# Patient Record
Sex: FEMALE | Race: WHITE | NOT HISPANIC OR LATINO | Employment: FULL TIME | URBAN - METROPOLITAN AREA
[De-identification: names, ages, dates, MRNs, and addresses within clinical notes are randomized per-mention and may not be internally consistent; named-entity substitution may affect disease eponyms.]

---

## 2018-07-04 ENCOUNTER — HOSPITAL ENCOUNTER (EMERGENCY)
Facility: HOSPITAL | Age: 38
Discharge: HOME | End: 2018-07-05
Attending: EMERGENCY MEDICINE | Admitting: EMERGENCY MEDICINE
Payer: COMMERCIAL

## 2018-07-04 DIAGNOSIS — F10.929 ALCOHOL INTOXICATION (HCC): Primary | ICD-10-CM

## 2018-07-04 LAB
AMPHETAMINES SERPL QL SCN: NEGATIVE
BARBITURATES UR QL: NEGATIVE
BENZODIAZ UR QL: NEGATIVE
COCAINE UR QL: NEGATIVE
ETHANOL EXG-MCNC: 0.26 MG/DL
EXT PREG TEST URINE: NEGATIVE
METHADONE UR QL: NEGATIVE
OPIATES UR QL SCN: NEGATIVE
PCP UR QL: NEGATIVE
THC UR QL: NEGATIVE

## 2018-07-04 PROCEDURE — 81025 URINE PREGNANCY TEST: CPT | Performed by: EMERGENCY MEDICINE

## 2018-07-04 PROCEDURE — 82075 ASSAY OF BREATH ETHANOL: CPT | Performed by: EMERGENCY MEDICINE

## 2018-07-04 PROCEDURE — 80307 DRUG TEST PRSMV CHEM ANLYZR: CPT | Performed by: EMERGENCY MEDICINE

## 2018-07-04 RX ORDER — NICOTINE 21 MG/24HR
14 PATCH, TRANSDERMAL 24 HOURS TRANSDERMAL ONCE
Status: DISCONTINUED | OUTPATIENT
Start: 2018-07-04 | End: 2018-07-04

## 2018-07-04 RX ORDER — NICOTINE 21 MG/24HR
14 PATCH, TRANSDERMAL 24 HOURS TRANSDERMAL DAILY PRN
Status: DISCONTINUED | OUTPATIENT
Start: 2018-07-04 | End: 2018-07-05 | Stop reason: HOSPADM

## 2018-07-04 RX ORDER — LORAZEPAM 1 MG/1
0.5 TABLET ORAL EVERY 6 HOURS PRN
COMMUNITY
End: 2018-09-19 | Stop reason: ALTCHOICE

## 2018-07-04 RX ADMIN — NICOTINE 14 MG: 14 PATCH, EXTENDED RELEASE TRANSDERMAL at 22:58

## 2018-07-04 NOTE — ED NOTES
On arrival, patient is intoxicated, uncooperative and intermittently physically combative  Patient refused to change into scrubs or give her purse to staff  Patient stated "I am due for my ativan", explained to patient that her belongings will be locked up and that if she is due for medication we will provide it for her, patient began to open her bottle of ativan, security called to bedside at this time however patient did take one 1mg tablet of ativan before staff could get the pills out of her hand  Security assisted patient to change and patient's money and credit cards locked up with security, all other belongings to be inventoried and placed in zone 5 med room  Patients medications placed in bag and sent to pharmacy  Patient ambulatory to bathroom with ED tech and is providing a urine sample at this time        Tiburcio Hazel RN  07/04/18 4201

## 2018-07-05 VITALS
HEART RATE: 82 BPM | SYSTOLIC BLOOD PRESSURE: 130 MMHG | TEMPERATURE: 98.7 F | WEIGHT: 130 LBS | RESPIRATION RATE: 18 BRPM | BODY MASS INDEX: 24.56 KG/M2 | OXYGEN SATURATION: 99 % | DIASTOLIC BLOOD PRESSURE: 84 MMHG

## 2018-07-05 LAB — ETHANOL EXG-MCNC: 0.15 MG/DL

## 2018-07-05 PROCEDURE — 99285 EMERGENCY DEPT VISIT HI MDM: CPT

## 2018-07-05 PROCEDURE — 82075 ASSAY OF BREATH ETHANOL: CPT | Performed by: EMERGENCY MEDICINE

## 2018-07-05 RX ORDER — LORAZEPAM 0.5 MG/1
1 TABLET ORAL ONCE
Status: COMPLETED | OUTPATIENT
Start: 2018-07-05 | End: 2018-07-05

## 2018-07-05 RX ADMIN — LORAZEPAM 1 MG: 0.5 TABLET ORAL at 00:42

## 2018-07-05 NOTE — ED PROVIDER NOTES
History  Chief Complaint   Patient presents with   [address] Problem     Patient brought in by EMS and Jose FELICIANO, per EMS patient was on the phone and was drunk and "sounded depressed", that person called the police, patient made suicidal statements to police and is being brought in as a 302 at this time     Patient is a 49-year-old female with a history of depression, PTSD, fibromyalgia, alcohol abuse who presents via police for reported suicidal statements  Per triage report, patient made suicidal statements to police so they filled out 302 forms and brought the patient for evaluation  She reports that she is "very, very, very, very depressed" but denies suicidal thoughts  She does report thoughts of hurting herself "but that's normal and I would never do it " She has a remote history of psych admission for anorexia/bulemia but denies admission for depression/suicidality  She denies homicidal thoughts, hallucinations, drug use  She does report having several beers today  Prior to Admission Medications   Prescriptions Last Dose Informant Patient Reported? Taking? LORazepam (ATIVAN) 1 mg tablet 7/4/2018 at Unknown time  Yes Yes   Sig: Take 0 5 mg by mouth every 6 (six) hours as needed for anxiety      Facility-Administered Medications: None       Past Medical History:   Diagnosis Date    Asthma     Chronic pain     Seizures ([facility] [address] )        History reviewed  No pertinent surgical history  History reviewed  No pertinent family history  I have reviewed and agree with the history as documented  Social History   Substance Use Topics    Smoking status: Current Every Day Smoker     Packs/day: 0 50     Types: Cigarettes    Smokeless tobacco: Never Used    Alcohol use Yes      Comment: patient refuses to answer        Review of Systems   Constitutional: Negative for chills, fatigue and fever  HENT: Negative for congestion and sore throat  Eyes: Negative for visual disturbance  Respiratory: Negative for cough and shortness of breath  Cardiovascular: Negative for chest pain  Gastrointestinal: Negative for abdominal pain, diarrhea, nausea and vomiting  Endocrine: Negative for polyuria  Genitourinary: Negative for difficulty urinating and dysuria  Musculoskeletal: Negative for arthralgias  Skin: Negative for rash  Neurological: Negative for dizziness, light-headedness and headaches  Psychiatric/Behavioral: Positive for dysphoric mood  Negative for suicidal ideas  The patient is not nervous/anxious  All other systems reviewed and are negative  Physical Exam  ED Triage Vitals [07/04/18 1909]   Temperature Pulse Respirations Blood Pressure SpO2   98 7 °F (37 1 °C) 79 20 125/77 99 %      Temp Source Heart Rate Source Patient Position - Orthostatic VS BP Location FiO2 (%)   Oral Monitor Sitting Right arm --      Pain Score       No Pain           Orthostatic Vital Signs  Vitals:    07/04/18 1909 07/05/18 0414   BP: 125/77 130/84   Pulse: 79 82   Patient Position - Orthostatic VS: Sitting Lying       Physical Exam   Constitutional: She is oriented to person, place, and time  She appears well-developed and well-nourished  No distress  HENT:   Head: Normocephalic and atraumatic  Eyes: EOM are normal  Pupils are equal, round, and reactive to light  Neck: Normal range of motion  Neck supple  Cardiovascular: Normal rate, regular rhythm and normal heart sounds  Pulmonary/Chest: Effort normal and breath sounds normal  No respiratory distress  Abdominal: Soft  Bowel sounds are normal  There is no tenderness  Musculoskeletal: Normal range of motion  She exhibits no edema  Neurological: She is alert and oriented to person, place, and time  Skin: Skin is warm and dry  Psychiatric: Her speech is slurred  She is agitated  She is not actively hallucinating  She exhibits a depressed mood  She expresses no homicidal and no suicidal ideation   She expresses no suicidal plans and no homicidal plans  Nursing note and vitals reviewed  ED Medications  Medications   LORazepam (ATIVAN) tablet 1 mg (1 mg Oral Given 7/5/18 0042)       Diagnostic Studies  Results Reviewed     Procedure Component Value Units Date/Time    POCT alcohol breath test [[id number]]  (Normal) Resulted:  07/05/18 0027    Lab Status:  Final result Updated:  07/05/18 0027     EXTBreath Alcohol 0 151    Rapid drug screen, urine [[id number]]  (Normal) Collected:  07/04/18 1949    Lab Status:  Final result Specimen:  Urine from Urine, Clean Catch Updated:  07/04/18 2007     Amph/Meth UR Negative     Barbiturate Ur Negative     Benzodiazepine Urine Negative     Cocaine Urine Negative     Methadone Urine Negative     Opiate Urine Negative     PCP Ur Negative     THC Urine Negative    Narrative:         FOR MEDICAL PURPOSES ONLY  IF CONFIRMATION NEEDED PLEASE CONTACT THE LAB WITHIN 5 DAYS  Drug Screen Cutoff Levels:  AMPHETAMINE/METHAMPHETAMINES  1000 ng/mL  BARBITURATES     200 ng/mL  BENZODIAZEPINES     200 ng/mL  COCAINE      300 ng/mL  METHADONE      300 ng/mL  OPIATES      300 ng/mL  PHENCYCLIDINE     25 ng/mL  THC       50 ng/mL    POCT alcohol breath test [[id number]]  (Normal) Resulted:  07/04/18 2000    Lab Status:  Final result Updated:  07/04/18 2000     EXTBreath Alcohol 0 261    POCT pregnancy, urine [[id number]]  (Normal) Resulted:  07/04/18 2000    Lab Status:  Final result Updated:  07/04/18 2000     EXT PREG TEST UR (Ref: Negative) NEGATIVE                 No orders to display         Procedures  Procedures      Phone Consults  ED Phone Contact    ED Course                               MDM  Number of Diagnoses or Management Options  Alcohol intoxication [facility]):   Diagnosis management comments: Patient is a 42-year-old female with a history of depression, PTSD, fibromyalgia, alcohol abuse who presents via police for reported suicidal statements   On exam, she is depressed and intoxicated but denies suicidality or homicidality    Will allow to sober up then reevaluate  When sober, patient continues to deny SI/HI  Discharged with return precautions  CritCare Time    Disposition  Final diagnoses:   Alcohol intoxication ([address] )     Time reflects when diagnosis was documented in both MDM as applicable and the Disposition within this note     Time User Action Codes Description Comment    7/5/2018  3:48 AM Juliane Grullon Add [F10 999] Alcohol intoxication [facility])       ED Disposition     ED Disposition Condition Comment    Discharge  Cedrick Zuñiga discharge to home/self care  Condition at discharge: Good        MD Documentation      Most Recent Value   Sending MD Jules Jovel      Follow-up Information     Follow up With Specialties Details Why Contact Info Additional Information    Guillermo Velarde DO General Practice Schedule an appointment as soon as possible for a visit in 3 days for re-evaluation [address] [address]  1100 [facility] Emergency Department Emergency Medicine Go to for re-evaluation, As needed, If symptoms worsen [address] [place] [address]  [phone]  ED, [address], [facility], 1717 [facility], [address]          Discharge Medication List as of 7/5/2018  3:49 AM      CONTINUE these medications which have NOT CHANGED    Details   LORazepam (ATIVAN) 1 mg tablet Take 0 5 mg by mouth every 6 (six) hours as needed for anxiety, Historical Med           No discharge procedures on file  ED Provider  Attending physically available and evaluated Beartiztorin Zuñiga I managed the patient along with the ED Attending      Electronically Signed by         Jeffrey Grover MD  07/06/18 4475

## 2018-07-05 NOTE — ED ATTENDING ATTESTATION
Zachary Sagastume MD, saw and evaluated the patient  All available labs and X-rays were ordered by me or the resident and have been reviewed by myself  I discussed the patient with the resident / non-physician and agree with the resident's / non-physician practitioner's findings and plan as documented in the resident's / non-physician practicitioner's note, except where noted  At this point, I agree with the current assessment done in the ED  Chief Complaint   Patient presents with   [address] Problem     Patient brought in by EMS and Jose FELICIANO, per EMS patient was on the phone and was drunk and "sounded depressed", that person called the police, patient made suicidal statements to police and is being brought in as a 302 at this time     This is a 63-year-old female presenting for evaluation of depression, alcohol intake  The patient was at home, she has been feeling very sad and depressed but will not tell me exactly what the trigger is  She mentioned this to a family member, I suspect the mother given the conversation and how angry the patient became  The police were called by this person  Police arrived and the patient had made worrisome statements so they had initiated a 302 on the patient, though she was intoxicated  She in the room and angry that this happened  She denies anything on ROS but is clinically intoxicated  Denies SI/HI  She also mentions, "does everyone who say they will kill themselves or are depressed, will they be brought in?" I said yes  PMH:  - anorexia/bulemia  - Asthma  - seizures  PSH:  - none  Smokes daily  +alcohol use  No drugs  PE:  Vitals:    07/04/18 1909 07/05/18 0414 07/05/18 0415   BP: 125/77 130/84    BP Location: Right arm Right arm    Pulse: 79 82    Resp: 20 18    Temp: 98 7 °F (37 1 °C)     TempSrc: Oral     SpO2: 99% 99%    Weight:   59 kg (130 lb)   General: VSS, NAD, awake, alert  Well-nourished, well-developed  Appears stated age     Speaking normally in full sentences  Head: Normocephalic, atraumatic, nontender  Eyes: PERRL, EOM-I  No diplopia  No hyphema  No subconjunctival hemorrhages  Symmetrical lids  ENT: Atraumatic external nose and ears  MMM  No malocclusion  No stridor  Normal phonation  No drooling  Normal swallowing  Neck: Symmetric, trachea midline  No JVD  CV: RRR  +S1/S2  No murmurs or gallops  Peripheral pulses +2 throughout  No chest wall tenderness  Lungs:   Unlabored No retractions  CTAB, lungs sounds equal bilateral    No tachypnea  Abd: +BS, soft, NT/ND    MSK:   FROM   Back:   No rashes  Skin: Dry, intact  Neuro: AAOx3, GCS 15, CN II-XII grossly intact  Motor grossly intact  Psychiatric/Behavioral: Appropriate mood and affect   Exam: deferred  A:  - Depression  - ETOH  P:  - obs  - CRISIS  - re-eval when sober   - 13 point ROS was performed and all are normal unless stated in the history above  - Nursing note reviewed  Vitals reviewed  - Orders placed by myself and/or advanced practitioner / resident     - Previous chart was not reviewed  - No language barrier    - History obtained from patient  - There are no limitations to the history obtained  - Critical care time: Not applicable for this patient  Final Diagnosis:  1   Alcohol intoxication ([address] )           Medications   LORazepam (ATIVAN) tablet 1 mg (1 mg Oral Given 7/5/18 0042)     No orders to display     Orders Placed This Encounter   Procedures    Rapid drug screen, urine    POCT alcohol breath test    POCT pregnancy, urine    POCT alcohol breath test     Labs Reviewed   RAPID DRUG SCREEN, URINE - Normal       Result Value Ref Range Status    Amph/Meth UR Negative  Negative Final    Barbiturate Ur Negative  Negative Final    Benzodiazepine Urine Negative  Negative Final    Cocaine Urine Negative  Negative Final    Methadone Urine Negative  Negative Final    Opiate Urine Negative  Negative Final    PCP Ur Negative  Negative Final    THC Urine Negative  Negative Final    Narrative:     FOR MEDICAL PURPOSES ONLY  IF CONFIRMATION NEEDED PLEASE CONTACT THE LAB WITHIN 5 DAYS  Drug Screen Cutoff Levels:  AMPHETAMINE/METHAMPHETAMINES  1000 ng/mL  BARBITURATES     200 ng/mL  BENZODIAZEPINES     200 ng/mL  COCAINE      300 ng/mL  METHADONE      300 ng/mL  OPIATES      300 ng/mL  PHENCYCLIDINE     25 ng/mL  THC       50 ng/mL   POCT ALCOHOL BREATH TEST - Normal    EXTBreath Alcohol 0 261   Final   POCT PREGNANCY, URINE - Normal    EXT PREG TEST UR (Ref: Negative) NEGATIVE   Final   POCT ALCOHOL BREATH TEST - Normal    EXTBreath Alcohol 0 151   Final     Time reflects when diagnosis was documented in both MDM as applicable and the Disposition within this note     Time User Action Codes Description Comment    7/5/2018  3:48 AM Nesha Ramires Add [F10 929] Alcohol intoxication [facility])       ED Disposition     ED Disposition Condition Comment    Discharge  Edward Amber discharge to home/self care  Condition at discharge: Good        MD Documentation      Most Recent Value   Sending MD Marc Iverson      Follow-up Information     Follow up With Specialties Details Why Contact Info Additional Information    Dangelo Murcia DO General Practice Schedule an appointment as soon as possible for a visit in 3 days for re-evaluation [address] [address]  1100 [facility] Emergency Department Emergency Medicine Go to for re-evaluation, As needed, If symptoms worsen [address] [place] [address]  [phone]  ED, [address] [place], [place], [address]        Discharge Medication List as of 7/5/2018  3:49 AM      CONTINUE these medications which have NOT CHANGED    Details   LORazepam (ATIVAN) 1 mg tablet Take 0 5 mg by mouth every 6 (six) hours as needed for anxiety, Historical Med           No discharge procedures on file    Prior to Admission Medications Prescriptions Last Dose Informant Patient Reported? Taking? LORazepam (ATIVAN) 1 mg tablet 7/4/2018 at Unknown time  Yes Yes   Sig: Take 0 5 mg by mouth every 6 (six) hours as needed for anxiety      Facility-Administered Medications: None       Portions of the record may have been created with voice recognition software  Occasional wrong word or "sound a like" substitutions may have occurred due to the inherent limitations of voice recognition software  Read the chart carefully and recognize, using context, where substitutions have occurred      Electronically signed by:  Chino Booth

## 2018-07-05 NOTE — ED CARE HANDOFF
Emergency Department Sign Out Note        Sign out and transfer of care from Dr Lloyd Carney  See Separate Emergency Department note  The patient, Benjamin Chatterjee, was evaluated by the previous provider for EtOH intoxication and depression/ suicidal ideation  Workup Completed:  Patient observed in the emergency department until ETOH is below 100    ED Course / Workup Pending (followup):   patient care signed out to me while patient is alcohol was still above 100  Currently, patient has a level that is below 100 and is clinically sober  Patient reports that she went off the wagon and started drinking 2 days ago and secondary to that she became extremely upset  Patient is denying suicidal ideations, homicidal ideations, auditory or visual hallucinations  She does admit that she is very upset that she started drinking again, but does not want to harm herself  Crisis also evaluated the patient and offered outpatient resources which the patient declined because she already  has outpatient resources  Will discharge the patient  Discussed strict return precautions including returning immediately if depression worsens or if patient develops suicidal ideations  Patient verbalized understanding  ED Course as of Jul 05 0349   Thu Jul 05, 2018   0200 39 yo F w/ pmhx EtOH abuse, depression p/w SI  Patient is still intoxicated  Had vague SI  Needs reassessment when sober  Procedures  MDM  CritCare Time      Disposition  Final diagnoses:   Alcohol intoxication ([address] )     Time reflects when diagnosis was documented in both MDM as applicable and the Disposition within this note     Time User Action Codes Description Comment    7/5/2018  3:48 AM Hermelinda Mcmahon Add [H30 768] Alcohol intoxication [facility])       ED Disposition     ED Disposition Condition Comment    Discharge  Benjamin Chatterjee discharge to home/self care      Condition at discharge: Good        MD Documentation      Most Recent Value   Sending MD Tarun Hicks      Follow-up Information     Follow up With Specialties Details Why Contact Info Additional Information    Veronica Rubio DO General Practice Schedule an appointment as soon as possible for a visit in 3 days for re-evaluation [address] [address]  1100 [facility] Emergency Department Emergency Medicine Go to for re-evaluation, As needed, If symptoms worsen 4178 [facility] 809 [facility] ED, [address] [address], [place], [place], [facility]        Patient's Medications   Discharge Prescriptions    No medications on file     No discharge procedures on file         ED Provider  Electronically Signed by     Vel Brown DO  07/05/18 4205

## 2018-07-05 NOTE — ED NOTES
Pt cleared by CW to be discharged  Medications retrieved from pharmacy along with valuables placed in safe       Олег Barney RN  07/05/18 5692

## 2018-07-05 NOTE — DISCHARGE INSTRUCTIONS
Alcohol Use Disorder   WHAT YOU NEED TO KNOW:   Alcohol use disorder (AUD) is problem drinking  AUD includes alcohol abuse and alcohol dependency  DISCHARGE INSTRUCTIONS:   Seek care immediately if:   · Your heart is beating faster than usual     · You have hallucinations  · You cannot remember what happens while you are drinking  · You have seizures  Contact your healthcare provider if:   · You are anxious and have nausea  · Your hands are shaky and you are sweating heavily  · You have questions or concerns about your condition or care  Follow up with your healthcare provider as directed:  Do not try to stop drinking on your own  Your healthcare provider may need to help you withdraw from alcohol safely  He may need to admit you to the hospital  You may also need any of the following treatments:  · Medicines to decrease your craving for alcohol    · Support groups such as Alcoholics Anonymous     · Therapy from a psychiatrist or psychologist     · Admission to an inpatient facility for treatment for severe AUD  For support and more information:   · Substance Abuse and [address] , [address] [place]  Web Address: https://[employer]/  · Alcoholics Anonymous  Web Address: http://[employer]/  © 2017 [address] Information is for End User's use only and may not be sold, redistributed or otherwise used for commercial purposes  All illustrations and images included in CareNotes® are the copyrighted property of A D A M , Inc  or Phillip Trejo  The above information is an  only  It is not intended as medical advice for individual conditions or treatments  Talk to your doctor, nurse or pharmacist before following any medical regimen to see if it is safe and effective for you  Depression   WHAT YOU NEED TO KNOW:   Depression is a medical condition that causes feelings of sadness or hopelessness that do not go away  Depression may cause you to lose interest in things you used to enjoy  These feelings may interfere with your daily life  DISCHARGE INSTRUCTIONS:   Call 911 for any of the following:   · You think about harming yourself or someone else  Contact your healthcare provider if:   · Your symptoms do not improve  · You cannot make it to your next appointment  · You have new symptoms  · You have questions or concerns about your condition or care  Medicines:   · Antidepressants  may be given to improve or balance your mood  You may need to take this medicine for several weeks before you begin to feel better  · Take your medicine as directed  Contact your healthcare provider if you think your medicine is not helping or if you have side effects  Tell him of her if you are allergic to any medicine  Keep a list of the medicines, vitamins, and herbs you take  Include the amounts, and when and why you take them  Bring the list or the pill bottles to follow-up visits  Carry your medicine list with you in case of an emergency  Therapy  may be used to treat your depression  A therapist will help you learn to cope with your thoughts and feelings  This can be done alone or in a group  It may also be done with family members or a significant other  Self-care:   · Get regular physical activity  Try to exercise for 30 minutes, 3 to 5 days a week  Work with your healthcare provider to develop an exercise plan that you enjoy  Physical activity may improve your symptoms  · Get enough sleep  Create a routine to help you relax before bed  You can listen to music, read, or do yoga  Try to go to bed and wake up at the same time every day  Sleep is important for emotional health  · Eat a variety of healthy foods from all of the food groups  A healthy meal plan is low in fat, salt, and added sugar  Ask your healthcare provider for more information about a meal plan that is right for you       · Do not drink alcohol or use drugs  Alcohol and drugs can make your symptoms worse  Follow up with your healthcare provider as directed: Your healthcare provider will monitor your progress at follow-up visits  He or she will also monitor your medicine if you take antidepressants  Your healthcare provider will ask if the medicine is helping  Tell him or her about any side effects or problems you may have with your medicine  The type or amount of medicine may need to be changed  Write down your questions so you remember to ask them during your visits  © 2017 [address] [facility] Information is for End User's use only and may not be sold, redistributed or otherwise used for commercial purposes  All illustrations and images included in CareNotes® are the copyrighted property of A D A M , Inc  or [employer] Trejo  The above information is an  only  It is not intended as medical advice for individual conditions or treatments  Talk to your doctor, nurse or pharmacist before following any medical regimen to see if it is safe and effective for you

## 2018-07-05 NOTE — ED NOTES
Patient provided with lunchbox, ginger ale and 3 turkey sandwiches  Patient continues to have intermittent outbursts and has stated several times "I'm not staying here"  1:1 supervision in place due to elopement risk        Parisa Hill, RN  07/04/18 2015

## 2018-07-05 NOTE — ED NOTES
Pt was brought in tonight after the police were called by the pt mother to make a welfare check  Pt states that she recently relasped on alcohol and has been drinking for the past 2-3 days  She denies SI/HI/AH/VH  Police were concerned for her and brought her to the ED  Police state pt made a suicidal threat with no plan while intoxicated  Pt does not admit to this, she said she was upset because of her relaspse on alcohol  Pt refused OP resources

## 2018-07-06 ENCOUNTER — HOSPITAL ENCOUNTER (EMERGENCY)
Facility: HOSPITAL | Age: 38
Discharge: HOME | End: 2018-07-07
Attending: EMERGENCY MEDICINE | Admitting: EMERGENCY MEDICINE
Payer: COMMERCIAL

## 2018-07-06 DIAGNOSIS — F10.10 ALCOHOL ABUSE: Primary | ICD-10-CM

## 2018-07-06 LAB
ETHANOL EXG-MCNC: NORMAL MG/DL
ETHANOL SERPL-MCNC: 329 MG/DL (ref 0–3)

## 2018-07-06 PROCEDURE — G0480 DRUG TEST DEF 1-7 CLASSES: HCPCS | Performed by: EMERGENCY MEDICINE

## 2018-07-06 PROCEDURE — 96372 THER/PROPH/DIAG INJ SC/IM: CPT

## 2018-07-06 PROCEDURE — 80320 DRUG SCREEN QUANTALCOHOLS: CPT | Performed by: EMERGENCY MEDICINE

## 2018-07-06 PROCEDURE — 36415 COLL VENOUS BLD VENIPUNCTURE: CPT | Performed by: EMERGENCY MEDICINE

## 2018-07-06 PROCEDURE — 82075 ASSAY OF BREATH ETHANOL: CPT | Performed by: EMERGENCY MEDICINE

## 2018-07-06 RX ORDER — ZIPRASIDONE MESYLATE 20 MG/ML
20 INJECTION, POWDER, LYOPHILIZED, FOR SOLUTION INTRAMUSCULAR ONCE
Status: COMPLETED | OUTPATIENT
Start: 2018-07-06 | End: 2018-07-06

## 2018-07-06 RX ADMIN — ZIPRASIDONE MESYLATE 20 MG: 20 INJECTION, POWDER, LYOPHILIZED, FOR SOLUTION INTRAMUSCULAR at 16:06

## 2018-07-06 RX ADMIN — WATER 1.2 ML: 1 INJECTION INTRAMUSCULAR; INTRAVENOUS; SUBCUTANEOUS at 16:06
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
## 2018-07-07 VITALS
OXYGEN SATURATION: 98 % | HEART RATE: 102 BPM | RESPIRATION RATE: 18 BRPM | BODY MASS INDEX: 20.78 KG/M2 | WEIGHT: 110 LBS | TEMPERATURE: 98 F | DIASTOLIC BLOOD PRESSURE: 86 MMHG | SYSTOLIC BLOOD PRESSURE: 144 MMHG

## 2018-07-07 LAB
AMPHETAMINES SERPL QL SCN: NEGATIVE
BACTERIA UR QL AUTO: ABNORMAL /HPF
BARBITURATES UR QL: NEGATIVE
BENZODIAZ UR QL: NEGATIVE
BILIRUB UR QL STRIP: NEGATIVE
CLARITY UR: CLEAR
COCAINE UR QL: NEGATIVE
COLOR UR: YELLOW
ETHANOL EXG-MCNC: 0.1 MG/DL
GLUCOSE UR STRIP-MCNC: NEGATIVE MG/DL
HGB UR QL STRIP.AUTO: ABNORMAL
KETONES UR STRIP-MCNC: NEGATIVE MG/DL
LEUKOCYTE ESTERASE UR QL STRIP: ABNORMAL
METHADONE UR QL: NEGATIVE
NITRITE UR QL STRIP: NEGATIVE
NON-SQ EPI CELLS URNS QL MICRO: ABNORMAL /HPF
OPIATES UR QL SCN: NEGATIVE
PCP UR QL: NEGATIVE
PH UR STRIP.AUTO: 6.5 [PH] (ref 4.5–8)
PROT UR STRIP-MCNC: NEGATIVE MG/DL
RBC #/AREA URNS AUTO: ABNORMAL /HPF
SP GR UR STRIP.AUTO: 1.01 (ref 1–1.03)
THC UR QL: NEGATIVE
UROBILINOGEN UR QL STRIP.AUTO: 0.2 E.U./DL
WBC #/AREA URNS AUTO: ABNORMAL /HPF

## 2018-07-07 PROCEDURE — 87086 URINE CULTURE/COLONY COUNT: CPT

## 2018-07-07 PROCEDURE — 80307 DRUG TEST PRSMV CHEM ANLYZR: CPT | Performed by: EMERGENCY MEDICINE

## 2018-07-07 PROCEDURE — 82075 ASSAY OF BREATH ETHANOL: CPT | Performed by: EMERGENCY MEDICINE

## 2018-07-07 PROCEDURE — 81001 URINALYSIS AUTO W/SCOPE: CPT

## 2018-07-07 PROCEDURE — 99285 EMERGENCY DEPT VISIT HI MDM: CPT

## 2018-07-07 RX ORDER — LORAZEPAM 0.5 MG/1
1 TABLET ORAL ONCE
Status: COMPLETED | OUTPATIENT
Start: 2018-07-07 | End: 2018-07-07

## 2018-07-07 RX ORDER — LORAZEPAM 1 MG/1
1 TABLET ORAL EVERY 6 HOURS
Qty: 16 TABLET | Refills: 0 | Status: SHIPPED | OUTPATIENT
Start: 2018-07-07 | End: 2018-09-19 | Stop reason: ALTCHOICE

## 2018-07-07 RX ORDER — IBUPROFEN 600 MG/1
600 TABLET ORAL ONCE
Status: COMPLETED | OUTPATIENT
Start: 2018-07-07 | End: 2018-07-07

## 2018-07-07 RX ORDER — ACETAMINOPHEN 325 MG/1
650 TABLET ORAL ONCE
Status: DISCONTINUED | OUTPATIENT
Start: 2018-07-07 | End: 2018-07-07

## 2018-07-07 RX ORDER — ACETAMINOPHEN 325 MG/1
650 TABLET ORAL ONCE
Status: DISCONTINUED | OUTPATIENT
Start: 2018-07-07 | End: 2018-07-07 | Stop reason: HOSPADM

## 2018-07-07 RX ORDER — LORAZEPAM 1 MG/1
0.5 TABLET ORAL EVERY 6 HOURS
Qty: 14 TABLET | Refills: 0 | Status: SHIPPED | OUTPATIENT
Start: 2018-07-07 | End: 2018-07-07

## 2018-07-07 RX ORDER — BUSPIRONE HYDROCHLORIDE 15 MG/1
TABLET ORAL 3 TIMES DAILY
COMMUNITY

## 2018-07-07 RX ORDER — LORAZEPAM 0.5 MG/1
1 TABLET ORAL EVERY 6 HOURS PRN
Status: DISCONTINUED | OUTPATIENT
Start: 2018-07-07 | End: 2018-07-07 | Stop reason: HOSPADM

## 2018-07-07 RX ORDER — NICOTINE 21 MG/24HR
14 PATCH, TRANSDERMAL 24 HOURS TRANSDERMAL ONCE
Status: DISCONTINUED | OUTPATIENT
Start: 2018-07-07 | End: 2018-07-07 | Stop reason: HOSPADM

## 2018-07-07 RX ADMIN — IBUPROFEN 600 MG: 600 TABLET ORAL at 06:10

## 2018-07-07 RX ADMIN — BUSPIRONE HYDROCHLORIDE 15 MG: 10 TABLET ORAL at 08:29

## 2018-07-07 RX ADMIN — LORAZEPAM 1 MG: 0.5 TABLET ORAL at 03:33

## 2018-07-07 RX ADMIN — LORAZEPAM 1 MG: 0.5 TABLET ORAL at 07:40

## 2018-07-07 RX ADMIN — NICOTINE 14 MG: 14 PATCH, EXTENDED RELEASE TRANSDERMAL at 05:54

## 2018-07-07 NOTE — DISCHARGE INSTRUCTIONS
Abuse of Alcohol   WHAT YOU NEED TO KNOW:   What is alcohol abuse? · Alcohol abuse is unhealthy drinking behavior  You may drink too much once a week, or continue to drink too much daily  You continue to drink even though it causes problems  The problems may include legal problems, problems at work, or problems with relationships  · If you drink too much at one time, you are binge drinking  Binge drinking is when you have a large amount of alcohol in a short time  Your blood alcohol concentrations (ASHLEY) goes above 0 08 g/dLlevel during binge drinking  For men, this usually happens with more than 4 drinks in 2 hours  For women, it is more than 3 drinks in 2 hours  A drink is 12 ounces of beer, 4 ounces of wine, or 1½ ounces of liquor  What are the signs and symptoms of alcohol abuse? Each person that abuses alcohol may have different symptoms  The following are common signs and symptoms of alcohol abuse:  · Loss of interest in activities, work, and school    · Decreased interest in family and friends    · Depression    · Constant thoughts about drinking    · Not able to control the amount you drink    · Restlessness, or erratic and violent behavior  What are the long-term effects of alcohol abuse? · Blackouts    · Memory loss    · Dementia    · Liver disease    · Thiamine (vitamin B1) deficiency  What treatments or therapies are used to treat alcohol abuse? · Detoxification (detox) and withdrawal  is a program that helps you to safely get alcohol out of your body  Detox can also help get rid of the physical need to drink  Healthcare providers monitor the physical symptoms of withdrawal  They may give you medicines to help decrease nausea, dehydration, and seizures  Healthcare providers will also monitor your blood pressure, heart and breathing rates, and your temperature  Symptoms of anxiety, depression, and suicidal thoughts are also monitored and managed during detox   Healthcare providers may give you medicines for these symptoms and therapy sessions will be available to you  Detox is usually done at a detox center or in a hospital  Healthcare providers do not recommend that you try to detox at home or by yourself  Withdrawal symptoms may become life-threatening  The center can help you find 12 step programs or an individual therapist to help with emotional support after detox  · Inpatient and outpatient treatment  focus on your personal needs to help you stop drinking  Treatment helps you understand the reasons you abuse alcohol  Counselors and therapists provide you with support and help you find ways to cope instead of drinking  You may need inpatient treatment to provide a controlled environment  You may need outpatient treatment after your inpatient treatment is complete  · Alcohol aversion therapy  takes away the desire to drink by causing a negative reaction when you drink  Healthcare providers may give you medicines that cause nausea and vomiting when you drink alcohol  They may instead give you a medicine that decreases your urge to drink alcohol  These medicines are used to help you stop drinking or reduce the amount you drink  They can also help you avoid relapse  What are the risks of alcohol abuse? Alcohol abuse increases your risk for gastrointestinal cancers, brain damage and problems with your immune system  It also increases your risk for heart, kidney, and lung damage  The risk of stroke increases with alcohol abuse  If you are pregnant and drink alcohol, you and your baby are at risk for serious health problems  Where can I find support and more information? · Alcoholics Anonymous  Web Address: http://www hernandez info/  · Substance Abuse and [address] [address]  Web Address: https://GANTEC/  Call 919 for the following:   · You have sudden chest pain or trouble breathing  · You want to harm yourself or others      · You have a seizure or have shaking or trembling  When should I seek immediate care? · You have hallucinations (you see or hear things that are not real)  · You cannot stop vomiting or you vomit blood  When should I contact my healthcare provider? · You need help to stop drinking alcohol  · You have questions or concerns about your condition or care  CARE AGREEMENT:   You have the right to help plan your care  Learn about your health condition and how it may be treated  Discuss treatment options with your caregivers to decide what care you want to receive  You always have the right to refuse treatment  The above information is an  only  It is not intended as medical advice for individual conditions or treatments  Talk to your doctor, nurse or pharmacist before following any medical regimen to see if it is safe and effective for you  © 2017 [address] Information is for End User's use only and may not be sold, redistributed or otherwise used for commercial purposes  All illustrations and images included in CareNotes® are the copyrighted property of A D A M , Inc  or Phillip Trejo

## 2018-07-07 NOTE — ED RE-EVALUATION NOTE
Was passed along that the patient has history of EtOH withdrawal seizures  Patient looking again to get into detox  Currently review is out to [facility]   Patient is hemodynamically stable  She does have very mild tremor of her hands but talks without pressured speech  Appears comfortable  Patient states she went 10 months; where she was sober and then fell off the wagon for the past week  She did have withdrawal seizures in the past; however she states for this 10 months she not drink at all  Given the patient has consumed alcohol for 1 week I believe she is low risk for withdrawal seizures at this time  If cannot place; will need discharge with outpatient resources       Geetha Leung DO  07/07/18 0064

## 2018-07-08 LAB — BACTERIA UR CULT: NORMAL

## 2018-09-19 ENCOUNTER — HOSPITAL ENCOUNTER (EMERGENCY)
Facility: HOSPITAL | Age: 38
Discharge: HOME | End: 2018-09-19
Attending: EMERGENCY MEDICINE
Payer: COMMERCIAL

## 2018-09-19 VITALS
HEART RATE: 68 BPM | OXYGEN SATURATION: 97 % | RESPIRATION RATE: 18 BRPM | DIASTOLIC BLOOD PRESSURE: 83 MMHG | BODY MASS INDEX: 18.88 KG/M2 | TEMPERATURE: 98.3 F | HEIGHT: 61 IN | SYSTOLIC BLOOD PRESSURE: 116 MMHG | WEIGHT: 100 LBS

## 2018-09-19 DIAGNOSIS — F41.9 ANXIETY: Primary | ICD-10-CM

## 2018-09-19 DIAGNOSIS — R00.2 PALPITATIONS: ICD-10-CM

## 2018-09-19 LAB
ALBUMIN SERPL BCP-MCNC: 3.7 G/DL (ref 3.5–5)
ALP SERPL-CCNC: 54 U/L (ref 46–116)
ALT SERPL W P-5'-P-CCNC: 21 U/L (ref 12–78)
AMMONIA PLAS-SCNC: <10 UMOL/L (ref 11–35)
ANION GAP SERPL CALCULATED.3IONS-SCNC: 8 MMOL/L (ref 4–13)
AST SERPL W P-5'-P-CCNC: 14 U/L (ref 5–45)
ATRIAL RATE: 84 BPM
BASOPHILS # BLD AUTO: 0.03 THOUSANDS/ΜL (ref 0–0.1)
BASOPHILS NFR BLD AUTO: 0 % (ref 0–1)
BILIRUB SERPL-MCNC: 0.36 MG/DL (ref 0.2–1)
BUN SERPL-MCNC: 6 MG/DL (ref 5–25)
CALCIUM SERPL-MCNC: 9.2 MG/DL (ref 8.3–10.1)
CHLORIDE SERPL-SCNC: 97 MMOL/L (ref 100–108)
CO2 SERPL-SCNC: 27 MMOL/L (ref 21–32)
CREAT SERPL-MCNC: 0.91 MG/DL (ref 0.6–1.3)
EOSINOPHIL # BLD AUTO: 0.07 THOUSAND/ΜL (ref 0–0.61)
EOSINOPHIL NFR BLD AUTO: 1 % (ref 0–6)
ERYTHROCYTE [DISTWIDTH] IN BLOOD BY AUTOMATED COUNT: 13.9 % (ref 11.6–15.1)
EXT PREG TEST URINE: NEGATIVE
GFR SERPL CREATININE-BSD FRML MDRD: 80 ML/MIN/1.73SQ M
GLUCOSE SERPL-MCNC: 101 MG/DL (ref 65–140)
HCT VFR BLD AUTO: 40 % (ref 34.8–46.1)
HGB BLD-MCNC: 13.4 G/DL (ref 11.5–15.4)
IMM GRANULOCYTES # BLD AUTO: 0.01 THOUSAND/UL (ref 0–0.2)
IMM GRANULOCYTES NFR BLD AUTO: 0 % (ref 0–2)
LYMPHOCYTES # BLD AUTO: 2.24 THOUSANDS/ΜL (ref 0.6–4.47)
LYMPHOCYTES NFR BLD AUTO: 33 % (ref 14–44)
MAGNESIUM SERPL-MCNC: 2 MG/DL (ref 1.6–2.6)
MCH RBC QN AUTO: 31.6 PG (ref 26.8–34.3)
MCHC RBC AUTO-ENTMCNC: 33.5 G/DL (ref 31.4–37.4)
MCV RBC AUTO: 94 FL (ref 82–98)
MONOCYTES # BLD AUTO: 0.44 THOUSAND/ΜL (ref 0.17–1.22)
MONOCYTES NFR BLD AUTO: 7 % (ref 4–12)
NEUTROPHILS # BLD AUTO: 3.99 THOUSANDS/ΜL (ref 1.85–7.62)
NEUTS SEG NFR BLD AUTO: 59 % (ref 43–75)
NRBC BLD AUTO-RTO: 0 /100 WBCS
P AXIS: 76 DEGREES
PHOSPHATE SERPL-MCNC: 3.8 MG/DL (ref 2.7–4.5)
PLATELET # BLD AUTO: 268 THOUSANDS/UL (ref 149–390)
PMV BLD AUTO: 8.7 FL (ref 8.9–12.7)
POTASSIUM SERPL-SCNC: 3.5 MMOL/L (ref 3.5–5.3)
PR INTERVAL: 132 MS
PROT SERPL-MCNC: 7.4 G/DL (ref 6.4–8.2)
QRS AXIS: 88 DEGREES
QRSD INTERVAL: 80 MS
QT INTERVAL: 342 MS
QTC INTERVAL: 404 MS
RBC # BLD AUTO: 4.24 MILLION/UL (ref 3.81–5.12)
SODIUM SERPL-SCNC: 132 MMOL/L (ref 136–145)
T WAVE AXIS: 71 DEGREES
TSH SERPL DL<=0.05 MIU/L-ACNC: 1.08 UIU/ML (ref 0.36–3.74)
VENTRICULAR RATE: 84 BPM
WBC # BLD AUTO: 6.78 THOUSAND/UL (ref 4.31–10.16)

## 2018-09-19 PROCEDURE — 84443 ASSAY THYROID STIM HORMONE: CPT | Performed by: EMERGENCY MEDICINE

## 2018-09-19 PROCEDURE — 83735 ASSAY OF MAGNESIUM: CPT | Performed by: EMERGENCY MEDICINE

## 2018-09-19 PROCEDURE — 93010 ELECTROCARDIOGRAM REPORT: CPT | Performed by: INTERNAL MEDICINE

## 2018-09-19 PROCEDURE — 82140 ASSAY OF AMMONIA: CPT | Performed by: EMERGENCY MEDICINE

## 2018-09-19 PROCEDURE — 96374 THER/PROPH/DIAG INJ IV PUSH: CPT

## 2018-09-19 PROCEDURE — 85025 COMPLETE CBC W/AUTO DIFF WBC: CPT | Performed by: EMERGENCY MEDICINE

## 2018-09-19 PROCEDURE — 84100 ASSAY OF PHOSPHORUS: CPT | Performed by: EMERGENCY MEDICINE

## 2018-09-19 PROCEDURE — 96361 HYDRATE IV INFUSION ADD-ON: CPT

## 2018-09-19 PROCEDURE — 80053 COMPREHEN METABOLIC PANEL: CPT | Performed by: EMERGENCY MEDICINE

## 2018-09-19 PROCEDURE — 93005 ELECTROCARDIOGRAM TRACING: CPT

## 2018-09-19 PROCEDURE — 81025 URINE PREGNANCY TEST: CPT | Performed by: EMERGENCY MEDICINE

## 2018-09-19 PROCEDURE — 36415 COLL VENOUS BLD VENIPUNCTURE: CPT | Performed by: EMERGENCY MEDICINE

## 2018-09-19 PROCEDURE — 99284 EMERGENCY DEPT VISIT MOD MDM: CPT

## 2018-09-19 RX ORDER — NICOTINE 21 MG/24HR
14 PATCH, TRANSDERMAL 24 HOURS TRANSDERMAL ONCE
Status: DISCONTINUED | OUTPATIENT
Start: 2018-09-19 | End: 2018-09-19 | Stop reason: HOSPADM

## 2018-09-19 RX ORDER — KETOROLAC TROMETHAMINE 30 MG/ML
15 INJECTION, SOLUTION INTRAMUSCULAR; INTRAVENOUS ONCE
Status: COMPLETED | OUTPATIENT
Start: 2018-09-19 | End: 2018-09-19

## 2018-09-19 RX ADMIN — SODIUM CHLORIDE 1000 ML: 0.9 INJECTION, SOLUTION INTRAVENOUS at 11:48

## 2018-09-19 RX ADMIN — NICOTINE 14 MG: 14 PATCH, EXTENDED RELEASE TRANSDERMAL at 13:51

## 2018-09-19 RX ADMIN — KETOROLAC TROMETHAMINE 15 MG: 30 INJECTION, SOLUTION INTRAMUSCULAR at 11:48

## 2018-09-19 NOTE — ED NOTES
Pt stopped Ativan 7/25 and entered a program at [address] [place] for detox  Pt released 8/23  Last Tuesday psychiatry prescribed buspar and NAC   Pt stated she is feeling bad since     Claudio Kemp RN  09/19/18 6238

## 2018-09-19 NOTE — ED PROVIDER NOTES
History  Chief Complaint   Patient presents with    Medication Problem     patient states that since she was off Ativan on July 25th and with the changes in her medications she has been not feeling right  States that she is having heart palpitations, sweats, ringing in the ears, blurred vision and confusion  This is a 28-year-old female with a history of alcohol abuse and chronic Ativan use who presents with multiple complaints  The patient states that she went into a program at the end of July to wean off of her Ativan  Ever since this time, she has been experiencing daily episodes of palpitations, headaches, fogginess, blurred vision, nausea without vomiting, sweats, and ringing in the ears  She was seen by her primary care doctor a couple weeks ago who did blood work and the blood work is normal according to the patient  The patient describes her headache as "my head is collapsing", intermittent, nonradiating, without any associated symptoms  Nothing seems to help these episodes  She has never experienced these episodes before  She also has a history of alcohol abuse  She has not had a drink since July as well  Denies history of DVT/PE (also, no objective results indicating DVT/PE), unilateral calf pain/swelling, hemoptysis, recent trauma/surgery (</= 4 weeks ago requiring general anesthesia), recent travel, cancer/cancer treatment (in last 6 months), exogenous estrogen use  Denies fever/chills, vomiting, lightheadedness/dizziness, numbness/weakness, URI symptoms, neck pain, chest pain, shortness of breath, cough, back pain, flank pain, abdominal pain, diarrhea, hematochezia, melena, dysuria, hematuria, abnormal vaginal discharge/bleeding  Prior to Admission Medications   Prescriptions Last Dose Informant Patient Reported?  Taking?   busPIRone (BUSPAR) 15 mg tablet 9/19/2018 at Unknown time  Yes Yes   Sig: Take by mouth 3 (three) times a day        Facility-Administered Medications: None Past Medical History:   Diagnosis Date    Asthma     Chronic pain     Seizures ([facility] [address] )        History reviewed  No pertinent surgical history  History reviewed  No pertinent family history  I have reviewed and agree with the history as documented  Social History   Substance Use Topics    Smoking status: Current Every Day Smoker     Packs/day: 0 50     Types: Cigarettes    Smokeless tobacco: Never Used    Alcohol use No      Comment: patient refuses to answer        Review of Systems   Constitutional: Positive for diaphoresis  Negative for chills and fever  HENT: Negative for rhinorrhea, sore throat and trouble swallowing  Eyes: Positive for visual disturbance  Negative for photophobia  Respiratory: Negative for cough, chest tightness and shortness of breath  Cardiovascular: Positive for palpitations  Negative for chest pain and leg swelling  Gastrointestinal: Positive for nausea  Negative for abdominal pain, blood in stool, diarrhea and vomiting  Endocrine: Negative for polyuria  Genitourinary: Negative for dysuria, flank pain, hematuria, vaginal bleeding and vaginal discharge  Musculoskeletal: Negative for back pain and neck pain  Skin: Negative for color change and rash  Allergic/Immunologic: Negative for immunocompromised state  Neurological: Positive for headaches  Negative for dizziness, weakness, light-headedness and numbness  Psychiatric/Behavioral: Positive for confusion  All other systems reviewed and are negative        Physical Exam  ED Triage Vitals [09/19/18 1057]   Temperature Pulse Respirations Blood Pressure SpO2   98 3 °F (36 8 °C) 101 18 116/83 97 %      Temp Source Heart Rate Source Patient Position - Orthostatic VS BP Location FiO2 (%)   Tympanic Monitor Sitting Left arm --      Pain Score       No Pain           Orthostatic Vital Signs  Vitals:    09/19/18 1057 09/19/18 1319   BP: 116/83    Pulse: 101 68   Patient Position - Orthostatic VS: Sitting Sitting       Physical Exam   Constitutional: Vital signs are normal  She appears well-developed and well-nourished  She is cooperative  Non-toxic appearance  She does not have a sickly appearance  She does not appear ill  No distress  HENT:   Head: Normocephalic and atraumatic  Right Ear: Hearing, tympanic membrane, external ear and ear canal normal    Left Ear: Hearing, tympanic membrane, external ear and ear canal normal    Nose: Nose normal    Mouth/Throat: Uvula is midline, oropharynx is clear and moist and mucous membranes are normal  No tonsillar exudate  Eyes: Conjunctivae, EOM and lids are normal  Pupils are equal, round, and reactive to light  Fundoscopic exam:       The right eye shows no AV nicking  The left eye shows no AV nicking  Neck: Trachea normal, normal range of motion and full passive range of motion without pain  Neck supple  No Brudzinski's sign and no Kernig's sign noted  No thyroid mass present  Cardiovascular: Normal rate, regular rhythm, normal heart sounds and normal pulses  No murmur heard  Pulmonary/Chest: Effort normal and breath sounds normal    Abdominal: Soft  Normal appearance and bowel sounds are normal  There is no tenderness  There is no rigidity, no rebound, no guarding and no CVA tenderness  Neurological: She is alert  She has normal strength and normal reflexes  No cranial nerve deficit or sensory deficit  She displays a negative Romberg sign  Coordination and gait normal    Reflex Scores:       Bicep reflexes are 2+ on the right side and 2+ on the left side  Patellar reflexes are 2+ on the right side and 2+ on the left side  Cranial nerves 2-12 intact, strength 5/5 throughout, sensation intact throughout  Visual fields normal  Normal finger-to-nose and heel-to-shin  Normal rapid alternating movements  Negative Romberg  Normal gait  DTRs are normal and symmetric  Psychiatric: She has a normal mood and affect   Her speech is normal and behavior is normal  Thought content normal        ED Medications  Medications   nicotine (NICODERM CQ) 14 mg/24hr TD 24 hr patch 14 mg (14 mg Transdermal Medication Applied 9/19/18 1351)   ketorolac (TORADOL) injection 15 mg (15 mg Intravenous Given 9/19/18 1148)   sodium chloride 0 9 % bolus 1,000 mL (0 mL Intravenous Stopped 9/19/18 1248)       Diagnostic Studies  Results Reviewed     Procedure Component Value Units Date/Time    POCT pregnancy, urine [[id number]]  (Normal) Resulted:  09/19/18 1252    Lab Status:  Final result Updated:  09/19/18 1252     EXT PREG TEST UR (Ref: Negative) Negative    Comprehensive metabolic panel [[id number]]  (Abnormal) Collected:  09/19/18 1145    Lab Status:  Final result Specimen:  Blood from Arm, Left Updated:  09/19/18 1230     Sodium 132 (L) mmol/L      Potassium 3 5 mmol/L      Chloride 97 (L) mmol/L      CO2 27 mmol/L      ANION GAP 8 mmol/L      BUN 6 mg/dL      Creatinine 0 91 mg/dL      Glucose 101 mg/dL      Calcium 9 2 mg/dL      AST 14 U/L      ALT 21 U/L      Alkaline Phosphatase 54 U/L      Total Protein 7 4 g/dL      Albumin 3 7 g/dL      Total Bilirubin 0 36 mg/dL      eGFR 80 ml/min/1 73sq m     Narrative:         National Kidney Disease Education Program recommendations are as follows:  GFR calculation is accurate only with a steady state creatinine  Chronic Kidney disease less than 60 ml/min/1 73 sq  meters  Kidney failure less than 15 ml/min/1 73 sq  meters  TSH, 3rd generation with Free T4 reflex [[id number]]  (Normal) Collected:  09/19/18 1145    Lab Status:  Final result Specimen:  Blood from Arm, Left Updated:  09/19/18 1230     TSH 3RD GENERATON 1 080 uIU/mL     Narrative:         Patients undergoing fluorescein dye angiography may retain small amounts of fluorescein in the body for 48-72 hours post procedure  Samples containing fluorescein can produce falsely depressed TSH values   If the patient had this procedure,a specimen should be resubmitted post fluorescein clearance  The recommended reference ranges for TSH during pregnancy are as follows:  First trimester 0 1 to 2 5 uIU/mL  Second trimester  0 2 to 3 0 uIU/mL  Third trimester 0 3 to 3 0 uIU/m      Magnesium [[id number]]  (Normal) Collected:  09/19/18 1145    Lab Status:  Final result Specimen:  Blood from Arm, Left Updated:  09/19/18 1230     Magnesium 2 0 mg/dL     Phosphorus [[id number]]  (Normal) Collected:  09/19/18 1145    Lab Status:  Final result Specimen:  Blood from Arm, Left Updated:  09/19/18 1230     Phosphorus 3 8 mg/dL     Ammonia [[id number]]  (Abnormal) Collected:  09/19/18 1145    Lab Status:  Final result Specimen:  Blood from Arm, Left Updated:  09/19/18 1218     Ammonia <10 (L) umol/L     CBC and differential [[id number]]  (Abnormal) Collected:  09/19/18 1145    Lab Status:  Final result Specimen:  Blood from Arm, Left Updated:  09/19/18 1205     WBC 6 78 Thousand/uL      RBC 4 24 Million/uL      Hemoglobin 13 4 g/dL      Hematocrit 40 0 %      MCV 94 fL      MCH 31 6 pg      MCHC 33 5 g/dL      RDW 13 9 %      MPV 8 7 (L) fL      Platelets 300 Thousands/uL      nRBC 0 /100 WBCs      Neutrophils Relative 59 %      Immat GRANS % 0 %      Lymphocytes Relative 33 %      Monocytes Relative 7 %      Eosinophils Relative 1 %      Basophils Relative 0 %      Neutrophils Absolute 3 99 Thousands/µL      Immature Grans Absolute 0 01 Thousand/uL      Lymphocytes Absolute 2 24 Thousands/µL      Monocytes Absolute 0 44 Thousand/µL      Eosinophils Absolute 0 07 Thousand/µL      Basophils Absolute 0 03 Thousands/µL                  No orders to display         Procedures  Procedures      Phone Consults  ED Phone Contact    ED Course                               MDM  Number of Diagnoses or Management Options  Diagnosis management comments: Labs including TS H  Urine pregnancy  EKG  Fluids and Toradol for pain  Disposition pending results      CritCare Time    Disposition  Final diagnoses: Anxiety   Palpitations     Time reflects when diagnosis was documented in both MDM as applicable and the Disposition within this note     Time User Action Codes Description Comment    9/19/2018  2:33 PM Yesenia Chalk Add [F41 9] Anxiety     9/19/2018  2:33 PM Yesenia Chalk Add [R00 2] Palpitations       ED Disposition     ED Disposition Condition Comment    Discharge  Kaila Jones discharge to home/self care  Condition at discharge: Stable        Follow-up Information     Follow up With Specialties Details Why Contact Info Additional Information    Lamar Manzo,  General Practice Call today As needed [address] [facility] Emergency Department Emergency Medicine Go to If symptoms worsen [address]  [phone]  ED, [address], [address]          Patient's Medications   Discharge Prescriptions    No medications on file     No discharge procedures on file  ED Provider  Attending physically available and evaluated Kaila Jones I managed the patient along with the ED Attending      Electronically Signed by         Thomas Granados MD  09/19/18 4723

## 2018-09-19 NOTE — ED NOTES
Pt stated that she has an appointment Tuesday with the doctor  He gave her a prescription for Ativan 1mg BID 14 day supply  Pt stated that she hasn't had it filled        Chris Burris RN  09/19/18 5384

## 2018-09-19 NOTE — DISCHARGE INSTRUCTIONS
Heart Palpitations   WHAT YOU NEED TO KNOW:   Heart palpitations are feelings that your heart races, jumps, throbs, or flutters  You may feel extra beats, no beats for a short time, or skipped beats  You may have these feelings in your chest, throat, or neck  They may happen when you are sitting, standing, or lying  Heart palpitations may be frightening, but are usually not caused by a serious problem  DISCHARGE INSTRUCTIONS:   Call 911 or have someone else call for any of the following:   · You have any of the following signs of a heart attack:      ¨ Squeezing, pressure, or pain in your chest that lasts longer than 5 minutes or returns    ¨ Discomfort or pain in your back, neck, jaw, stomach, or arm     ¨ Trouble breathing    ¨ Nausea or vomiting    ¨ Lightheadedness or a sudden cold sweat, especially with chest pain or trouble breathing    · You have any of the following signs of a stroke:      ¨ Numbness or drooping on one side of your face     ¨ Weakness in an arm or leg    ¨ Confusion or difficulty speaking    ¨ Dizziness, a severe headache, or vision loss    · You faint or lose consciousness  Return to the emergency department if:   · Your palpitations happen more often or get more intense  Contact your healthcare provider if:   · You have new or worsening swelling in your feet or ankles  · You have questions or concerns about your condition or care  Follow up with your healthcare provider as directed: You may need to follow up with a cardiologist  Anabella Gao may need tests to check for heart problems that cause palpitations  Write down your questions so you remember to ask them during your visits  Keep a record:  Write down when your palpitations start and stop, what you were doing when they started, and your symptoms  Keep track of what you ate or drank within a few hours of your palpitations  Include anything that seemed to help your symptoms, such as lying down or holding your breath   This record will help you and your healthcare provider learn what triggers your palpitations  Bring this record with you to your follow up visits  Help prevent heart palpitations:   · Manage stress and anxiety  Find ways to relax such as listening to music, meditating, or doing yoga  Exercise can also help decrease stress and anxiety  Talk to someone you trust about your stress or anxiety  You can also talk to a therapist      · Get plenty of sleep every night  Ask your healthcare provider how much sleep you need each night  · Do not drink caffeine or alcohol  Caffeine and alcohol can make your palpitations worse  Caffeine is found in soda, coffee, tea, chocolate, and drinks that increase your energy  · Do not smoke  Nicotine and other chemicals in cigarettes and cigars may damage your heart and blood vessels  Ask your healthcare provider for information if you currently smoke and need help to quit  E-cigarettes or smokeless tobacco still contain nicotine  Talk to your healthcare provider before you use these products  · Do not use illegal drugs  Talk to your healthcare provider if you use illegal drugs and want help to quit  © 2017 [facility] Information is for End User's use only and may not be sold, redistributed or otherwise used for commercial purposes  All illustrations and images included in CareNotes® are the copyrighted property of A D A M , Inc  or Phillip Trejo  The above information is an  only  It is not intended as medical advice for individual conditions or treatments  Talk to your doctor, nurse or pharmacist before following any medical regimen to see if it is safe and effective for you

## 2018-10-04 ENCOUNTER — OFFICE VISIT (OUTPATIENT)
Dept: OBGYN CLINIC | Facility: CLINIC | Age: 38
End: 2018-10-04
Payer: COMMERCIAL

## 2018-10-04 VITALS
WEIGHT: 101 LBS | HEIGHT: 61 IN | SYSTOLIC BLOOD PRESSURE: 108 MMHG | BODY MASS INDEX: 19.07 KG/M2 | DIASTOLIC BLOOD PRESSURE: 66 MMHG

## 2018-10-04 DIAGNOSIS — Z01.419 ENCOUNTER FOR GYNECOLOGICAL EXAMINATION (GENERAL) (ROUTINE) WITHOUT ABNORMAL FINDINGS: Primary | ICD-10-CM

## 2018-10-04 DIAGNOSIS — Z11.3 SCREENING FOR STD (SEXUALLY TRANSMITTED DISEASE): ICD-10-CM

## 2018-10-04 PROCEDURE — 99385 PREV VISIT NEW AGE 18-39: CPT | Performed by: PHYSICIAN ASSISTANT

## 2018-10-04 RX ORDER — OMEPRAZOLE 20 MG/1
CAPSULE, DELAYED RELEASE ORAL DAILY
Refills: 1 | COMMUNITY
Start: 2018-09-17 | End: 2020-06-10

## 2018-10-04 RX ORDER — LORATADINE 10 MG/1
10 TABLET ORAL
Refills: 2 | COMMUNITY
Start: 2018-09-19 | End: 2020-04-08

## 2018-10-04 RX ORDER — OMEGA-3-ACID ETHYL ESTERS 1 G/1
2 CAPSULE, LIQUID FILLED ORAL 2 TIMES DAILY
COMMUNITY
End: 2019-06-26

## 2018-10-04 RX ORDER — TIZANIDINE 4 MG/1
4 TABLET ORAL EVERY 6 HOURS
Refills: 5 | COMMUNITY
Start: 2018-08-28 | End: 2022-04-21 | Stop reason: ALTCHOICE

## 2018-10-04 RX ORDER — LORAZEPAM 1 MG/1
1 TABLET ORAL
Refills: 1 | COMMUNITY
Start: 2018-09-20

## 2018-10-04 RX ORDER — RIBOFLAVIN (VITAMIN B2) 100 MG
100 TABLET ORAL DAILY
COMMUNITY

## 2018-10-04 RX ORDER — IBUPROFEN 600 MG/1
600 TABLET ORAL
Refills: 1 | COMMUNITY
Start: 2018-09-19 | End: 2021-09-20 | Stop reason: ALTCHOICE

## 2018-10-04 RX ORDER — FLUTICASONE PROPIONATE 110 UG/1
110 AEROSOL, METERED RESPIRATORY (INHALATION)
COMMUNITY
Start: 2014-09-24

## 2018-10-04 NOTE — ASSESSMENT & PLAN NOTE
The current ASCCP guidelines were reviewed  Patient's last pap was 6/16/14 - WNL (-) HRHPV type 16/18 negative; C/G negative and therefore, a pap with HPV cotesting was performed since last done over 4 years ago and patient admits to not regularly following up on a yearly basis  I emphasized the importance of an annual pelvic and breast exam  Patient ok to have a pap done today

## 2018-10-04 NOTE — PROGRESS NOTES
Assessment/Plan   Diagnoses and all orders for this visit:    Encounter for gynecological examination (general) (routine) without abnormal findings  -     Thinprep Tis Pap and HPV mRNA E6/E7, Chlamydia/N gonorrhoeae; Future  The current ASCCP guidelines were reviewed  Patient's last pap was 6/16/14 - WNL (-) HRHPV type 16/18 negative; C/G negative and therefore, a pap with HPV cotesting was performed since last done over 4 years ago and patient admits to not regularly following up on a yearly basis  I emphasized the importance of an annual pelvic and breast exam  Patient ok to have a pap done today  Screening for STD (sexually transmitted disease)  -     Thinprep Tis Pap and HPV mRNA E6/E7, Chlamydia/N gonorrhoeae; Future  -     Hepatitis B surface antigen; Future  -     Hepatitis C antibody; Future  -     HIV 1/2 AG-AB combo; Future  -     RPR; Future  Encourage safe sexual practices; STD testing - cultures collected and bloodwork slip given - treat based on results    Discussion  I have discussed the importance of monthly self-breast exams, exercise and healthy diet as well as adequate intake of calcium and vitamin D  Encourage MVI q day and r/emeterio importance of folic acid; Encourage 30-40 min weight bearing exercise most days of week  Contraception - none/abstinance  Breast cancer screening is not indicated at this time, however, history of possible breast cyst aspiration and abnormal mammogram through Dr Clay Aden - will have patient sign a records release form to review latest report and recommendations  Patient aware should establish self with a PCP to address non-GYN related issues  All questions have been answered to her satisfaction  RTO for APE or sooner if needed      Subjective     HPI   Apurva Moulton is a 45 y o  female who presents for annual well woman exam    Menarche - 12; LMP - 913/18; Periods are reg q month and last 5-7 days;  Last month period was a week late - was thrown off due to medication issues; No excessive bleeding; No intermenstrual bleeding or spotting; Cramps are intense and tolerable with ibuprofen  No vulvar itch/burn; No vaginal itch/burn; No abn discharge or odor; No urinary sx - burning/pain/frequency/hematuria  (+) SBEs - no breast masses, asymmetry, nipple discharge or bleeding, changes in skin of breast, or breast tenderness bilaterally  No abd/pelvic pain or HAs; No menopausal symptoms: No hot flashes/night sweats, problems with intercourse, vaginal dryness; sleeping ok; Pt is not currently sexually active seperated from ; She requests std/hiv/hep testing; Feels safe at home  Current contraception: abstinence at this time  (-) PCP for routine Bw/care; Last Pap - 6/16/14 - WNL (-) HRHPV type 16/18 negative; C/G negative  History of abnormal Pap smear: yes     Review of Systems   Constitutional: Positive for fatigue  Negative for activity change, fever and unexpected weight change  HENT: Negative for congestion, dental problem, sinus pain and sinus pressure  Eyes: Negative for visual disturbance  Respiratory: Negative for cough, shortness of breath and wheezing  Cardiovascular: Negative for chest pain and leg swelling  Gastrointestinal: Negative for abdominal distention, abdominal pain, blood in stool, constipation, diarrhea, nausea and vomiting  Endocrine: Negative for polydipsia  Genitourinary: Negative for difficulty urinating, dyspareunia, dysuria, frequency, hematuria, menstrual problem, pelvic pain, urgency, vaginal bleeding, vaginal discharge and vaginal pain  Musculoskeletal: Positive for back pain  Negative for arthralgias  Allergic/Immunologic: Positive for environmental allergies  Neurological: Positive for headaches  Negative for dizziness and seizures  Psychiatric/Behavioral: Negative for dysphoric mood and sleep disturbance  The patient is nervous/anxious          The following portions of the patient's history were reviewed and updated as appropriate: allergies, current medications, past family history, past medical history, past social history, past surgical history and problem list          OB History      Para Term  AB Living    3 1     2      SAB TAB Ectopic Multiple Live Births    1                Obstetric Comments    : 12 VIP- surgical;  SAB - naturally;   F          Past Medical History:   Diagnosis Date    Asthma     Chronic pain     Depression     Fibrocystic breast changes of both breasts     Seizures ([address] )        Past Surgical History:   Procedure Laterality Date    BREAST CYST ASPIRATION      INDUCED          Family History   Problem Relation Age of Onset    Diabetes Sister     Cancer Father         Diagnosed stage 4 - metastazied       Social History     Social History    Marital status: /Civil Union     Spouse name: N/A    Number of children: N/A    Years of education: N/A     Occupational History    Not on file       Social History Main Topics    Smoking status: Current Every Day Smoker     Packs/day: 0 50     Types: Cigarettes    Smokeless tobacco: Never Used      Comment: 10-15 cig/day    Alcohol use No      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    Drug use: No    Sexual activity: Yes     Partners: Male     Other Topics Concern    Not on file     Social History Narrative    No narrative on file         Current Outpatient Prescriptions:     Ascorbic Acid (VITAMIN C) 100 MG tablet, Take 100 mg by mouth daily, Disp: , Rfl:     Biotin w/ Vitamins C & E (HAIR/SKIN/NAILS PO), Take by mouth, Disp: , Rfl:     busPIRone (BUSPAR) 15 mg tablet, Take by mouth 3 (three) times a day  , Disp: , Rfl:     CALCIUM-MAGNESIUM-ZINC PO, Take by mouth, Disp: , Rfl:     co-enzyme Q-10 30 MG capsule, Take 30 mg by mouth 3 (three) times a day, Disp: , Rfl:     fluticasone (FLOVENT HFA) 110 MCG/ACT inhaler, 110 puffs, Disp: , Rfl:     ibuprofen (MOTRIN) 600 mg tablet, Take 600 mg by mouth 3 to 4 times daily as needed, Disp: , Rfl: 1    loratadine (CLARITIN) 10 mg tablet, Take 10 mg by mouth daily at bedtime as needed, Disp: , Rfl: 2    LORazepam (ATIVAN) 1 mg tablet, Take 1 mg by mouth 2 (two) times a day as needed, Disp: , Rfl: 1    milk thistle 175 MG tablet, Take 175 mg by mouth daily, Disp: , Rfl:     omega-3-acid ethyl esters (LOVAZA) 1 g capsule, Take 2 g by mouth 2 (two) times a day, Disp: , Rfl:     omeprazole (PriLOSEC) 20 mg delayed release capsule, Take by mouth daily, Disp: , Rfl: 1    tiZANidine (ZANAFLEX) 4 mg tablet, Take 4 mg by mouth every 6 (six) hours, Disp: , Rfl: 5    VENTOLIN  (90 Base) MCG/ACT inhaler, Inhale 2 puffs, Disp: , Rfl: 2    Allergies   Allergen Reactions    Meloxicam GI Intolerance    Neurontin [Gabapentin] Hives    Ultram [Tramadol] Hives     SEIZURES    Vistaril [Hydroxyzine]        Objective   Vitals:    10/04/18 1252   BP: 108/66   BP Location: Left arm   Patient Position: Sitting   Cuff Size: Adult   Weight: 45 8 kg (101 lb)   Height: 5' 1" (1 549 m)     Physical Exam   Constitutional: She appears well-developed and well-nourished  No distress  Neck: No thyromegaly present  Cardiovascular: Normal rate, regular rhythm and normal heart sounds  No murmur heard  Pulmonary/Chest: Effort normal and breath sounds normal  No respiratory distress  She has no wheezes  Right breast exhibits no inverted nipple, no mass, no nipple discharge, no skin change and no tenderness  Left breast exhibits no inverted nipple, no mass, no nipple discharge, no skin change and no tenderness  Breasts are symmetrical    Abdominal: Soft  She exhibits no distension and no mass  There is no tenderness  Genitourinary: Vagina normal and uterus normal  Pelvic exam was performed with patient supine  There is no rash, tenderness or lesion on the right labia  There is no rash, tenderness or lesion on the left labia   Uterus is not deviated, not enlarged, not fixed and not tender  Cervix exhibits no motion tenderness, no discharge and no friability  Right adnexum displays no mass, no tenderness and no fullness  Left adnexum displays no mass, no tenderness and no fullness  No erythema, tenderness or bleeding in the vagina  No foreign body in the vagina  No vaginal discharge found  Musculoskeletal: She exhibits no edema  Lymphadenopathy:     She has no cervical adenopathy  She has no axillary adenopathy  Right: No inguinal adenopathy present  Left: No inguinal adenopathy present  Neurological: She is alert  Skin: Skin is warm  She is not diaphoretic  Psychiatric: She has a normal mood and affect  Her behavior is normal    Vitals reviewed

## 2018-10-05 LAB
HBV SURFACE AG SERPL QL IA: NORMAL
HCV AB S/CO SERPL IA: 0.01
HCV AB SERPL QL IA: NORMAL
HIV 1+2 AB+HIV1 P24 AG SERPL QL IA: NORMAL
RPR SER QL: NORMAL

## 2018-10-10 LAB
C TRACH RRNA SPEC QL NAA+PROBE: NOT DETECTED
CLINICAL INFO: NORMAL
CYTO CVX: NORMAL
CYTOLOGY CMNT CVX/VAG CYTO-IMP: NORMAL
DATE PREVIOUS BX: NORMAL
HPV E6+E7 MRNA CVX QL NAA+PROBE: NOT DETECTED
LMP START DATE: NORMAL
N GONORRHOEA RRNA SPEC QL NAA+PROBE: NOT DETECTED
QUESTION/PROBLEM: NORMAL
SL AMB CONTAINER TYPE: NORMAL
SL AMB FINAL RESOLUTION: NORMAL
SL AMB PREV. PAP:: NORMAL
SL AMB REPORT STATUS: NORMAL
SPECIMEN SOURCE CVX/VAG CYTO: NORMAL

## 2018-10-15 DIAGNOSIS — R92.8 ABNORMAL MAMMOGRAPHY: Primary | ICD-10-CM

## 2019-02-22 ENCOUNTER — TRANSCRIBE ORDERS (OUTPATIENT)
Dept: ADMINISTRATIVE | Facility: HOSPITAL | Age: 39
End: 2019-02-22

## 2019-05-08 ENCOUNTER — TELEPHONE (OUTPATIENT)
Dept: BEHAVIORAL/MENTAL HEALTH CLINIC | Facility: CLINIC | Age: 39
End: 2019-05-08

## 2019-06-03 ENCOUNTER — OFFICE VISIT (OUTPATIENT)
Dept: BEHAVIORAL/MENTAL HEALTH CLINIC | Facility: CLINIC | Age: 39
End: 2019-06-03
Payer: COMMERCIAL

## 2019-06-03 DIAGNOSIS — F50.2 BULIMIA: ICD-10-CM

## 2019-06-03 DIAGNOSIS — F41.1 GAD (GENERALIZED ANXIETY DISORDER): ICD-10-CM

## 2019-06-03 DIAGNOSIS — F43.10 PTSD (POST-TRAUMATIC STRESS DISORDER): Primary | ICD-10-CM

## 2019-06-03 DIAGNOSIS — F33.2 MDD (MAJOR DEPRESSIVE DISORDER), RECURRENT SEVERE, WITHOUT PSYCHOSIS (HCC): ICD-10-CM

## 2019-06-03 PROCEDURE — 90791 PSYCH DIAGNOSTIC EVALUATION: CPT | Performed by: SOCIAL WORKER

## 2019-06-05 ENCOUNTER — OFFICE VISIT (OUTPATIENT)
Dept: BEHAVIORAL/MENTAL HEALTH CLINIC | Facility: CLINIC | Age: 39
End: 2019-06-05
Payer: COMMERCIAL

## 2019-06-05 DIAGNOSIS — F50.2 BULIMIA NERVOSA, PURGING TYPE: Primary | ICD-10-CM

## 2019-06-05 DIAGNOSIS — F41.1 GENERALIZED ANXIETY DISORDER: ICD-10-CM

## 2019-06-05 DIAGNOSIS — F10.21 ALCOHOL USE DISORDER, SEVERE, IN EARLY REMISSION (HCC): ICD-10-CM

## 2019-06-05 PROCEDURE — 90834 PSYTX W PT 45 MINUTES: CPT | Performed by: SOCIAL WORKER

## 2019-06-20 ENCOUNTER — TELEPHONE (OUTPATIENT)
Dept: PSYCHIATRY | Facility: CLINIC | Age: 39
End: 2019-06-20

## 2019-06-20 NOTE — TELEPHONE ENCOUNTER
Patient came in seeking other options because she is struggling  She isn't scheduled to see Lenora Like until August she was given information about the partial program and Dr Ada Zuñiga provided her with some places that help patient's with eating disorders

## 2019-06-21 ENCOUNTER — TELEPHONE (OUTPATIENT)
Dept: PSYCHOLOGY | Facility: CLINIC | Age: 39
End: 2019-06-21

## 2019-06-21 NOTE — TELEPHONE ENCOUNTER
Good morning Mallika,    So this patient was at the front window, checking out from an appointment with "[facility] [place]" and I mistakenly assumed it was our NP, Tremaine Don  So instead of messaging you I messaged Sree Rodrigues about eating disorder resources in the area  It sounds like the places I suggested are not taking her insurance or are closed (She said [facility] is closed - do you know if that is true?)  Anyway, this patient was pretty adamant that she is not comfortable waiting until August to see you   Just wanted to pass that along for your clinical appraisal     Thanks,  [phone]

## 2019-06-24 ENCOUNTER — DOCUMENTATION (OUTPATIENT)
Dept: BEHAVIORAL/MENTAL HEALTH CLINIC | Facility: CLINIC | Age: 39
End: 2019-06-24

## 2019-06-26 ENCOUNTER — OFFICE VISIT (OUTPATIENT)
Dept: PSYCHOLOGY | Facility: CLINIC | Age: 39
End: 2019-06-26
Payer: COMMERCIAL

## 2019-06-26 ENCOUNTER — OFFICE VISIT (OUTPATIENT)
Dept: PSYCHIATRY | Facility: CLINIC | Age: 39
End: 2019-06-26
Payer: COMMERCIAL

## 2019-06-26 VITALS
DIASTOLIC BLOOD PRESSURE: 63 MMHG | TEMPERATURE: 97.9 F | SYSTOLIC BLOOD PRESSURE: 96 MMHG | RESPIRATION RATE: 16 BRPM | BODY MASS INDEX: 15.03 KG/M2 | HEART RATE: 60 BPM | WEIGHT: 88 LBS | HEIGHT: 64 IN

## 2019-06-26 DIAGNOSIS — F50.81 BINGE EATING DISORDER: Primary | ICD-10-CM

## 2019-06-26 DIAGNOSIS — F41.1 GENERALIZED ANXIETY DISORDER: ICD-10-CM

## 2019-06-26 DIAGNOSIS — F33.0 MAJOR DEPRESSIVE DISORDER, RECURRENT, MILD (HCC): ICD-10-CM

## 2019-06-26 DIAGNOSIS — F43.10 POST TRAUMATIC STRESS DISORDER (PTSD): ICD-10-CM

## 2019-06-26 PROBLEM — R41.3 MEMORY LOSS: Status: ACTIVE | Noted: 2019-02-21

## 2019-06-26 PROBLEM — F50.819 BINGE EATING DISORDER: Status: ACTIVE | Noted: 2019-06-26

## 2019-06-26 PROBLEM — R20.2 PARESTHESIA: Status: ACTIVE | Noted: 2019-02-21

## 2019-06-26 PROBLEM — R25.1 TREMOR: Status: ACTIVE | Noted: 2019-02-21

## 2019-06-26 PROCEDURE — G0176 OPPS/PHP;ACTIVITY THERAPY: HCPCS

## 2019-06-26 PROCEDURE — G0410 GRP PSYCH PARTIAL HOSP 45-50: HCPCS

## 2019-06-26 PROCEDURE — G0177 OPPS/PHP; TRAIN & EDUC SERV: HCPCS

## 2019-06-26 PROCEDURE — 90791 PSYCH DIAGNOSTIC EVALUATION: CPT | Performed by: PSYCHIATRY & NEUROLOGY

## 2019-06-26 PROCEDURE — 90791 PSYCH DIAGNOSTIC EVALUATION: CPT

## 2019-06-26 RX ORDER — HYDROCODONE BITARTRATE AND ACETAMINOPHEN 5; 325 MG/1; MG/1
1 TABLET ORAL DAILY PRN
COMMUNITY
Start: 2019-05-29 | End: 2022-04-21 | Stop reason: ALTCHOICE

## 2019-06-26 RX ORDER — THIAMINE MONONITRATE (VIT B1) 100 MG
100 TABLET ORAL DAILY
COMMUNITY

## 2019-06-27 ENCOUNTER — OFFICE VISIT (OUTPATIENT)
Dept: PSYCHOLOGY | Facility: CLINIC | Age: 39
End: 2019-06-27
Payer: COMMERCIAL

## 2019-06-27 VITALS
DIASTOLIC BLOOD PRESSURE: 65 MMHG | SYSTOLIC BLOOD PRESSURE: 98 MMHG | HEART RATE: 84 BPM | TEMPERATURE: 97.8 F | RESPIRATION RATE: 18 BRPM

## 2019-06-27 DIAGNOSIS — F33.0 MAJOR DEPRESSIVE DISORDER, RECURRENT, MILD (HCC): ICD-10-CM

## 2019-06-27 DIAGNOSIS — F41.1 GENERALIZED ANXIETY DISORDER: ICD-10-CM

## 2019-06-27 DIAGNOSIS — F43.10 POST TRAUMATIC STRESS DISORDER (PTSD): ICD-10-CM

## 2019-06-27 DIAGNOSIS — F50.81 BINGE EATING DISORDER: Primary | ICD-10-CM

## 2019-06-27 PROCEDURE — H0035 MH PARTIAL HOSP TX UNDER 24H: HCPCS

## 2019-06-28 ENCOUNTER — OFFICE VISIT (OUTPATIENT)
Dept: PSYCHOLOGY | Facility: CLINIC | Age: 39
End: 2019-06-28
Payer: COMMERCIAL

## 2019-06-28 DIAGNOSIS — F43.10 POST TRAUMATIC STRESS DISORDER (PTSD): ICD-10-CM

## 2019-06-28 DIAGNOSIS — F33.0 MAJOR DEPRESSIVE DISORDER, RECURRENT, MILD (HCC): ICD-10-CM

## 2019-06-28 DIAGNOSIS — F41.1 GENERALIZED ANXIETY DISORDER: ICD-10-CM

## 2019-06-28 DIAGNOSIS — F50.81 BINGE EATING DISORDER: Primary | ICD-10-CM

## 2019-06-28 PROCEDURE — H0035 MH PARTIAL HOSP TX UNDER 24H: HCPCS

## 2019-07-01 ENCOUNTER — OFFICE VISIT (OUTPATIENT)
Dept: PSYCHOLOGY | Facility: CLINIC | Age: 39
End: 2019-07-01
Payer: COMMERCIAL

## 2019-07-01 DIAGNOSIS — F33.0 MAJOR DEPRESSIVE DISORDER, RECURRENT, MILD (HCC): ICD-10-CM

## 2019-07-01 DIAGNOSIS — F41.1 GENERALIZED ANXIETY DISORDER: ICD-10-CM

## 2019-07-01 DIAGNOSIS — F50.81 BINGE EATING DISORDER: Primary | ICD-10-CM

## 2019-07-01 DIAGNOSIS — F43.10 POST TRAUMATIC STRESS DISORDER (PTSD): ICD-10-CM

## 2019-07-01 PROCEDURE — H0035 MH PARTIAL HOSP TX UNDER 24H: HCPCS

## 2019-07-01 NOTE — PSYCH
Subjective:     Patient ID: Lizette Martines is a 45 y o  female  [facility] Clinical Progress Notes      Specialized Services Documentation  Therapist must complete separate progress note for each specific clinical activity in which the individual participated during the day  Group Psychotherapy     (0930-1030) Lizette Martines participated in psychotherapy process group today  This writer facilitated a guided breathing exercise as an opening exercise  Clients checked in, shared how they were feeling, and identified a challenge and/or success from the weekend  This writer facilitated an open discussion on assertive, aggressive, and passive communication types  Clients discussed characteristics of each and shared perspective  Clients learned and practiced I statements  Sandoval Evon shared feeling tired and in pain, that it was challenging but she is taking action steps regarding previous problem behaviors including her eating  Fatimah shared in group and was observant and receptive to information as she was writing information down  Sandoval Barnard is making progress towards goals by participating in group psychotherapy and they are encouraged to continue working towards long term goals  [place] Plan Objective: 1 2 [place] [facility] [place]     Case Management Note    Jay Thompson [facility]    Current suicide risk : Low     (8091-5466) Met with Sandoval Hence; she presented tearful and said that she was an emotional wreck  This writer offered 1:1 consult but Sandoval Hence denied, stating that she didn't want to keep crying  This writer told Sandoval Hence that she is always welcome to process with this writer  Medications changes/added/denied? No    Treatment session number: 4    Individual Case Management Visit provided today?  Yes     [facility] follow up physician's orders: None at this time

## 2019-07-01 NOTE — PSYCH
Subjective:     Patient ID: Lonny Figueroa is a 45 y o  female  [facility] Clinical Progress Notes      Specialized Services Documentation  Therapist must complete separate progress note for each specific clinical activity in which the individual participated during the day  Allied Therapy   [id number]   Lonny Figueroa actively shared in [facility] group focused on relaxation techniques and mindfulness strategies  Fatimah engaged in [address] and breathing techniques  Group explored practice of what skills through observing, describing and participating in a gray listening and then engaging in song  Ana Morales was attentive yet quiet reporting that she was in much pain however PMR did "help"  Group discussed ways to apply to slowing down to make more effective choices  Slow effort noted toward treatment goal   Continue AT to encourage the development and practice of mindfulness strategies to alleviate symptoms and support wellness      Tx Plan Objective: 1 1, Therapist:  Judie MALLOY    Education Therapy   Time:  8407-6244  Previous goal met: Yes   Readiness to Learning: Receptive  Barriers to Learning: Other physical pain  Learning Assessment  Time: 3446-4907  Education Completed: Illness and Wellness Tools  Teaching Method: Verbal and Demonstration  Shared Area of Learning: Yes   Goal Set: take care of her neck  Tx Plan Objective: 1 1, Therapist:  Judie MALLOY

## 2019-07-01 NOTE — PSYCH
Subjective:     Patient ID: Rafat Capps is a 45 y o  female  [facility] Clinical Progress Notes      Specialized Services Documentation  Therapist must complete separate progress note for each specific clinical activity in which the individual participated during the day  Group Psychotherapy 10:45 Tx Plan Objective: 1 1, Therapist:  Larry Rock, Counselor Assistant     Justina Pritchard actively participated in group focused on stress management  The group expressed their largest source of stress, symptoms they experience, and social supports  Justina Pritchard stated her current stressor is me due to having a lot going on  She mentioned having  difficulty thinking of social supports and feeling frustrated not having a consistent  in her current program shes in  Justina Pritchard participated when it was her turn and attempted to complete activity worksheet  Justina Pritchard will continue psychotherapy group to share stressors, successes, and challenges

## 2019-07-02 ENCOUNTER — OFFICE VISIT (OUTPATIENT)
Dept: PSYCHOLOGY | Facility: CLINIC | Age: 39
End: 2019-07-02
Payer: COMMERCIAL

## 2019-07-02 DIAGNOSIS — F50.81 BINGE EATING DISORDER: Primary | ICD-10-CM

## 2019-07-02 DIAGNOSIS — F33.1 MODERATE EPISODE OF RECURRENT MAJOR DEPRESSIVE DISORDER (HCC): ICD-10-CM

## 2019-07-02 PROCEDURE — H0035 MH PARTIAL HOSP TX UNDER 24H: HCPCS

## 2019-07-02 NOTE — PSYCH
Subjective:     Patient ID: Pepe Martinez is a 45 y o  female  [facility] Clinical Progress Notes      Specialized Services Documentation  Therapist must complete separate progress note for each specific clinical activity in which the individual participated during the day  Allied Therapy   7801-3225 Pepe Martinez  actively  shared in [facility] group focused on boundary setting  Mariana Kawasaki engaged in improvisation and discussion exploring value of and ways to set healthy limits with self and others  DBT strategy JELENA GIVE introduced as skill to voice boundaries  She identified a struggle with boundary setting with herself and others starting with "I don't even know what my boundaries are"  She participated in practicing boundaries with given scenarios  Some work toward goal noted  Continue AT to encourage skill development and use       Tx Plan Objective: 1 1, Therapist:  Tuyet DARBYBC    Education Therapy   Time:  0341-9882  Previous goal met: Yes   Readiness to Learning: Receptive  Barriers to Learning: None  Learning Assessment  Time: 5502-1330  Education Completed: Illness and Wellness Tools  Teaching Method: Verbal and Demonstration  Shared Area of Learning: Yes   Goal Set: be mindful of triggers  Tx Plan Objective: 1 1, Therapist:  Tuyet DARBYBC

## 2019-07-02 NOTE — PSYCH
Subjective:     Patient ID: Makayla Anguiano is a 45 y o  female  [facility] Clinical Progress Notes      Specialized Services Documentation  Therapist must complete separate progress note for each specific clinical activity in which the individual participated during the day  Group Psychotherapy     (7411-9852) Makayla Anguiano participated in psychotherapy process group today  This writer facilitated a mindfulness exercise on letting go of worry  Clients checked in, shared how they were feeling, and identified something they like about themselves  This writer facilitated a quick refreshing discussion on assertive, aggressive, and passive communication types  Clients focused on discussing I statements and the Broken Record Technique to express selves assertively  Adonay Arias was more passive in group today  She participated when prompted  Adonay Arias is encouraged to make progress towards goals by participating in group psychotherapy and they are encouraged to continue working towards long term goals  Tx Plan Objective: 1 1, 1 2 Therapist:  [place]issy [place]argas [facility] [facility]    (0091-7095) Makayla Anguiano participated in psychoeducation group today  Clients completed an expressive activity that focused on how they express themselves to the world and what they feel on the inside  Clients created their own masks that they wear in society and then a mask of who they really are  Clients engaged in reading a poem regarding masks and shared their feelings  Adonay Arias was observed to participate in the group activity  She made progress towards goals and is encouraged to continue to participate to work on long term goal accomplishment  Tx Plan Objective: 1 1, 1 2 Therapist:  Crissy Vargas, [facility]      Other     Case Management Note    Crissy Vargas, [facility]    Current suicide risk : Rossy Birmingham stated she did not wish to have a 1:1 case management session today       Medications changes/added/denied? No    Treatment session number: 5    Individual Case Management Visit provided today?  No    [facility] follow up physician's orders: None at this time

## 2019-07-03 ENCOUNTER — OFFICE VISIT (OUTPATIENT)
Dept: PSYCHOLOGY | Facility: CLINIC | Age: 39
End: 2019-07-03
Payer: COMMERCIAL

## 2019-07-03 DIAGNOSIS — F50.81 BINGE EATING DISORDER: ICD-10-CM

## 2019-07-03 DIAGNOSIS — F33.1 MODERATE EPISODE OF RECURRENT MAJOR DEPRESSIVE DISORDER (HCC): Primary | ICD-10-CM

## 2019-07-03 PROCEDURE — H0035 MH PARTIAL HOSP TX UNDER 24H: HCPCS

## 2019-07-03 NOTE — PSYCH
Subjective:     Patient ID: Imelda Damon is a 45 y o  female  [facility] Clinical Progress Notes      Specialized Services Documentation  Therapist must complete separate progress note for each specific clinical activity in which the individual participated during the day  Group Psychotherapy     (7517-3707) Imelda Damon participated in psychotherapy process group today  This writer began group with a mindfulness exercise  Clients checked in, shared how they were feeling and a wellness plan they have for themselves with the upcoming holiday  Clients engaged in a discussion on cognitive fusion  This writer facilitated an exercise on defusion and encouraged discussion on thoughts the group usually get fused with  Bert Thomas was quiet during group and observed to not engage in discussion until the very end of group  She is encouraged to participate to make progress towards goals and is encouraged to continue to participate to work on long term goal accomplishment  Tx Plan Objective: 1 2 Therapist:  Anson Perez, [facility] [place]    Other     Case Management Note    Miracle Jackman, SUSANNE    Current suicide risk : Low     (9346-3947) Met with Bert Thomas and discussed progress  Weighed her and she weighs 86 6 pounds, meaning she lost two pounds in the last week  She stated she is eating and went to an [facility] group and learned a lot  This writer discussed the importance of getting targeted treatment for her eating disorder as this program does not focus on that  Psychoeducated that and ED is a mental health diagnosis, as she kept noting that she needs to focus on her mental health  Bert Thomas identified feeling incapable of handling weekly stressors  Identified that she is struggling with her ex-  Provided an automatic though log as Bert Thomas said she has benefited from beginning to track her thoughts and emotions  Medications changes/added/denied?  No    Treatment session number: 6    Individual Case Management Visit provided today?  Yes     [facility] follow up physician's orders: None at this time

## 2019-07-03 NOTE — PSYCH
Subjective:     Patient ID: Rock Gallardo is a 45 y o  female  [facility] Clinical Progress Notes      Specialized Services Documentation  Therapist must complete separate progress note for each specific clinical activity in which the individual participated during the day  Group Psychotherapy 9:30-10:30 Tx Plan Objective: 1 2, Therapist:  Ana Cristina Zafar, Counselor Assistant     Laura Stagedione participated in group focused on stress management  The group discussed how emotional management, life balance, and basic needs can impact stress  Laura Alejandra reflected on past poor emotional management tendencies and appeared to listen to peers when discussing healthier ways to handle emotions  She explained not expressing herself enough and holding things in  She was encouraged to express and advocate for herself once an opportunity arose  Laura Alejandra was quiet majority of group and attempted to complete the activity worksheet  Laura Alejandra will continue psychotherapy group to share stressors, successes, and challenges

## 2019-07-03 NOTE — PSYCH
Subjective:     Patient ID: Geraldo Miranda is a 45 y o  female  [facility] Clinical Progress Notes      Specialized Services Documentation  Therapist must complete separate progress note for each specific clinical activity in which the individual participated during the day  Allied Therapy   9219-1260 Geraldo Miranda actively shared in [facility] group exploring DBT distress tolerance crisis survival strategies  Group explored crisis survival strategies ACCEPTS, SELF-SOOTHING, TIP, STOP, IMPROVE and PROS & CONS) reinforcing actions one could take to learn to tolerate stressful experiences, thoughts and urges  Sherly Aparicio was quiet, yet she worked on skills workbook given to [facility] reviewed  She felt she could put effort into practicing TIP  Slow progress toward goal noted  Continue AT to encourage learning, practice and home practice of skills to manage distress      Tx Plan Objective: 1 1, Therapist:  Author Kamryn MALLOY    Education Therapy   Time:  0414-9782  Previous goal met: No  Readiness to Learning: Receptive  Barriers to Learning: None  Learning Assessment  Time: 2516-9085  Education Completed: Illness and Wellness Tools  Teaching Method: Verbal and Demonstration  Shared Area of Learning: Yes   Goal Set: use "STOP" skill   Sherly Aparicio continues to be late to program   Tx Plan Objective: 1 1, Therapist:  Author Kamryn MALLOY

## 2019-07-05 ENCOUNTER — OFFICE VISIT (OUTPATIENT)
Dept: PSYCHOLOGY | Facility: CLINIC | Age: 39
End: 2019-07-05
Payer: COMMERCIAL

## 2019-07-05 DIAGNOSIS — F33.0 MAJOR DEPRESSIVE DISORDER, RECURRENT, MILD (HCC): Primary | ICD-10-CM

## 2019-07-05 DIAGNOSIS — F50.81 BINGE EATING DISORDER: ICD-10-CM

## 2019-07-05 DIAGNOSIS — F43.10 POST TRAUMATIC STRESS DISORDER (PTSD): ICD-10-CM

## 2019-07-05 DIAGNOSIS — F41.1 GENERALIZED ANXIETY DISORDER: ICD-10-CM

## 2019-07-05 PROCEDURE — H0035 MH PARTIAL HOSP TX UNDER 24H: HCPCS

## 2019-07-05 NOTE — PSYCH
Subjective:     Patient ID: Kaila Jones is a 45 y o  female  [facility] Clinical Progress Notes      Specialized Services Documentation  Therapist must complete separate progress note for each specific clinical activity in which the individual participated during the day  Group Psychotherapy     (3503-2570) Kaila Jones participated in weekly wellness assessment group today, reviewing weekend supports and plan as clients approach the weekend  Medications reviewed with no concerns or questions at this time  Compliance of medications reviewed and understood  The six (6) dimension of wellness discussed (Physical, Emotional, Cognitive, Vocational, Social and Spiritual)  The format for assessing wellness and measuring progress reviewed  Questions discussed were:  I intend to improve in this area by My first step will be and I will share my plans with and ask for their support by saying Ajith Rahman identified ways she has been trying to improve her life  Lisandro Walsh is making  progress towards goals and objectives and will continue to attend wellness group  TX Goals:1 4 Therapist: Marika Verdugo MA, [facility]    Education Therapy   Time:  4871-6678  Previous goal met: Yes   Readiness to Learning: Receptive  Barriers to Learning: None  Learning Assessment  Time: 5998-6082  Education Completed: Illness and Wellness Tools, Wellness Assessment   Teaching Method: Verbal, Written and Demonstration  Shared Area of Learning: Yes   Goal Set: "Write a gratitude list"  Tx Plan Objective: 1 4 Therapist:  Marika Verdugo MA, LPC    Other     Case Management Note    Miracle Jackman LPC    Current suicide risk : Low     ([id number]) Met with Lisandro Walsh and discussed upcoming one-year anniversary of sobriety  Lisandro Walsh shared her plans for the weekend  This writer provided resources for outpatient therapists and support groups targeting eating disorders  Medications changes/added/denied?  No    Treatment session number: 7    Individual Case Management Visit provided today?  Yes     [facility] follow up physician's orders: None at this time

## 2019-07-05 NOTE — PSYCH
Subjective:     Patient ID: Nati Elias is a 45 y o  female  [facility] Clinical Progress Notes      Specialized Services Documentation  Therapist must complete separate progress note for each specific clinical activity in which the individual participated during the day  Group Psychotherapy 9:30-10:30 Tx Plan Objective: 1 1, Therapist:  Krzysztof Mccoy, Counselor Assistant     [facility] actively participated in group focused on positive affirmations and self-esteem  The group completed a worksheet of self-esteem boosters and discussed the process they experienced in completing it  [facility] stated completing the worksheet was difficult, but was able to complete some of it  Most of her self-esteem boosters were with helping others and being driven  [facility] expressed that thinking of someone/something that loves her was the most difficult one to think about  She also expressed feeling judged if she puts some of her affirmations up on the wall  She was encouraged to put her affirmations in a safe and accessible place  Fatimah provided feedback and completed the activity worksheet  [facility]  will continue psychotherapy group to share stressors, successes, and challenges

## 2019-07-05 NOTE — PSYCH
Subjective:     Patient ID: Larissa Schwartz is a 45 y o  female  [facility] Clinical Progress Notes      Specialized Services Documentation  Therapist must complete separate progress note for each specific clinical activity in which the individual participated during the day  Allied Therapy   [id number] Larissa Schwartz actively shared in [facility] group focused on managing emotions  Group utilized gray analysis and task to explore triggers and healthy responses to emotions  Group discussed learning to acknowledge feeling uncomfortable and encourage to not let it define oneself  Erasto Saul was active in small group task and able to give examples of triggers and responses  Group stressed normalcy of discomfort and ability to focus on ways to feel more comfortable even in the moment (DBT handout utilized)  She shared in group the potential use of WRAP as a tool to help she manage difficult emotions  Some effort toward goal   Continue AT to encourage personal role in self care and emotional regulation     Tx Plan Objective: 1 1, Therapist:  Librado Forte MT-BC

## 2019-07-08 ENCOUNTER — OFFICE VISIT (OUTPATIENT)
Dept: PSYCHOLOGY | Facility: CLINIC | Age: 39
End: 2019-07-08
Payer: COMMERCIAL

## 2019-07-08 DIAGNOSIS — F43.10 POST TRAUMATIC STRESS DISORDER (PTSD): ICD-10-CM

## 2019-07-08 DIAGNOSIS — F33.0 MAJOR DEPRESSIVE DISORDER, RECURRENT, MILD (HCC): Primary | ICD-10-CM

## 2019-07-08 DIAGNOSIS — F50.81 BINGE EATING DISORDER: ICD-10-CM

## 2019-07-08 DIAGNOSIS — F41.1 GENERALIZED ANXIETY DISORDER: ICD-10-CM

## 2019-07-08 PROCEDURE — H0035 MH PARTIAL HOSP TX UNDER 24H: HCPCS

## 2019-07-08 NOTE — PSYCH
Subjective:     Patient ID: Benjamin Chatterjee is a 45 y o  female  [facility] Clinical Progress Notes      Specialized Services Documentation  Therapist must complete separate progress note for each specific clinical activity in which the individual participated during the day  Group Psychotherapy [id number] Raghu Norwood, RN, BSN, MA  Benjamin Chatterjee actively participated in group psychotherapy today  Ori Cheney checked in at the beginning of group and identified her feelings as anxious and tired due to poor sleep  Group focus was on addiction education to include physiological and psychological addiction  Discussed and processed healthy vs unhealthy coping skills and strategies  She offered verbal support to other group members through her own experiences with alcohol and food addictions  Ori Cheney is making progress toward goals by participating in group psychotherapy      Tx Plan Objectives:  1 1

## 2019-07-08 NOTE — PSYCH
Assessment/Plan:       Diagnoses and all orders for this visit:     Binge eating disorder     Generalized anxiety disorder     Post traumatic stress disorder (PTSD)     Major depressive disorder, recurrent, mild (HCC)            Subjective:      Patient ID: Lizette Martines is a 45 y o  female      [facility]   AREAS OF NEED: Eating disorder as evidenced by binging, restricting, and purging, and depression as evidenced by overwhelming anxiety, poor sleep due to complicated grief  Date Initiated: 06/26/19      Strengths: "smart, driven, caring about others "            LONG TERM GOAL:   Date Initiated: 6/26/2019  1 0 I will identify three ways in which my overall day-to-day functioning has improved since attending program    Target Date: 07/24/19  Completion Date:      Date Initiated: 6/26/2019  2 0 I will decrease my engagement in negative coping mechanisms (alcohol use or binge eating) for my duration at program    Target Date:07/24/19   Completion Date:     SHORT TERM OBJECTIVES:      Date Initiated: 6/26/2019  1 1 I will learn three new coping skills and implement one daily to improve my ability to cope with depressive and anxious symptoms  Revision Date: 07/08/19   Target Date: 07/17/19  Completion Date:      Date Initiated: 6/26/2019  1 2 I will identify one realistic goal to set for myself each day and accomplish that goal to improve my overall mood through successive approximations  Revision Date: 07/08/19   Target Date: 07/17/19  Completion Date:     Date Initiated: 6/26/2019  1 3 I will take medications as prescribed and share questions and concerns if arise  Revision Date: 07/08/19   Target Date: 07/17/19  Completion Date:      Date Initiated: 6/26/2019  1 4 I will identify 3 ways my supports can assist in my recovery and agree to staff/support contact as indicated      Revision Date: 07/08/19   Target Date: 07/17/19  Completion Date:        Date Initiated: 06/26/19   2 1 I will identify three triggers to my negative coping mechanisms and share with my team   Revision Date: 07/08/19   Target Date: 07/17/19  Completion Date:           7 DAY REVISION:     Date Initiated: 07/08/19   1 5 I will use one skill per day outside of program and discuss my outcomes with the treatment team    Revision Date:   Target Date: 07/17/19  Completion Date:        PSYCHIATRY:  Date Initiated: 06/26/19  Medication Management and Education       Revision Date: 7/8/2019       Continue medication management  The person(s) responsible for carrying out the plan is Neena Rivera MD    NURSING:   Date Initiated: 06/26/19  1 1,1 2,1 3,1 4 This RN will provide daily wellness group five days weekly to educate Luis Eduardo Becerril on S/S of her diagnoses and medications used in treatment  Revision Date: 7/8/2019  1 1,1 2,1 3,1 4,1 5 Continue to encourage Luis Eduardo Becerril to participate in wellness groups daily to learn about symptoms, coping strategies and warning signs to promote relapse prevention  The person(s) responsible for carrying out the plan is Shawn Vargas RN    PSYCHOLOGY:   Date Initiated: 06/26/19       1 1, 1 2, 1 4 Provide psychotherapy group 5 times per week to allow opportunity for Luis Eduardo Becerril to explore stressors and ways of coping  Revision Date: 7/8/2019  1 1,1 2,1 4,1 5 Continue to provide psychotherapy group daily to Luis Eduardo Becerril and encourage sharing of stressors, skills and positive change  The person(s) responsible for carrying out the plan is Mohan Pascual MA, [facility] [place]    ALLIED THERAPY:   Date Initiated: 06/26/19  1 1,1 2 Engage Luis Eduardo Becerril in AT group 5 times daily to encourage development and use of wellness tools to decrease symptoms and promote recovery through meaningful activity    Revision Date: 7/8/2019  1 1,1 2,1 5 Continue to engage Luis Eduardo Becerril to participate in AT group to practice wellness tools within program and transfer to home sharing successes and barriers through healthy task involvement  The person(s) responsible for carrying out the plan is GAMA Quintero     CO-OCCURRING:   Date Initiated: 06/26/19  2 1 Engage Edward Amber in Co-occurring groups twice weekly to explore addiction and impact on mental health issues (and vice versa), alternative coping and value of sobriety through group discussion and education  Revision Date: 7/8/2019  2 1 Continue to engage Edward Amber in COD groups twice weekly to explore aspects of recovery support in both the areas of mental health and addiction - challenges, supports and growth  The person(s) responsible for carrying out the [facility]     CASE MANAGEMENT:   Date Initiated: 06/26/19      1 0 This  will meet with [facility] 3-4 times weekly to assess treatment progress, discharge planning, connection to community supports and UR as indicated  Revision Date: 7/8/2019  1 0 Continue to meet with [facility] 3-4 times weekly to assess growth, work toward goals, continued treatment needs, dc planning and use of supports  The person(s) responsible for carrying out the plan is Rigo Maldonado MA, LPC         TREATMENT REVIEW/COMMENTS:      DISCHARGE CRITERIA: Identify 3 signs of progress and complete relapse prevention plan  DISCHARGE PLAN: Return to outpatient therapy and medication management    Estimated Length of Stay: 10 treatment days               Diagnosis and Treatment Plan explained to Elvia Jerez relates understanding diagnosis and is agreeable to Treatment Plan             CLIENT COMMENTS / Please share your thoughts, feelings, need and/or experiences regarding your treatment plan: _____________________________________________________________________________________________________________________________________________________________________________________________________________________________________________________________________________________________________________________ Date/Time: ______________      Patient Signature: _________________________________      Date/Time: ______________       Signature: _________________________________    Date/Time: ______________

## 2019-07-08 NOTE — PSYCH
Subjective:     Patient ID: Kristine Pantoja is a 45 y o  female  [facility] Clinical Progress Notes      Specialized Services Documentation  Therapist must complete separate progress note for each specific clinical activity in which the individual participated during the day  Allied Therapy   [id number] Kristine Pantoja actively shared in [facility] group focused on relaxation techniques and mindfulness strategies  Group introduced to and practiced the [facility] skills non-judgmental, one-mindfully, and effectiveness through various tasks  Lucy Bertha was able to voice how she was able to use group itself to refocus on self versus texts right before group from   Group discussed ways to apply to slowing down to make more effective choices  Some effort noted toward treatment goal   Continue AT to encourage the development and practice of mindfulness strategies to alleviate symptoms and support wellness      Tx Plan Objective: 1 1, Therapist:  Mally MALLOY    Education Therapy   Time:  3653-0598  Previous goal met: Yes   Readiness to Learning: Receptive  Barriers to Learning: None  Learning Assessment  Time: 7255-0832  Education Completed: Illness and Wellness Tools  Teaching Method: Verbal, Written and Demonstration  Shared Area of Learning: Yes   Goal Set: not text  during work shift  Tx Plan Objective: 1 1, 1 2 Therapist:  Mally MALLOY

## 2019-07-08 NOTE — PSYCH
Subjective:     Patient ID: Lizette Martines is a 45 y o  female  [facility] Clinical Progress Notes      Specialized Services Documentation  Therapist must complete separate progress note for each specific clinical activity in which the individual participated during the day  Group Psychotherapy     (0930-1030) Lizette Martines actively participated in group psychotherapy process today  Group began with this writer facilitating a mindfulness exercise on [employer]  Clients engaged in a check-in, identifying how they were feeling and a challenge or a success from the weekend  Clients utilized the check-in as a space to process current challenges and express emotions regarding struggling situations  Clients utilized altruism and engaged in the empathetic process of supporting one another  Nick Hence shared with the group her weekend containing struggles with her year celebration of sobriety and her interactions with her   She processed her current situation and her peers responded positively  Sandoval Hence is making progress towards goals by participating in group psychotherapy and they are encouraged to continue working towards long term goals  Tx Plan Objective: 1 2 Therapist:  Jay Thompson [facility] [place]    Other     Case Management Note    Jay Thompson LPC    Current suicide risk : Low     ([id number]) Confirmed with Sandoval Hence that she will leave early tomorrow due to having a previously scheduled appointment  She stated that she had an interesting weekend and celebrated one year sobriety  Medications changes/added/denied? No    Treatment session number: 8    Individual Case Management Visit provided today?  Yes     [facility] follow up physician's orders: None at this time

## 2019-07-09 ENCOUNTER — OFFICE VISIT (OUTPATIENT)
Dept: PSYCHOLOGY | Facility: CLINIC | Age: 39
End: 2019-07-09
Payer: COMMERCIAL

## 2019-07-09 DIAGNOSIS — F50.81 BINGE EATING DISORDER: ICD-10-CM

## 2019-07-09 DIAGNOSIS — F41.1 GENERALIZED ANXIETY DISORDER: ICD-10-CM

## 2019-07-09 DIAGNOSIS — F43.10 POST TRAUMATIC STRESS DISORDER (PTSD): ICD-10-CM

## 2019-07-09 DIAGNOSIS — F33.0 MAJOR DEPRESSIVE DISORDER, RECURRENT, MILD (HCC): Primary | ICD-10-CM

## 2019-07-09 PROCEDURE — H0035 MH PARTIAL HOSP TX UNDER 24H: HCPCS

## 2019-07-09 NOTE — PSYCH
Subjective:     Patient ID: Jennifer Gallo is a 45 y o  female  [facility] Clinical Progress Notes      Specialized Services Documentation  Therapist must complete separate progress note for each specific clinical activity in which the individual participated during the day  Allied Therapy   [id number] Newton Fallsemeka Soliman actively shared in [facility] group focused on self- care  She was encouraged to identify aspects of self-care and barriers to it  The concept of self -care versus selfishness explored  Yoan Soliman identified engaging in exercise  as self-care  Group reinforced the necessity of self -care in wellness versus seeing this as a luxury and tips to increase self-care  When asked at end of a specific thing she could commit to in order to increase self-care, she stated could continue to work on boundary setting  Some progress voiced toward goal   Continue AT to engage in the development and practice of wellness tools       Tx Plan Objective: 1 1,1 5, Therapist:  Shay MALLOY    Education Therapy   Time:  2083-1723  Previous goal met: Yes   Readiness to Learning: Receptive  Barriers to Learning: None  Learning Assessment  Time: 8355-9215  Education Completed: Illness and Wellness Tools  Teaching Method: Verbal and Demonstration  Shared Area of Learning: Yes   Goal Set: clean  Tx Plan Objective: 1 2, Therapist:  Shay MALLOY

## 2019-07-09 NOTE — PSYCH
Subjective:     Patient ID: Chyna Sanchez is a 45 y o  female  [facility] Clinical Progress Notes      Specialized Services Documentation  Therapist must complete separate progress note for each specific clinical activity in which the individual participated during the day  Group Psychotherapy     ([id number]) Chyna Sanchez participated in psychotherapy process group today  This writer began group with a mindfulness exercise  Clients checked in, shared how they were feeling and an event that led them to coming to [facility]  Clients engaged in a discussion on change and identified different expected and unexpected changes, and whether they were easy or difficult to cope with  Tyler Godinezmelisa was engaged and active in group  She identified different changes she has been going through  Tyler Arroyo made progress towards goals and is encouraged to continue to participate to work on long term goal accomplishment  Tx Plan Objective: 1 2 Therapist:  Carla Sandra [facility]    Other     Case Management Note    Carla Sandra [facility]    Current suicide risk : Low     ([id number]) Tyler Arroyo shared needing to attend an eye doctor appointment today which she was encouraged to do  Tyler Arroyo identified making progress in groups and not needing anything additional from this   Medications changes/added/denied? No    Treatment session number: 9    Individual Case Management Visit provided today?  Yes     [facility] follow up physician's orders: None at this time

## 2019-07-09 NOTE — PSYCH
Subjective:     Patient ID: Makayla Anguiano is a 45 y o  female  [facility] Clinical Progress Notes      Specialized Services Documentation  Therapist must complete separate progress note for each specific clinical activity in which the individual participated during the day  Group Psychotherapy 9:30-10:30 Tx Plan Objective: 1 2, Therapist: Filemon Chaparro, Counselor Assistant    [facility] participated in group focused on recognizing and labeling emotions and thoughts  The group first discussed the importance of understanding and labelling emotions and then discussed specific emotions and thoughts following a scenario  [facility] [place] [facility] asked questions, provided feedback, and appeared very interested in group discussion  [facility] [place] [facility] expressed feeling confident and satisfied on Sunday when she received her sobriety coin for completing 1 year sober  [facility] [place] [facility] will continue psychotherapy group to share stressors, successes, and challenges

## 2019-07-10 ENCOUNTER — OFFICE VISIT (OUTPATIENT)
Dept: PSYCHOLOGY | Facility: CLINIC | Age: 39
End: 2019-07-10
Payer: COMMERCIAL

## 2019-07-10 DIAGNOSIS — F50.81 BINGE EATING DISORDER: ICD-10-CM

## 2019-07-10 DIAGNOSIS — F33.0 MAJOR DEPRESSIVE DISORDER, RECURRENT, MILD (HCC): Primary | ICD-10-CM

## 2019-07-10 DIAGNOSIS — F41.1 GENERALIZED ANXIETY DISORDER: ICD-10-CM

## 2019-07-10 DIAGNOSIS — F43.10 POST TRAUMATIC STRESS DISORDER (PTSD): ICD-10-CM

## 2019-07-10 PROCEDURE — H0035 MH PARTIAL HOSP TX UNDER 24H: HCPCS

## 2019-07-10 NOTE — PSYCH
Subjective:     Patient ID: Nati Elias is a 45 y o  female  [facility] Clinical Progress Notes      Specialized Services Documentation  Therapist must complete separate progress note for each specific clinical activity in which the individual participated during the day  Group Psychotherapy [id number] Tx Plan Objective: 1 1,1 2, Therapist:  Elena Osei RN    Cookie Lan participated in wellness group identifying coping skills and the use of these skills enhancing mental health  Cookie Lan was an active participant and was able to identify specific coping skills that she utilizes  She identified that she utilizes daily positive affirmation cards and read two of them to the group  Cookie Lan also took notes during the group and shared that she willing to try new coping strategies  She verbalized that she enjoys deep breathing but "does not do it on a regular basis and finds it difficult to incorporate this skill in her daily routine  During this coping skills group the participants identified 3-4 coping skills associated with each letter of the alphabet  Cookie Lan has made progress towards her goals  Nursing will continue to offer groups to provide education on coping skills and overall wellness

## 2019-07-10 NOTE — PSYCH
Subjective:     Patient ID: Jacinda Hennessy is a 45 y o  female  [facility] Clinical Progress Notes      Specialized Services Documentation  Therapist must complete separate progress note for each specific clinical activity in which the individual participated during the day  Allied Therapy   [id number] Jacinda Hennessy actively shared in [facility] group focused on distress tolerance skill radical acceptance and the concept of letting go  Engaged in therapist led PMR exercise and breath counting  During group discussion on letting go, Varsha Escobar participated in group reading  She shared having a headache related to not sleeping well last night, however minimal personal disclosure to topic  Group reinforced importance of consistently caring for ones self and use of strategies explored to refocus to the present  Inconsistent progress toward goal   Continue AT to increase skill awareness and use of skills consistently     Tx Plan Objective: 1 1, Therapist:  Nicolasa MALLOY

## 2019-07-10 NOTE — PSYCH
Subjective:     Patient ID: Lizette Martines is a 45 y o  female  [facility] Clinical Progress Notes      Specialized Services Documentation  Therapist must complete separate progress note for each specific clinical activity in which the individual participated during the day  Group Psychotherapy     ([id number]) Lizette Martines participated in psychotherapy process group today  This writer began group with a mindfulness exercise which focused on the present, connecting with the here and now, and observing your own personal experience related to outside stimuli  Clients engaged in a social activity to boost connections, which is said to improve depressive symptoms  [facility] was engaged and involved in group  Initially she was apprehensive but was eventually active  [facility] made progress towards goals and is encouraged to continue to participate to work on long term goal accomplishment  TX Goals: 1 2, Therapist: Jay Thompson MA, LPC        Education Therapy   Time:  1434-3046  Previous goal met: Fatimah missed morning assessment   Readiness to Learning: Receptive  Barriers to Learning: None  Learning Assessment  Time: 5442-6806  Education Completed: Illness and Wellness Tools, Wellness Assessment   Teaching Method: Verbal, Written and Demonstration  Shared Area of Learning: Yes   Goal Set: "Do art and go on the hike for health"  Tx Plan Objective: 1 4 Therapist:  Jay Thompson MA, LPC      Other     Case Management Note    Jay Thompson LPC    Current suicide risk : Low     ([id number]) Discussed progress in program and aftercare appointments  Identified struggles in finding outpatient providers targeting eating disorders  Identified that she was satisfied with attendance in program      Medications changes/added/denied? No    Treatment session number: 10    Individual Case Management Visit provided today?  Yes    [facility] follow up physician's orders: None at this time

## 2019-07-11 ENCOUNTER — OFFICE VISIT (OUTPATIENT)
Dept: PSYCHOLOGY | Facility: CLINIC | Age: 39
End: 2019-07-11
Payer: COMMERCIAL

## 2019-07-11 DIAGNOSIS — F41.1 GENERALIZED ANXIETY DISORDER: ICD-10-CM

## 2019-07-11 DIAGNOSIS — F33.0 MAJOR DEPRESSIVE DISORDER, RECURRENT, MILD (HCC): Primary | ICD-10-CM

## 2019-07-11 DIAGNOSIS — F43.10 POST TRAUMATIC STRESS DISORDER (PTSD): ICD-10-CM

## 2019-07-11 DIAGNOSIS — F50.81 BINGE EATING DISORDER: ICD-10-CM

## 2019-07-11 PROCEDURE — H0035 MH PARTIAL HOSP TX UNDER 24H: HCPCS

## 2019-07-11 NOTE — PSYCH
Subjective:     Patient ID: Jennifer Gallo is a 45 y o  female  [facility] Clinical Progress Notes      Specialized Services Documentation  Therapist must complete separate progress note for each specific clinical activity in which the individual participated during the day  Allied Therapy   [id number] Jennifer Gallo actively shared in [facility] group focused on decreasing self- stigma and supports  Yoan Soliman attentively listened to [address] [facility] share his life story  Group encouraged power of learning about self, accepting illness and personal responsibility in recovery  Community resources reviewed in addition to personal resources like the positive self talk, mantras, and how to implement wellness tools consistently  Yoan Soliman offered feedback and was very engaged  Some positive progress toward goal noted  Continue AT to encourage self -awareness and healthy engagement of supports       Tx Plan Objective: 1 1,1 4, Therapist:  Shay MALLOY

## 2019-07-11 NOTE — PSYCH
Subjective:     Patient ID: Sharita Kowalski is a 45 y o  female  [facility] Clinical Progress Notes      Specialized Services Documentation  Therapist must complete separate progress note for each specific clinical activity in which the individual participated during the day  Group Psychotherapy 9:30-10:30 Tx Plan Objective:1 4, Gregorio Aldana Works participated in group focused on making and planning for personal change  Kev Lopez sated that liking herself better, stopping negative thinking, and getting help for her eating disorder were changes she wanted to make  Kev Lopez expressed having awareness of what she wants to change, making a decision to make the change, but struggles with planning the change and putting it to take action  Kevsmith Lopez was encouraged to continue reaching out to services and supports  Kev Lopez completed the worksheet, gave feedback and asked questions  Kev Lopez will continue psychotherapy group to share stressors, successes, and challenges

## 2019-07-12 ENCOUNTER — OFFICE VISIT (OUTPATIENT)
Dept: PSYCHOLOGY | Facility: CLINIC | Age: 39
End: 2019-07-12
Payer: COMMERCIAL

## 2019-07-12 DIAGNOSIS — F41.1 GENERALIZED ANXIETY DISORDER: ICD-10-CM

## 2019-07-12 DIAGNOSIS — F50.81 BINGE EATING DISORDER: ICD-10-CM

## 2019-07-12 DIAGNOSIS — F43.10 POST TRAUMATIC STRESS DISORDER (PTSD): ICD-10-CM

## 2019-07-12 DIAGNOSIS — F33.0 MAJOR DEPRESSIVE DISORDER, RECURRENT, MILD (HCC): Primary | ICD-10-CM

## 2019-07-12 PROCEDURE — H0035 MH PARTIAL HOSP TX UNDER 24H: HCPCS

## 2019-07-12 NOTE — PSYCH
Subjective:     Patient ID: Jacinda Hennessy is a 45 y o  female  [facility] Clinical Progress Notes      Specialized Services Documentation  Therapist must complete separate progress note for each specific clinical activity in which the individual participated during the day  Group Psychotherapy     ([id number]) Jacinda Hennessy participated in weekly wellness assessment group today, reviewing weekend supports and plan as clients approach the weekend  Medications reviewed with no concerns or questions at this time  Compliance of medications reviewed and understood  The six (6) dimension of wellness discussed (Physical, Emotional, Cognitive, Vocational, Social and Spiritual)  The format for assessing wellness and measuring progress reviewed  Questions discussed were:  I intend to improve in this area by My first step will be and I will share my plans with and ask for their support by saying Iwona Alberto identified significant improvements she has made over the week and things she plans on doing this upcoming week  Varsha Escobar is making progress towards goals and objectives and will continue to attend wellness group  TX Goals:1 3, 1 4 Therapist: Alla Pagan MA, [facility]    Other     Case Management Note    Alla Pagan LPC    Current suicide risk : Henry Matt did not request to meet with  today  Medications changes/added/denied? No    Treatment session number: 12    Individual Case Management Visit provided today?  No    [facility] follow up physician's orders: None at this time

## 2019-07-12 NOTE — PSYCH
Subjective:     Patient ID: Lizette Martines is a 45 y o  female  [facility] Clinical Progress Notes      Specialized Services Documentation  Therapist must complete separate progress note for each specific clinical activity in which the individual participated during the day  Group Psychotherapy   [id number] Lizette Matrines actively shared in psychotherapy  group focused on DBT emotional regulation skill accumulating positive emotion and weekend planning  Sandoval Hence engaged in hand chime exercise and was attentive during task  Group emphasized participating and focusing on positive tasks to change uncomfortable emotions (active versus passive process)  She was given Pleasant Events List and shared she could clean to work on increasing pleasant events this weekend  Sandoval Hence was able to gain from the concept of  "avoid avoiding"  Some exploration of goal observed and shared  Continue group to encourage the awareness of strengths; strategies and personal application      Tx Plan Objective: 1 1,1 2,1 5, Therapist:  Marques MALLOY      Education Therapy   Time:  5894-8099  Previous goal met: Yes   Readiness to Learning: Receptive  Barriers to Learning: None  Learning Assessment  Time: 4798-8258  Education Completed: Illness and Wellness Tools  Teaching Method: Verbal and Demonstration  Shared Area of Learning: Yes   Goal Set: be mindful during tasks  Tx Plan Objective: 1 5, Therapist:  Marques MALLOY

## 2019-07-12 NOTE — PSYCH
Subjective:     Patient ID: Geraldo Miranda is a 45 y o  female  [facility] Clinical Progress Notes      Specialized Services Documentation  Therapist must complete separate progress note for each specific clinical activity in which the individual participated during the day  Allied Therapy ([id number]) pt actively participated in coping skills group this morning  Pt engaged fully in group discussion and deep breathing and mindfulness exercise  Pt appears to be making good progress toward goals to identify triggers as well as coping strategies  Group started with a reflection with theme of focusing on the present moment  Pt provided relevant contributions during reflection  This group focused on differentiating between healthy vs unhealthy coping strategies, how to facilitate implementation of coping strategies, how to identify warning signs to use coping strategies, and implementation of examples of coping strategies (deep breathing, grounding, visualization)  Pt provided support to fellow group members and stated that she related to struggling with eating disorder and body image  Pt became tearful when sharing about her body image issues as well as eating disorder  Group members offered support and pt reported it helps her knowing that other people struggle with this same issue  Pt shared that one of her coping strategies is attending various support groups every week  Pt acknowledged that she feels like she needs structure in order to function, and that is why she felt she needs to come to partial program  Pt stated that one of her triggers is when her ex  texts her, and a warning sign is when she feels tremors in her hands  Pt identified that she plans to implement mindfulness/grounding strategy, breathing exercise, and progressive muscle relaxation this weekend as coping strategies  Handout provided on grounding mindfulness technique   Continue with AT to encourage continued progress toward long-term goal achievement  Tx Plan Objective: 1 1, 2 1   Therapist:  Nabila Mason MS, OTR/L

## 2019-07-15 ENCOUNTER — OFFICE VISIT (OUTPATIENT)
Dept: PSYCHOLOGY | Facility: CLINIC | Age: 39
End: 2019-07-15
Payer: COMMERCIAL

## 2019-07-15 DIAGNOSIS — F43.10 POST TRAUMATIC STRESS DISORDER (PTSD): ICD-10-CM

## 2019-07-15 DIAGNOSIS — F41.1 GENERALIZED ANXIETY DISORDER: ICD-10-CM

## 2019-07-15 DIAGNOSIS — F50.81 BINGE EATING DISORDER: ICD-10-CM

## 2019-07-15 DIAGNOSIS — F33.0 MAJOR DEPRESSIVE DISORDER, RECURRENT, MILD (HCC): Primary | ICD-10-CM

## 2019-07-15 PROCEDURE — H0035 MH PARTIAL HOSP TX UNDER 24H: HCPCS

## 2019-07-15 NOTE — PSYCH
Subjective:     Patient ID: Kristine Pantoja is a 45 y o  female  [facility] Clinical Progress Notes      Specialized Services Documentation  Therapist must complete separate progress note for each specific clinical activity in which the individual participated during the day  Group Psychotherapy     ([id number]) Kristine Pantoja participated in psychotherapy process group today  This writer began group with a mindfulness exercise on coping with anxiety  Clients checked in, shared how they were feeling and identified a challenge or success from the weekend  Clients engaged in an open discussion on anxiety and what their anxiety looks like  Clients shared their reactions to anxiety and identified similarities and differences in their reactions to anxiety  Lucy Stone was actrively involved in group discussion and provided feedback to her peers  She was receptive to new information too  Lucy Stone made progress towards goals and is encouraged to continue to participate to work on long term goal accomplishment  TX Goals: 1 2, Therapist: Keshawn Lerma MA, SUSANNE        Education Therapy   Time:  4698-7966  Previous goal met: Fatimah missed check in  Readiness to Learning: Receptive  Barriers to Learning: None  Learning Assessment  Time: 8535-9489  Education Completed: Illness and Wellness Tools, Wellness Assessment   Teaching Method: Verbal, Written and Demonstration  Shared Area of Learning: Yes   Goal Set: "Mindful eating at work"  Tx Plan Objective: 1 4 Therapist:  Keshawn Lerma MA, LPC    Other     Case Management Note    Miracle Jackman LPC    Current suicide risk : Low     ([id number]) Met with Lucy Stone and began to talk about the potential of discharge  Discussed fears surrounding it and that she has services in place that support discharge  Medications changes/added/denied? No    Treatment session number: 13    Individual Case Management Visit provided today?  Yes     [facility] follow up physician's orders: None at this time

## 2019-07-15 NOTE — PSYCH
Subjective:     Patient ID: Jacinda Hennessy is a 45 y o  female  [facility] Clinical Progress Notes      Specialized Services Documentation  Therapist must complete separate progress note for each specific clinical activity in which the individual participated during the day  Group Psychotherapy (9:30-10:30am) Ethel Quinteros participated in group psychotherapy  Group began with a 5 minute mindful meditation followed by check in  Varsha Luz shared feeling tired today and in pain  She stated that her biggest challenge at the moment is pain issues  Varsha Escobar participated in an open discussion on Negative thoughts and how to reframe them to get through a situation  Varsha Escobar shared that she has learned to be her own motivation and knows that she has few supports so knows that she needs to provide support for herself to reach her goals  She shared that she has some work to do but would like to one day be a peer support to others who find themselves in situations similar to hers  Varsha Escobar provided in discussions offered suggestions to fellow group members for dealing with situations  Varsha Escobar will continue to work on her goals and objectives and attend group therapy    Tx Plan Objective: 1 1, 1 2, Therapist:  Zahida Reyes, ELIZABETH, LSW

## 2019-07-15 NOTE — PSYCH
Subjective:     Patient ID: Kristine Pantoja is a 45 y o  female  [facility] Clinical Progress Notes      Specialized Services Documentation  Therapist must complete separate progress note for each specific clinical activity in which the individual participated during the day  Allied Therapy   [id number]  Kristine Pantoja actively shared in [facility] group focused on relaxation techniques and concept of mindfulness  Lucy Stone was observed to be engaged in therapist led progressive muscle relaxation exercises  Goals of mindfulness reviewed and she identified a personal goal for the evening to use mindfulness while eating - she reports she has been practicing this over the last several days with positive results  Group discussed specific ways to practice refocusing thoughts to the present and offered encouragement to use these things regularly to manage stressors consistently  Some effort noted toward tx goal   Continue AT to encourage development of wellness skills and consistent practice      Tx Plan Objective: 1 5, Therapist:  Mally Capps MT-BC

## 2019-07-16 ENCOUNTER — DOCUMENTATION (OUTPATIENT)
Dept: PSYCHOLOGY | Facility: CLINIC | Age: 39
End: 2019-07-16

## 2019-07-16 NOTE — PROGRESS NOTES
Subjective:     Patient ID: Madelyn Ibarra is a 45 y o  female  [facility] Clinical Progress Notes      Specialized Services Documentation  Therapist must complete separate progress note for each specific clinical activity in which the individual participated during the day  Case Management Note    Yao Weiner LPC    Current suicide risk : Unable to assess due to absence  Madelyn Ibarra contacted the program and stated they were not going to be present due to being initially late  She said she would attempt to get to program on time but she never made it in  Medications changes/added/denied? No    Treatment session number: N/A    Individual Case Management Visit provided today? No    [facility] follow up physician's orders: None at this time

## 2019-07-17 ENCOUNTER — OFFICE VISIT (OUTPATIENT)
Dept: PSYCHOLOGY | Facility: CLINIC | Age: 39
End: 2019-07-17
Payer: COMMERCIAL

## 2019-07-17 DIAGNOSIS — F33.0 MAJOR DEPRESSIVE DISORDER, RECURRENT, MILD (HCC): Primary | ICD-10-CM

## 2019-07-17 DIAGNOSIS — F41.1 GENERALIZED ANXIETY DISORDER: ICD-10-CM

## 2019-07-17 DIAGNOSIS — F43.10 POST TRAUMATIC STRESS DISORDER (PTSD): ICD-10-CM

## 2019-07-17 DIAGNOSIS — F50.81 BINGE EATING DISORDER: ICD-10-CM

## 2019-07-17 PROCEDURE — H0035 MH PARTIAL HOSP TX UNDER 24H: HCPCS

## 2019-07-17 NOTE — PSYCH
Subjective:     Patient ID: Larissa Schwartz is a 45 y o  female  [facility] Clinical Progress Notes      Specialized Services Documentation  Therapist must complete separate progress note for each specific clinical activity in which the individual participated during the day  Group Psychotherapy ([id number]) Zhao Rutherford RN     Larissa Schwartz  attended  wellness group today on medication  management and compliance  Discussion began with :  Managing medications well is an important part of recovery and relapse prevention  People typically need to try several strategies to find the one that works for them  There are many strategies that can be used  The key to find the one that best suit your needs  Hand out provided with following list   Jurline Para the one or ones that might work best for you:  (1) Having medication with me in case I am not home  (2) Putting my medication in a visible location  (3) Reminding myself of what happens if I dont take my medications  (4) Someone reminding me to take my medications  (5) Setting up my medication in a 1-week organizer  (6) Setting up my daily medications  (7) Leaving myself a note with medications times  (8) Taking my medications with meals  (9) Setting up an alarm clock  (10) Taking my medications at bedtime  (11) Other  -------------------------------------  Are the strategies for your medication working consistently   -------------Yes------------ No--------------  If not, is there a strategy that you would like to try? Describe it:  --------------------------------------------  A medication blank medication work sheet provided with the headings :  Name of Drug:  What its for:  Date Started:  Doctor:  Color/Shape:  Dose and Instructions :   Erasto Saul was able to understand the importance of medication management and was able to list medications effectively         Tx Plan Objective: 1 3

## 2019-07-17 NOTE — PSYCH
Subjective:     Patient ID: Nhung Brown is a 45 y o  female  [facility] Clinical Progress Notes      Specialized Services Documentation  Therapist must complete separate progress note for each specific clinical activity in which the individual participated during the day  Allied Therapy   [id number] Nhung Brown actively shared in [facility] group focused on WRAP development and use  [facility] spontaneously engaged throughout group exploring key facets of recovery through gray analysis and sections of the WRAP during group task  She shared examples of categories and took notes for her own WRAP  Group explored the benefits of WRAP development and strategies to using this tool to support ongoing recovery  Some progress noted toward goal   Continue AT to explore recovery strategies and use      Tx Plan Objective: 1 1,1 5, Therapist:  Susie MALLOY    Education Therapy   Time:  0647-0483  Previous goal met: Yes   Readiness to Learning: Receptive  Barriers to Learning: None  Learning Assessment  Time: 8247-9673  Education Completed: Illness and Wellness Tools  Teaching Method: Verbal and Written  Shared Area of Learning: Yes   Goal Set: practice being mindful  Tx Plan Objective: 1 5, Therapist:  Susie MALLOY

## 2019-07-17 NOTE — PSYCH
Subjective:     Patient ID: Kayy Kelly is a 45 y o  female  [facility] Clinical Progress Notes      Specialized Services Documentation  Therapist must complete separate progress note for each specific clinical activity in which the individual participated during the day  Group Psychotherapy     (0930-1030) Kayy Kelly actively participated in group psychotherapy process today  Group began with this writer facilitating a mindfulness exercise on grounding techniques  Clients engaged in a check-in, identifying how they were feeling as well as a productive and pleasant activity planned for the weekend  Group today was an open forum, which presented clients with the space to share current challenges and stressors and receive support and feedback from peers  Clients engaged in altruistic aspect of process psychotherapy and engaged in the connections with others a means to overcome challenges  Jacklyn Sparks shared her struggles with time management and feeling overwhelmed  The group provided her with supportive feedback and recommendations  Jacklyn Sparks is making progress towards goals by participating in group psychotherapy and they are encouraged to continue working towards long term goals through program compliance  TX Plan Objective: 1 1, 1 2, 1 4 Therapist: Segundo Nazario MA, [facility] [place]    Other     Case Management Note    Miracle Jackman LPC    Current suicide risk : Low     ([id number]) Met with Jacklyn Sparks and discussed resources available to the group and shared  She identified struggles with food shopping, time management and balancing wellness with obligations  Medications changes/added/denied? No    Treatment session number: 14    Individual Case Management Visit provided today?  Yes     [facility] follow up physician's orders: None at this time

## 2019-07-18 ENCOUNTER — OFFICE VISIT (OUTPATIENT)
Dept: PSYCHOLOGY | Facility: CLINIC | Age: 39
End: 2019-07-18
Payer: COMMERCIAL

## 2019-07-18 DIAGNOSIS — F50.81 RECURRENT BINGE EATING: Primary | ICD-10-CM

## 2019-07-18 DIAGNOSIS — F43.10 POST TRAUMATIC STRESS DISORDER (PTSD): ICD-10-CM

## 2019-07-18 DIAGNOSIS — F33.0 MAJOR DEPRESSIVE DISORDER, RECURRENT, MILD (HCC): ICD-10-CM

## 2019-07-18 DIAGNOSIS — F41.1 GENERALIZED ANXIETY DISORDER: ICD-10-CM

## 2019-07-18 PROCEDURE — H0035 MH PARTIAL HOSP TX UNDER 24H: HCPCS

## 2019-07-18 NOTE — PSYCH
Subjective:     Patient ID: Alisa Thornton is a 45 y o  female  [facility] Clinical Progress Notes      Specialized Services Documentation  Therapist must complete separate progress note for each specific clinical activity in which the individual participated during the day  Group Psychotherapy Juan A Hackett RN    (7429-1918) Alisa Thornton  attended wellness group: Cinemetherapy- Discussion began with -  [employer] can be a powerful catalyst for healing and growth for anybody who is open to learning how movies affect us and gives us conscious awareness  Cinema therapy allows us to use the effects of imagery, plot, music, etc  in films on our psych for insight, inspiration, emotional release or relief and natural changes  Group participants were able to see that mental illness is not curable but very manageable  The movie  emphasizes why medication compliance as well as therapy is needed if wellness is to be achieved and maintained  Education then distributed on  the different classes of  medication used for different disorders  as shown by the different diagnosis from this movie  It also showed the importance of supports especially when dealing with a mental illness diagnosis  Maureen Parra was able to learn from this group and will continue to attend wellness groups towards achieving goals and objectives        Tx Plan Objective: 1 3,1 4

## 2019-07-18 NOTE — PSYCH
Subjective:     Patient ID: Chyna Sanchez is a 45 y o  female  [facility] Clinical Progress Notes      Specialized Services Documentation  Therapist must complete separate progress note for each specific clinical activity in which the individual participated during the day  Group Psychotherapy     ([id number]) Chyna Sanchez participated in group psychotherapy today  Group began with this writer facilitating a mindfulness skill to practice during a morning routine  Clients checked in and shared how they were feeling and three character traits about them  Clients participated in a group activity on personality and understanding personality types  Clients completed an assessment and debriefed with peers regarding outcomes  [facility] struggled with task completion and complained often of anxiety  At the end of group, she accomplished the task and celebrated her success  [facility] made progress towards goals and is encouraged to continue through group participation and program compliance  TX Plan Objective: 1 1 , 1 2  Therapist:  Carla Sandra [facility]    Education Therapy   Time:  7819-5373  Previous goal met: Yes   Readiness to Learning: Receptive  Barriers to Learning: None  Learning Assessment  Time: 3373-0331  Education Completed: Illness and Wellness Tools  Teaching Method: Verbal, Written and Demonstration  Shared Area of Learning: Yes   Goal Set: "Use coping skills at work"  Tx Plan Objective: 1 4 Therapist:  Carla Sandra [facility]    Other     Case Management Note    Carla Sandra LPC    Current suicide risk : Mike Gu did not request to meet with this writer today  Medications changes/added/denied? No    Treatment session number: 15    Individual Case Management Visit provided today?  No    [facility] follow up physician's orders: None

## 2019-07-18 NOTE — PSYCH
Subjective:     Patient ID: Denver Velez is a 45 y o  female  [facility] Clinical Progress Notes      Specialized Services Documentation  Therapist must complete separate progress note for each specific clinical activity in which the individual participated during the day  Group Psychotherapy 9:30-10:30 Tx Plan Objective: 1 2, Therapist:  Emily Bain, Counselor Assistant     Angélica Hodges actively participated in group focused on current stressors and triggers  Angélica Hodges expressed feeling anxious and worrisome about a possible date she might have this weekend with a male from [facility]  She expressed feeling unsure and that the encounter has triggered negative thoughts and feelings  She was encouraged to process her emotions, to be honest with herself and the salty, and to do what she feels is best to do for her  Angélica Hodges did not provide feedback and appeared distracted by her cell phone at times  Angélica Hodges will continue psychotherapy group to share stressors, successes and challenges

## 2019-07-18 NOTE — PSYCH
Assessment/Plan:       Diagnoses and all orders for this visit:     Binge eating disorder     Generalized anxiety disorder     Post traumatic stress disorder (PTSD)     Major depressive disorder, recurrent, mild (HCC)           Subjective:      Patient ID: Fatimah Da Silva is a 45 y  o  female      [facility] Treatment Plan   AREAS OF NEED: Eating disorder as evidenced by binging, restricting, and purging, and depression as evidenced by overwhelming anxiety, poor sleep due to complicated grief     Date Initiated: 06/26/19      Strengths: "smart, driven, caring about others  "            LONG TERM GOAL:   Date Initiated: 6/26/2019  1 0 I will identify three ways in which my overall day-to-day functioning has improved since attending program    Target Date: 07/24/19  Completion Date:      Date Initiated: 6/26/2019  2 0 I will decrease my engagement in negative coping mechanisms (alcohol use or binge eating) for my duration at program    Target Date:07/24/19   Completion Date:     SHORT TERM OBJECTIVES:      Date Initiated: 6/26/2019  1 1 I will learn three new coping skills and implement one daily to improve my ability to cope with depressive and anxious symptoms    Revision Date: 07/18/19   Target Date: 07/26/19  Completion Date:      Date Initiated: 6/26/2019  1 2 I will identify one realistic goal to set for myself each day and accomplish that goal to improve my overall mood through successive approximations     Revision Date: 07/08/19   Target Date: 07/17/19  Completion Date: 07/18/19      Date Initiated: 6/26/2019  1 3 I will take medications as prescribed and share questions and concerns if arise     Revision Date: 07/18/19   Target Date: 07/26/19  Completion Date:      Date Initiated: 6/26/2019  1 4 I will identify 3 ways my supports can assist in my recovery and agree to staff/support contact as indicated     Revision Date: 07/18/19   Target Date: 07/26/19  Completion Date:         Date Initiated: 06/26/19   2 1 I will identify three triggers to my negative coping mechanisms and share with my team   Revision Date: 07/08/19   Target Date: 07/17/19  Completion Date: 07/18/19           7 DAY REVISION:     Date Initiated: 07/08/19   1 5 I will use one skill per day outside of program and discuss my outcomes with the treatment team    Revision Date: 07/18/19   Target Date: 07/26/19  Completion Date:    Date Initiated: 07/18/19   1 6 I will identify one improvement I've made which has prepared me for discharge and begin to make plans for my discharge  Revision Date:   Target Date: 07/26/19  Completion Date:     PSYCHIATRY:  Date Initiated: 06/26/19  Medication Management and Education       Revision Date:07/18/19        Continue medication management  The person(s) responsible for carrying out the plan is Edna Amos MD     NURSING:   Date Initiated: 06/26/19  1 1,1 2,1 3,1 4 This RN will provide daily wellness group five days weekly to educate Larissa Schwartz on S/S of her diagnoses and medications used in treatment  Revision Date: 07/18/19   1 1,1 2,1 3,1 4,1 5 Continue to encourage Larissa Schwartz to participate in wellness groups daily to learn about symptoms, coping strategies and warning signs to promote relapse prevention  The person(s) responsible for carrying out the plan is Zhao Rutherford RN     PSYCHOLOGY:   Date Initiated: 06/26/19       1 1, 1 2, 1 4 Provide psychotherapy group 5 times per week to allow opportunity for Larissa Schwartz to explore stressors and ways of coping  Revision Date: 07/18/19   1 1,1 2,1 4,1 5 Continue to provide psychotherapy group daily to Larissa Schwartz and encourage sharing of stressors, skills and positive change    The person(s) responsible for carrying out the plan is Stephanie Ngo MA, LPC     ALLIED THERAPY:   Date Initiated: 06/26/19  1 1,1 2 Engage Larissa Schwartz in AT group 5 times daily to encourage development and use of wellness tools to decrease symptoms and promote recovery through meaningful activity  Revision Date: 07/18/19   1 1,1 2,1 5 Continue to engage Chyna Sanchez to participate in AT group to practice wellness tools within program and transfer to home sharing successes and barriers through healthy task involvement  The person(s) responsible for carrying out the plan is GAMA Lemons      CO-OCCURRING:   Date Initiated: 06/26/19  2 1 Engage Chyna Sanchez in Co-occurring groups twice weekly to explore addiction and impact on mental health issues (and vice versa), alternative coping and value of sobriety through group discussion and education  Revision Date: 07/18/19   2 1 Continue to engage [place] Sanchez in COD groups twice weekly to explore aspects of recovery support in both the areas of mental health and addiction - challenges, supports and growth  The person(s) responsible for carrying out the plan [facility]     CASE MANAGEMENT:   Date Initiated: 06/26/19      1 0 This  will meet with Chyna Sanchez 3-4 times weekly to assess treatment progress, discharge planning, connection to community supports and UR as indicated  Revision Date: 07/18/19   1 0 Continue to meet with Chyna Sanchez 3-4 times weekly to assess growth, work toward goals, continued treatment needs, dc planning and use of supports  The person(s) responsible for carrying out the plan is Carla Sandra MA, LPC           TREATMENT REVIEW/COMMENTS:      DISCHARGE CRITERIA: Identify 3 signs of progress and complete relapse prevention plan     DISCHARGE PLAN: Return to outpatient therapy and medication management    Estimated Length of Stay: 10 treatment days               Diagnosis and Treatment Plan explained to Fatimah Sheridan relates understanding diagnosis and is agreeable to Treatment Plan             CLIENT COMMENTS / Please share your thoughts, feelings, need and/or experiences regarding your treatment plan: _____________________________________________________________________________________________________________________________________________________________________________________________________________________________________________________________________________________________________________________ Date/Time: ______________      Patient Signature: _________________________________      Date/Time: ______________       Signature: _________________________________     Date/Time: ______________

## 2019-07-19 ENCOUNTER — OFFICE VISIT (OUTPATIENT)
Dept: PSYCHOLOGY | Facility: CLINIC | Age: 39
End: 2019-07-19
Payer: COMMERCIAL

## 2019-07-19 DIAGNOSIS — F50.81 RECURRENT BINGE EATING: Primary | ICD-10-CM

## 2019-07-19 PROCEDURE — H0035 MH PARTIAL HOSP TX UNDER 24H: HCPCS

## 2019-07-19 NOTE — PSYCH
Subjective:     Patient ID: Kayy Kelly is a 45 y o  female  [facility] Clinical Progress Notes      Specialized Services Documentation  Therapist must complete separate progress note for each specific clinical activity in which the individual participated during the day  Group Psychotherapy *  ([id number]) Kayy Kelly actively participated in group psychotherapy process  Group began with this writer facilitating a mindfulness exercise on a grounding exercise  Clients checked in, shared how they were feeling and identified something that is important to them  Clients reviewed values and the values they wish to live by in the different domains of their life  Clients engaged in an open discussion on clarifying their values  Jacklyn Po was engaged in group made progress towards goals through group participation and is encouraged to continue working towards long term goals through program compliance  TX Plan Objective: 1 1 , 1 2  [facility] [place]      Education Therapy   Time:  0488-7729  Previous goal met: Yes   Readiness to Learning: Receptive  Barriers to Learning: None  Learning Assessment  Time: 8066-5827  Education Completed: Illness and Wellness Tools  Teaching Method: Verbal, Written and Demonstration  Shared Area of Learning: Yes   Goal Set: "Limit how many activities I complete over the weekend as not to overwhelm"  Tx Plan Objective: 1 4 [facility] [place]    Other     Case Management Note    Segundo Nazario LPC    Current suicide risk : Low     (9252-3363) Met with Jacklyn Sparks and reviewed treatment plan update  Discussed next week and need to plan for discharge  Yumiko Luna to prepare to come in on Monday and notify the program of which day next week she will discharge  Medications changes/added/denied? No    Treatment session number: 16    Individual Case Management Visit provided today?  Yes     [facility] follow up physician's orders: None at this time

## 2019-07-19 NOTE — PSYCH
Subjective:     Patient ID: Ariel Francois is a 45 y o  female  [facility] Clinical Progress Notes      Specialized Services Documentation  Therapist must complete separate progress note for each specific clinical activity in which the individual participated during the day  Group Psychotherapy Jeannette Floyd RN    ([id number])  Ariel Francois  attended- : weekly  wellness assessment group today  Reviewing weekend supports and plan as we approach the weekend  Medications reviewed with  no concerns or questions at this time  Compliance of medications reviewed and understood  The six (6) dimension of wellness discussed (Physical, Emotional, Cognitive, Vocational, Social and Spiritual)  The format for assessing wellness and measuring progress   reviewed   Questions asked :  (1) I intend to improve in this area by    (2) My first step will be   (3) I will share my plans with Rogelio Singleton ask for their support by saying    Ermias Orlin stated that she needs to start taking a moral inventory of her life and see what she needs to change in her life  Ermias Ordaz   Is making  progress towards goals and objectives and will continue to attend wellness group       Tx Plan Objective: 1 3,1 4

## 2019-07-19 NOTE — PSYCH
Subjective:     Patient ID: Pepe Martinez is a 45 y o  female  [facility] Clinical Progress Notes      Specialized Services Documentation  Therapist must complete separate progress note for each specific clinical activity in which the individual participated during the day  Group Psychotherapy 10:45-11:45 Tx Plan Objective: 1 5, Therapist:  Wilfrid Siemens, Counselor Assistant     Lorie Kawasaki minimally participated in group focused on personal boundaries   The group read over a What are Personal Boundaries  worksheet that included 3 different types: rigid, porous, and healthy  The group then shared comments and concerns about those boundaries and how they could be improved  Lorie Kawasaki expressed setting boundaries with her  and sticking to them  She expressed it being a difficult process but will continue to stick by it  Lorie Kawasaki did not provide feedback and appeared distracted at times  Lorie Kawasaki will continue psychotherapy group to share stressors, successes and challenges

## 2019-07-22 ENCOUNTER — OFFICE VISIT (OUTPATIENT)
Dept: PSYCHOLOGY | Facility: CLINIC | Age: 39
End: 2019-07-22
Payer: COMMERCIAL

## 2019-07-22 DIAGNOSIS — F50.81 RECURRENT BINGE EATING: Primary | ICD-10-CM

## 2019-07-22 PROCEDURE — H0035 MH PARTIAL HOSP TX UNDER 24H: HCPCS

## 2019-07-22 NOTE — PSYCH
Subjective:     Patient ID: Denver Velez is a 45 y o  female  [facility] Clinical Progress Notes      Specialized Services Documentation  Therapist must complete separate progress note for each specific clinical activity in which the individual participated during the day  Allied Therapy   2281-1321 Denver Velez actively shared in [facility] group focused on DBT skill corral mind  Angélica Hodges engaged in tasks exploring differences between reasonable and emotion mind and ways to get to wise mind  Group explored the benefits of mindfulness and practiced ways to slow down to begin to develop corral mind  Lindsaykaye Hodges identified how she has been implementing breathing skills and the benefits  She asked appropriate questions and voiced skill transfer  Group reinforced role of participating with wise mind in order to prevent getting stuck in the past or fears of the future  Some positive effort toward treatment goal noted  Continue AT to encourage healthy skill development and practice of explored strategies       Tx Plan Objective: 1 5, Therapist:  Sujey MALLOY    Education Therapy   Time:  5109-6698  Previous goal met: Yes   Readiness to Learning: Receptive  Barriers to Learning: None  Learning Assessment  Time: 2826-1219  Education Completed: Illness, Aftercare and Wellness Tools  Teaching Method: Verbal and Demonstration  Shared Area of Learning: Yes   Goal Set: read from the Big book  Tx Plan Objective: 1 5,1 4, Therapist:  Sujey MALLOY

## 2019-07-22 NOTE — PSYCH
Subjective:     Patient ID: Kayy Kelly is a 45 y o  female  [facility] Clinical Progress Notes      Specialized Services Documentation  Therapist must complete separate progress note for each specific clinical activity in which the individual participated during the day  Group Psychotherapy     ([id number]) Kayy Kelly participated in group psychotherapy process today  Group began with this writer facilitating a mindfulness exercise on finger breathing  Clients engaged in a check-in, identifying how they were feeling as well as a productive and pleasant activity planned for the weekend  Group today was an open forum, which presented clients with the space to share current challenges and stressors and receive support and feedback from peers  Clients engaged in altruistic aspect of process psychotherapy and engaged in the connections with others a means to overcome challenges  Jacklyn Sparks was open in group and identified that she limited herself to activities she wanted to do  She took care of herself and set limits  She is making progress towards goals by participating in group psychotherapy and they are encouraged to continue working towards long term goals through program compliance  Tx Plan Objective: 1 2 Therapist:  Segundo Naz[facility] [facility]      Other     Case Management Note    Miracle Jackman LPC    Current suicide risk : Low     (2814-0587) Met with Jacklyn Bridger and discussed aftercare and setting up appointments  She establish discharge date of the 26th, this Friday  She identified having an aftercare appointment established on Monday, July 29th with [facility], has an appointment with her [facility] next week, and also has an outpatient appointment with Trevor Shipman on August 20th  She wants to additionally schedule with her [place] outpatient program in order to "have back up" if [facility] does not work out  Medications changes/added/denied?  No    Treatment session number: 17    Individual Case Management Visit provided today?  Yes     [facility] follow up physician's orders: None at this time

## 2019-07-22 NOTE — PSYCH
Subjective:     Patient ID: Imelda Damon is a 45 y o  female  [facility] Clinical Progress Notes      Specialized Services Documentation  Therapist must complete separate progress note for each specific clinical activity in which the individual participated during the day  Group Psychotherapy (3009-3394) Oseas Mayer RN    Imelda Damon attended wellness group today dealing with - Getting to Know You And Learning to Like Yourself Or Make A Change  Questionnaire given - List of questions to ask yourself and give honest responses  1) What would you do if you knew you would be successful? 2) Do you think you are beautiful when you wake up in the morning? 3) How are you, really? 4)  Do you have your dream job or are you creating it? 5) If there was a solution to your anxiety, how would it look like? 6) If there was a solution for your depression, how would it look like? 7) When was the last time you did something you truly enjoyed? 8) Why are you worth knowing? 9) Who or what lights you up?  10) How do you treat people who can do nothing for you? Understanding that we cannot control anyone else but we can control our self is the first step towards change  Bert Thomas was able to recognize "  I need to know how my emotional being gets affected by other people's actions  I jorge not to feel afraid to say " no" when I cannot help out when asked to and I need to find things that will make me happy again " Bert Martha was able to do exercise honestly and will continue to attend wellness group to achieve goals and objectives           Tx Plan Objective: 1 5

## 2019-07-23 ENCOUNTER — OFFICE VISIT (OUTPATIENT)
Dept: PSYCHOLOGY | Facility: CLINIC | Age: 39
End: 2019-07-23
Payer: COMMERCIAL

## 2019-07-23 DIAGNOSIS — F50.81 RECURRENT BINGE EATING: Primary | ICD-10-CM

## 2019-07-23 PROCEDURE — H0035 MH PARTIAL HOSP TX UNDER 24H: HCPCS

## 2019-07-23 NOTE — PSYCH
Subjective:     Patient ID: Luis Eduardo Becerril is a 45 y o  female  [facility] Clinical Progress Notes      Specialized Services Documentation  Therapist must complete separate progress note for each specific clinical activity in which the individual participated during the day  Group Psychotherapy ([id number]) Shawn Vargas RN    Luis Eduardo Becerril  attended wellness group today on: Living with Bipolar: Educational Video entitled - Jovan Organ viewed  Discussion  started off with definition of Bipolar Disorder which can be referred  as  manic-depressive disorder    Video viewed  After  the video discussion  began with  : For people living with Bipolar , the view of life can seem dark and ominous or deceptively beautiful  Bipolar illness can twist and distort the mind of physically healthy individuals, to the point that their jobs their families and even lives can be lost   But with education, therapy and medication management, people can regain what they have lost and perhaps live in a world better than they have known  Information provided on all the resources available to treat this disease  Support groups like [facility] ([address] [place] [address]) is a great tool available for people diagnosed with this illness, families and friends  [facility] was able to understand better that with proper diagnosis,  medication and education, this illness is manageable and treatable  [facility] will continue to attend wellness groups in order to achieve goals and objectives         Tx Plan Objective: 1 3,1 4      Education Therapy   Time:  9064-3790  Previous goal met: Yes   Readiness to Learning: Receptive  Barriers to Learning: None  Learning Assessment  Time: 3035-5275  Education Completed: Wellness Tools  Teaching Method: Verbal  Shared Area of Learning: Yes   Goal Set: " Take a shower and go to [employer]"  Tx Plan Objective: 1 5 Therapist:  Shawn Vargas RN

## 2019-07-23 NOTE — PSYCH
Subjective:     Patient ID: Makayla Anguiano is a 45 y o  female  [facility] Clinical Progress Notes      Specialized Services Documentation  Therapist must complete separate progress note for each specific clinical activity in which the individual participated during the day  Group Psychotherapy 10:45-11:45 Tx Plan Objective: 1 2, Therapist:  Filemon Chaparro, Counselor assistant    Adonay Arias participated in group focused on an open discussion about suicide  Patients were able to freely discuss experiences in regards to attempting and/or losing someone to suicide  Adonay Hugo encouraged her peers to seek support and grief groups for anyone who has experience significant losses  She expressed that she felt supported, helped, and benefitted from it  Adonay Arias will continue psychotherapy group to share stressors, successes, and challenges  Case Management Note   This is day 25 for Adonay Arias  She claimed having no issues or concerns other than her usual physical pain  Adonay Arias is working towards her discharge for this Friday 7/26/19  Filemon Chaparro, Counselor assistant    Current suicide risk : Low     Medications changes/added/denied? No    Treatment session number: 18    Individual Case Management Visit provided today?  No    [facility] follow up physician's orders: na

## 2019-07-23 NOTE — PSYCH
Subjective:     Patient ID: Lonny Figueroa is a 45 y o  female  [facility] Clinical Progress Notes      Specialized Services Documentation  Therapist must complete separate progress note for each specific clinical activity in which the individual participated during the day  Group Psychotherapy (9:30-10:30) Alejandrina Arceo particpated in group psychotherapy focused on Coping Skills  Fatimah shared feeling discombobulated today but is also proud of her inner strength  The group participated in a 5 minute mindful meditation followed by sharing various coping skills and how to put them into practice on a day to day basis  Ana Morales will continue to work on her goals and objectives and participate in group therapy    Tx Plan Objective: 1 1,1 2, Therapist:  AYANA Shi

## 2019-07-24 ENCOUNTER — OFFICE VISIT (OUTPATIENT)
Dept: PSYCHOLOGY | Facility: CLINIC | Age: 39
End: 2019-07-24
Payer: COMMERCIAL

## 2019-07-24 DIAGNOSIS — F33.1 MODERATE EPISODE OF RECURRENT MAJOR DEPRESSIVE DISORDER (HCC): Primary | ICD-10-CM

## 2019-07-24 DIAGNOSIS — F41.1 GENERALIZED ANXIETY DISORDER: ICD-10-CM

## 2019-07-24 DIAGNOSIS — F43.10 POST TRAUMATIC STRESS DISORDER (PTSD): ICD-10-CM

## 2019-07-24 DIAGNOSIS — F50.81 BINGE EATING DISORDER: ICD-10-CM

## 2019-07-24 PROCEDURE — H0035 MH PARTIAL HOSP TX UNDER 24H: HCPCS

## 2019-07-24 NOTE — PSYCH
Subjective:     Patient ID: Makayla Anguiano is a 45 y o  female  [facility] Clinical Progress Notes      Specialized Services Documentation  Therapist must complete separate progress note for each specific clinical activity in which the individual participated during the day  Education Therapy   Time:  9780-9541  Previous goal met: Missed check-in  Readiness to Learning: Receptive  Barriers to Learning: None  Learning Assessment  Time: 5027-1582  Education Completed: Illness and Wellness Tools  Teaching Method: Verbal, Written and Demonstration  Shared Area of Learning: Yes   Goal Set: "Self-soothe after meeting with ICM"  Tx Plan Objective: 1 4 Therapist:  Crissy Vargas [facility] [place]      Other     Case Management Note    Crissy Vargas LPC    Current suicide risk : Low     Attempted to meet with hospitals after program but she stated she needed to leave and would talk to this writer tomorrow  Medications changes/added/denied? No    Treatment session number: 19    Individual Case Management Visit provided today?  No    [facility] follow up physician's orders: None at this time

## 2019-07-24 NOTE — PSYCH
Subjective:     Patient ID: Lonny Figueroa is a 45 y o  female  [facility] Clinical Progress Notes      Specialized Services Documentation  Therapist must complete separate progress note for each specific clinical activity in which the individual participated during the day  Group Psychotherapy (7523-4412) Chan Mccarty RN     Lonny Figueroa  attended wellness group today on - Supportive vs Toxic Relationships: Discussion began with definition of what is a toxic person  A toxic person can be any one you come across in your life, live with, work with or see from time to time  Sadly, many of us become victims of a toxic relationship  We have to know when to say enough and move on  A toxic person can be anyone who infects (like a disease) your thoughts, emotions, feelings, and behavior in ways that are not good  Supportive people keep us from feeling alone, offer approval, are honest, give strength, want what is best for us, supply us with kind listening, keep us from falling or sinking, speak in favor of us and supply us with kind listening  Anaseverino Morales was able to identify supportive and toxic relationships and will make action plan to keep supportive relationship and let go of the toxic relationships  Ana Morales is making progress towards goals and objective and will continue to attend wellness groups           Tx Plan Objective: 1 4

## 2019-07-24 NOTE — PSYCH
Subjective:     Patient ID: Sharita Kowalski is a 45 y o  female  [facility] Clinical Progress Notes      Specialized Services Documentation  Therapist must complete separate progress note for each specific clinical activity in which the individual participated during the day  Group Psychotherapy 0930-1030 Kev Lopez actively participated in psychotherapy group focused on DBT skill self-soothing from Distress Tolerance module  She engaged in relaxation exercises and discussion  Group explored diaphragmatic breathing, progressive muscle relaxation, breath counting, and visualization  Group reviewed Distress Tolerance definition and meaning  Group discussed engaging your five senses to self-soothe  Group discussed creating a self-soothe kit tailored to themselves as individuals to help them tolerate the moment  Kev Lopez shared taking a walk in nature as a way to self-soothe  Some progress towards goal observed and shared  Continue psychotherapy to further encourage utilizing self-soothing techniques to promote wellness  Tx Plan Objective: 1 1, 1 5 Therapist:  WILNER JohnsonBC    Allied Therapy 6479-8070 Kev Lopez actively shared in [facility] group focused on interpersonal effectiveness and communication styles  She engaged in instrument improvisations and discussion  Group discussed different communication styles (passive, passive-aggressive, aggressive, and assertive) and traits seen in each  Group explored how those communication styles present themselves in different ways  Group discussed positive impact of communicating assertively with others  Kev Lopez shared a situation from yesterday where she was trying to speak to someone on the phone, who was being aggressive and yelling  Encouraged her to use assertive communication and set boundaries  Some good progress towards goals observed and shared  Continue AT to further practice communicating in a healthy way to promote wellness   Tx Plan Objective: 1 2, 1 4, 1 5 Therapist: Elisabeth Szymanski, MT-BC

## 2019-07-25 ENCOUNTER — OFFICE VISIT (OUTPATIENT)
Dept: PSYCHOLOGY | Facility: CLINIC | Age: 39
End: 2019-07-25
Payer: COMMERCIAL

## 2019-07-25 DIAGNOSIS — F41.1 GENERALIZED ANXIETY DISORDER: Primary | ICD-10-CM

## 2019-07-25 DIAGNOSIS — F50.81 BINGE EATING DISORDER: ICD-10-CM

## 2019-07-25 DIAGNOSIS — F33.0 MAJOR DEPRESSIVE DISORDER, RECURRENT, MILD (HCC): ICD-10-CM

## 2019-07-25 DIAGNOSIS — F43.10 POST TRAUMATIC STRESS DISORDER (PTSD): ICD-10-CM

## 2019-07-25 PROCEDURE — H0035 MH PARTIAL HOSP TX UNDER 24H: HCPCS

## 2019-07-25 NOTE — PSYCH
[facility] Discharge Instructions:      Disposition: home     Address[address] [address]      Diagnosis:   Encounter Diagnoses   Name Primary?  Generalized anxiety disorder Yes    Binge eating disorder     Post traumatic stress disorder (PTSD)     Major depressive disorder, recurrent, mild (HCC)        Allergies (Drug/Food): Allergies   Allergen Reactions    Meloxicam GI Intolerance    Neurontin [Gabapentin] Hives    Ultram [Tramadol] Hives     SEIZURES    Vistaril [Hydroxyzine]      Activity: No work restrictions    Diet:no recommendations     Smoking Cessation: The best thing you can do to improve your health is to stop using tobacco     Diagnostic/Laboratory Orders: None    Vaccines: If you received a vaccine, please notify your family physician on your next visit  For more information, please call [phone]  Follow-up appointments/Referrals: Appointment with Yenifer Alejandro at 08/20/19  Has outstanding appointment with [facility] and [facility] additionally  ICM/CTT: Has ICM through [address]: [place] [phone]  Intake/Referral/Evaluation (Non-Emergency) [facility]: [phone], [facility]: [phone], [facility]: [phone] and [place]: [phone]  Crisis Intervention (Emergency) [place] Service: [facility]: [phone], [place]: [phone], [facility] [address]: [phone], [facility]): [phone], [facility]: [phone] and [facility]: [phone]  [place] Crisis Intervention Hotline: 0-960.527.8617  National Suicide Crisis Hotline: 6-225.366.5536  I, the undersigned, have received and understand the above instructions          Patient/Rep Signature: __________________________________       Date/Time: ______________       Physician Signature: ____________________________________      Date/Time: ______________             Signature: ________________________________       Date/Time: ______________

## 2019-07-25 NOTE — PSYCH
Subjective:     Patient ID: Chyna Sanchez is a 44 y o  female  [facility] Clinical Progress Notes      Specialized Services Documentation  Therapist must complete separate progress note for each specific clinical activity in which the individual participated during the day  Allied Therapy 0315-9005 Tyler Arroyo actively shared in [facility] group focused on Interpersonal Effectiveness module and "DEAR MAN" skill  She engaged in gray analysis and discussion  Group discussed using "DEAR MAN" to get their objective met; Describe, Express, Assert, Reinforce, Mindfully, Appear Confident, and Negotiate  Group identified healthy and strong boundary setting versus unhealthy and weak  Group discussed need to put their own needs first when unwell, and to set consequences if boundaries are not being followed  Tyler Arroyo shared that she had to set boundaries with her ex- and leave the living situation  Some progress towards goal observed and shared  Continue AT to further encourage using skills to have needs met while maintaining healthy relationships   Tx Plan Objective: 1 1, 1 4, 1 5 Therapist:  GAMA Noonan

## 2019-07-25 NOTE — PSYCH
Subjective:     Patient ID: Geraldo Miranda is a 44 y o  female  [facility] Clinical Progress Notes      Specialized Services Documentation  Therapist must complete separate progress note for each specific clinical activity in which the individual participated during the day  Group Psychotherapy     (5314-9537) Geraldo Miranda actively participated in group psychotherapy process today  Clients read poems and used poems as a vehicle for the expression of emotions that might otherwise be difficult to express  Clients discussed grief and misunderstanding in depth  Clients wrote their own poems, which served as a purpose to redefine their current situations and opened up new ways of processing their situations  Sherlymattie Aparicio was hesitant to engage in group  Sherlymattie Aparicio complained about the activity in group  She was not making progress towards goalsand they are encouraged to continue working towards long term goals through program compliance  TX Plan Objective: 1 1  Therapist:  Mayte [facility]      Education Therapy   Time:  1203-0969  Previous goal met: Yes   Readiness to Learning: Receptive  Barriers to Learning: None  Learning Assessment  Time: 1998-2542  Education Completed: Illness and Wellness Tools  Teaching Method: Verbal, Written and Demonstration  Shared Area of Learning: Yes   Goal Set: "Cancel physical therapy to get rest before work"  Tx Plan Objective: 1 4 Therapist:  Mayte [facility]    Other     Case Management Note    Miracle Jackman LPC    Current suicide risk : Low     (2317-4458) Met with Sherly Aparicio and reviewed RPP  She attempted to go over it with this writer but this writer had to remind Sherly Aparicio to review it tomorrow    Sherly Aparicio came to this writer requesting to be referred to groups at [facility], and this writer told Sherly Aparicio to discuss it with her outpatient therapist and Sherly Aparicio complained that her appointment isn't until August   Reminded Sherly Aparicio again to be put on her cancellation list and discussed needing an assessment from the outpatient therapist   Traci Lopez stated, "Well this was pointless" and stormed out of the room  Medications changes/added/denied? No    Treatment session number: 20    Individual Case Management Visit provided today?  Yes     [facility] follow up physician's orders: None at this time

## 2019-07-26 ENCOUNTER — OFFICE VISIT (OUTPATIENT)
Dept: PSYCHOLOGY | Facility: CLINIC | Age: 39
End: 2019-07-26
Payer: COMMERCIAL

## 2019-07-26 DIAGNOSIS — F50.81 BINGE EATING DISORDER: ICD-10-CM

## 2019-07-26 DIAGNOSIS — F33.0 MAJOR DEPRESSIVE DISORDER, RECURRENT, MILD (HCC): ICD-10-CM

## 2019-07-26 DIAGNOSIS — F43.10 POST TRAUMATIC STRESS DISORDER (PTSD): ICD-10-CM

## 2019-07-26 DIAGNOSIS — F41.1 GENERALIZED ANXIETY DISORDER: Primary | ICD-10-CM

## 2019-07-26 PROCEDURE — H0035 MH PARTIAL HOSP TX UNDER 24H: HCPCS

## 2019-07-26 NOTE — PSYCH
Subjective:     Patient ID: Lonny Figueroa is a 44 y o  female  [facility] Discharge Summary:   Admission Date: 06/26/19    Patient was referred by self    Discharge Date: 07/26/19     Was this a routine discharge? yes     Diagnosis: Axis I:   1  Generalized anxiety disorder     2  Binge eating disorder     3  Post traumatic stress disorder (PTSD)     4  Major depressive disorder, recurrent, mild ([facility] [address] )          Treating Physician: Dr Riley Tripp MD    Treatment Complications: Prolonged stay, struggled with commitment to discharge  Presenting Need: As per Dr Priya Jaeger: Muna Barrios a 45 y  o  female with depression ,  eating disorder, recovering alcoholic,  and PTSD self -referred because she has been more depressed and anxious    Onset of symptoms was  a few months ago with gradually worsening course since that time  Psychosocial Stressors: financial, health, marital and self-stem , anxiety   She states that she is  from her  for 2 years because he still drinking and she is in recovery   She feels very anxious, her eating disorder is getting worse, she binges, after that she vomit and she also is using laxative  She does not have any support  Zay Da Silva feels anxious, depressed   She denies suicidal thoughts, plan or intent, denies any homicidal thoughts or psychotic symptoms and any history of manic episodes  Her PHQ-9 is 13       As per this writer: Lonny Figueroa is a 45 y o  female using she/her/hers pronouns referred to [facility] via herself due to increased need for support, in her words  She identified having a wife network of providers including outpatient services, drug and alcohol counseling, and other support groups, but wanted something more collaborative  Ana Morales identified that her eating disorder is her most pressing issue currently and over the last few months since October, she has been engaging in binging, purging and cycles of restriction    Ana Morales identified having ongoing issues with her spouse although they are  and living apart, struggling financially, and enduring more complicated grief from the loss of her daughter in 2014  Justina Pritchard reports having minimal supports and struggling to keep everything together       As per Rafat Capps: "I feel like I just switch one addiction for another  I can't balance "  Justina Pritchard reported feeling overwhelmed often and wanting to restore balance  She identified strengths as driven, a hard worker, and caring for others  Course of treatment includes:    group counseling, medication management, individual case management, allied therapy, psychoeducation and psychiatric evaluation    Treatment Progress: Rafat Capps attended [facility] for 21 treatment days  She struggled with tardiness on most days of her attendance and on one day was unable to attend because she could not get herself together  Justina Pritchard continued to work during [facility]  Her treatment goals focused on coping skills, behavioral activation and establishing a routine, skill implementation and discharge preparation  Justina Pritchard had an apprehensive presentation to most days  She adhering to the schedule and guidelines of program structure appeared difficult to Justina Pritchard, as she was often late for groups throughout the day and would come back from breaks whenever she wabnted  She was disappointed that the psychiatrist would not complete her medication request   She was active in the groups that appeared meaningful to her  She would complete the worksheets and the activities, but she would often complain or provide a negative connotation to the activity  She would participate in open discussion often and would provide feedback to others but moreso recommendations on things they should do    Justina Pritchard was constantly looking for more to do in her already busy schedule, and when advised to simplify or to take a step back to utilize self-care, she became resistant  Jeana Bright identified progress as utilizing mindfulness consistently and that the "techniques were helpful" but did not name specific techniques  She stated her end goals were to look for more groups and attend Overeaters Anonymous groups consistently  She was resistant to discharge planning when approach a week ago and stated she was not ready  She stated she believes in the future she will need to come back  Aftercare recommendations include:  Attend previously scheduled appointments:  07/30/19 Dr Timothy Jimenez at [facility] at [address], [address], [facility], [address]  at [address] [place]  07/31/19 [address] at [address]  at [phone]  08/01/19 [facility] [address] [facility] [phone] at 1400   08/06/19  Windy Chapman [phone]  08/06/19 Dr Thao Malik at [facility] 1452 at [address]   at [address] [facility]   08/20/19 Isela Minor at [facility] 1700    Discharge Medications include:  Current Outpatient Medications:     Ascorbic Acid (VITAMIN C) 100 MG tablet, Take 100 mg by mouth daily, Disp: , Rfl:     Biotin w/ Vitamins C & E (HAIR/SKIN/NAILS PO), Take by mouth, Disp: , Rfl:     busPIRone (BUSPAR) 15 mg tablet, Take by mouth 3 (three) times a day  , Disp: , Rfl:     CALCIUM-MAGNESIUM-ZINC PO, Take by mouth, Disp: , Rfl:     co-enzyme Q-10 30 MG capsule, Take 30 mg by mouth 3 (three) times a day, Disp: , Rfl:     fluticasone (FLOVENT HFA) 110 MCG/ACT inhaler, 110 puffs, Disp: , Rfl:     HYDROcodone-acetaminophen (NORCO) 5-325 mg per tablet, Take 1 tablet by mouth daily as needed for severe pain, Disp: , Rfl:     ibuprofen (MOTRIN) 600 mg tablet, Take 600 mg by mouth 3 to 4 times daily as needed, Disp: , Rfl: 1    loratadine (CLARITIN) 10 mg tablet, Take 10 mg by mouth daily at bedtime as needed, Disp: , Rfl: 2    LORazepam (ATIVAN) 1 mg tablet, Take 1 mg by mouth 4 (four) times a day as needed for anxiety , Disp: , Rfl: 1    omeprazole (PriLOSEC) 20 mg delayed release capsule, Take by mouth daily, Disp: , Rfl: 1    thiamine (VITAMIN B1) 100 mg tablet, Take 100 mg by mouth daily, Disp: , Rfl:     tiZANidine (ZANAFLEX) 4 mg tablet, Take 4 mg by mouth every 6 (six) hours, Disp: , Rfl: 5    VENTOLIN  (90 Base) MCG/ACT inhaler, Inhale 2 puffs, Disp: , Rfl: 2

## 2019-07-26 NOTE — PSYCH
Subjective:     Patient ID: Geraldo Miranda is a 44 y o  female  [facility] Clinical Progress Notes      Specialized Services Documentation  Therapist must complete separate progress note for each specific clinical activity in which the individual participated during the day  Group Psychotherapy    ([id number]) Geraldo Miranda participated in psychotherapy process group today  This writer facilitated a mindfulness exercise practicing observing feelings  Clients checked in, shared how they were feeling and a positive activity they have planned for the weekend  Clients engaged in a discussion on different applications that have been helpful to them on their journey to wellness  Clients discussed different types of coping skills they engage in an activity that focused on continuing to develop wellness skills  Sherlymattie Aparicio was engaged in group and conversational with peers  She continues to make progress towards goals through participating in group psychotherapy and is encouraged to continue to progress towards long term goals  Tx Plan Objective: 1 1 Therapist:  Mayte Johns, [facility]    Other     Case Management Note    Miracle Jackman LPC    Current suicide risk : Low     ([id number]) Met with Sherly Aparicio and reviewed her RPP  She discussed aftercare appointments with this writer  She has multiple follow up appointments scheduled  She identified hoping to continue with the mindfulness as it is something very helpful to her  She said the skill building was helpful but did not identify which specific skills were helpful  She identified wanting to still look for more groups although she attends many  She denied SI, SIB, HI and psychosis  Medications changes/added/denied? No    Treatment session number: 21    Individual Case Management Visit provided today?  Yes     [facility] follow up physician's orders: None at this time

## 2019-07-26 NOTE — PSYCH
Subjective:     Patient ID: Kayy Kelly is a 44 y o  female  [facility] Clinical Progress Notes      Specialized Services Documentation  Therapist must complete separate progress note for each specific clinical activity in which the individual participated during the day  Group Psychotherapy Nydia Wu RN      ([id number])  Kayy Kelly  attended- : weekly  wellness assessment group today  Reviewing weekend supports and plan as we approach the weekend  Medications reviewed with  no concerns or questions at this time  Compliance of medications reviewed and understood  The six (6) dimension of wellness discussed (Physical, Emotional, Cognitive, Vocational, Social and Spiritual)  The format for assessing wellness and measuring progress   reviewed   Questions asked :  (1) I intend to improve in this area by    (2) My first step will be   (3) I will share my plans with Arnulfo Le ask for their support by saying [facility] stated " I figured out my triggers and I do not let them affect me like they use to  I forgave myself for a lot of my past and this gave me closure  I still do not have the greatest supports in my life but I make do with who is available to me "   Jacklyn Sparks made progress towards goals and objectives and will continue to attend support groups even after discharge from program today         Tx Plan Objective: 1 3,1 4      Education Therapy   Time:  1513-8211  Previous goal met: Yes   Readiness to Learning: Receptive  Barriers to Learning: None  Learning Assessment  Time: 1113-5822  Education Completed: Wellness Tools  Teaching Method: Verbal  Shared Area of Learning: Yes   Goal Set: " stay healthy"   Tx Plan Objective: 1 5 Therapist:  Nydia Wu RN

## 2019-07-26 NOTE — PSYCH
[facility] Clinical Progress Notes      Specialized Services Documentation  Therapist must complete separate progress note for each specific clinical activity in which the individual participated during the day         [facility] follow up physician's orders:   DATE 7/26/2019  TIME 10:30   DISCHARGE TODAY  Jamal Glass MD

## 2019-07-26 NOTE — PSYCH
Subjective:     Patient ID: Kaila Jones is a 44 y o  female  [facility] Clinical Progress Notes      Specialized Services Documentation  Therapist must complete separate progress note for each specific clinical activity in which the individual participated during the day  Allied Therapy   [id number] Lisandro Walsh actively shared in [facility] group exploring DBT skill changing emotional responses  She engaged in task sharing triggers and responses to given emotions  Group explored differences between justified and unjustified emotions to prompting events and effective versus ineffective responses  With weekend approach, group explored risk of feeling lazy  Group reviewed skill Opposite Action related to depression withdraw versus get active and productive weekend choices offered  Some effort and progress noted toward goals  Discharge at the end of the treatment day  Tx Plan Objective: 1 5, Therapist:  Aracelis MALLOY     PHQ-9 Depression Screening    PHQ-9:    Frequency of the following problems over the past two weeks:       Little interest or pleasure in doing things:  0 - not at all  Feeling down, depressed, or hopeless:  0 - not at all  Trouble falling or staying asleep, or sleeping too much:  0 - not at all  Feeling tired or having little energy:  2 - more than half the days  Poor appetite or overeatin - not at all  Feeling bad about yourself - or that you are a failure or have let yourself or your family down:  0 - not at all  Trouble concentrating on things, such as reading the newspaper or watching television:  1 - several days  Moving or speaking so slowly that other people could have noticed   Or the opposite - being so fidgety or restless that you have been moving around a lot more than usual:  0 - not at all  Thoughts that you would be better off dead, or of hurting yourself in some way:  0 - not at all  PHQ-2 Score:  0  PHQ-9 Score:  3

## 2019-10-17 ENCOUNTER — DOCUMENTATION (OUTPATIENT)
Dept: PSYCHOLOGY | Facility: CLINIC | Age: 39
End: 2019-10-17

## 2020-04-03 DIAGNOSIS — J45.909 UNSPECIFIED ASTHMA, UNCOMPLICATED: ICD-10-CM

## 2020-04-03 RX ORDER — ALBUTEROL SULFATE 90 UG/1
AEROSOL, METERED RESPIRATORY (INHALATION)
Qty: 6.7 INHALER | Refills: 0 | Status: SHIPPED | OUTPATIENT
Start: 2020-04-03 | End: 2020-07-01

## 2020-04-08 DIAGNOSIS — J30.2 SEASONAL ALLERGIES: Primary | ICD-10-CM

## 2020-04-08 RX ORDER — LORATADINE 10 MG/1
TABLET ORAL
Qty: 30 TABLET | Refills: 0 | Status: SHIPPED | OUTPATIENT
Start: 2020-04-08 | End: 2020-05-11

## 2020-04-27 DIAGNOSIS — J45.909 UNSPECIFIED ASTHMA, UNCOMPLICATED: ICD-10-CM

## 2020-04-27 RX ORDER — ALBUTEROL SULFATE 90 UG/1
AEROSOL, METERED RESPIRATORY (INHALATION)
Qty: 18 INHALER | Refills: 0 | OUTPATIENT
Start: 2020-04-27

## 2020-05-10 DIAGNOSIS — J30.2 SEASONAL ALLERGIES: ICD-10-CM

## 2020-05-11 RX ORDER — LORATADINE 10 MG/1
TABLET ORAL
Qty: 30 TABLET | Refills: 0 | Status: SHIPPED | OUTPATIENT
Start: 2020-05-11 | End: 2020-06-08

## 2020-05-15 ENCOUNTER — TELEMEDICINE (OUTPATIENT)
Dept: FAMILY MEDICINE CLINIC | Facility: CLINIC | Age: 40
End: 2020-05-15
Payer: COMMERCIAL

## 2020-05-15 VITALS — TEMPERATURE: 98.5 F | BODY MASS INDEX: 18.31 KG/M2 | HEIGHT: 61 IN | WEIGHT: 97 LBS

## 2020-05-15 DIAGNOSIS — Z20.828 EXPOSURE TO SARS-ASSOCIATED CORONAVIRUS: Primary | ICD-10-CM

## 2020-05-15 DIAGNOSIS — J01.10 ACUTE FRONTAL SINUSITIS, RECURRENCE NOT SPECIFIED: ICD-10-CM

## 2020-05-15 DIAGNOSIS — Z20.828 EXPOSURE TO SARS-ASSOCIATED CORONAVIRUS: ICD-10-CM

## 2020-05-15 DIAGNOSIS — R06.02 SHORT OF BREATH ON EXERTION: ICD-10-CM

## 2020-05-15 PROCEDURE — 99213 OFFICE O/P EST LOW 20 MIN: CPT | Performed by: NURSE PRACTITIONER

## 2020-05-15 PROCEDURE — U0003 INFECTIOUS AGENT DETECTION BY NUCLEIC ACID (DNA OR RNA); SEVERE ACUTE RESPIRATORY SYNDROME CORONAVIRUS 2 (SARS-COV-2) (CORONAVIRUS DISEASE [COVID-19]), AMPLIFIED PROBE TECHNIQUE, MAKING USE OF HIGH THROUGHPUT TECHNOLOGIES AS DESCRIBED BY CMS-2020-01-R: HCPCS

## 2020-05-15 PROCEDURE — 3008F BODY MASS INDEX DOCD: CPT | Performed by: NURSE PRACTITIONER

## 2020-05-15 RX ORDER — AZITHROMYCIN 250 MG/1
250 TABLET, FILM COATED ORAL EVERY 24 HOURS
Qty: 6 TABLET | Refills: 0 | Status: SHIPPED | OUTPATIENT
Start: 2020-05-15 | End: 2020-05-20

## 2020-05-15 RX ORDER — IPRATROPIUM BROMIDE 42 UG/1
2 SPRAY, METERED NASAL 4 TIMES DAILY
Qty: 15 ML | Refills: 1 | Status: SHIPPED | OUTPATIENT
Start: 2020-05-15 | End: 2020-07-09 | Stop reason: SDUPTHER

## 2020-05-17 LAB — SARS-COV-2 RNA SPEC QL NAA+PROBE: NOT DETECTED

## 2020-05-18 ENCOUNTER — TELEMEDICINE (OUTPATIENT)
Dept: FAMILY MEDICINE CLINIC | Facility: CLINIC | Age: 40
End: 2020-05-18
Payer: COMMERCIAL

## 2020-05-18 DIAGNOSIS — Z01.89 VISIT FOR LABORATORY TEST: Primary | ICD-10-CM

## 2020-05-18 PROCEDURE — 99214 OFFICE O/P EST MOD 30 MIN: CPT | Performed by: NURSE PRACTITIONER

## 2020-05-20 ENCOUNTER — TELEPHONE (OUTPATIENT)
Dept: FAMILY MEDICINE CLINIC | Facility: CLINIC | Age: 40
End: 2020-05-20

## 2020-06-06 DIAGNOSIS — J30.2 SEASONAL ALLERGIES: ICD-10-CM

## 2020-06-08 RX ORDER — LORATADINE 10 MG/1
TABLET ORAL
Qty: 30 TABLET | Refills: 0 | Status: SHIPPED | OUTPATIENT
Start: 2020-06-08 | End: 2020-06-30

## 2020-06-10 DIAGNOSIS — K21.9 GASTROESOPHAGEAL REFLUX DISEASE WITHOUT ESOPHAGITIS: Primary | ICD-10-CM

## 2020-06-10 RX ORDER — OMEPRAZOLE 20 MG/1
CAPSULE, DELAYED RELEASE ORAL
Qty: 30 CAPSULE | Refills: 2 | Status: SHIPPED | OUTPATIENT
Start: 2020-06-10 | End: 2020-09-02

## 2020-06-30 DIAGNOSIS — J30.2 SEASONAL ALLERGIES: ICD-10-CM

## 2020-06-30 RX ORDER — LORATADINE 10 MG/1
TABLET ORAL
Qty: 30 TABLET | Refills: 0 | Status: SHIPPED | OUTPATIENT
Start: 2020-06-30 | End: 2020-08-10 | Stop reason: SDUPTHER

## 2020-07-01 DIAGNOSIS — J45.909 UNSPECIFIED ASTHMA, UNCOMPLICATED: ICD-10-CM

## 2020-07-01 RX ORDER — ALBUTEROL SULFATE 90 UG/1
AEROSOL, METERED RESPIRATORY (INHALATION)
Qty: 18 INHALER | Refills: 0 | Status: SHIPPED | OUTPATIENT
Start: 2020-07-01 | End: 2020-07-28

## 2020-07-09 DIAGNOSIS — R06.02 SHORT OF BREATH ON EXERTION: ICD-10-CM

## 2020-07-09 DIAGNOSIS — J01.10 ACUTE FRONTAL SINUSITIS, RECURRENCE NOT SPECIFIED: ICD-10-CM

## 2020-07-09 RX ORDER — IPRATROPIUM BROMIDE 42 UG/1
2 SPRAY, METERED NASAL 4 TIMES DAILY
Qty: 15 ML | Refills: 1 | Status: SHIPPED | OUTPATIENT
Start: 2020-07-09 | End: 2020-09-02

## 2020-07-16 ENCOUNTER — DOCUMENTATION (OUTPATIENT)
Dept: BEHAVIORAL/MENTAL HEALTH CLINIC | Facility: CLINIC | Age: 40
End: 2020-07-16

## 2020-07-16 DIAGNOSIS — F33.1 MODERATE EPISODE OF RECURRENT MAJOR DEPRESSIVE DISORDER (HCC): Primary | ICD-10-CM

## 2020-07-16 DIAGNOSIS — F50.81 BINGE EATING DISORDER: ICD-10-CM

## 2020-07-17 NOTE — PROGRESS NOTES
Assessment/Plan:      Diagnoses and all orders for this visit:    Moderate episode of recurrent major depressive disorder ([facility] [address] )    Binge eating disorder        Subjective:     Patient ID: Greer Mustafa is a 44 y o  female  Outpatient Discharge Summary:     Admission Date: 06/03/2019 by Barb Hartley and transferred for consultation to Mathis Hipp Merlinda Monday was referred by Reese Contreras DO    Discharge Date: 9/30/2019    Discharge Diagnosis:    1  Moderate episode of [facility]     2  Binge eating disorder         Treating Physician: No [facility] psychiatrist for outpatient treatement    Treatment Complications: Multiple providers; co-occurring MH and AOD issues    Presenting Problem: depression, anxiety, disordered eating, substance abuse    Course of treatment includes:    individual therapy      Merlinda Monday attended two individual therapy sessions (including her initial intake assessment) prior to beginning the [facility] program  Upon completion of Peg So was referred back to outpatient treatment; however, she never followed through with treatment attendance  By her report, she was also involved in treatment at other service providers  Treatment Progress: poor  Criteria for Discharge: need to be transferred to another service/level of care within the system  Aftercare recommendations include return for services in the future, if needed       Attend treatment at other service providers, as identified in her d/c summary from the [facility] program     Discharge Medications include:  (medications prescribed by physical health providers as well as MD for [facility] program)    Current Outpatient Medications:     albuterol (PROVENTIL HFA,VENTOLIN HFA) 90 mcg/act inhaler, INHALE 2 PUFFS BY MOUTH EVERY 4 TO 6 HOURS, Disp: 18 Inhaler, Rfl: 0    Ascorbic Acid (VITAMIN C) 100 MG tablet, Take 100 mg by mouth daily, Disp: , Rfl:     Biotin w/ Vitamins C & E (HAIR/SKIN/NAILS PO), Take by mouth, Disp: , Rfl:     busPIRone (BUSPAR) 15 mg tablet, Take by mouth 3 (three) times a day  , Disp: , Rfl:     CALCIUM-MAGNESIUM-ZINC PO, Take by mouth, Disp: , Rfl:     co-enzyme Q-10 30 MG capsule, Take 30 mg by mouth 3 (three) times a day, Disp: , Rfl:     fluticasone (FLOVENT HFA) 110 MCG/ACT inhaler, 110 puffs, Disp: , Rfl:     HYDROcodone-acetaminophen (NORCO) 5-325 mg per tablet, Take 1 tablet by mouth daily as needed for severe pain, Disp: , Rfl:     ibuprofen (MOTRIN) 600 mg tablet, Take 600 mg by mouth 3 to 4 times daily as needed, Disp: , Rfl: 1    ipratropium (ATROVENT) 0 06 % nasal spray, 2 sprays into each nostril 4 (four) times a day, Disp: 15 mL, Rfl: 1    loratadine (CLARITIN) 10 mg tablet, TAKE 1 TABLET BY MOUTH EVERY DAY, Disp: 30 tablet, Rfl: 0    LORazepam (ATIVAN) 1 mg tablet, Take 1 mg by mouth 4 (four) times a day as needed for anxiety , Disp: , Rfl: 1    omeprazole (PriLOSEC) 20 mg delayed release capsule, TAKE 1 CAPSULE BY MOUTH EVERY DAY IN THE MORNING, Disp: 30 capsule, Rfl: 2    thiamine (VITAMIN B1) 100 mg tablet, Take 100 mg by mouth daily, Disp: , Rfl:     tiZANidine (ZANAFLEX) 4 mg tablet, Take 4 mg by mouth every 6 (six) hours, Disp: , Rfl: 5    Prognosis: guarded

## 2020-07-28 DIAGNOSIS — J45.909 UNSPECIFIED ASTHMA, UNCOMPLICATED: ICD-10-CM

## 2020-07-28 RX ORDER — ALBUTEROL SULFATE 90 UG/1
AEROSOL, METERED RESPIRATORY (INHALATION)
Qty: 18 INHALER | Refills: 0 | Status: SHIPPED | OUTPATIENT
Start: 2020-07-28 | End: 2020-08-31

## 2020-08-05 ENCOUNTER — TELEPHONE (OUTPATIENT)
Dept: FAMILY MEDICINE CLINIC | Facility: CLINIC | Age: 40
End: 2020-08-05

## 2020-08-05 NOTE — TELEPHONE ENCOUNTER
COVID Pre-Visit Screening     1  Is this a family member screening? No  2  Have you traveled outside of your state in the past 2 weeks? No  3  Do you presently have a fever or flu-like symptoms? No  4  Do you have symptoms of an upper respiratory infection like runny nose, sore throat, or cough? No  5  Are you suffering from new headache that you have not had in the past?  No  6  Do you have/have you experienced any new shortness of breath recently? No  7  Do you have any new diarrhea, nausea or vomiting? No  8  Have you been in contact with anyone who has been sick or diagnosed with COVID-19? No  9  Do you have any new loss of taste or smell? No  10  Are you able to wear a mask without a valve for the entire visit? Yes    Confirmed appointment for 8/6/20 and made aware to call upon arrival for intake    Leo Hill

## 2020-08-10 DIAGNOSIS — J30.2 SEASONAL ALLERGIES: ICD-10-CM

## 2020-08-11 RX ORDER — LORATADINE 10 MG/1
10 TABLET ORAL DAILY
Qty: 30 TABLET | Refills: 0 | Status: SHIPPED | OUTPATIENT
Start: 2020-08-11 | End: 2020-09-02

## 2020-08-29 DIAGNOSIS — J45.909 UNSPECIFIED ASTHMA, UNCOMPLICATED: ICD-10-CM

## 2020-08-31 RX ORDER — ALBUTEROL SULFATE 90 UG/1
AEROSOL, METERED RESPIRATORY (INHALATION)
Qty: 18 INHALER | Refills: 0 | Status: SHIPPED | OUTPATIENT
Start: 2020-08-31 | End: 2020-12-09 | Stop reason: SDUPTHER

## 2020-09-02 DIAGNOSIS — K21.9 GASTROESOPHAGEAL REFLUX DISEASE WITHOUT ESOPHAGITIS: ICD-10-CM

## 2020-09-02 DIAGNOSIS — J30.2 SEASONAL ALLERGIES: ICD-10-CM

## 2020-09-02 DIAGNOSIS — J01.10 ACUTE FRONTAL SINUSITIS, RECURRENCE NOT SPECIFIED: ICD-10-CM

## 2020-09-02 DIAGNOSIS — R06.02 SHORT OF BREATH ON EXERTION: ICD-10-CM

## 2020-09-02 RX ORDER — OMEPRAZOLE 20 MG/1
CAPSULE, DELAYED RELEASE ORAL
Qty: 30 CAPSULE | Refills: 2 | Status: SHIPPED | OUTPATIENT
Start: 2020-09-02 | End: 2020-12-09 | Stop reason: SDUPTHER

## 2020-09-02 RX ORDER — IPRATROPIUM BROMIDE 42 UG/1
2 SPRAY, METERED NASAL 4 TIMES DAILY
Qty: 15 ML | Refills: 1 | Status: SHIPPED | OUTPATIENT
Start: 2020-09-02

## 2020-09-02 RX ORDER — LORATADINE 10 MG/1
TABLET ORAL
Qty: 30 TABLET | Refills: 0 | Status: SHIPPED | OUTPATIENT
Start: 2020-09-02 | End: 2020-10-12

## 2020-09-16 NOTE — PSYCH
Problem: Falls - Risk of  Goal: *Absence of Falls  Description: Document Malvin Nelson Fall Risk and appropriate interventions in the flowsheet.   Outcome: Resolved/Met  Note: Fall Risk Interventions:            Medication Interventions: Patient to call before getting OOB                   Problem: Patient Education: Go to Patient Education Activity  Goal: Patient/Family Education  Outcome: Resolved/Met     Problem: Patient Education: Go to Patient Education Activity  Goal: Patient/Family Education  Outcome: Resolved/Met     Problem: Unstable angina/NSTEMI: Day of Admission/Day 1  Goal: Off Pathway (Use only if patient is Off Pathway)  Outcome: Resolved/Met  Goal: Activity/Safety  Outcome: Resolved/Met  Goal: Consults, if ordered  Outcome: Resolved/Met  Goal: Diagnostic Test/Procedures  Outcome: Resolved/Met  Goal: Nutrition/Diet  Outcome: Resolved/Met  Goal: Discharge Planning  Outcome: Resolved/Met  Goal: Medications  Outcome: Resolved/Met  Goal: Respiratory  Outcome: Resolved/Met  Goal: Treatments/Interventions/Procedures  Outcome: Resolved/Met  Goal: Psychosocial  Outcome: Resolved/Met  Goal: *Hemodynamically stable  Outcome: Resolved/Met  Goal: *Optimal pain control at patient's stated goal  Outcome: Resolved/Met  Goal: *Lungs clear or at baseline  Outcome: Resolved/Met     Problem: Unstable angina/NSTEMI: Day 2  Goal: Off Pathway (Use only if patient is Off Pathway)  Outcome: Resolved/Met  Goal: Activity/Safety  Outcome: Resolved/Met  Goal: Consults, if ordered  Outcome: Resolved/Met  Goal: Diagnostic Test/Procedures  Outcome: Resolved/Met  Goal: Nutrition/Diet  Outcome: Resolved/Met  Goal: Discharge Planning  Outcome: Resolved/Met  Goal: Medications  Outcome: Resolved/Met  Goal: Respiratory  Outcome: Resolved/Met  Goal: Treatments/Interventions/Procedures  Outcome: Resolved/Met  Goal: Psychosocial  Outcome: Resolved/Met  Goal: *Hemodynamically stable  Outcome: Resolved/Met  Goal: *Optimal pain control at patient's Subjective:     Patient ID: Jennifer Gallo is a 45 y o  female  [facility] Clinical Progress Notes      Specialized Services Documentation  Therapist must complete separate progress note for each specific clinical activity in which the individual participated during the day  Group Psychotherapy   ([id number]) Jennifer Gallo participated in psychotherapy process group today  This writer began group with a mindfulness exercise on watching your thoughts  Clients engaged in an open discussion on thinking traps and learned skills on reframing negative thinking to more realistic thinking  Yoan Soliman was engaged in group, identifying that she struggles with feeling like her life isn't going into any direction  Yoan Soliman was active in discussion and made progress towards goals and is encouraged to continue to participate to work on long term goal accomplishment  TX Goals: 1 2, Therapist: Hill Arevalo MA, LPC        Education Therapy   Time:  7933-2308  Previous goal met: Yes   Readiness to Learning: Receptive  Barriers to Learning: None  Learning Assessment  Time: 1980-8523  Education Completed: Illness and Wellness Tools, Wellness Assessment   Teaching Method: Verbal, Written and Demonstration  Shared Area of Learning: Yes   Goal Set: " protein shakes and go to physical therapy"  Tx Plan Objective: 1 4 Therapist:  Hill Arevalo MA, LPC    Other     Case Management Note    Hill Arevalo LPC    Current suicide risk : Low     ([id number]) Met with Yoan Soliman and discussed how she has reached out to her step-daughter, which was something she was hesitant to doing due to her 's manipulation  She discussed feeling happy she did this  Fatimah spent time taking care of herself during the last day, hydrating and getting rest      Medications changes/added/denied? No    Treatment session number: 11    Individual Case Management Visit provided today?  Yes     [facility] follow up physician's orders: None at this time stated goal  Outcome: Resolved/Met  Goal: *Lungs clear or at baseline  Outcome: Resolved/Met     Problem: Unstable Angina/NSTEMI: Discharge Outcomes  Goal: *Hemodynamically stable  Outcome: Resolved/Met  Goal: *Stable cardiac rhythm  Outcome: Resolved/Met  Goal: *Lungs clear or at baseline  Outcome: Resolved/Met  Goal: *Optimal pain control at patient's stated goal  Outcome: Resolved/Met  Goal: *Identifies cardiac risk factors  Outcome: Resolved/Met  Goal: *Verbalizes home exercise program, activity guidelines, cardiac precautions  Outcome: Resolved/Met  Goal: *Verbalizes understanding and describes prescribed diet  Outcome: Resolved/Met  Goal: *Verbalizes name, dosage, time, side effects, and number of days to continue medications  Outcome: Resolved/Met  Goal: *Anxiety reduced or absent  Outcome: Resolved/Met  Goal: *Understands and describes signs and symptoms to report to providers(Stroke Metric)  Outcome: Resolved/Met  Goal: *Describes follow-up/return visits to physicians  Outcome: Resolved/Met  Goal: *Describes available resources and support systems  Outcome: Resolved/Met  Goal: *Influenza immunization  Outcome: Resolved/Met  Goal: *Pneumococcal immunization  Outcome: Resolved/Met  Goal: *Describes smoking cessation resources  Outcome: Resolved/Met     Problem: Discharge Planning  Goal: *Discharge to safe environment  Outcome: Resolved/Met

## 2020-09-29 DIAGNOSIS — J45.20 MILD INTERMITTENT ASTHMA WITHOUT COMPLICATION: Primary | ICD-10-CM

## 2020-09-29 RX ORDER — DEXAMETHASONE 4 MG/1
TABLET ORAL
Qty: 12 INHALER | Refills: 1 | Status: SHIPPED | OUTPATIENT
Start: 2020-09-29 | End: 2020-12-09 | Stop reason: SDUPTHER

## 2020-10-09 DIAGNOSIS — J30.2 SEASONAL ALLERGIES: ICD-10-CM

## 2020-10-10 DIAGNOSIS — J30.2 SEASONAL ALLERGIES: ICD-10-CM

## 2020-10-12 RX ORDER — LORATADINE 10 MG/1
10 TABLET ORAL DAILY
Qty: 30 TABLET | Refills: 0 | OUTPATIENT
Start: 2020-10-12

## 2020-10-12 RX ORDER — LORATADINE 10 MG/1
TABLET ORAL
Qty: 30 TABLET | Refills: 0 | Status: SHIPPED | OUTPATIENT
Start: 2020-10-12 | End: 2020-11-12

## 2020-11-11 ENCOUNTER — TELEPHONE (OUTPATIENT)
Dept: FAMILY MEDICINE CLINIC | Facility: CLINIC | Age: 40
End: 2020-11-11

## 2020-11-11 DIAGNOSIS — J30.2 SEASONAL ALLERGIES: ICD-10-CM

## 2020-11-12 RX ORDER — LORATADINE 10 MG/1
TABLET ORAL
Qty: 30 TABLET | Refills: 0 | Status: SHIPPED | OUTPATIENT
Start: 2020-11-12 | End: 2020-12-09 | Stop reason: SDUPTHER

## 2020-12-09 ENCOUNTER — TELEMEDICINE (OUTPATIENT)
Dept: FAMILY MEDICINE CLINIC | Facility: CLINIC | Age: 40
End: 2020-12-09
Payer: MEDICARE

## 2020-12-09 DIAGNOSIS — Z71.6 TOBACCO ABUSE COUNSELING: ICD-10-CM

## 2020-12-09 DIAGNOSIS — K21.9 GASTROESOPHAGEAL REFLUX DISEASE WITHOUT ESOPHAGITIS: ICD-10-CM

## 2020-12-09 DIAGNOSIS — J45.20 MILD INTERMITTENT ASTHMA WITHOUT COMPLICATION: Primary | ICD-10-CM

## 2020-12-09 DIAGNOSIS — J30.2 SEASONAL ALLERGIES: ICD-10-CM

## 2020-12-09 PROCEDURE — 99213 OFFICE O/P EST LOW 20 MIN: CPT | Performed by: NURSE PRACTITIONER

## 2020-12-09 RX ORDER — LORATADINE 10 MG/1
10 TABLET ORAL DAILY
Qty: 30 TABLET | Refills: 1 | Status: SHIPPED | OUTPATIENT
Start: 2020-12-09 | End: 2021-02-06

## 2020-12-09 RX ORDER — DEXAMETHASONE 4 MG/1
2 TABLET ORAL 2 TIMES DAILY
Qty: 1 INHALER | Refills: 1 | Status: SHIPPED | OUTPATIENT
Start: 2020-12-09 | End: 2021-08-26 | Stop reason: SDUPTHER

## 2020-12-09 RX ORDER — ALBUTEROL SULFATE 90 UG/1
2 AEROSOL, METERED RESPIRATORY (INHALATION) EVERY 6 HOURS PRN
Qty: 1 INHALER | Refills: 1 | Status: SHIPPED | OUTPATIENT
Start: 2020-12-09 | End: 2021-01-08

## 2020-12-09 RX ORDER — OMEPRAZOLE 20 MG/1
20 CAPSULE, DELAYED RELEASE ORAL DAILY
Qty: 30 CAPSULE | Refills: 1 | Status: SHIPPED | OUTPATIENT
Start: 2020-12-09 | End: 2021-02-11 | Stop reason: SDUPTHER

## 2020-12-11 ENCOUNTER — TELEPHONE (OUTPATIENT)
Dept: FAMILY MEDICINE CLINIC | Facility: CLINIC | Age: 40
End: 2020-12-11

## 2020-12-29 ENCOUNTER — TELEPHONE (OUTPATIENT)
Dept: FAMILY MEDICINE CLINIC | Facility: CLINIC | Age: 40
End: 2020-12-29

## 2021-01-22 ENCOUNTER — OFFICE VISIT (OUTPATIENT)
Dept: FAMILY MEDICINE CLINIC | Facility: CLINIC | Age: 41
End: 2021-01-22
Payer: COMMERCIAL

## 2021-01-22 VITALS
BODY MASS INDEX: 17.44 KG/M2 | TEMPERATURE: 97.2 F | RESPIRATION RATE: 12 BRPM | SYSTOLIC BLOOD PRESSURE: 98 MMHG | HEART RATE: 113 BPM | HEIGHT: 61 IN | DIASTOLIC BLOOD PRESSURE: 68 MMHG | OXYGEN SATURATION: 97 % | WEIGHT: 92.4 LBS

## 2021-01-22 DIAGNOSIS — Z92.29 HISTORY OF RECEIPT OF HEPATITIS B IMMUNE GLOBULIN: Primary | ICD-10-CM

## 2021-01-22 DIAGNOSIS — F10.21 ALCOHOL USE DISORDER, SEVERE, IN EARLY REMISSION (HCC): ICD-10-CM

## 2021-01-22 DIAGNOSIS — J45.909 ASTHMA, UNSPECIFIED ASTHMA SEVERITY, UNSPECIFIED WHETHER COMPLICATED, UNSPECIFIED WHETHER PERSISTENT: ICD-10-CM

## 2021-01-22 DIAGNOSIS — Z23 NEED FOR IMMUNIZATION AGAINST INFLUENZA: ICD-10-CM

## 2021-01-22 DIAGNOSIS — Z00.00 MEDICARE ANNUAL WELLNESS VISIT, INITIAL: ICD-10-CM

## 2021-01-22 DIAGNOSIS — F33.0 MAJOR DEPRESSIVE DISORDER, RECURRENT, MILD (HCC): ICD-10-CM

## 2021-01-22 DIAGNOSIS — Z23 ENCOUNTER FOR IMMUNIZATION: ICD-10-CM

## 2021-01-22 DIAGNOSIS — Z12.31 ENCOUNTER FOR SCREENING MAMMOGRAM FOR BREAST CANCER: ICD-10-CM

## 2021-01-22 DIAGNOSIS — Z23 NEED FOR PNEUMOCOCCAL VACCINATION: ICD-10-CM

## 2021-01-22 PROCEDURE — 99213 OFFICE O/P EST LOW 20 MIN: CPT | Performed by: NURSE PRACTITIONER

## 2021-01-22 RX ORDER — NAPROXEN 500 MG/1
500 TABLET ORAL 2 TIMES DAILY PRN
COMMUNITY
Start: 2021-01-14 | End: 2021-09-20 | Stop reason: SDUPTHER

## 2021-01-22 NOTE — PATIENT INSTRUCTIONS
Medicare Preventive Visit Patient Instructions  Thank you for completing your Welcome to Medicare Visit or Medicare Annual Wellness Visit today  Your next wellness visit will be due in one year (1/22/2022)  The screening/preventive services that you may require over the next 5-10 years are detailed below  Some tests may not apply to you based off risk factors and/or age  Screening tests ordered at today's visit but not completed yet may show as past due  Also, please note that scanned in results may not display below  Preventive Screenings:  Service Recommendations Previous Testing/Comments   Colorectal Cancer Screening  * Colonoscopy    * Fecal Occult Blood Test (FOBT)/Fecal Immunochemical Test (FIT)  * Fecal DNA/Cologuard Test  * Flexible Sigmoidoscopy Age: 54-65 years old   Colonoscopy: every 10 years (may be performed more frequently if at higher risk)  OR  FOBT/FIT: every 1 year  OR  Cologuard: every 3 years  OR  Sigmoidoscopy: every 5 years  Screening may be recommended earlier than age 48 if at higher risk for colorectal cancer  Also, an individualized decision between you and your healthcare provider will decide whether screening between the ages of 74-80 would be appropriate  Colonoscopy: Not on file  FOBT/FIT: Not on file  Cologuard: Not on file  Sigmoidoscopy: Not on file         Breast Cancer Screening Age: 36 years old  Frequency: every 1-2 years  Not required if history of left and right mastectomy Mammogram: 02/24/2015       Cervical Cancer Screening Between the ages of 21-29, pap smear recommended once every 3 years  Between the ages of 33-67, can perform pap smear with HPV co-testing every 5 years     Recommendations may differ for women with a history of total hysterectomy, cervical cancer, or abnormal pap smears in past  Pap Smear: 10/04/2018       Hepatitis C Screening Once for adults born between 1945 and 1965  More frequently in patients at high risk for Hepatitis C Hep C Antibody: 10/04/2018       Diabetes Screening 1-2 times per year if you're at risk for diabetes or have pre-diabetes Fasting glucose: No results in last 5 years   A1C: No results in last 5 years       Cholesterol Screening Once every 5 years if you don't have a lipid disorder  May order more often based on risk factors  Lipid panel: Not on file         Other Preventive Screenings Covered by Medicare:  1  Abdominal Aortic Aneurysm (AAA) Screening: covered once if your at risk  You're considered to be at risk if you have a family history of AAA  2  Lung Cancer Screening: covers low dose CT scan once per year if you meet all of the following conditions: (1) Age 50-69; (2) No signs or symptoms of lung cancer; (3) Current smoker or have quit smoking within the last 15 years; (4) You have a tobacco smoking history of at least 30 pack years (packs per day multiplied by number of years you smoked); (5) You get a written order from a healthcare provider  3  Glaucoma Screening: covered annually if you're considered high risk: (1) You have diabetes OR (2) Family history of glaucoma OR (3)  aged 48 and older OR (3)  American aged 72 and older  3  Osteoporosis Screening: covered every 2 years if you meet one of the following conditions: (1) You're estrogen deficient and at risk for osteoporosis based off medical history and other findings; (2) Have a vertebral abnormality; (3) On glucocorticoid therapy for more than 3 months; (4) Have primary hyperparathyroidism; (5) On osteoporosis medications and need to assess response to drug therapy  · Last bone density test (DXA Scan): Not on file  5  HIV Screening: covered annually if you're between the age of 12-76  Also covered annually if you are younger than 13 and older than 72 with risk factors for HIV infection  For pregnant patients, it is covered up to 3 times per pregnancy      Immunizations:  Immunization Recommendations   Influenza Vaccine Annual influenza vaccination during flu season is recommended for all persons aged >= 6 months who do not have contraindications   Pneumococcal Vaccine (Prevnar and Pneumovax)  * Prevnar = PCV13  * Pneumovax = PPSV23   Adults 25-60 years old: 1-3 doses may be recommended based on certain risk factors  Adults 72 years old: Prevnar (PCV13) vaccine recommended followed by Pneumovax (PPSV23) vaccine  If already received PPSV23 since turning 65, then PCV13 recommended at least one year after PPSV23 dose  Hepatitis B Vaccine 3 dose series if at intermediate or high risk (ex: diabetes, end stage renal disease, liver disease)   Tetanus (Td) Vaccine - COST NOT COVERED BY MEDICARE PART B Following completion of primary series, a booster dose should be given every 10 years to maintain immunity against tetanus  Td may also be given as tetanus wound prophylaxis  Tdap Vaccine - COST NOT COVERED BY MEDICARE PART B Recommended at least once for all adults  For pregnant patients, recommended with each pregnancy  Shingles Vaccine (Shingrix) - COST NOT COVERED BY MEDICARE PART B  2 shot series recommended in those aged 48 and above     Health Maintenance Due:      Topic Date Due    MAMMOGRAM  02/24/2016    Cervical Cancer Screening  10/04/2023    HIV Screening  Completed    Hepatitis C Screening  Completed     Immunizations Due:      Topic Date Due    Pneumococcal Vaccine: Pediatrics (0 to 5 Years) and At-Risk Patients (6 to 59 Years) (1 of 1 - PPSV23) 07/25/1986     Advance Directives   What are advance directives? Advance directives are legal documents that state your wishes and plans for medical care  These plans are made ahead of time in case you lose your ability to make decisions for yourself  Advance directives can apply to any medical decision, such as the treatments you want, and if you want to donate organs  What are the types of advance directives?   There are many types of advance directives, and each state has rules about how to use them  You may choose a combination of any of the following:  · Living will: This is a written record of the treatment you want  You can also choose which treatments you do not want, which to limit, and which to stop at a certain time  This includes surgery, medicine, IV fluid, and tube feedings  · Durable power of  for healthcare [place] [facility]): This is a written record that states who you want to make healthcare choices for you when you are unable to make them for yourself  This person, called a proxy, is usually a family member or a friend  You may choose more than 1 proxy  · Do not resuscitate (DNR) order:  A DNR order is used in case your heart stops beating or you stop breathing  It is a request not to have certain forms of treatment, such as CPR  A DNR order may be included in other types of advance directives  · Medical directive: This covers the care that you want if you are in a coma, near death, or unable to make decisions for yourself  You can list the treatments you want for each condition  Treatment may include pain medicine, surgery, blood transfusions, dialysis, IV or tube feedings, and a ventilator (breathing machine)  · Values history: This document has questions about your views, beliefs, and how you feel and think about life  This information can help others choose the care that you would choose  Why are advance directives important? An advance directive helps you control your care  Although spoken wishes may be used, it is better to have your wishes written down  Spoken wishes can be misunderstood, or not followed  Treatments may be given even if you do not want them  An advance directive may make it easier for your family to make difficult choices about your care  Cigarette Smoking and Your Health   Risks to your health if you smoke:  Nicotine and other chemicals found in tobacco damage every cell in your body   Even if you are a light smoker, you have an increased risk for cancer, heart disease, and lung disease  If you are pregnant or have diabetes, smoking increases your risk for complications  Benefits to your health if you stop smoking:   · You decrease respiratory symptoms such as coughing, wheezing, and shortness of breath  · You reduce your risk for cancers of the lung, mouth, throat, kidney, bladder, pancreas, stomach, and cervix  If you already have cancer, you increase the benefits of chemotherapy  You also reduce your risk for cancer returning or a second cancer from developing  · You reduce your risk for heart disease, blood clots, heart attack, and stroke  · You reduce your risk for lung infections, and diseases such as pneumonia, asthma, chronic bronchitis, and emphysema  · Your circulation improves  More oxygen can be delivered to your body  If you have diabetes, you lower your risk for complications, such as kidney, artery, and eye diseases  You also lower your risk for nerve damage  Nerve damage can lead to amputations, poor vision, and blindness  · You improve your body's ability to heal and to fight infections  For more information and support to stop smoking:   · [employer]  Phone: 0- 640 - [phone]  Web Address: [employer]  Underweight  Underweight is defined as having a body mass index (BMI) of less than 18 5 kg/m2   Anorexia  is a loss of appetite, decreased food intake, or both  Your appetite naturally decreases as you get older  You also get full faster than you used to  This occurs because your body needs less energy  Other body changes can also lead to a decreased appetite  Even though some appetite loss is normal, you still need to get enough calories and nutrients to keep you healthy  You can start to lose too much weight if you do not eat as much food as your body needs  Unwanted weight loss can cause health problems, or worsen health problems you already have  You can also become dehydrated if you do not drink enough liquid    How to eat healthy and get enough nutrients:   · Choose healthy foods  Eat a variety of fruits, vegetables, whole grains, low-fat dairy foods, lean meats, and other protein foods  Limit foods high in fat, sugar, and salt  Limit or avoid alcohol as directed  Work with a dietitian to help you plan your meals if you need to follow a special diet  A dietitian can also teach you how to modify foods if you have trouble chewing or swallowing  · Snack on healthy foods between meals  if you only eat a small amount during meals  Snacks provide extra healthy nutrients and calories between meals  Examples include fruit, cheese, and whole grain crackers  · Drink liquids as directed  to avoid dehydration  Drink liquids between meals if they cause you to get full too quickly during meals  Ask how much liquid to drink each day and which liquids are best for you  · Use herbs, spices, and flavor enhancers to add flavor to foods  Avoid using herbs and spice blends that also contain sodium  Ask your healthcare provider or dietitian about flavor enhancers  Flavor enhancers with ham, natural saleh, and roast beef flavors can also be sprinkled on food to add flavor  · Share meals with others as often as you can  Eating with others may help you to eat better during meal time  Ask family members, neighbors, or friends to join you for lunch  There are also senior centers where you can meet people, and share meals with them  · Ask family and friends for help  with shopping or preparing foods  Ask for a ride to the grocery store, if needed  © Copyright [employer] 2018 Information is for End User's use only and may not be sold, redistributed or otherwise used for commercial purposes   All illustrations and images included in CareNotes® are the copyrighted property of [employer] D A M [employer] Inc  or [facility] Preventive Visit Patient Instructions  Thank you for completing your Welcome to Medicare Visit or Medicare Annual Wellness Visit today  Your next wellness visit will be due in one year (1/22/2022)  The screening/preventive services that you may require over the next 5-10 years are detailed below  Some tests may not apply to you based off risk factors and/or age  Screening tests ordered at today's visit but not completed yet may show as past due  Also, please note that scanned in results may not display below  Preventive Screenings:  Service Recommendations Previous Testing/Comments   Colorectal Cancer Screening  * Colonoscopy    * Fecal Occult Blood Test (FOBT)/Fecal Immunochemical Test (FIT)  * Fecal DNA/Cologuard Test  * Flexible Sigmoidoscopy Age: 54-65 years old   Colonoscopy: every 10 years (may be performed more frequently if at higher risk)  OR  FOBT/FIT: every 1 year  OR  Cologuard: every 3 years  OR  Sigmoidoscopy: every 5 years  Screening may be recommended earlier than age 48 if at higher risk for colorectal cancer  Also, an individualized decision between you and your healthcare provider will decide whether screening between the ages of 74-80 would be appropriate  Colonoscopy: Not on file  FOBT/FIT: Not on file  Cologuard: Not on file  Sigmoidoscopy: Not on file         Breast Cancer Screening Age: 36 years old  Frequency: every 1-2 years  Not required if history of left and right mastectomy Mammogram: 02/24/2015       Cervical Cancer Screening Between the ages of 21-29, pap smear recommended once every 3 years  Between the ages of 33-67, can perform pap smear with HPV co-testing every 5 years     Recommendations may differ for women with a history of total hysterectomy, cervical cancer, or abnormal pap smears in past  Pap Smear: 10/04/2018       Hepatitis C Screening Once for adults born between 1945 and 1965  More frequently in patients at high risk for Hepatitis C Hep C Antibody: 10/04/2018       Diabetes Screening 1-2 times per year if you're at risk for diabetes or have pre-diabetes Fasting glucose: No results in last 5 years   A1C: No results in last 5 years       Cholesterol Screening Once every 5 years if you don't have a lipid disorder  May order more often based on risk factors  Lipid panel: Not on file         Other Preventive Screenings Covered by Medicare:  6  Abdominal Aortic Aneurysm (AAA) Screening: covered once if your at risk  You're considered to be at risk if you have a family history of AAA  7  Lung Cancer Screening: covers low dose CT scan once per year if you meet all of the following conditions: (1) Age 50-69; (2) No signs or symptoms of lung cancer; (3) Current smoker or have quit smoking within the last 15 years; (4) You have a tobacco smoking history of at least 30 pack years (packs per day multiplied by number of years you smoked); (5) You get a written order from a healthcare provider  8  Glaucoma Screening: covered annually if you're considered high risk: (1) You have diabetes OR (2) Family history of glaucoma OR (3)  aged 48 and older OR (3)  American aged 72 and older  5  Osteoporosis Screening: covered every 2 years if you meet one of the following conditions: (1) You're estrogen deficient and at risk for osteoporosis based off medical history and other findings; (2) Have a vertebral abnormality; (3) On glucocorticoid therapy for more than 3 months; (4) Have primary hyperparathyroidism; (5) On osteoporosis medications and need to assess response to drug therapy  · Last bone density test (DXA Scan): Not on file  10  HIV Screening: covered annually if you're between the age of 12-76  Also covered annually if you are younger than 13 and older than 72 with risk factors for HIV infection  For pregnant patients, it is covered up to 3 times per pregnancy      Immunizations:  Immunization Recommendations   Influenza Vaccine Annual influenza vaccination during flu season is recommended for all persons aged >= 6 months who do not have contraindications Pneumococcal Vaccine (Prevnar and Pneumovax)  * Prevnar = PCV13  * Pneumovax = PPSV23   Adults 25-60 years old: 1-3 doses may be recommended based on certain risk factors  Adults 72 years old: Prevnar (PCV13) vaccine recommended followed by Pneumovax (PPSV23) vaccine  If already received PPSV23 since turning 65, then PCV13 recommended at least one year after PPSV23 dose  Hepatitis B Vaccine 3 dose series if at intermediate or high risk (ex: diabetes, end stage renal disease, liver disease)   Tetanus (Td) Vaccine - COST NOT COVERED BY MEDICARE PART B Following completion of primary series, a booster dose should be given every 10 years to maintain immunity against tetanus  Td may also be given as tetanus wound prophylaxis  Tdap Vaccine - COST NOT COVERED BY MEDICARE PART B Recommended at least once for all adults  For pregnant patients, recommended with each pregnancy  Shingles Vaccine (Shingrix) - COST NOT COVERED BY MEDICARE PART B  2 shot series recommended in those aged 48 and above     Health Maintenance Due:      Topic Date Due    MAMMOGRAM  02/24/2016    Cervical Cancer Screening  10/04/2023    HIV Screening  Completed    Hepatitis C Screening  Completed     Immunizations Due:      Topic Date Due    Pneumococcal Vaccine: Pediatrics (0 to 5 Years) and At-Risk Patients (6 to 59 Years) (1 of 1 - PPSV23) 07/25/1986     Advance Directives   What are advance directives? Advance directives are legal documents that state your wishes and plans for medical care  These plans are made ahead of time in case you lose your ability to make decisions for yourself  Advance directives can apply to any medical decision, such as the treatments you want, and if you want to donate organs  What are the types of advance directives? There are many types of advance directives, and each state has rules about how to use them  You may choose a combination of any of the following:  · Living will:   This is a written record of the treatment you want  You can also choose which treatments you do not want, which to limit, and which to stop at a certain time  This includes surgery, medicine, IV fluid, and tube feedings  · Durable power of  for healthcare [place] [facility]): This is a written record that states who you want to make healthcare choices for you when you are unable to make them for yourself  This person, called a proxy, is usually a family member or a friend  You may choose more than 1 proxy  · Do not resuscitate (DNR) order:  A DNR order is used in case your heart stops beating or you stop breathing  It is a request not to have certain forms of treatment, such as CPR  A DNR order may be included in other types of advance directives  · Medical directive: This covers the care that you want if you are in a coma, near death, or unable to make decisions for yourself  You can list the treatments you want for each condition  Treatment may include pain medicine, surgery, blood transfusions, dialysis, IV or tube feedings, and a ventilator (breathing machine)  · Values history: This document has questions about your views, beliefs, and how you feel and think about life  This information can help others choose the care that you would choose  Why are advance directives important? An advance directive helps you control your care  Although spoken wishes may be used, it is better to have your wishes written down  Spoken wishes can be misunderstood, or not followed  Treatments may be given even if you do not want them  An advance directive may make it easier for your family to make difficult choices about your care  Cigarette Smoking and Your Health   Risks to your health if you smoke:  Nicotine and other chemicals found in tobacco damage every cell in your body  Even if you are a light smoker, you have an increased risk for cancer, heart disease, and lung disease   If you are pregnant or have diabetes, smoking increases your risk for complications  Benefits to your health if you stop smoking:   · You decrease respiratory symptoms such as coughing, wheezing, and shortness of breath  · You reduce your risk for cancers of the lung, mouth, throat, kidney, bladder, pancreas, stomach, and cervix  If you already have cancer, you increase the benefits of chemotherapy  You also reduce your risk for cancer returning or a second cancer from developing  · You reduce your risk for heart disease, blood clots, heart attack, and stroke  · You reduce your risk for lung infections, and diseases such as pneumonia, asthma, chronic bronchitis, and emphysema  · Your circulation improves  More oxygen can be delivered to your body  If you have diabetes, you lower your risk for complications, such as kidney, artery, and eye diseases  You also lower your risk for nerve damage  Nerve damage can lead to amputations, poor vision, and blindness  · You improve your body's ability to heal and to fight infections  For more information and support to stop smoking:   · [employer]  Phone: 6- 600 - 575-7657  Web Address: [employer]  Underweight  Underweight is defined as having a body mass index (BMI) of less than 18 5 kg/m2   Anorexia  is a loss of appetite, decreased food intake, or both  Your appetite naturally decreases as you get older  You also get full faster than you used to  This occurs because your body needs less energy  Other body changes can also lead to a decreased appetite  Even though some appetite loss is normal, you still need to get enough calories and nutrients to keep you healthy  You can start to lose too much weight if you do not eat as much food as your body needs  Unwanted weight loss can cause health problems, or worsen health problems you already have  You can also become dehydrated if you do not drink enough liquid  How to eat healthy and get enough nutrients:   · Choose healthy foods    Eat a variety of fruits, vegetables, whole grains, low-fat dairy foods, lean meats, and other protein foods  Limit foods high in fat, sugar, and salt  Limit or avoid alcohol as directed  Work with a dietitian to help you plan your meals if you need to follow a special diet  A dietitian can also teach you how to modify foods if you have trouble chewing or swallowing  · Snack on healthy foods between meals  if you only eat a small amount during meals  Snacks provide extra healthy nutrients and calories between meals  Examples include fruit, cheese, and whole grain crackers  · Drink liquids as directed  to avoid dehydration  Drink liquids between meals if they cause you to get full too quickly during meals  Ask how much liquid to drink each day and which liquids are best for you  · Use herbs, spices, and flavor enhancers to add flavor to foods  Avoid using herbs and spice blends that also contain sodium  Ask your healthcare provider or dietitian about flavor enhancers  Flavor enhancers with ham, natural saleh, and roast beef flavors can also be sprinkled on food to add flavor  · Share meals with others as often as you can  Eating with others may help you to eat better during meal time  Ask family members, neighbors, or friends to join you for lunch  There are also senior centers where you can meet people, and share meals with them  · Ask family and friends for help  with shopping or preparing foods  Ask for a ride to the grocery store, if needed  © Copyright [employer] 2018 Information is for End User's use only and may not be sold, redistributed or otherwise used for commercial purposes   All illustrations and images included in CareNotes® are the copyrighted property of [employer] M [employer]  or [address] [place] [address] [employer]

## 2021-01-22 NOTE — PROGRESS NOTES
Assessment and Plan:     Problem List Items Addressed This Visit        Respiratory    Asthma      Other Visit Diagnoses     Encounter for screening mammogram for breast cancer    -  Primary    Need for immunization against influenza        Need for pneumococcal vaccination        Encounter for immunization        Medicare annual wellness visit, initial        Relevant Orders    ECG 12 lead        BMI Counseling: Body mass index is 17 46 kg/m²  The BMI is below normal  Patient advised to gain weight and dietary education for weight gain was provided  Depression Screening and Follow-up Plan: Patient's depression screening was positive with a PHQ-2 score of 1  Their PHQ-9 score was 2  Patient assessed for underlying major depression  Brief counseling provided and recommend additional follow-up/re-evaluation next office visit  Tobacco Cessation Counseling: Tobacco cessation counseling was provided  The patient is sincerely urged to quit consumption of tobacco  She is not ready to quit tobacco  Medication options and side effects of medication discussed  Patient refused medication  Preventive health issues were discussed with patient, and age appropriate screening tests were ordered as noted in patient's After Visit Summary  Personalized health advice and appropriate referrals for health education or preventive services given if needed, as noted in patient's After Visit Summary  History of Present Illness:     Patient presents for Welcome to Medicare visit  Patient Care Team:  Balbir Webber as PCP - General (Nurse Practitioner)  Wilbur Herbert MD as PCP - [address] [place] [address] (RTE)     Review of Systems:     Review of Systems   Constitutional: Negative for activity change, appetite change, chills, fatigue and fever  HENT: Negative for congestion, ear pain, postnasal drip, rhinorrhea, sinus pressure, sinus pain, sore throat and voice change      Eyes: Negative for pain, redness and visual disturbance  Respiratory: Negative for apnea, cough, choking, shortness of breath and wheezing  Cardiovascular: Negative for chest pain  Gastrointestinal: Negative for abdominal distention, abdominal pain, constipation, diarrhea, nausea and vomiting  Endocrine: Negative  Genitourinary: Negative for dysuria and hematuria  Musculoskeletal: Negative for arthralgias  Skin: Negative  Allergic/Immunologic: Negative  Neurological: Negative  Hematological: Negative  Psychiatric/Behavioral: Negative         Problem List:     Patient Active Problem List   Diagnosis    Encounter for gynecological examination (general) (routine) without abnormal findings    Asthma    Fibrocystic breast disease    Depression    Fibromyalgia    HLA B27 (HLA B27 positive)    Memory loss    Tremor    Paresthesia    Binge eating disorder    Exposure to SARS-associated coronavirus    Short of breath on exertion    Acute frontal sinusitis    Visit for laboratory test    Gastroesophageal reflux disease without esophagitis    Seasonal allergies    Tobacco abuse counseling      Past Medical and Surgical History:     Past Medical History:   Diagnosis Date    Allergies     Anxiety     Asthma     Chronic pain     Depression     Eating disorder     Fibrocystic breast changes of both breasts     Seizures (HCC)      Past Surgical History:   Procedure Laterality Date    INDUCED       MAMMO STEREOTACTIC BREAST BIOPSY LEFT (ALL INC)      Benign      Family History:     Family History   Problem Relation Age of Onset    Diabetes Sister     Alcohol abuse Sister     Cancer Father         Diagnosed stage 3 - metastazied    Psychiatric Illness Mother     Hypertension Family     Diabetes Family       Social History:        Social History     Socioeconomic History    Marital status: /Civil Union     Spouse name: None    Number of children: None    Years of education: None    Highest education level: High school graduate   Occupational History    Occupation: part time    Social Needs    Financial resource strain: Somewhat hard   [place] insecurity     Worry: Never true     Inability: Never true    Transportation needs     Medical: No     Non-medical: No   Tobacco Use    Smoking status: Current Every Day Smoker     Packs/day: 0 50     Types: Cigarettes    Smokeless tobacco: Never Used    Tobacco comment: 10-15 cig/day   Substance and Sexual Activity    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    Drug use: No    Sexual activity: Yes     Partners: Male   Lifestyle    Physical activity     Days per week: None     Minutes per session: None    Stress:  To some extent   Relationships    Social connections     Talks on phone: More than three times a week     Gets together: More than three times a week     Attends Buddhism service: 1 to 4 times per year     Active member of club or organization: No     Attends meetings of clubs or organizations: Never     Relationship status:     Intimate partner violence     Fear of current or ex partner: No     Emotionally abused: No     Physically abused: No     Forced sexual activity: No   Other Topics Concern    None   Social History Narrative    · Most recent tobacco use screenin-      · Do you currently or have you served in [employer]:   No      · Were you activated, into active duty, as a member of the [employer] or as a Reservist:   No      · Tobacco cessation counseling provided date:   03-       Medications and Allergies:     Current Outpatient Medications   Medication Sig Dispense Refill    amoxicillin (AMOXIL) 875 mg tablet amoxicillin 875 mg tablet      Ascorbic Acid (VITAMIN C) 100 MG tablet Take 100 mg by mouth daily      azithromycin (ZITHROMAX) 250 mg tablet azithromycin 250 mg tablet      Biotin w/ Vitamins C & E (HAIR/SKIN/NAILS PO) Take by mouth      busPIRone (BUSPAR) 15 mg tablet Take by mouth 3 (three) times a day        CALCIUM-MAGNESIUM-ZINC PO Take by mouth      co-enzyme Q-10 30 MG capsule Take 30 mg by mouth 3 (three) times a day      fluticasone (FLONASE) 50 mcg/act nasal spray fluticasone propionate 50 mcg/actuation nasal spray,suspension      fluticasone (FLOVENT HFA) 110 MCG/ACT inhaler 110 puffs      fluticasone (Flovent HFA) 110 MCG/ACT inhaler Inhale 2 puffs 2 (two) times a day Rinse mouth after use  1 Inhaler 1    HYDROcodone-acetaminophen (NORCO) 5-325 mg per tablet Take 1 tablet by mouth daily as needed for severe pain      hydrocortisone 2 5 % cream hydrocortisone 2 5 % topical cream with perineal applicator      ibuprofen (MOTRIN) 600 mg tablet Take 600 mg by mouth 3 to 4 times daily as needed  1    ipratropium (ATROVENT) 0 06 % nasal spray 2 SPRAYS INTO EACH NOSTRIL 4 (FOUR) TIMES A DAY (Patient not taking: Reported on 12/9/2020) 15 mL 1    loratadine (CLARITIN) 10 mg tablet Take 1 tablet (10 mg total) by mouth daily 30 tablet 1    LORazepam (ATIVAN) 1 mg tablet Take 1 mg by mouth 4 (four) times a day as needed for anxiety   1    naproxen (EC NAPROSYN) 500 MG EC tablet Take 500 mg by mouth 2 (two) times a day as needed      omeprazole (PriLOSEC) 20 mg delayed release capsule Take 1 capsule (20 mg total) by mouth daily 30 capsule 1    predniSONE 10 mg tablet prednisone 10 mg tablet      thiamine (VITAMIN B1) 100 mg tablet Take 100 mg by mouth daily      tiZANidine (ZANAFLEX) 4 mg tablet Take 4 mg by mouth every 6 (six) hours  5     No current facility-administered medications for this visit        Allergies   Allergen Reactions    Gabapentin Hives    Meloxicam GI Intolerance    Tramadol Hives     SEIZURES    Vistaril [Hydroxyzine]       Immunizations:     Immunization History   Administered Date(s) Administered    Tdap 08/13/2017      Health Maintenance:         Topic Date Due    MAMMOGRAM  02/24/2016    Cervical Cancer Screening  10/04/2023    HIV Screening  Completed    Hepatitis C Screening  Completed         Topic Date Due    Pneumococcal Vaccine: Pediatrics (0 to 5 Years) and At-Risk Patients (6 to 59 Years) (1 of 1 - PPSV23) 07/25/1986      Medicare Screening Tests and Risk Assessments:         Health Risk Assessment:   Patient rates overall health as good  Patient feels that their physical health rating is same  Eyesight was rated as same  Hearing was rated as same  Patient feels that their emotional and mental health rating is same  Pain experienced in the last 7 days has been some  Patient's pain rating has been 5/10  Patient states that she has experienced no weight loss or gain in last 6 months  Depression Screening:   PHQ-2 Score: 1  PHQ-9 Score: 2      Fall Risk Screening: In the past year, patient has experienced: no history of falling in past year      Urinary Incontinence Screening:   Patient has not leaked urine accidently in the last six months  Home Safety:  Patient does not have trouble with stairs inside or outside of their home  Patient has working smoke alarms and has working carbon monoxide detector  Home safety hazards include: none  Nutrition:   Current diet is Regular and Frequent junk food  Activities of Daily Living (ADLs)/Instrumental Activities of Daily Living (IADLs):   Walk and transfer into and out of bed and chair?: Yes  Dress and groom yourself?: Yes    Bathe or shower yourself?: Yes    Feed yourself?  Yes  Do your laundry/housekeeping?: Yes  Manage your money, pay your bills and track your expenses?: Yes  Make your own meals?: Yes    Do your own shopping?: Yes    Advance Care Planning:   Living will: No    Durable POA for healthcare: No    Advanced directive: No    Advanced directive counseling given: No    Five wishes given: No    Patient declined ACP directive: Yes    End of Life Decisions reviewed with patient: No    Provider agrees with end of life decisions: No      Cognitive Screening:   Provider or family/friend/caregiver concerned regarding cognition?: No    PREVENTIVE SCREENINGS      Cardiovascular Screening:    General: Screening Not Indicated      Diabetes Screening:     General: Risks and Benefits Discussed and Screening Not Indicated      Colorectal Cancer Screening:     General: Risks and Benefits Discussed    Due for: Cologuard      Cervical Cancer Screening:    General: Screening Current and Screening Not Indicated      Osteoporosis Screening:    General: Risks and Benefits Discussed and Screening Not Indicated      Abdominal Aortic Aneurysm (AAA) Screening:        General: Risks and Benefits Discussed and Screening Not Indicated      Lung Cancer Screening:     General: Screening Not Indicated and Risks and Benefits Discussed      Hepatitis C Screening:    General: Screening Current and Screening Not Indicated    Other Counseling Topics:   Alcohol use counseling, car/seat belt/driving safety, skin self-exam, sunscreen and calcium and vitamin D intake and regular weightbearing exercise  Visual Acuity Screening    Right eye Left eye Both eyes   Without correction:      With correction: 20/20 20/25 20/20   Comments: Color vision= normal       Physical Exam:     BP 98/68   Pulse (!) 113   Temp (!) 97 2 °F (36 2 °C) (Tympanic)   Resp 12   Ht 5' 1" (1 549 m)   Wt 41 9 kg (92 lb 6 4 oz)   SpO2 97%   BMI 17 46 kg/m²     Physical Exam  Vitals signs and nursing note reviewed  Constitutional:       General: She is not in acute distress  Appearance: Normal appearance  She is well-developed and normal weight  HENT:      Head: Normocephalic and atraumatic  Right Ear: Tympanic membrane normal       Left Ear: Tympanic membrane normal       Nose: Nose normal       Mouth/Throat:      Mouth: Mucous membranes are dry  Eyes:      Extraocular Movements: Extraocular movements intact  Conjunctiva/sclera: Conjunctivae normal       Pupils: Pupils are equal, round, and reactive to light     Neck: Musculoskeletal: Normal range of motion and neck supple  Cardiovascular:      Rate and Rhythm: Normal rate and regular rhythm  Heart sounds: No murmur  Pulmonary:      Effort: Pulmonary effort is normal  No respiratory distress  Breath sounds: Normal breath sounds  Abdominal:      General: Abdomen is flat  Palpations: Abdomen is soft  Tenderness: There is no abdominal tenderness  Musculoskeletal: Normal range of motion  Skin:     General: Skin is warm and dry  Capillary Refill: Capillary refill takes 2 to 3 seconds  Neurological:      General: No focal deficit present  Mental Status: She is alert and oriented to person, place, and time     Psychiatric:         Mood and Affect: Mood normal          Behavior: Behavior normal           SOLO Nascimento

## 2021-01-25 ENCOUNTER — LAB (OUTPATIENT)
Dept: LAB | Facility: CLINIC | Age: 41
End: 2021-01-25
Payer: COMMERCIAL

## 2021-01-25 DIAGNOSIS — Z92.29 HISTORY OF RECEIPT OF HEPATITIS B IMMUNE GLOBULIN: ICD-10-CM

## 2021-01-25 PROCEDURE — 36415 COLL VENOUS BLD VENIPUNCTURE: CPT

## 2021-01-25 PROCEDURE — 86706 HEP B SURFACE ANTIBODY: CPT

## 2021-01-26 LAB — HBV SURFACE AB SER-ACNC: >1000 MIU/ML

## 2021-02-06 DIAGNOSIS — J30.2 SEASONAL ALLERGIES: ICD-10-CM

## 2021-02-06 RX ORDER — LORATADINE 10 MG/1
TABLET ORAL
Qty: 30 TABLET | Refills: 1 | Status: SHIPPED | OUTPATIENT
Start: 2021-02-06 | End: 2021-04-14

## 2021-02-11 ENCOUNTER — TELEPHONE (OUTPATIENT)
Dept: FAMILY MEDICINE CLINIC | Facility: CLINIC | Age: 41
End: 2021-02-11

## 2021-02-11 DIAGNOSIS — K21.9 GASTROESOPHAGEAL REFLUX DISEASE WITHOUT ESOPHAGITIS: Primary | ICD-10-CM

## 2021-02-11 DIAGNOSIS — K21.9 GASTROESOPHAGEAL REFLUX DISEASE WITHOUT ESOPHAGITIS: ICD-10-CM

## 2021-02-11 RX ORDER — OMEPRAZOLE 20 MG/1
20 CAPSULE, DELAYED RELEASE ORAL DAILY
Qty: 30 CAPSULE | Refills: 1 | Status: SHIPPED | OUTPATIENT
Start: 2021-02-11 | End: 2021-04-07

## 2021-02-11 RX ORDER — OMEPRAZOLE 20 MG/1
20 CAPSULE, DELAYED RELEASE ORAL DAILY
Qty: 90 CAPSULE | Refills: 1 | Status: CANCELLED | OUTPATIENT
Start: 2021-02-11 | End: 2021-03-13

## 2021-02-11 NOTE — TELEPHONE ENCOUNTER
LM on FD VM @ 11:18 am    Needs refill     omeprazole (PriLOSEC) 20 mg delayed release capsule QD # 90    She thought you were refilling at her last OV    Let her know when it is sent or if there is a problem

## 2021-04-07 DIAGNOSIS — K21.9 GASTROESOPHAGEAL REFLUX DISEASE WITHOUT ESOPHAGITIS: ICD-10-CM

## 2021-04-07 RX ORDER — OMEPRAZOLE 20 MG/1
CAPSULE, DELAYED RELEASE ORAL
Qty: 30 CAPSULE | Refills: 1 | Status: SHIPPED | OUTPATIENT
Start: 2021-04-07 | End: 2021-06-04

## 2021-04-14 DIAGNOSIS — J30.2 SEASONAL ALLERGIES: ICD-10-CM

## 2021-04-14 RX ORDER — LORATADINE 10 MG/1
TABLET ORAL
Qty: 30 TABLET | Refills: 1 | Status: SHIPPED | OUTPATIENT
Start: 2021-04-14 | End: 2021-06-04

## 2021-05-19 ENCOUNTER — TELEMEDICINE (OUTPATIENT)
Dept: FAMILY MEDICINE CLINIC | Facility: CLINIC | Age: 41
End: 2021-05-19
Payer: COMMERCIAL

## 2021-05-19 VITALS — HEIGHT: 61 IN | BODY MASS INDEX: 17.94 KG/M2 | WEIGHT: 95 LBS

## 2021-05-19 DIAGNOSIS — K21.9 GASTROESOPHAGEAL REFLUX DISEASE WITHOUT ESOPHAGITIS: ICD-10-CM

## 2021-05-19 DIAGNOSIS — J01.10 ACUTE NON-RECURRENT FRONTAL SINUSITIS: Primary | ICD-10-CM

## 2021-05-19 DIAGNOSIS — Z71.6 TOBACCO ABUSE COUNSELING: ICD-10-CM

## 2021-05-19 DIAGNOSIS — J45.20 MILD INTERMITTENT ASTHMA WITHOUT COMPLICATION: ICD-10-CM

## 2021-05-19 PROCEDURE — 99213 OFFICE O/P EST LOW 20 MIN: CPT | Performed by: NURSE PRACTITIONER

## 2021-05-19 PROCEDURE — 3008F BODY MASS INDEX DOCD: CPT | Performed by: NURSE PRACTITIONER

## 2021-05-19 PROCEDURE — 4004F PT TOBACCO SCREEN RCVD TLK: CPT | Performed by: NURSE PRACTITIONER

## 2021-05-19 RX ORDER — ALBUTEROL SULFATE 90 UG/1
2 AEROSOL, METERED RESPIRATORY (INHALATION) EVERY 6 HOURS PRN
COMMUNITY
End: 2021-08-26

## 2021-05-19 RX ORDER — AMOXICILLIN 500 MG/1
500 CAPSULE ORAL EVERY 12 HOURS SCHEDULED
Qty: 30 CAPSULE | Refills: 0 | Status: SHIPPED | OUTPATIENT
Start: 2021-05-19 | End: 2021-05-29

## 2021-05-19 NOTE — LETTER
May 19, 2021     Patient: Katy Klein   YOB: 1980   Date of Visit: 5/19/2021       To Whom it May Concern: Deretha Lundborg is under my professional care  She was seen in my office on 5/19/2021  She may return to work on 5/21/2021  If you have any questions or concerns, please don't hesitate to call  Sincerely,          SOLO Sparrow        CC:  Katy Klein

## 2021-05-19 NOTE — PROGRESS NOTES
Virtual Brief Visit    Assessment/Plan:    Problem List Items Addressed This Visit        Digestive    Gastroesophageal reflux disease without esophagitis       Patient has history of GERD and she takes over-the-counter omeprazole 20 mg p o  daily  Instructed to maintain a GERD diet  Respiratory    Asthma       Patient  Uses her albuterol inhaler 2 puffs every 4-6 hours as needed  She reports she has been in using it more frequently due to allergy season  Relevant Medications    albuterol (PROVENTIL HFA,VENTOLIN HFA) 90 mcg/act inhaler    Acute frontal sinusitis - Primary     Patient placed on amoxicillin 500 mg p o  b i d  for 10 days  Instructed to use over-the-counter Flonase routinely  Relevant Medications    amoxicillin (AMOXIL) 500 mg capsule       Other    Tobacco abuse counseling       Patient has been counseled on smoke cessation  Medication for smoke cessation at this time  Depression Screening and Follow-up Plan: Clincally patient does not have depression  No treatment is required  Patient assessed for underlying major depression  Brief counseling provided and recommend additional follow-up/re-evaluation next office visit  Tobacco Cessation Counseling: Tobacco cessation counseling was provided  The patient is sincerely urged to quit consumption of tobacco  She is ready to quit tobacco  Medication options and side effects of medication discussed  Patient refused medication  Refused medication  Reason for visit is   Chief Complaint   Patient presents with    Chest Pain     on exertion    Headache     symtoms present since 5/11    COVID-19     pt was vaccinated   she was tested for Covid on 5/11 which was negative, and then was tested again yesterday 5/18 but has not gotten the result yet    Shortness of Breath     pt uses her inhaler - provides some relief    Fatigue    Anorexia    Generalized Body Aches    Nasal Congestion    Cough    Virtual Brief Visit        Encounter provider Benitez Chaudhari Casia     Provider located at [address]  [place] [address] [facility] [address]  [phone]    Recent Visits  No visits were found meeting these conditions  Showing recent visits within past 7 days and meeting all other requirements     Today's Visits  Date Type Provider Dept   05/19/21 Telemedicine Z[facility] Butcher 112 today's visits and meeting all other requirements     Future Appointments  No visits were found meeting these conditions  Showing future appointments within next 150 days and meeting all other requirements        After connecting through telephone, the patient was identified by name and date of birth  Clayton Carpenter was informed that this is a telemedicine visit and that the visit is being conducted through [facility] [employer] and patient was informed that this is a secure, HIPAA-compliant platform  She agrees to proceed     My office door was closed  No one else was in the room  She acknowledged consent and understanding of privacy and security of the platform  The patient has agreed to participate and understands she can discontinue the visit at any time  Patient is aware this is a billable service  Subjective    Clayton Carpenter is a 36 y o  female  Virtual video visit  Patient is a 36 year ole female that has had headaches, altered vision, upset stomach and increased use of her inhaler  She indicated she has had chest pain 1 week prior  She does not know if it was her sinuses and was retested yesterday  Not sure iif needs an antibiotic, since symptoms have been over a week  Covid-19 testing completed and negative as reported by the patient  Works at [employer]         Past Medical History:   Diagnosis Date    Allergies     Anxiety     Asthma     Chronic pain     Depression     Eating disorder     Fibrocystic breast changes of both breasts     Seizures (HCC)        Past Surgical History:   Procedure Laterality Date    INDUCED       MAMMO STEREOTACTIC BREAST BIOPSY LEFT (ALL INC)  2015    Benign       Current Outpatient Medications   Medication Sig Dispense Refill    albuterol (PROVENTIL HFA,VENTOLIN HFA) 90 mcg/act inhaler Inhale 2 puffs every 6 (six) hours as needed for wheezing      Ascorbic Acid (VITAMIN C) 100 MG tablet Take 100 mg by mouth daily      Biotin w/ Vitamins C & E (HAIR/SKIN/NAILS PO) Take by mouth      busPIRone (BUSPAR) 15 mg tablet Take by mouth 3 (three) times a day        CALCIUM-MAGNESIUM-ZINC PO Take by mouth      fluticasone (FLONASE) 50 mcg/act nasal spray fluticasone propionate 50 mcg/actuation nasal spray,suspension      fluticasone (Flovent HFA) 110 MCG/ACT inhaler Inhale 2 puffs 2 (two) times a day Rinse mouth after use   1 Inhaler 1    hydrocortisone 2 5 % cream hydrocortisone 2 5 % topical cream with perineal applicator      ibuprofen (MOTRIN) 600 mg tablet Take 600 mg by mouth 3 to 4 times daily as needed  1    loratadine (CLARITIN) 10 mg tablet TAKE 1 TABLET BY MOUTH EVERY DAY 30 tablet 1    LORazepam (ATIVAN) 1 mg tablet Take 1 mg by mouth 4 (four) times a day as needed for anxiety   1    omeprazole (PriLOSEC) 20 mg delayed release capsule TAKE 1 CAPSULE BY MOUTH EVERY DAY 30 capsule 1    thiamine (VITAMIN B1) 100 mg tablet Take 100 mg by mouth daily      tiZANidine (ZANAFLEX) 4 mg tablet Take 4 mg by mouth every 6 (six) hours  5    amoxicillin (AMOXIL) 500 mg capsule Take 1 capsule (500 mg total) by mouth every 12 (twelve) hours for 10 days 30 capsule 0    amoxicillin (AMOXIL) 875 mg tablet amoxicillin 875 mg tablet      azithromycin (ZITHROMAX) 250 mg tablet azithromycin 250 mg tablet      co-enzyme Q-10 30 MG capsule Take 30 mg by mouth 3 (three) times a day      fluticasone (FLOVENT HFA) 110 MCG/ACT inhaler 110 puffs      HYDROcodone-acetaminophen (NORCO) 5-325 mg per tablet Take 1 tablet by mouth daily as needed for severe pain      ipratropium (ATROVENT) 0 06 % nasal spray 2 SPRAYS INTO EACH NOSTRIL 4 (FOUR) TIMES A DAY (Patient not taking: Reported on 12/9/2020) 15 mL 1    naproxen (EC NAPROSYN) 500 MG EC tablet Take 500 mg by mouth 2 (two) times a day as needed      predniSONE 10 mg tablet prednisone 10 mg tablet       No current facility-administered medications for this visit  Allergies   Allergen Reactions    Gabapentin Hives    Meloxicam GI Intolerance    Tramadol Hives     SEIZURES    Vistaril [Hydroxyzine]        Review of Systems   Constitutional: Positive for appetite change and fatigue  Negative for activity change, chills, diaphoresis, fever and unexpected weight change  No appetite  HENT: Positive for sinus pressure  Negative for congestion, ear discharge, ear pain, nosebleeds, postnasal drip, rhinorrhea, sinus pain, sneezing, sore throat and voice change  Eyes: Positive for photophobia  Negative for pain, redness, itching and visual disturbance  Respiratory: Positive for cough, chest tightness, shortness of breath and wheezing  Using her inhaler more frequently  Cardiovascular: Negative for chest pain and palpitations  Gastrointestinal: Positive for nausea  Negative for abdominal distention, abdominal pain, constipation, diarrhea and vomiting  Endocrine: Negative  Genitourinary: Negative for difficulty urinating, dysuria, flank pain, frequency, hematuria and urgency  Musculoskeletal: Negative for arthralgias and myalgias  Skin: Negative  Neurological: Positive for dizziness and headaches  Negative for tremors, seizures, facial asymmetry, speech difficulty and numbness  Hematological: Negative  Psychiatric/Behavioral: Negative          Vitals:    05/19/21 1417   Weight: 43 1 kg (95 lb)   Height: 5' 1" (1 549 m)         I spent 25 minutes minutes directly with the patient during this visit    VIRTUAL VISIT DISCLAIMER    Myrandadilma Ron acknowledges that she has consented to an online visit or consultation  She understands that the online visit is based solely on information provided by her, and that, in the absence of a face-to-face physical evaluation by the physician, the diagnosis she receives is both limited and provisional in terms of accuracy and completeness  This is not intended to replace a full medical face-to-face evaluation by the physician  Oralia Velasquez understands and accepts these terms

## 2021-05-19 NOTE — PATIENT INSTRUCTIONS
Sinusitis   WHAT YOU NEED TO KNOW:   What is sinusitis? Sinusitis is inflammation or infection of your sinuses  It is most often caused by a virus  Acute sinusitis may last up to 12 weeks  Chronic sinusitis lasts longer than 12 weeks  Recurrent sinusitis means you have 4 or more times in 1 year  What increases my risk for sinusitis? · Medical conditions, such as an upper respiratory infection, allergies, asthma, or cystic fibrosis    · Dental infections or procedures, such as gum infections, tooth decay, or a root canal    · Smoking    · Abnormal sinus structure, such as nasal growths, swollen tonsils, or a deviated septum    · A weak immune system, from diseases such as diabetes or HIV    What are the signs and symptoms of sinusitis? · Fever    · Pain, pressure, redness, or swelling around the forehead, cheeks, or eyes    · Thick yellow or green discharge from your nose    · Tenderness when you touch your face over your sinuses    · Dry cough that happens mostly at night or when you lie down    · Headache and face pain that is worse when you lean forward    · Tooth pain, or pain when you chew    How is sinusitis diagnosed? Your healthcare provider will examine you and ask about your symptoms  He or she will check inside your nose using a nasal speculum  This is a small tool used to open your nostrils  A sample of the mucus from your nose may show what germ is causing your infection  How is sinusitis treated? Your symptoms may go away on their own  Your healthcare provider may recommend watchful waiting for up to 10 days before starting antibiotics  You may  need any of the following:  · Acetaminophen  decreases pain and fever  It is available without a doctor's order  Ask how much to take and how often to take it  Follow directions   Read the labels of all other medicines you are using to see if they also contain acetaminophen, or ask your doctor or pharmacist  Acetaminophen can cause liver damage if not taken correctly  Do not use more than 4 grams (4,000 milligrams) total of acetaminophen in one day  · NSAIDs , such as ibuprofen, help decrease swelling, pain, and fever  This medicine is available with or without a doctor's order  NSAIDs can cause stomach bleeding or kidney problems in certain people  If you take blood thinner medicine, always ask your healthcare provider if NSAIDs are safe for you  Always read the medicine label and follow directions  · Nasal steroid sprays  may help decrease inflammation in your nose and sinuses  · Decongestants  help reduce swelling and drain mucus in the nose and sinuses  They may help you breathe easier  · Antihistamines  help dry mucus in the nose and relieve sneezing  · Antibiotics  help treat or prevent a bacterial infection  How can I manage my symptoms? · Rinse your sinuses  Use a sinus rinse device to rinse your nasal passages with a saline (salt water) solution or distilled water  Do not use tap water  This will help thin the mucus in your nose and rinse away pollen and dirt  It will also help reduce swelling so you can breathe normally  Ask your healthcare provider how often to do this  · Breathe in steam   Heat a bowl of water until you see steam  Lean over the bowl and make a tent over your head with a large towel  Breathe deeply for about 20 minutes  Be careful not to get too close to the steam or burn yourself  Do this 3 times a day  You can also breathe deeply when you take a hot shower  · Sleep with your head elevated  Place an extra pillow under your head before you go to sleep to help your sinuses drain  · Drink liquids as directed  Ask your healthcare provider how much liquid to drink each day and which liquids are best for you  Liquids will thin the mucus in your nose and help it drain  Avoid drinks that contain alcohol or caffeine  · Do not smoke, and avoid secondhand smoke    Nicotine and other chemicals in cigarettes and cigars can make your symptoms worse  Ask your healthcare provider for information if you currently smoke and need help to quit  E-cigarettes or smokeless tobacco still contain nicotine  Talk to your healthcare provider before you use these products  How can I help prevent the spread of germs that cause sinusitis? Wash your hands often with soap and water  Wash your hands after you use the bathroom, change a child's diaper, or sneeze  Wash your hands before you prepare or eat food  When should I seek immediate care? · Your eye and eyelid are red, swollen, and painful  · You cannot open your eye  · You have vision changes, such as double vision  · Your eyeball bulges out or you cannot move your eye  · You are more sleepy than normal, or you notice changes in your ability to think, move, or talk  · You have a stiff neck, a fever, or a bad headache  · You have swelling of your forehead or scalp  When should I contact my healthcare provider? · Your symptoms do not improve after 3 days  · Your symptoms do not go away after 10 days  · You have nausea and are vomiting  · Your nose is bleeding  · You have questions or concerns about your condition or care  CARE AGREEMENT:   You have the right to help plan your care  Learn about your health condition and how it may be treated  Discuss treatment options with your healthcare providers to decide what care you want to receive  You always have the right to refuse treatment  The above information is an  only  It is not intended as medical advice for individual conditions or treatments  Talk to your doctor, nurse or pharmacist before following any medical regimen to see if it is safe and effective for you  © Copyright [address] Information is for End User's use only and may not be sold, redistributed or otherwise used for commercial purposes   All illustrations and images included in CareNotes® are the copyrighted property of [employer]  or [address] [place], [place] [address]kailash christian

## 2021-05-19 NOTE — ASSESSMENT & PLAN NOTE
Patient  Uses her albuterol inhaler 2 puffs every 4-6 hours as needed  She reports she has been in using it more frequently due to allergy season

## 2021-05-19 NOTE — ASSESSMENT & PLAN NOTE
Patient placed on amoxicillin 500 mg p o  b i d  for 10 days  Instructed to use over-the-counter Flonase routinely

## 2021-05-19 NOTE — ASSESSMENT & PLAN NOTE
Patient has history of GERD and she takes over-the-counter omeprazole 20 mg p o  daily  Instructed to maintain a GERD diet

## 2021-06-04 DIAGNOSIS — K21.9 GASTROESOPHAGEAL REFLUX DISEASE WITHOUT ESOPHAGITIS: ICD-10-CM

## 2021-06-04 DIAGNOSIS — J30.2 SEASONAL ALLERGIES: ICD-10-CM

## 2021-06-04 RX ORDER — OMEPRAZOLE 20 MG/1
CAPSULE, DELAYED RELEASE ORAL
Qty: 30 CAPSULE | Refills: 1 | Status: SHIPPED | OUTPATIENT
Start: 2021-06-04 | End: 2021-08-04

## 2021-06-04 RX ORDER — LORATADINE 10 MG/1
TABLET ORAL
Qty: 30 TABLET | Refills: 1 | Status: SHIPPED | OUTPATIENT
Start: 2021-06-04 | End: 2021-08-04

## 2021-08-04 DIAGNOSIS — J30.2 SEASONAL ALLERGIES: ICD-10-CM

## 2021-08-04 DIAGNOSIS — K21.9 GASTROESOPHAGEAL REFLUX DISEASE WITHOUT ESOPHAGITIS: ICD-10-CM

## 2021-08-04 RX ORDER — LORATADINE 10 MG/1
TABLET ORAL
Qty: 30 TABLET | Refills: 1 | Status: SHIPPED | OUTPATIENT
Start: 2021-08-04 | End: 2021-09-20 | Stop reason: SDUPTHER

## 2021-08-04 RX ORDER — OMEPRAZOLE 20 MG/1
CAPSULE, DELAYED RELEASE ORAL
Qty: 30 CAPSULE | Refills: 1 | Status: SHIPPED | OUTPATIENT
Start: 2021-08-04 | End: 2021-09-20 | Stop reason: SDUPTHER

## 2021-08-26 DIAGNOSIS — J45.20 MILD INTERMITTENT ASTHMA, UNCOMPLICATED: ICD-10-CM

## 2021-08-26 DIAGNOSIS — J45.20 MILD INTERMITTENT ASTHMA WITHOUT COMPLICATION: ICD-10-CM

## 2021-08-26 RX ORDER — ALBUTEROL SULFATE 90 UG/1
AEROSOL, METERED RESPIRATORY (INHALATION)
Qty: 6.7 G | Refills: 1 | Status: SHIPPED | OUTPATIENT
Start: 2021-08-26 | End: 2021-09-20 | Stop reason: SDUPTHER

## 2021-08-26 RX ORDER — DEXAMETHASONE 4 MG/1
2 TABLET ORAL 2 TIMES DAILY
Qty: 12 G | Refills: 1 | Status: SHIPPED | OUTPATIENT
Start: 2021-08-26 | End: 2022-06-21

## 2021-09-19 NOTE — PATIENT INSTRUCTIONS
Anorexia Nervosa   WHAT YOU NEED TO KNOW:   What is anorexia nervosa? Anorexia nervosa is an eating disorder that can lead to severe weight loss  Anorexia may cause you to stop eating or to eat fewer calories than your body needs  The weight loss is not related to another medical condition  What increases my risk for anorexia nervosa? Anorexia most often starts during teenage years  The following may increase your risk:  · A desire to be perfect in everything you do and how you look    · Participation in sports or activities that require a low weight    · Constant dieting to lose weight    · Being female    · A family history of an eating disorder    · Trouble accepting how your body looks    · Pressure from others to be thin    · A recent loss or separation    What are the signs and symptoms of anorexia nervosa? · Body weight that is much lower than is healthy for your height and age    · Fear of gaining weight, even if you are very thin    · Spending much of your time thinking about food and how to lose more weight    · Restricting food, measuring or weighing everything you eat, or not eating at all    · Eating large amounts quickly and then purging (vomiting or using laxative) to prevent weight gain    · Exercising too much to prevent weight gain    · Feeling tired, dizzy, weak, or cold much of the time    · Cracked or dry skin, thinning hair, or fine hair covering your body    · Nails that break easily    · Mood control problems, such as easily becoming angry, or depression    How is anorexia nervosa diagnosed? Your healthcare provider will examine you and check your height and weight  Blood tests will show if you are getting enough iron, calcium, and other nutrients  Urine tests may be used to check for signs of dehydration  Your provider will ask you how you feel about your body, and how you control weight  The provider may ask you to fill out several forms about feelings and eating habits   You may have a hard time talking about your weight or about not eating  You may also have trouble asking for help  Your healthcare provider's questions are only meant to help you  The more honest you can be, the more easily your provider can help you be healthy  How is anorexia nervosa treated? You may feel like it will be hard to get better  You may have a lot of feelings about eating and reaching a healthy body weight  Treatment is meant to help you develop a healthy relationship with food  Treatment may also be needed for health problems caused by anorexia  Treatment may need to take place in a hospital or clinic  · Counseling  is an important part of treatment  You may work with healthcare providers alone or in a group  Group counseling is a way for you to talk with others who have anorexia  Counseling may center on helping you replace negative thoughts with positive thoughts  Family sessions can help everyone in the family understand anorexia and what to do to help you  · Nutrition therapy  means you will meet with a dietitian to plan healthy meals  Others in your family may also meet with the dietitian  Your healthcare providers and dietitian will work with you to make small changes over time  · Medicines  are sometimes used to help treat anorexia or the health problems it causes  You may get medicine to help improve your mood, control mood swings, and decrease obsessive thoughts  Vitamin or mineral supplements may also be needed if your nutrient levels are low because of anorexia  What can I do to care for myself? · Be patient  Recovery from anorexia is a process that takes time  You may have times when you go back to not eating, or eating few calories, especially during stressful times  This is common  Work with family members and healthcare providers to get back on track with healthy eating and healthy exercise  Try not to be angry with yourself for the episode   It might help to talk about your feelings with someone you trust      · Focus on a healthy self-esteem  Think about everything you like about yourself  For example, you may be a talented artist, or you may write well  Focus on those skills or talents instead of on appearance  Ask others not to comment on your weight or shape  Your healthcare provider can tell you healthy weight ranges for your age and height  It may take time before you are comfortable knowing your weight or seeing your weight as healthy  Remember your goals to build a healthy self-esteem  Be patient with yourself as you change your thinking  Where can I get support and more information? · [facility]  [address] , [place] [facility] [address]  Phone: 7- [phone] - [phone]  Phone: 8- [phone] - [phone]  Web Address: http://www  [facility]  org    · [address] [facility], Office of [facility], Planning, and Communications  [address] [place] [address], [address] [place] [id number]   Phone: [phone]  Phone: [phone]  Web Address: [facility] [place]  Call your local emergency number (911 in the [address]) for any of the following:   · You want to harm or kill yourself  · You have pain when you swallow, or severe pain in your chest or abdomen  · Your heart is beating fast or fluttering, or you feel dizzy or faint  When should I seek immediate care? · Your muscles feel weak, and you have pain and stiffness  When should I call my doctor? · You have tingling in your hands or feet  · Your monthly period is light or has stopped completely (females)  · You are planning to get pregnant and need to develop a safe eating plan  · You have questions or concerns about your condition or care  CARE AGREEMENT:   You have the right to help plan your care  Learn about your health condition and how it may be treated   Discuss treatment options with your healthcare providers to decide what care you want to receive  You always have the right to refuse treatment  The above information is an  only  It is not intended as medical advice for individual conditions or treatments  Talk to your doctor, nurse or pharmacist before following any medical regimen to see if it is safe and effective for you  © Copyright [employer] 2021 Information is for End User's use only and may not be sold, redistributed or otherwise used for commercial purposes  All illustrations and images included in CareNotes® are the copyrighted property of A D A M , Inc  or [facility]  Generalized Tonic Clonic Seizures   WHAT YOU NEED TO KNOW:   What are generalized tonic-clonic seizures? A generalized tonic-clonic seizure may also be called a grand mal seizure  A seizure means an abnormal area in your brain sometimes sends bursts of electrical activity  A generalized seizure affects both sides of your brain  Tonic and clonic are phases that happen during the seizure  The tonic phase causes your muscles to become stiff  You lose consciousness and may fall down  The clonic phase causes convulsions (repeated muscle contractions)  A seizure may last from a few seconds up to 3 minutes  It is an emergency if it lasts longer than 5 minutes  What increases my risk for tonic-clonic seizures? · Epilepsy    · A family history of epilepsy or seizures    · A brain injury, head trauma, infection, tumor, or stroke    · Blood vessel problems in the brain    · A lack of sleep    · Very low glucose (sugar), sodium (salt), calcium, or magnesium levels    · Withdrawal from drugs or alcohol    What are the signs and symptoms of a tonic-clonic seizure? · Sudden loss of consciousness    · Convulsions    · Crying out    · Not responding when spoken to    · Biting your lips or cheeks    · Loss of bladder control    · Confusion and lack of energy after the seizure stops    How are tonic-clonic seizures diagnosed?   Your healthcare provider will ask about your health conditions and what medicines you take  Epilepsy is usually diagnosed if you have at least 2 seizures within 24 hours  It may also be diagnosed if you have 1 seizure but are likely to have more  A brain scan or electroencephalogram (EEG) may also show signs of epilepsy that make another seizure likely  Tell the provider how close together the seizures were if you had more than one  Your healthcare provider will ask for a detailed description of each seizure  If possible, bring someone with you who saw you have a seizure  You may also need any of the following:  · Blood tests  may show the cause of your seizures  The tests may be used to check your blood glucose, sodium, calcium, or potassium levels  · An EEG  records the electrical activity of your brain  It is used to find changes in the normal patterns of your brain activity  · CT or MRI  pictures may be used to check for injuries or abnormal areas in your brain  You may be given contrast liquid to help your brain show up better in the pictures  Tell the healthcare provider if you have ever had an allergic reaction to contrast liquid  Do not enter the MRI room with anything metal  Metal can cause serious injury  Tell the healthcare provider if you have any metal in or on your body  · A PET scan  is used to see activity in areas of your brain  You are given radioactive material that helps healthcare providers see the activity better  · A SPECT scan  uses radioactive material to find where the seizure started in your brain  This scan may be done if other scans do not show where the seizure started  How are tonic-clonic seizures treated? Your healthcare provider may treat any health conditions causing your seizures  The goal of treatment is to try to stop your seizures completely  You may need any of the following:  · Medicines  may be given to treat certain health conditions   You may need antiepileptic medicine if your seizures are caused by epilepsy  You may need medicine daily to prevent seizures or during a seizure to stop it  Do not stop taking your medicine unless directed by your healthcare provider  · Surgery  may help reduce how often you have seizures if you have epilepsy and medicine does not help  Ask your healthcare provider for more information about surgery for epilepsy  What can I do to prevent a tonic-clonic seizure? You may not be able to prevent every seizure  The following can help you manage triggers that may make a seizure start:  · Take antiseizure medicine every day at the same time  This will also help prevent medicine side effects  Set an alarm to help remind you to take your medicine every day  If you are a woman, talk to your provider about family planning while you are taking this medicine  · Manage stress  Stress can be a trigger for epilepsy  Exercise can help you reduce stress  Talk to your healthcare provider about exercise that is safe for you  Illness can be a form of stress  Eat a variety of healthy foods and drink plenty of liquids during an illness  Talk to your healthcare provider about other ways to manage stress  · Set a regular sleep schedule  A lack of sleep can trigger a seizure  Try to go to sleep and wake up at the same time every day  Keep your bedroom quiet and dark  Talk to your healthcare provider if you are having trouble sleeping  · Limit or do not drink alcohol as directed  Alcohol can trigger a seizure, especially if you drink a large amount at one time  A drink of alcohol is 12 ounces of beer, 1½ ounces of liquor, or 5 ounces of wine  Talk to your healthcare provider about a safe amount of alcohol for you  Your provider may recommend that you do not drink any alcohol  Tell him or her if you need help to quit drinking  What can I do to manage tonic-clonic seizures?   The following can help you manage the seizures if you have more than one:  · Keep a seizure diary  This can help you find your triggers and avoid them  Possible triggers include illness, lack of sleep, hormone changes, alcohol, drugs, lights, and stress  Write down the dates of your seizures, where you were, and what you were doing  Include how you felt before and after  · Record any auras you have before a seizure  An aura is a sign that you are about to have a seizure  Auras happen before certain types of seizures that are in only 1 part of the brain  The aura may happen seconds before a seizure, or up to an hour before  You may feel, see, hear, or smell something  Examples include part of your body becoming hot  You may see a flash of light or hear something  You may have anxiety or déjà vu  If you have an aura, include it in your seizure diary  · Create a care plan  Tell family, friends, and coworkers about your epilepsy  Give them instructions that tell them how they can keep you safe if you have a seizure  · Ask what safety precautions you should take  Talk with your healthcare provider about driving  You may not be able to drive until you are seizure-free for a period of time  You will need to check the law where you live  Also talk to your healthcare provider about swimming and bathing  You may drown or develop life-threatening heart or lung damage if you have a seizure in water  · Carry medical alert identification  Wear medical alert jewelry or carry a card that says you have tonic-clonic seizures  Ask your healthcare provider where to get these items  How can others keep me safe during a seizure? Give the following instructions to family, friends, and coworkers:  · Do not panic  · Do not hold me down or put anything in my mouth  · Gently guide me to the floor or a soft surface  · Place me on my side to help prevent me from swallowing saliva or vomit  · Protect me from injury   Remove sharp or hard objects from the area surrounding me, or cushion my head  · Loosen the clothing around my head and neck  · Time how long my seizure lasts  Call 911 if my seizure lasts longer than 5 minutes or if I have a second seizure  · Stay with me until my seizure ends  Let me rest until I am fully awake  · Perform CPR if I stop breathing or you cannot feel my pulse  · Do not give me anything to eat or drink until I am fully awake  Call your local emergency number (911 in the [address] [facility]), or have someone else call, for any of the following:   · This is the first seizure you have ever had  · You have trouble breathing or feeling alert after a seizure  · The seizure lasts longer than 5 minutes  · You had a seizure in water, such as in a swimming pool or hot tub  · You have diabetes or are pregnant and had a seizure  When should I call my doctor? · You have a second seizure within 24 hours of the first      · You are injured during a seizure  · You feel you are not able to cope with your condition  · Your seizures start to happen more often  · You are confused longer than usual after a seizure  · You are planning to get pregnant or are currently pregnant  · You have questions or concerns about your condition or care  CARE AGREEMENT:   You have the right to help plan your care  Learn about your health condition and how it may be treated  Discuss treatment options with your healthcare providers to decide what care you want to receive  You always have the right to refuse treatment  The above information is an  only  It is not intended as medical advice for individual conditions or treatments  Talk to your doctor, nurse or pharmacist before following any medical regimen to see if it is safe and effective for you  © Copyright [address] 2021 Information is for End User's use only and may not be sold, redistributed or otherwise used for commercial purposes   All illustrations and images included in CareNotes® are the copyrighted property of A D A M , Inc  or [address]  Anxiety   WHAT YOU NEED TO KNOW:   What do I need to know about anxiety? Anxiety is a condition that causes you to feel extremely worried or nervous  The feelings are so strong that they can cause problems with your daily activities or sleep  Anxiety may be triggered by something you fear, or it may happen without a cause  Family or work stress, smoking, caffeine, and alcohol can increase your risk for anxiety  Certain medicines or health conditions can also increase your risk  Anxiety can become a long-term condition if it is not managed or treated  What other common signs and symptoms may occur with anxiety? · Fatigue or muscle tightness    · Shaking, restlessness, or irritability    · Problems focusing    · Trouble sleeping    · Feeling jumpy, easily startled, or dizzy    · Rapid heartbeat or shortness of breath    What do I need to tell my healthcare provider about my anxiety? Tell your healthcare provider when your symptoms began and what triggers them  Tell your provider if anxiety affects your daily activities  Your provider will also ask about your medical history and if you have family members with a similar condition  Tell your provider about your past and present alcohol, nicotine, or drug use  What can I do to manage anxiety? You may get medicines to help you feel calm and relaxed, and to decrease your symptoms  Medicines are usually given together with therapy or other treatments  The following can help you manage anxiety:  · Talk to someone about your anxiety  Your healthcare provider may suggest counseling  Cognitive behavioral therapy can help you understand and change how you react to events that trigger your symptoms  You might feel more comfortable talking with a friend or family member about your anxiety  Choose someone you know will be supportive and encouraging  · Find ways to relax    Activities such as exercise, meditation, or listening to music can help you relax  Spend time with friends, or do things you enjoy  · Practice deep breathing  Deep breathing can help you relax when you feel anxious  Focus on taking slow, deep breaths several times a day, or during an anxiety attack  Breathe in through your nose and out through your mouth  · Create a regular sleep routine  Regular sleep can help you feel calmer during the day  Go to sleep and wake up at the same times every day  Do not watch television or use the computer right before bed  Your room should be comfortable, dark, and quiet  · Eat a variety of healthy foods  Healthy foods include fruits, vegetables, low-fat dairy products, lean meats, fish, whole-grain breads, and cooked beans  Healthy foods can help you feel less anxious and have more energy  · Exercise regularly  Exercise can increase your energy level  Exercise may also lift your mood and help you sleep better  Your healthcare provider can help you create an exercise plan  · Do not smoke  Nicotine and other chemicals in cigarettes and cigars can increase anxiety  Ask your healthcare provider for information if you currently smoke and need help to quit  E-cigarettes or smokeless tobacco still contain nicotine  Talk to your healthcare provider before you use these products  · Do not have caffeine  Caffeine can make your symptoms worse  Do not have foods or drinks that are meant to increase your energy level  · Limit or do not drink alcohol  Ask your healthcare provider if alcohol is safe for you  You may not be able to drink alcohol if you take certain anxiety or depression medicines  Limit alcohol to 1 drink per day if you are a woman  Limit alcohol to 2 drinks per day if you are a man  A drink of alcohol is 12 ounces of beer, 5 ounces of wine, or 1½ ounces of liquor  · Do not use drugs  Drugs can make your anxiety worse  It can also make anxiety hard to manage   Talk to your healthcare provider if you use drugs and want help to quit  Call your local emergency number (911 in the [address]) if:   · You have chest pain, tightness, or heaviness that may spread to your shoulders, arms, jaw, neck, or back  · You feel like hurting yourself or someone else  When should I call my doctor? · Your symptoms get worse or do not get better with treatment  · Your anxiety keeps you from doing your regular daily activities  · You have new symptoms since your last visit  · You have questions or concerns about your condition or care  CARE AGREEMENT:   You have the right to help plan your care  Learn about your health condition and how it may be treated  Discuss treatment options with your healthcare providers to decide what care you want to receive  You always have the right to refuse treatment  The above information is an  only  It is not intended as medical advice for individual conditions or treatments  Talk to your doctor, nurse or pharmacist before following any medical regimen to see if it is safe and effective for you  © Copyright [employer] 2021 Information is for End User's use only and may not be sold, redistributed or otherwise used for commercial purposes  All illustrations and images included in CareNotes® are the copyrighted property of [employer] , [employer]  or [address] [place] [address] [place]   Depression   WHAT YOU NEED TO KNOW:   What is depression? Depression is a medical condition that causes feelings of sadness or hopelessness that do not go away  Depression may cause you to lose interest in things you used to enjoy  These feelings may interfere with your daily life  What causes or increases my risk for depression? Depression may be caused by changes in brain chemicals that affect your mood   Your risk for depression may be higher if you have any of the following:  · Stressful events such as the death of a loved one, unemployment, or divorce    · A family history of depression    · A chronic medical condition, such as diabetes, heart disease, or cancer    · Drug or alcohol abuse    What are the signs and symptoms of depression? · Appetite changes, or weight gain or loss    · Trouble going to sleep or staying asleep, or sleeping too much    · Fatigue or lack of energy    · Feeling restless, irritable, or withdrawn    · Feeling worthless, hopeless, discouraged, or guilty    · Trouble concentrating, remembering things, doing daily tasks, or making decisions    · Thoughts about hurting or killing yourself    How is depression diagnosed? Your healthcare provider will ask about your symptoms and how long you have had them  He or she will ask if you have any family members with depression  Tell your healthcare provider about any stressful events in your life  He or she may ask about any other health conditions or medicines you take  How is depression treated? · Therapy  is often used together with medicine  Therapy is a way for you to talk about your feelings and anything that may be causing depression  Therapy can be done alone or in a group  It may also be done with family members or a significant other  · Antidepressant medicine  may be given to relieve depression  You may need to take this medicine for several weeks before you begin to feel better  Tell your healthcare provider about any side effects or problems you have with your medicine  The type or amount of medicine may need to be changed  How can I manage depression? · Get regular physical activity  Try to be active for 30 minutes, 3 to 5 days a week  Physical activity can help relieve depression  Work with your healthcare provider to develop a plan that you enjoy  It may help to ask someone to be active with you  · Create a regular sleep schedule  A routine can help you relax before bed  Listen to music, read, or do yoga  Try to go to bed and wake up at the same time every day   Sleep is important for emotional health  · Eat a variety of healthy foods  Healthy foods include fruits, vegetables, whole-grain breads, low-fat dairy products, lean meats, fish, and cooked beans  A healthy meal plan is low in fat, salt, and added sugar  · Do not drink alcohol or use drugs  Alcohol and drugs can make depression worse  Talk to your therapist or doctor if you need help quitting  The following resources are available at any time to help you, if needed:   · [address] [facility]: 1-796.970.1530 (3-532-629-TYTS)     · Suicide Hotline: 4-833.837.7962 (4-218-QGJYOAO)     · For a list of international numbers: https://save org/find-help/international-resources/    Call your local emergency number (911 in the [address]) if:   · You think about harming yourself or someone else  · You have done something on purpose to hurt yourself  When should I call my therapist or doctor? · Your symptoms do not improve  · You cannot make it to your next appointment  · You have new symptoms  · You have questions or concerns about your condition or care  CARE AGREEMENT:   You have the right to help plan your care  Learn about your health condition and how it may be treated  Discuss treatment options with your healthcare providers to decide what care you want to receive  You always have the right to refuse treatment  The above information is an  only  It is not intended as medical advice for individual conditions or treatments  Talk to your doctor, nurse or pharmacist before following any medical regimen to see if it is safe and effective for you  © Copyright [employer] 2021 Information is for End User's use only and may not be sold, redistributed or otherwise used for commercial purposes   All illustrations and images included in CareNotes® are the copyrighted property of A D A M , Inc  or [facility] Joni Akins   Asthma   AMBULATORY CARE:   Asthma  is a lung disease that makes breathing difficult  Chronic inflammation and reactions to triggers narrow the airways in your lungs  Asthma can become life-threatening if it is not managed  Cough-variant asthma  is a type of asthma that causes a dry cough that keeps coming back  A dry cough may be your only symptom, or you may also have chest tightness  These symptoms may be caused by exercise or exposure to odors, allergens, or respiratory tract infections  Cough-variant asthma is treated the same way as typical asthma  Common symptoms include the following:   · Coughing    · Wheezing    · Shortness of breath    · Chest tightness    Call your local emergency number (911 in the [address]) if:   · You have severe shortness of breath  · The skin around your neck and ribs pulls in with each breath  · Your peak flow numbers are in the red zone of your AAP  Seek care immediately if:   · You have shortness of breath, even after you take your short-term medicine as directed  · Your lips or nails turn blue or gray  Call your doctor or asthma specialist if:   · You run out of medicine before your next refill is due  · Your symptoms get worse  · You need to take more medicine than usual to control your symptoms  · You have questions or concerns about your condition or care  Treatment for asthma  will depend on how severe your asthma is  Medicine may be used to decrease inflammation, open airways, and make it easier to breathe  Medicines may be inhaled, taken as a pill, or injected  Short-term medicines relieve your symptoms quickly  Long-term medicines are used to prevent future attacks  Other medicines may be needed if your regular medicines are not able to prevent attacks  You may also need medicine to help control your allergies  Manage and prevent future asthma attacks:   · Follow your asthma action plan  This is a written plan that you and your healthcare provider create   It explains which medicine you need and when to change doses if necessary  It also explains how you can monitor symptoms and use a peak flow meter  The meter measures how well your lungs are working  · Manage other health conditions , such as allergies, acid reflux, and sleep apnea  · Identify and avoid triggers  These may include pets, dust mites, mold, and cockroaches  · Do not smoke or be around others who smoke  Nicotine and other chemicals in cigarettes and cigars can cause lung damage  Ask your healthcare provider for information if you currently smoke and need help to quit  E-cigarettes or smokeless tobacco still contain nicotine  Talk to your healthcare provider before you use these products  · Ask about the flu vaccine  The flu can make your asthma worse  You may need a yearly flu shot  Follow up with your healthcare provider as directed: You will need to return to make sure your medicine is working and your symptoms are controlled  You may be referred to an asthma or allergy specialist  Cory Camarena may be asked to keep a record of your peak flow values and bring it with you to your appointments  Write down your questions so you remember to ask them during your visits  © Copyright [employer] 2021 Information is for End User's use only and may not be sold, redistributed or otherwise used for commercial purposes  All illustrations and images included in CareNotes® are the copyrighted property of A D A M , Inc  or [address] [place] [address]  The above information is an  only  It is not intended as medical advice for individual conditions or treatments  Talk to your doctor, nurse or pharmacist before following any medical regimen to see if it is safe and effective for you  Medicare Preventive Visit Patient Instructions  Thank you for completing your Welcome to Medicare Visit or Medicare Annual Wellness Visit today  Your next wellness visit will be due in one year (9/21/2022)    The screening/preventive services that you may require over the next 5-10 years are detailed below  Some tests may not apply to you based off risk factors and/or age  Screening tests ordered at today's visit but not completed yet may show as past due  Also, please note that scanned in results may not display below  Preventive Screenings:  Service Recommendations Previous Testing/Comments   Colorectal Cancer Screening  * Colonoscopy    * Fecal Occult Blood Test (FOBT)/Fecal Immunochemical Test (FIT)  * Fecal DNA/Cologuard Test  * Flexible Sigmoidoscopy Age: 54-65 years old   Colonoscopy: every 10 years (may be performed more frequently if at higher risk)  OR  FOBT/FIT: every 1 year  OR  Cologuard: every 3 years  OR  Sigmoidoscopy: every 5 years  Screening may be recommended earlier than age 48 if at higher risk for colorectal cancer  Also, an individualized decision between you and your healthcare provider will decide whether screening between the ages of 74-80 would be appropriate  Colonoscopy: Not on file  FOBT/FIT: Not on file  Cologuard: Not on file  Sigmoidoscopy: Not on file          Breast Cancer Screening Age: 36 years old  Frequency: every 1-2 years  Not required if history of left and right mastectomy Mammogram: 02/20/2020    Screening Current   Cervical Cancer Screening Between the ages of 21-29, pap smear recommended once every 3 years  Between the ages of 33-67, can perform pap smear with HPV co-testing every 5 years     Recommendations may differ for women with a history of total hysterectomy, cervical cancer, or abnormal pap smears in past  Pap Smear: 10/04/2018    Screening Current   Hepatitis C Screening Once for adults born between 1945 and 1965  More frequently in patients at high risk for Hepatitis C Hep C Antibody: 10/04/2018    Screening Current   Diabetes Screening 1-2 times per year if you're at risk for diabetes or have pre-diabetes Fasting glucose: No results in last 5 years   A1C: No results in last 5 years        Cholesterol Screening Once every 5 years if you don't have a lipid disorder  May order more often based on risk factors  Lipid panel: Not on file          Other Preventive Screenings Covered by Medicare:  1  Abdominal Aortic Aneurysm (AAA) Screening: covered once if your at risk  You're considered to be at risk if you have a family history of AAA  2  Lung Cancer Screening: covers low dose CT scan once per year if you meet all of the following conditions: (1) Age 50-69; (2) No signs or symptoms of lung cancer; (3) Current smoker or have quit smoking within the last 15 years; (4) You have a tobacco smoking history of at least 30 pack years (packs per day multiplied by number of years you smoked); (5) You get a written order from a healthcare provider  3  Glaucoma Screening: covered annually if you're considered high risk: (1) You have diabetes OR (2) Family history of glaucoma OR (3)  aged 48 and older OR (3)  American aged 72 and older  3  Osteoporosis Screening: covered every 2 years if you meet one of the following conditions: (1) You're estrogen deficient and at risk for osteoporosis based off medical history and other findings; (2) Have a vertebral abnormality; (3) On glucocorticoid therapy for more than 3 months; (4) Have primary hyperparathyroidism; (5) On osteoporosis medications and need to assess response to drug therapy  · Last bone density test (DXA Scan): Not on file  5  HIV Screening: covered annually if you're between the age of 12-76  Also covered annually if you are younger than 13 and older than 72 with risk factors for HIV infection  For pregnant patients, it is covered up to 3 times per pregnancy      Immunizations:  Immunization Recommendations   Influenza Vaccine Annual influenza vaccination during flu season is recommended for all persons aged >= 6 months who do not have contraindications   Pneumococcal Vaccine (Prevnar and Pneumovax)  * Prevnar = PCV13  * Pneumovax = PPSV23   Adults 25-60 years old: 1-3 doses may be recommended based on certain risk factors  Adults 72 years old: Prevnar (PCV13) vaccine recommended followed by Pneumovax (PPSV23) vaccine  If already received PPSV23 since turning 65, then PCV13 recommended at least one year after PPSV23 dose  Hepatitis B Vaccine 3 dose series if at intermediate or high risk (ex: diabetes, end stage renal disease, liver disease)   Tetanus (Td) Vaccine - COST NOT COVERED BY MEDICARE PART B Following completion of primary series, a booster dose should be given every 10 years to maintain immunity against tetanus  Td may also be given as tetanus wound prophylaxis  Tdap Vaccine - COST NOT COVERED BY MEDICARE PART B Recommended at least once for all adults  For pregnant patients, recommended with each pregnancy  Shingles Vaccine (Shingrix) - COST NOT COVERED BY MEDICARE PART B  2 shot series recommended in those aged 48 and above     Health Maintenance Due:      Topic Date Due    Breast Cancer Screening: Mammogram  02/20/2021    Cervical Cancer Screening  10/04/2023    HIV Screening  Completed    Hepatitis C Screening  Completed     Immunizations Due:      Topic Date Due    Hepatitis A Vaccine (1 of 2 - Risk 2-dose series) Never done    COVID-19 Vaccine (1) Never done    Hepatitis B Vaccine (1 of 3 - Risk 3-dose series) Never done     Advance Directives   What are advance directives? Advance directives are legal documents that state your wishes and plans for medical care  These plans are made ahead of time in case you lose your ability to make decisions for yourself  Advance directives can apply to any medical decision, such as the treatments you want, and if you want to donate organs  What are the types of advance directives? There are many types of advance directives, and each state has rules about how to use them  You may choose a combination of any of the following:  · Living will: This is a written record of the treatment you want   You can also choose which treatments you do not want, which to limit, and which to stop at a certain time  This includes surgery, medicine, IV fluid, and tube feedings  · Durable power of  for healthcare [facility]): This is a written record that states who you want to make healthcare choices for you when you are unable to make them for yourself  This person, called a proxy, is usually a family member or a friend  You may choose more than 1 proxy  · Do not resuscitate (DNR) order:  A DNR order is used in case your heart stops beating or you stop breathing  It is a request not to have certain forms of treatment, such as CPR  A DNR order may be included in other types of advance directives  · Medical directive: This covers the care that you want if you are in a coma, near death, or unable to make decisions for yourself  You can list the treatments you want for each condition  Treatment may include pain medicine, surgery, blood transfusions, dialysis, IV or tube feedings, and a ventilator (breathing machine)  · Values history: This document has questions about your views, beliefs, and how you feel and think about life  This information can help others choose the care that you would choose  Why are advance directives important? An advance directive helps you control your care  Although spoken wishes may be used, it is better to have your wishes written down  Spoken wishes can be misunderstood, or not followed  Treatments may be given even if you do not want them  An advance directive may make it easier for your family to make difficult choices about your care  Cigarette Smoking and Your Health   Risks to your health if you smoke:  Nicotine and other chemicals found in tobacco damage every cell in your body  Even if you are a light smoker, you have an increased risk for cancer, heart disease, and lung disease  If you are pregnant or have diabetes, smoking increases your risk for complications     Benefits to your health if you stop smoking:   · You decrease respiratory symptoms such as coughing, wheezing, and shortness of breath  · You reduce your risk for cancers of the lung, mouth, throat, kidney, bladder, pancreas, stomach, and cervix  If you already have cancer, you increase the benefits of chemotherapy  You also reduce your risk for cancer returning or a second cancer from developing  · You reduce your risk for heart disease, blood clots, heart attack, and stroke  · You reduce your risk for lung infections, and diseases such as pneumonia, asthma, chronic bronchitis, and emphysema  · Your circulation improves  More oxygen can be delivered to your body  If you have diabetes, you lower your risk for complications, such as kidney, artery, and eye diseases  You also lower your risk for nerve damage  Nerve damage can lead to amputations, poor vision, and blindness  · You improve your body's ability to heal and to fight infections  For more information and support to stop smoking:   · [employer]  Phone: 8- 572 - [phone]  Web Address: [employer]  Underweight  Underweight is defined as having a body mass index (BMI) of less than 18 5 kg/m2   Anorexia  is a loss of appetite, decreased food intake, or both  Your appetite naturally decreases as you get older  You also get full faster than you used to  This occurs because your body needs less energy  Other body changes can also lead to a decreased appetite  Even though some appetite loss is normal, you still need to get enough calories and nutrients to keep you healthy  You can start to lose too much weight if you do not eat as much food as your body needs  Unwanted weight loss can cause health problems, or worsen health problems you already have  You can also become dehydrated if you do not drink enough liquid  How to eat healthy and get enough nutrients:   · Choose healthy foods    Eat a variety of fruits, vegetables, whole grains, low-fat dairy foods, lean meats, and other protein foods  Limit foods high in fat, sugar, and salt  Limit or avoid alcohol as directed  Work with a dietitian to help you plan your meals if you need to follow a special diet  A dietitian can also teach you how to modify foods if you have trouble chewing or swallowing  · Snack on healthy foods between meals  if you only eat a small amount during meals  Snacks provide extra healthy nutrients and calories between meals  Examples include fruit, cheese, and whole grain crackers  · Drink liquids as directed  to avoid dehydration  Drink liquids between meals if they cause you to get full too quickly during meals  Ask how much liquid to drink each day and which liquids are best for you  · Use herbs, spices, and flavor enhancers to add flavor to foods  Avoid using herbs and spice blends that also contain sodium  Ask your healthcare provider or dietitian about flavor enhancers  Flavor enhancers with ham, natural saleh, and roast beef flavors can also be sprinkled on food to add flavor  · Share meals with others as often as you can  Eating with others may help you to eat better during meal time  Ask family members, neighbors, or friends to join you for lunch  There are also senior centers where you can meet people, and share meals with them  · Ask family and friends for help  with shopping or preparing foods  Ask for a ride to the grocery store, if needed  © Copyright [employer] 2018 Information is for End User's use only and may not be sold, redistributed or otherwise used for commercial purposes   All illustrations and images included in CareNotes® are the copyrighted property of [employer]  or [address] [place] [address] [employer]

## 2021-09-19 NOTE — PROGRESS NOTES
BMI Counseling: Body mass index is 17 12 kg/m²  The BMI is below normal  Patient advised to gain weight and dietary education for weight gain was provided  Rationale for BMI follow-up plan is due to patient being underweight  Assessment/Plan:         Problem List Items Addressed This Visit        Respiratory    Asthma    Relevant Medications    albuterol (PROVENTIL HFA,VENTOLIN HFA) 90 mcg/act inhaler    Other Relevant Orders    Comprehensive metabolic panel       Other    Fibromyalgia - Primary    Relevant Medications    naproxen (EC NAPROSYN) 500 MG EC tablet    Other Relevant Orders    CBC and differential    Seasonal allergies    Relevant Medications    loratadine (CLARITIN) 10 mg tablet    Severe protein-calorie malnutrition (HCC)      Other Visit Diagnoses     Medicare annual wellness visit, initial        Encounter for immunization        Encounter for screening mammogram for breast cancer        Relevant Orders    Mammo diagnostic right w cad    Screening for blood or protein in urine        Relevant Orders    UA w Reflex to Microscopic w Reflex to Culture    Screening, lipid        Relevant Orders    Lipid panel    Bulimia nervosa, in partial remission, moderate        Relevant Orders    Ambulatory referral to Psychiatry    Ambulatory referral to Psychology    Mild intermittent asthma, uncomplicated        Relevant Medications    albuterol (PROVENTIL HFA,VENTOLIN HFA) 90 mcg/act inhaler    Seasonal allergic rhinitis, unspecified trigger        Relevant Medications    fluticasone (FLONASE) 50 mcg/act nasal spray    naproxen (EC NAPROSYN) 500 MG EC tablet            Subjective:      Patient ID: Imelda Damon is a 39 y o  female  Patient is a 39year old female present for medication refills  Medical history noted of bulimia, anxiety, asthma, GERD        The following portions of the patient's history were reviewed and updated as appropriate:   Past Medical History:  She has a past medical history of Allergies, Anxiety, Asthma, Chronic pain, Depression, Eating disorder, Fibrocystic breast changes of both breasts, and Seizures ([address] )  ,  _______________________________________________________________________  Medical Problems:  does not have any pertinent problems on file ,  _______________________________________________________________________  Past Surgical History:   has a past surgical history that includes Induced  () and Mammo stereotactic breast biopsy left ([facility]) ()  ,  _______________________________________________________________________  Family History:  family history includes Alcohol abuse in her sister; Cancer in her father; Diabetes in her family and sister; Hypertension in her family; Psychiatric Illness in her mother ,  _______________________________________________________________________  Social History:   reports that she has been smoking cigarettes  She has been smoking about 0 50 packs per day  She has never used smokeless tobacco  She reports that she does not drink alcohol and does not use drugs  ,  _______________________________________________________________________  Allergies:  is allergic to gabapentin, meloxicam, tramadol, and vistaril [hydroxyzine]     _______________________________________________________________________  Current Outpatient Medications   Medication Sig Dispense Refill    albuterol (PROVENTIL HFA,VENTOLIN HFA) 90 mcg/act inhaler Inhale 2 puffs every 6 (six) hours as needed for wheezing or shortness of breath 6 7 g 1    Ascorbic Acid (VITAMIN C) 100 MG tablet Take 100 mg by mouth daily      Biotin w/ Vitamins C & E (HAIR/SKIN/NAILS PO) Take by mouth      busPIRone (BUSPAR) 15 mg tablet Take by mouth 3 (three) times a day        CALCIUM-MAGNESIUM-ZINC PO Take by mouth      co-enzyme Q-10 30 MG capsule Take 30 mg by mouth 3 (three) times a day      fluticasone (FLONASE) 50 mcg/act nasal spray 1 spray into each nostril daily 16 g 2    fluticasone (FLOVENT HFA) 110 MCG/ACT inhaler 110 puffs      fluticasone (Flovent HFA) 110 MCG/ACT inhaler Inhale 2 puffs 2 (two) times a day Rinse mouth after use  12 g 1    hydrocortisone 2 5 % cream hydrocortisone 2 5 % topical cream with perineal applicator      loratadine (CLARITIN) 10 mg tablet Take 1 tablet (10 mg total) by mouth daily 30 tablet 1    LORazepam (ATIVAN) 1 mg tablet Take 1 mg by mouth 4 (four) times a day as needed for anxiety   1    omeprazole (PriLOSEC) 20 mg delayed release capsule TAKE 1 CAPSULE BY MOUTH EVERY DAY 30 capsule 1    thiamine (VITAMIN B1) 100 mg tablet Take 100 mg by mouth daily      tiZANidine (ZANAFLEX) 4 mg tablet Take 4 mg by mouth every 6 (six) hours  5    amoxicillin (AMOXIL) 875 mg tablet amoxicillin 875 mg tablet (Patient not taking: Reported on 9/20/2021)      azithromycin (ZITHROMAX) 250 mg tablet azithromycin 250 mg tablet (Patient not taking: Reported on 9/20/2021)      HYDROcodone-acetaminophen (NORCO) 5-325 mg per tablet Take 1 tablet by mouth daily as needed for severe pain (Patient not taking: Reported on 9/20/2021)      ipratropium (ATROVENT) 0 06 % nasal spray 2 SPRAYS INTO EACH NOSTRIL 4 (FOUR) TIMES A DAY (Patient not taking: Reported on 12/9/2020) 15 mL 1    naproxen (EC NAPROSYN) 500 MG EC tablet Take 1 tablet (500 mg total) by mouth 2 (two) times a day as needed for mild pain 30 tablet 0    predniSONE 10 mg tablet prednisone 10 mg tablet (Patient not taking: Reported on 9/20/2021)       No current facility-administered medications for this visit      _______________________________________________________________________  Review of Systems   Constitutional: Positive for appetite change  Negative for activity change, chills, fatigue, fever and unexpected weight change     HENT: Negative for congestion, ear discharge, ear pain, facial swelling, hearing loss, mouth sores, nosebleeds, postnasal drip, rhinorrhea, sinus pressure, sinus pain, sneezing, sore throat and voice change  Eyes: Negative for pain, redness and visual disturbance  Respiratory: Negative for cough, chest tightness, shortness of breath and wheezing  Cardiovascular: Negative for chest pain, palpitations and leg swelling  Gastrointestinal: Negative for abdominal distention, abdominal pain, constipation, diarrhea, nausea and vomiting  Endocrine: Negative  Genitourinary: Negative for difficulty urinating, dyspareunia, dysuria, enuresis, flank pain, frequency, genital sores, hematuria and urgency  Musculoskeletal: Negative for arthralgias and myalgias  Skin: Negative  Allergic/Immunologic: Negative  Neurological: Negative for dizziness, tremors, seizures, syncope, speech difficulty, weakness, light-headedness, numbness and headaches  Hematological: Negative  Psychiatric/Behavioral: Negative  Objective:  Vitals:    09/20/21 1416   BP: 96/64   Pulse: 102   Resp: 12   Temp: 97 9 °F (36 6 °C)   TempSrc: Tympanic   SpO2: 98%   Weight: 41 1 kg (90 lb 9 6 oz)   Height: 5' 1" (1 549 m)     Body mass index is 17 12 kg/m²  Physical Exam  Vitals and nursing note reviewed  Constitutional:       General: She is not in acute distress  Appearance: Normal appearance  She is well-developed  She is ill-appearing  She is not toxic-appearing or diaphoretic  Comments: Extremely skinny  HENT:      Head: Normocephalic and atraumatic  Right Ear: Tympanic membrane, ear canal and external ear normal       Left Ear: Tympanic membrane, ear canal and external ear normal       Nose: Nose normal       Mouth/Throat:      Mouth: Mucous membranes are moist    Eyes:      Extraocular Movements: Extraocular movements intact  Conjunctiva/sclera: Conjunctivae normal       Pupils: Pupils are equal, round, and reactive to light  Cardiovascular:      Rate and Rhythm: Normal rate and regular rhythm  Pulses: Normal pulses        Heart sounds: Normal heart sounds  No murmur heard  Pulmonary:      Effort: Pulmonary effort is normal       Breath sounds: Normal breath sounds  Abdominal:      General: Bowel sounds are normal       Palpations: Abdomen is soft  Musculoskeletal:         General: Normal range of motion  Cervical back: Normal range of motion  Skin:     General: Skin is warm  Capillary Refill: Capillary refill takes less than 2 seconds  Neurological:      General: No focal deficit present  Mental Status: She is alert and oriented to person, place, and time     Psychiatric:         Mood and Affect: Mood normal          Behavior: Behavior normal

## 2021-09-20 ENCOUNTER — APPOINTMENT (OUTPATIENT)
Dept: LAB | Facility: CLINIC | Age: 41
End: 2021-09-20
Payer: COMMERCIAL

## 2021-09-20 ENCOUNTER — OFFICE VISIT (OUTPATIENT)
Dept: FAMILY MEDICINE CLINIC | Facility: CLINIC | Age: 41
End: 2021-09-20
Payer: COMMERCIAL

## 2021-09-20 VITALS
SYSTOLIC BLOOD PRESSURE: 96 MMHG | DIASTOLIC BLOOD PRESSURE: 64 MMHG | HEIGHT: 61 IN | RESPIRATION RATE: 12 BRPM | WEIGHT: 90.6 LBS | OXYGEN SATURATION: 98 % | TEMPERATURE: 97.9 F | HEART RATE: 102 BPM | BODY MASS INDEX: 17.11 KG/M2

## 2021-09-20 DIAGNOSIS — E43 SEVERE PROTEIN-CALORIE MALNUTRITION (HCC): ICD-10-CM

## 2021-09-20 DIAGNOSIS — Z12.31 ENCOUNTER FOR SCREENING MAMMOGRAM FOR BREAST CANCER: ICD-10-CM

## 2021-09-20 DIAGNOSIS — Z13.220 SCREENING, LIPID: ICD-10-CM

## 2021-09-20 DIAGNOSIS — J45.20 MILD INTERMITTENT ASTHMA WITHOUT COMPLICATION: ICD-10-CM

## 2021-09-20 DIAGNOSIS — F50.2 BULIMIA NERVOSA, IN PARTIAL REMISSION, MODERATE: ICD-10-CM

## 2021-09-20 DIAGNOSIS — K21.9 GASTROESOPHAGEAL REFLUX DISEASE WITHOUT ESOPHAGITIS: ICD-10-CM

## 2021-09-20 DIAGNOSIS — M79.7 FIBROMYALGIA: Primary | ICD-10-CM

## 2021-09-20 DIAGNOSIS — Z13.89 SCREENING FOR BLOOD OR PROTEIN IN URINE: ICD-10-CM

## 2021-09-20 DIAGNOSIS — J45.20 MILD INTERMITTENT ASTHMA, UNCOMPLICATED: ICD-10-CM

## 2021-09-20 DIAGNOSIS — J30.2 SEASONAL ALLERGIES: ICD-10-CM

## 2021-09-20 DIAGNOSIS — Z00.00 MEDICARE ANNUAL WELLNESS VISIT, INITIAL: ICD-10-CM

## 2021-09-20 DIAGNOSIS — J30.2 SEASONAL ALLERGIC RHINITIS, UNSPECIFIED TRIGGER: ICD-10-CM

## 2021-09-20 DIAGNOSIS — M79.7 FIBROMYALGIA: ICD-10-CM

## 2021-09-20 DIAGNOSIS — Z23 ENCOUNTER FOR IMMUNIZATION: ICD-10-CM

## 2021-09-20 LAB
ALBUMIN SERPL BCP-MCNC: 3.8 G/DL (ref 3.5–5)
ALP SERPL-CCNC: 43 U/L (ref 46–116)
ALT SERPL W P-5'-P-CCNC: 23 U/L (ref 12–78)
ANION GAP SERPL CALCULATED.3IONS-SCNC: 8 MMOL/L (ref 4–13)
AST SERPL W P-5'-P-CCNC: 17 U/L (ref 5–45)
BASOPHILS # BLD AUTO: 0.06 THOUSANDS/ΜL (ref 0–0.1)
BASOPHILS NFR BLD AUTO: 1 % (ref 0–1)
BILIRUB SERPL-MCNC: 0.33 MG/DL (ref 0.2–1)
BILIRUB UR QL STRIP: NEGATIVE
BUN SERPL-MCNC: 8 MG/DL (ref 5–25)
CALCIUM SERPL-MCNC: 9.2 MG/DL (ref 8.3–10.1)
CHLORIDE SERPL-SCNC: 104 MMOL/L (ref 100–108)
CHOLEST SERPL-MCNC: 200 MG/DL (ref 50–200)
CLARITY UR: CLEAR
CO2 SERPL-SCNC: 27 MMOL/L (ref 21–32)
COLOR UR: YELLOW
CREAT SERPL-MCNC: 1.12 MG/DL (ref 0.6–1.3)
EOSINOPHIL # BLD AUTO: 0.15 THOUSAND/ΜL (ref 0–0.61)
EOSINOPHIL NFR BLD AUTO: 2 % (ref 0–6)
ERYTHROCYTE [DISTWIDTH] IN BLOOD BY AUTOMATED COUNT: 16.9 % (ref 11.6–15.1)
GFR SERPL CREATININE-BSD FRML MDRD: 61 ML/MIN/1.73SQ M
GLUCOSE P FAST SERPL-MCNC: 86 MG/DL (ref 65–99)
GLUCOSE UR STRIP-MCNC: NEGATIVE MG/DL
HCT VFR BLD AUTO: 33.5 % (ref 34.8–46.1)
HDLC SERPL-MCNC: 84 MG/DL
HGB BLD-MCNC: 10.5 G/DL (ref 11.5–15.4)
HGB UR QL STRIP.AUTO: NEGATIVE
IMM GRANULOCYTES # BLD AUTO: 0.03 THOUSAND/UL (ref 0–0.2)
IMM GRANULOCYTES NFR BLD AUTO: 0 % (ref 0–2)
KETONES UR STRIP-MCNC: NEGATIVE MG/DL
LDLC SERPL CALC-MCNC: 99 MG/DL (ref 0–100)
LEUKOCYTE ESTERASE UR QL STRIP: NEGATIVE
LYMPHOCYTES # BLD AUTO: 3.65 THOUSANDS/ΜL (ref 0.6–4.47)
LYMPHOCYTES NFR BLD AUTO: 41 % (ref 14–44)
MCH RBC QN AUTO: 27.3 PG (ref 26.8–34.3)
MCHC RBC AUTO-ENTMCNC: 31.3 G/DL (ref 31.4–37.4)
MCV RBC AUTO: 87 FL (ref 82–98)
MONOCYTES # BLD AUTO: 0.57 THOUSAND/ΜL (ref 0.17–1.22)
MONOCYTES NFR BLD AUTO: 6 % (ref 4–12)
NEUTROPHILS # BLD AUTO: 4.53 THOUSANDS/ΜL (ref 1.85–7.62)
NEUTS SEG NFR BLD AUTO: 50 % (ref 43–75)
NITRITE UR QL STRIP: NEGATIVE
NONHDLC SERPL-MCNC: 116 MG/DL
NRBC BLD AUTO-RTO: 0 /100 WBCS
PH UR STRIP.AUTO: 7 [PH]
PLATELET # BLD AUTO: 386 THOUSANDS/UL (ref 149–390)
PMV BLD AUTO: 8.4 FL (ref 8.9–12.7)
POTASSIUM SERPL-SCNC: 4.7 MMOL/L (ref 3.5–5.3)
PROT SERPL-MCNC: 6.7 G/DL (ref 6.4–8.2)
PROT UR STRIP-MCNC: NEGATIVE MG/DL
RBC # BLD AUTO: 3.85 MILLION/UL (ref 3.81–5.12)
SODIUM SERPL-SCNC: 139 MMOL/L (ref 136–145)
SP GR UR STRIP.AUTO: 1.01 (ref 1–1.03)
TRIGL SERPL-MCNC: 87 MG/DL
UROBILINOGEN UR QL STRIP.AUTO: 0.2 E.U./DL
WBC # BLD AUTO: 8.99 THOUSAND/UL (ref 4.31–10.16)

## 2021-09-20 PROCEDURE — G0439 PPPS, SUBSEQ VISIT: HCPCS | Performed by: NURSE PRACTITIONER

## 2021-09-20 PROCEDURE — 99214 OFFICE O/P EST MOD 30 MIN: CPT | Performed by: NURSE PRACTITIONER

## 2021-09-20 PROCEDURE — 85025 COMPLETE CBC W/AUTO DIFF WBC: CPT

## 2021-09-20 PROCEDURE — 3725F SCREEN DEPRESSION PERFORMED: CPT | Performed by: NURSE PRACTITIONER

## 2021-09-20 PROCEDURE — 3008F BODY MASS INDEX DOCD: CPT | Performed by: NURSE PRACTITIONER

## 2021-09-20 PROCEDURE — 80053 COMPREHEN METABOLIC PANEL: CPT

## 2021-09-20 PROCEDURE — 81003 URINALYSIS AUTO W/O SCOPE: CPT | Performed by: NURSE PRACTITIONER

## 2021-09-20 PROCEDURE — 36415 COLL VENOUS BLD VENIPUNCTURE: CPT

## 2021-09-20 PROCEDURE — 4004F PT TOBACCO SCREEN RCVD TLK: CPT | Performed by: NURSE PRACTITIONER

## 2021-09-20 PROCEDURE — 80061 LIPID PANEL: CPT

## 2021-09-20 RX ORDER — OMEPRAZOLE 20 MG/1
20 CAPSULE, DELAYED RELEASE ORAL DAILY
Qty: 30 CAPSULE | Refills: 1 | Status: SHIPPED | OUTPATIENT
Start: 2021-09-20 | End: 2021-12-13

## 2021-09-20 RX ORDER — NAPROXEN 500 MG/1
500 TABLET ORAL 2 TIMES DAILY PRN
Qty: 30 TABLET | Refills: 0 | Status: SHIPPED | OUTPATIENT
Start: 2021-09-20 | End: 2022-05-10

## 2021-09-20 RX ORDER — FLUTICASONE PROPIONATE 50 MCG
1 SPRAY, SUSPENSION (ML) NASAL DAILY
Qty: 16 G | Refills: 2 | Status: SHIPPED | OUTPATIENT
Start: 2021-09-20 | End: 2021-10-20

## 2021-09-20 RX ORDER — LORATADINE 10 MG/1
10 TABLET ORAL DAILY
Qty: 30 TABLET | Refills: 1 | Status: SHIPPED | OUTPATIENT
Start: 2021-09-20 | End: 2021-12-13

## 2021-09-20 RX ORDER — ALBUTEROL SULFATE 90 UG/1
2 AEROSOL, METERED RESPIRATORY (INHALATION) EVERY 6 HOURS PRN
Qty: 6.7 G | Refills: 1 | Status: SHIPPED | OUTPATIENT
Start: 2021-09-20 | End: 2022-03-14

## 2021-09-20 NOTE — PROGRESS NOTES
Assessment and Plan:     Problem List Items Addressed This Visit        Respiratory    Asthma    Relevant Medications    albuterol (PROVENTIL HFA,VENTOLIN HFA) 90 mcg/act inhaler    Other Relevant Orders    Comprehensive metabolic panel       Other    Fibromyalgia - Primary    Relevant Medications    naproxen (EC NAPROSYN) 500 MG EC tablet    Other Relevant Orders    CBC and differential    Seasonal allergies    Relevant Medications    loratadine (CLARITIN) 10 mg tablet    Severe protein-calorie malnutrition (HCC)      Other Visit Diagnoses     Medicare annual wellness visit, initial        Encounter for immunization        Encounter for screening mammogram for breast cancer        Relevant Orders    Mammo diagnostic right w cad    Screening for blood or protein in urine        Relevant Orders    UA w Reflex to Microscopic w Reflex to Culture    Screening, lipid        Relevant Orders    Lipid panel    Bulimia nervosa, in partial remission, moderate        Relevant Orders    Ambulatory referral to Psychiatry    Ambulatory referral to Psychology    Mild intermittent asthma, uncomplicated        Relevant Medications    albuterol (PROVENTIL HFA,VENTOLIN HFA) 90 mcg/act inhaler    Seasonal allergic rhinitis, unspecified trigger        Relevant Medications    fluticasone (FLONASE) 50 mcg/act nasal spray    naproxen (EC NAPROSYN) 500 MG EC tablet        BMI Counseling: Body mass index is 17 12 kg/m²  The BMI is below normal  Patient advised to gain weight and dietary education for weight gain was provided  Rationale for BMI follow-up plan is due to patient being underweight  Depression Screening and Follow-up Plan:   Continue regular follow-up with their mental health provider who is managing their mental health condition(s)  Tobacco Cessation Counseling: Tobacco cessation counseling was provided   The patient is sincerely urged to quit consumption of tobacco  She is not ready to quit tobacco  Medication options and side effects of medication discussed  Patient refused medication  Preventive health issues were discussed with patient, and age appropriate screening tests were ordered as noted in patient's After Visit Summary  Personalized health advice and appropriate referrals for health education or preventive services given if needed, as noted in patient's After Visit Summary       History of Present Illness:     Patient presents for Medicare Annual Wellness visit    Patient Care Team:  Inocente Vega as PCP - General (Nurse Practitioner)  Hussain Ibanez MD as PCP - [address] [place] [address] (RTE)  Hussain Ibanez MD as PCP - PCP-[facility] ([facility])     Problem List:     Patient Active Problem List   Diagnosis    Encounter for gynecological examination (general) (routine) without abnormal findings    Asthma    Fibrocystic breast disease    Depression    Fibromyalgia    HLA B27 (HLA B27 positive)    Memory loss    Tremor    Paresthesia    Binge eating disorder    Exposure to SARS-associated coronavirus    Short of breath on exertion    Acute frontal sinusitis    Visit for laboratory test    Gastroesophageal reflux disease without esophagitis    Seasonal allergies    Tobacco abuse counseling    Alcohol use disorder, severe, in early remission ([address] )    Major depressive disorder, recurrent, mild ([address] )    Severe protein-calorie malnutrition ([address] )      Past Medical and Surgical History:     Past Medical History:   Diagnosis Date    Allergies     Anxiety     Asthma     Chronic pain     Depression     Eating disorder     Fibrocystic breast changes of both breasts     Seizures ([address] )      Past Surgical History:   Procedure Laterality Date    INDUCED       MAMMO STEREOTACTIC BREAST BIOPSY LEFT ([facility])      Benign      Family History:     Family History   Problem Relation Age of Onset    Diabetes Sister     Alcohol abuse Sister     Cancer Father         Diagnosed stage 4 - metastazied    Psychiatric Illness Mother     Hypertension Family     Diabetes Family       Social History:     Social History     Socioeconomic History    Marital status: /Civil Union     Spouse name: None    Number of children: None    Years of education: None    Highest education level: High school graduate   Occupational History    Occupation: part time    Tobacco Use    Smoking status: Current Every Day Smoker     Packs/day: 0 50     Types: Cigarettes    Smokeless tobacco: Never Used    Tobacco comment: 10-15 cig/day   Vaping Use    Vaping Use: Never used   Substance and Sexual Activity    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    Drug use: No    Sexual activity: Yes     Partners: Male   Other Topics Concern    None   Social History Narrative    · Most recent tobacco use screenin-      · Do you currently or have you served in the [employer]:   No      · Were you activated, into active duty, as a member of the [employer] or as a Reservist:   No      · Tobacco cessation counseling provided date:   03-      Social Determinants of Health     Financial Resource Strain:     Difficulty of Paying Living Expenses:    Food Insecurity:     Worried About Running Out of Food in the Last Year:     [address] in the Last Year:    Transportation Needs:     Lack of Transportation (Medical):      Lack of Transportation (Non-Medical):    Physical Activity:     Days of Exercise per Week:     Minutes of Exercise per Session:    Stress:     Feeling of Stress :    Social Connections:     Frequency of Communication with Friends and Family:     Frequency of Social Gatherings with Friends and Family:     Attends [employer] Services:     Active Member of Clubs or Organizations:     Attends Club or Organization Meetings:     Marital Status:    Intimate Partner Violence:     Fear of Current or Ex-Partner:     Emotionally Abused:     Physically Abused:     Sexually Abused:       Medications and Allergies:     Current Outpatient Medications   Medication Sig Dispense Refill    albuterol (PROVENTIL HFA,VENTOLIN HFA) 90 mcg/act inhaler Inhale 2 puffs every 6 (six) hours as needed for wheezing or shortness of breath 6 7 g 1    Ascorbic Acid (VITAMIN C) 100 MG tablet Take 100 mg by mouth daily      Biotin w/ Vitamins C & E (HAIR/SKIN/NAILS PO) Take by mouth      busPIRone (BUSPAR) 15 mg tablet Take by mouth 3 (three) times a day        CALCIUM-MAGNESIUM-ZINC PO Take by mouth      co-enzyme Q-10 30 MG capsule Take 30 mg by mouth 3 (three) times a day      fluticasone (FLONASE) 50 mcg/act nasal spray 1 spray into each nostril daily 16 g 2    fluticasone (FLOVENT HFA) 110 MCG/ACT inhaler 110 puffs      fluticasone (Flovent HFA) 110 MCG/ACT inhaler Inhale 2 puffs 2 (two) times a day Rinse mouth after use   12 g 1    hydrocortisone 2 5 % cream hydrocortisone 2 5 % topical cream with perineal applicator      loratadine (CLARITIN) 10 mg tablet Take 1 tablet (10 mg total) by mouth daily 30 tablet 1    LORazepam (ATIVAN) 1 mg tablet Take 1 mg by mouth 4 (four) times a day as needed for anxiety   1    omeprazole (PriLOSEC) 20 mg delayed release capsule TAKE 1 CAPSULE BY MOUTH EVERY DAY 30 capsule 1    thiamine (VITAMIN B1) 100 mg tablet Take 100 mg by mouth daily      tiZANidine (ZANAFLEX) 4 mg tablet Take 4 mg by mouth every 6 (six) hours  5    amoxicillin (AMOXIL) 875 mg tablet amoxicillin 875 mg tablet (Patient not taking: Reported on 9/20/2021)      azithromycin (ZITHROMAX) 250 mg tablet azithromycin 250 mg tablet (Patient not taking: Reported on 9/20/2021)      HYDROcodone-acetaminophen (NORCO) 5-325 mg per tablet Take 1 tablet by mouth daily as needed for severe pain (Patient not taking: Reported on 9/20/2021)      ipratropium (ATROVENT) 0 06 % nasal spray 2 SPRAYS INTO EACH NOSTRIL 4 (FOUR) TIMES A DAY (Patient not taking: Reported on 12/9/2020) 15 mL 1    naproxen (EC NAPROSYN) 500 MG EC tablet Take 1 tablet (500 mg total) by mouth 2 (two) times a day as needed for mild pain 30 tablet 0    predniSONE 10 mg tablet prednisone 10 mg tablet (Patient not taking: Reported on 9/20/2021)       No current facility-administered medications for this visit  Allergies   Allergen Reactions    Gabapentin Hives    Meloxicam GI Intolerance    Tramadol Hives     SEIZURES    Vistaril [Hydroxyzine]       Immunizations:     Immunization History   Administered Date(s) Administered    Tdap 08/13/2017      Health Maintenance:         Topic Date Due    Breast Cancer Screening: Mammogram  02/20/2021    Cervical Cancer Screening  10/04/2023    HIV Screening  Completed    Hepatitis C Screening  Completed         Topic Date Due    Hepatitis A Vaccine (1 of 2 - Risk 2-dose series) Never done    COVID-19 Vaccine (1) Never done    Hepatitis B Vaccine (1 of 3 - Risk 3-dose series) Never done      Medicare Health Risk Assessment:     BP 96/64   Pulse 102   Temp 97 9 °F (36 6 °C) (Tympanic)   Resp 12   Ht 5' 1" (1 549 m)   Wt 41 1 kg (90 lb 9 6 oz)   SpO2 98%   BMI 17 12 kg/m²      Aleksandar Burleson is here for her Initial Wellness visit  Last Medicare Wellness visit information reviewed, patient interviewed, no change since last AWV  Health Risk Assessment:   Patient rates overall health as fair  Patient feels that their physical health rating is same  Patient is dissatisfied with their life  Eyesight was rated as same  Hearing was rated as same  Patient feels that their emotional and mental health rating is slightly worse  Patients states they are never, rarely angry  Patient states they are often unusually tired/fatigued  Pain experienced in the last 7 days has been some  Patient's pain rating has been 5/10  Patient states that she has experienced no weight loss or gain in last 6 months  Depression Screening:   PHQ-2 Score: 1  PHQ-9 Score: 13      Fall Risk Screening:    In the past year, patient has experienced: no history of falling in past year      Urinary Incontinence Screening:   Patient has not leaked urine accidently in the last six months  Home Safety:  Patient does not have trouble with stairs inside or outside of their home  Patient has working smoke alarms and has working carbon monoxide detector  Home safety hazards include: none  Nutrition:   Current diet is Regular  Medications:   Patient is currently taking over-the-counter supplements  OTC medications include: see medication list  Patient is able to manage medications  Hair, skin and nails, calcium, magnesium, zinc, vitamin-C, B 100  Activities of Daily Living (ADLs)/Instrumental Activities of Daily Living (IADLs):   Walk and transfer into and out of bed and chair?: Yes  Dress and groom yourself?: Yes    Bathe or shower yourself?: Yes    Feed yourself?  Yes  Do your laundry/housekeeping?: Yes  Manage your money, pay your bills and track your expenses?: Yes  Make your own meals?: Yes    Do your own shopping?: Yes    Previous Hospitalizations:   Any hospitalizations or ED visits within the last 12 months?: No      Advance Care Planning:   Living will: No    Durable POA for healthcare: No    Advanced directive: No    Advanced directive counseling given: Yes    Five wishes given: Yes    Patient declined ACP directive: Yes    End of Life Decisions reviewed with patient: Yes    Provider agrees with end of life decisions: Yes      Cognitive Screening:   Provider or family/friend/caregiver concerned regarding cognition?: No    PREVENTIVE SCREENINGS      Cardiovascular Screening:    General: Patient Declines and Risks and Benefits Discussed      Diabetes Screening:     General: Risks and Benefits Discussed and Patient Declines      Breast Cancer Screening:     General: Screening Current    Due for: Mammogram        Cervical Cancer Screening:    General: Screening Current      Osteoporosis Screening:    General: Risks and Benefits Discussed and Screening Not Indicated    Due for: DXA Axial      Abdominal Aortic Aneurysm (AAA) Screening:        General: Risks and Benefits Discussed and Screening Not Indicated      Lung Cancer Screening:     General: Screening Not Indicated      Hepatitis C Screening:    General: Screening Current    Hep C Screening Accepted: Yes      Screening, Brief Intervention, and Referral to Treatment (SBIRT)    Screening      Single Item Drug Screening:  How often have you used an illegal drug (including marijuana) or a prescription medication for non-medical reasons in the past year? never    Single Item Drug Screen Score: 0  Interpretation: Negative screen for possible drug use disorder    Brief Intervention  Alcohol & drug use screenings were reviewed  No concerns regarding substance use disorder identified  Healthy alcohol use/limits discussed  Health risks of current substance use discussed  Alcohol cessation counseling given  Patient has not drank in 3 1/2 years  Review of Current Opioid Use    Opioid Risk Tool (ORT) Interpretation: Complete Opioid Risk Tool (ORT)    Other Counseling Topics:   Car/seat belt/driving safety, skin self-exam, sunscreen and calcium and vitamin D intake and regular weightbearing exercise         SOLO Roca

## 2021-09-21 DIAGNOSIS — D64.9 LOW HEMOGLOBIN AND LOW HEMATOCRIT: Primary | ICD-10-CM

## 2021-09-21 NOTE — PROGRESS NOTES
Patient reports she has had chronic rectal bleeding since having her daughter, her hemoglobin and hematocrit is low  Hematology consult and colon rectal surgery consultation ordered

## 2021-09-22 ENCOUNTER — TELEPHONE (OUTPATIENT)
Dept: HEMATOLOGY ONCOLOGY | Facility: CLINIC | Age: 41
End: 2021-09-22

## 2021-09-27 ENCOUNTER — TELEPHONE (OUTPATIENT)
Dept: HEMATOLOGY ONCOLOGY | Facility: CLINIC | Age: 41
End: 2021-09-27

## 2021-11-09 ENCOUNTER — HOSPITAL ENCOUNTER (EMERGENCY)
Facility: HOSPITAL | Age: 41
Discharge: HOME | End: 2021-11-09
Attending: EMERGENCY MEDICINE | Admitting: EMERGENCY MEDICINE
Payer: COMMERCIAL

## 2021-11-09 VITALS
SYSTOLIC BLOOD PRESSURE: 115 MMHG | OXYGEN SATURATION: 99 % | DIASTOLIC BLOOD PRESSURE: 84 MMHG | HEART RATE: 94 BPM | TEMPERATURE: 98.2 F | RESPIRATION RATE: 18 BRPM

## 2021-11-09 DIAGNOSIS — R11.0 NAUSEA: Primary | ICD-10-CM

## 2021-11-09 DIAGNOSIS — R19.7 DIARRHEA: ICD-10-CM

## 2021-11-09 DIAGNOSIS — Z71.1 CONCERN ABOUT DRUG REACTION WITHOUT DIAGNOSIS: ICD-10-CM

## 2021-11-09 LAB
ALBUMIN SERPL BCP-MCNC: 3.6 G/DL (ref 3.5–5)
ALP SERPL-CCNC: 41 U/L (ref 46–116)
ALT SERPL W P-5'-P-CCNC: 18 U/L (ref 12–78)
ANION GAP SERPL CALCULATED.3IONS-SCNC: 6 MMOL/L (ref 4–13)
AST SERPL W P-5'-P-CCNC: 14 U/L (ref 5–45)
BASOPHILS # BLD AUTO: 0.04 THOUSANDS/ΜL (ref 0–0.1)
BASOPHILS NFR BLD AUTO: 1 % (ref 0–1)
BILIRUB SERPL-MCNC: 0.28 MG/DL (ref 0.2–1)
BUN SERPL-MCNC: 6 MG/DL (ref 5–25)
CALCIUM SERPL-MCNC: 9.4 MG/DL (ref 8.3–10.1)
CHLORIDE SERPL-SCNC: 107 MMOL/L (ref 100–108)
CO2 SERPL-SCNC: 23 MMOL/L (ref 21–32)
CREAT SERPL-MCNC: 1.06 MG/DL (ref 0.6–1.3)
EOSINOPHIL # BLD AUTO: 0.07 THOUSAND/ΜL (ref 0–0.61)
EOSINOPHIL NFR BLD AUTO: 1 % (ref 0–6)
ERYTHROCYTE [DISTWIDTH] IN BLOOD BY AUTOMATED COUNT: 15.6 % (ref 11.6–15.1)
GFR SERPL CREATININE-BSD FRML MDRD: 65 ML/MIN/1.73SQ M
GLUCOSE SERPL-MCNC: 86 MG/DL (ref 65–140)
HCT VFR BLD AUTO: 33.4 % (ref 34.8–46.1)
HGB BLD-MCNC: 10.1 G/DL (ref 11.5–15.4)
IMM GRANULOCYTES # BLD AUTO: 0.02 THOUSAND/UL (ref 0–0.2)
IMM GRANULOCYTES NFR BLD AUTO: 0 % (ref 0–2)
LYMPHOCYTES # BLD AUTO: 2.72 THOUSANDS/ΜL (ref 0.6–4.47)
LYMPHOCYTES NFR BLD AUTO: 32 % (ref 14–44)
MCH RBC QN AUTO: 26.4 PG (ref 26.8–34.3)
MCHC RBC AUTO-ENTMCNC: 30.2 G/DL (ref 31.4–37.4)
MCV RBC AUTO: 87 FL (ref 82–98)
MONOCYTES # BLD AUTO: 0.46 THOUSAND/ΜL (ref 0.17–1.22)
MONOCYTES NFR BLD AUTO: 5 % (ref 4–12)
NEUTROPHILS # BLD AUTO: 5.27 THOUSANDS/ΜL (ref 1.85–7.62)
NEUTS SEG NFR BLD AUTO: 61 % (ref 43–75)
NRBC BLD AUTO-RTO: 0 /100 WBCS
PLATELET # BLD AUTO: 387 THOUSANDS/UL (ref 149–390)
PMV BLD AUTO: 8.3 FL (ref 8.9–12.7)
POTASSIUM SERPL-SCNC: 3.9 MMOL/L (ref 3.5–5.3)
PROT SERPL-MCNC: 7 G/DL (ref 6.4–8.2)
RBC # BLD AUTO: 3.82 MILLION/UL (ref 3.81–5.12)
SODIUM SERPL-SCNC: 136 MMOL/L (ref 136–145)
WBC # BLD AUTO: 8.58 THOUSAND/UL (ref 4.31–10.16)

## 2021-11-09 PROCEDURE — 96374 THER/PROPH/DIAG INJ IV PUSH: CPT

## 2021-11-09 PROCEDURE — 36415 COLL VENOUS BLD VENIPUNCTURE: CPT | Performed by: EMERGENCY MEDICINE

## 2021-11-09 PROCEDURE — 85025 COMPLETE CBC W/AUTO DIFF WBC: CPT | Performed by: EMERGENCY MEDICINE

## 2021-11-09 PROCEDURE — 80053 COMPREHEN METABOLIC PANEL: CPT | Performed by: EMERGENCY MEDICINE

## 2021-11-09 PROCEDURE — 99284 EMERGENCY DEPT VISIT MOD MDM: CPT | Performed by: EMERGENCY MEDICINE

## 2021-11-09 PROCEDURE — 99283 EMERGENCY DEPT VISIT LOW MDM: CPT

## 2021-11-09 RX ORDER — ONDANSETRON 2 MG/ML
4 INJECTION INTRAMUSCULAR; INTRAVENOUS ONCE
Status: COMPLETED | OUTPATIENT
Start: 2021-11-09 | End: 2021-11-09

## 2021-11-09 RX ORDER — ONDANSETRON 4 MG/1
4 TABLET, ORALLY DISINTEGRATING ORAL EVERY 8 HOURS PRN
Qty: 20 TABLET | Refills: 0 | Status: SHIPPED | OUTPATIENT
Start: 2021-11-09 | End: 2022-05-12

## 2021-11-09 RX ORDER — HYDROCODONE BITARTRATE AND ACETAMINOPHEN 5; 325 MG/1; MG/1
1 TABLET ORAL EVERY 6 HOURS PRN
Qty: 12 TABLET | Refills: 0 | Status: SHIPPED | OUTPATIENT
Start: 2021-11-09 | End: 2022-04-21 | Stop reason: ALTCHOICE

## 2021-11-09 RX ADMIN — ONDANSETRON 4 MG: 2 INJECTION INTRAMUSCULAR; INTRAVENOUS at 15:57

## 2021-12-12 DIAGNOSIS — J30.2 SEASONAL ALLERGIES: ICD-10-CM

## 2021-12-12 DIAGNOSIS — K21.9 GASTROESOPHAGEAL REFLUX DISEASE WITHOUT ESOPHAGITIS: ICD-10-CM

## 2021-12-13 RX ORDER — OMEPRAZOLE 20 MG/1
CAPSULE, DELAYED RELEASE ORAL
Qty: 30 CAPSULE | Refills: 1 | Status: SHIPPED | OUTPATIENT
Start: 2021-12-13 | End: 2022-02-15

## 2021-12-13 RX ORDER — LORATADINE 10 MG/1
TABLET ORAL
Qty: 30 TABLET | Refills: 1 | Status: SHIPPED | OUTPATIENT
Start: 2021-12-13 | End: 2022-02-15

## 2021-12-30 ENCOUNTER — TELEMEDICINE (OUTPATIENT)
Dept: FAMILY MEDICINE CLINIC | Facility: CLINIC | Age: 41
End: 2021-12-30
Payer: COMMERCIAL

## 2021-12-30 VITALS — HEIGHT: 61 IN | WEIGHT: 90 LBS | BODY MASS INDEX: 16.99 KG/M2

## 2021-12-30 DIAGNOSIS — R09.81 NASAL CONGESTION: ICD-10-CM

## 2021-12-30 DIAGNOSIS — M79.7 FIBROMYALGIA: ICD-10-CM

## 2021-12-30 DIAGNOSIS — Z71.6 TOBACCO ABUSE COUNSELING: ICD-10-CM

## 2021-12-30 DIAGNOSIS — J01.11 ACUTE RECURRENT FRONTAL SINUSITIS: Primary | ICD-10-CM

## 2021-12-30 DIAGNOSIS — R05.9 COUGH: ICD-10-CM

## 2021-12-30 DIAGNOSIS — R51.9 ACUTE NONINTRACTABLE HEADACHE, UNSPECIFIED HEADACHE TYPE: ICD-10-CM

## 2021-12-30 PROCEDURE — 99214 OFFICE O/P EST MOD 30 MIN: CPT | Performed by: NURSE PRACTITIONER

## 2021-12-30 RX ORDER — AMOXICILLIN 875 MG/1
875 TABLET, COATED ORAL 2 TIMES DAILY
Qty: 20 TABLET | Refills: 0 | Status: SHIPPED | OUTPATIENT
Start: 2021-12-30 | End: 2022-01-09

## 2021-12-30 RX ORDER — NAPROXEN 500 MG/1
500 TABLET ORAL 2 TIMES DAILY WITH MEALS
Qty: 60 TABLET | Refills: 5 | Status: SHIPPED | OUTPATIENT
Start: 2021-12-30 | End: 2022-06-21

## 2022-02-12 DIAGNOSIS — K21.9 GASTROESOPHAGEAL REFLUX DISEASE WITHOUT ESOPHAGITIS: ICD-10-CM

## 2022-02-12 DIAGNOSIS — J30.2 SEASONAL ALLERGIES: ICD-10-CM

## 2022-02-15 RX ORDER — OMEPRAZOLE 20 MG/1
CAPSULE, DELAYED RELEASE ORAL
Qty: 30 CAPSULE | Refills: 1 | Status: SHIPPED | OUTPATIENT
Start: 2022-02-15 | End: 2022-04-08

## 2022-02-15 RX ORDER — LORATADINE 10 MG/1
TABLET ORAL
Qty: 30 TABLET | Refills: 1 | Status: SHIPPED | OUTPATIENT
Start: 2022-02-15 | End: 2022-04-07

## 2022-02-16 ENCOUNTER — TELEMEDICINE (OUTPATIENT)
Dept: FAMILY MEDICINE CLINIC | Facility: CLINIC | Age: 42
End: 2022-02-16
Payer: COMMERCIAL

## 2022-02-16 DIAGNOSIS — R50.9 FEVER, UNSPECIFIED FEVER CAUSE: Primary | ICD-10-CM

## 2022-02-16 DIAGNOSIS — J01.21 ACUTE RECURRENT ETHMOIDAL SINUSITIS: ICD-10-CM

## 2022-02-16 PROCEDURE — 87636 SARSCOV2 & INF A&B AMP PRB: CPT | Performed by: NURSE PRACTITIONER

## 2022-02-16 PROCEDURE — 4004F PT TOBACCO SCREEN RCVD TLK: CPT | Performed by: NURSE PRACTITIONER

## 2022-02-16 PROCEDURE — 99214 OFFICE O/P EST MOD 30 MIN: CPT | Performed by: NURSE PRACTITIONER

## 2022-02-16 RX ORDER — AMOXICILLIN 875 MG/1
875 TABLET, COATED ORAL 2 TIMES DAILY
Qty: 20 TABLET | Refills: 0 | Status: SHIPPED | OUTPATIENT
Start: 2022-02-16 | End: 2022-02-26

## 2022-02-16 NOTE — PATIENT INSTRUCTIONS
Sinusitis   WHAT YOU NEED TO KNOW:   What is sinusitis? Sinusitis is inflammation or infection of your sinuses  Sinusitis is most often caused by a virus  Acute sinusitis may last up to 12 weeks  Chronic sinusitis lasts longer than 12 weeks  Recurrent sinusitis means you have 4 or more infections in 1 year  What increases my risk for sinusitis? · Medical conditions, such as an upper respiratory infection, allergies, asthma, or cystic fibrosis    · Dental infections or procedures, such as gum infections, tooth decay, or a root canal    · Smoking or exposure to secondhand smoke    · Abnormal sinus structure, such as nasal growths, swollen tonsils, or a deviated septum    · A weak immune system, from diseases such as diabetes or HIV    What are the signs and symptoms of sinusitis? · Fever    · Pain, pressure, redness, or swelling around the forehead, cheeks, or eyes    · Thick yellow or green discharge from your nose    · Tenderness when you touch your face over your sinuses    · Dry cough that happens mostly at night or when you lie down    · Headache and face pain that is worse when you lean forward    · Tooth pain, or pain when you chew    How is sinusitis diagnosed? Your healthcare provider will examine you and ask about your symptoms  He or she may check inside your nose using a nasal speculum  You may need any of the following tests:  · A sample of mucus from your nose  may show what germ is causing your infection  · A CT or MRI of your head  may show the mucous lining of your sinuses  You may be given contrast liquid to help your sinuses show up better in the pictures  Tell your healthcare provider if you have ever had an allergic reaction to contrast liquid  Do not enter the MRI room with anything metal  Metal can cause serious injury  Tell your healthcare provider if you have any metal in or on your body  How is sinusitis treated? Your symptoms may go away on their own   Your healthcare provider may recommend watchful waiting for up to 10 days before starting antibiotics  You may need any of the following:  · Acetaminophen  decreases pain and fever  It is available without a doctor's order  Ask how much to take and how often to take it  Follow directions  Read the labels of all other medicines you are using to see if they also contain acetaminophen, or ask your doctor or pharmacist  Acetaminophen can cause liver damage if not taken correctly  Do not use more than 4 grams (4,000 milligrams) total of acetaminophen in one day  · NSAIDs , such as ibuprofen, help decrease swelling, pain, and fever  This medicine is available with or without a doctor's order  NSAIDs can cause stomach bleeding or kidney problems in certain people  If you take blood thinner medicine, always ask your healthcare provider if NSAIDs are safe for you  Always read the medicine label and follow directions  · Nasal steroid sprays  may help decrease inflammation in your nose and sinuses  · Decongestants  help reduce swelling and drain mucus in the nose and sinuses  They may help you breathe easier  · Antihistamines  help dry mucus in the nose and relieve sneezing  · Antibiotics  help treat or prevent a bacterial infection  How can I manage my symptoms? · Rinse your sinuses as directed  Use a sinus rinse device to rinse your nasal passages with a saline (salt water) solution or distilled water  Do not use tap water  This will help thin the mucus in your nose and rinse away pollen and dirt  It will also help reduce swelling so you can breathe normally  · Use a humidifier  to increase air moisture in your home  This may make it easier for you to breathe and help decrease your cough  · Sleep with your head elevated  Place an extra pillow under your head before you go to sleep to help your sinuses drain  · Drink liquids as directed    Ask your healthcare provider how much liquid to drink each day and which liquids are best for you  Liquids will thin the mucus in your nose and help it drain  Avoid drinks that contain alcohol or caffeine  · Do not smoke, and avoid secondhand smoke  Nicotine and other chemicals in cigarettes and cigars can make your symptoms worse  Ask your healthcare provider for information if you currently smoke and need help to quit  E-cigarettes or smokeless tobacco still contain nicotine  Talk to your healthcare provider before you use these products  How can I help prevent the spread of germs? · Wash your hands often with soap and water  Wash your hands after you use the bathroom, change a child's diaper, or sneeze  Wash your hands before you prepare or eat food  · Stay away from people who are sick  Some germs spread easily and quickly through contact  When should I seek immediate care? · You have trouble breathing or wheezing that is getting worse  · You have a stiff neck, a fever, or a bad headache  · You cannot open your eye  · Your eyeball bulges out or you cannot move your eye  · You are more sleepy than normal, or you notice changes in your ability to think, move, or talk  · You have swelling of your forehead or scalp  When should I call my doctor? · You have vision changes, such as double vision  · Your eye and eyelid are red, swollen, and painful  · Your symptoms do not improve or go away after 10 days  · You have nausea and are vomiting  · Your nose is bleeding  · You have questions or concerns about your condition or care  CARE AGREEMENT:   You have the right to help plan your care  Learn about your health condition and how it may be treated  Discuss treatment options with your healthcare providers to decide what care you want to receive  You always have the right to refuse treatment  The above information is an  only  It is not intended as medical advice for individual conditions or treatments   Talk to your doctor, nurse or pharmacist before following any medical regimen to see if it is safe and effective for you  © Copyright [employer] 2021 Information is for End User's use only and may not be sold, redistributed or otherwise used for commercial purposes   All illustrations and images included in CareNotes® are the copyrighted property of A D A M , Inc  or [address] [place] [address]

## 2022-02-16 NOTE — PROGRESS NOTES
Virtual Regular Visit    Verification of patient location:    Patient is located in the following state in which I hold an active license [place]      Assessment/Plan:    Problem List Items Addressed This Visit     None      Visit Diagnoses     Fever, unspecified fever cause    -  Primary    Relevant Orders    Covid/Flu- Office Collect    Acute recurrent ethmoidal sinusitis        Relevant Medications    amoxicillin (AMOXIL) 875 mg tablet          BMI Counseling: There is no height or weight on file to calculate BMI  The BMI is below normal  Patient advised to gain weight and dietary education for weight gain was provided  Rationale for BMI follow-up plan is due to patient being underweight  Depression Screening and Follow-up Plan: Clincally patient does not have depression  No treatment is required  Reason for visit is   Chief Complaint   Patient presents with    Virtual Regular Visit        Encounter provider Trey Silveira [address]    Provider located at [address] [place] [address] [phone]      Recent Visits  No visits were found meeting these conditions  Showing recent visits within past 7 days and meeting all other requirements  Today's Visits  Date Type Provider Dept   02/16/22 Telemedicine [facility] today's visits and meeting all other requirements  Future Appointments  No visits were found meeting these conditions  Showing future appointments within next 150 days and meeting all other requirements       The patient was identified by name and date of birth  Matthew Adeel was informed that this is a telemedicine visit and that the visit is being conducted through [address] [place] [address] Now and patient was informed that this is a secure, HIPAA-compliant platform  She agrees to proceed     My office door was closed  No one else was in the room    She acknowledged consent and understanding of privacy and security of the video platform  The patient has agreed to participate and understands they can discontinue the visit at any time  Patient is aware this is a billable service  Barron Mayfield is a 39 y o  female virtual sick visit for fever, nausea, poor appetite, chills, cold sweats and fatigue with nasal congestion and runny nose  Patient is 39 year ol female present for evaluation of symptoms of upset stomach that progressively worsened and felt nausea and like going to throw up, with body aches that started 2022  She reports fever, cold sweats and chills         Past Medical History:   Diagnosis Date    Allergies     Anxiety     Asthma     Chronic pain     Depression     Eating disorder     Fibrocystic breast changes of both breasts     Seizures (HCC)        Past Surgical History:   Procedure Laterality Date    INDUCED       MAMMO STEREOTACTIC BREAST BIOPSY LEFT (ALL INC)  2015    Benign       Current Outpatient Medications   Medication Sig Dispense Refill    albuterol (PROVENTIL HFA,VENTOLIN HFA) 90 mcg/act inhaler Inhale 2 puffs every 6 (six) hours as needed for wheezing or shortness of breath 6 7 g 1    amoxicillin (AMOXIL) 875 mg tablet amoxicillin 875 mg tablet (Patient not taking: Reported on 2021)      amoxicillin (AMOXIL) 875 mg tablet Take 1 tablet (875 mg total) by mouth 2 (two) times a day for 10 days 20 tablet 0    Ascorbic Acid (VITAMIN C) 100 MG tablet Take 100 mg by mouth daily      azithromycin (ZITHROMAX) 250 mg tablet azithromycin 250 mg tablet (Patient not taking: Reported on 2021)      Biotin w/ Vitamins C & E (HAIR/SKIN/NAILS PO) Take by mouth      busPIRone (BUSPAR) 15 mg tablet Take by mouth 3 (three) times a day        CALCIUM-MAGNESIUM-ZINC PO Take by mouth      co-enzyme Q-10 30 MG capsule Take 30 mg by mouth 3 (three) times a day      fluticasone (FLONASE) 50 mcg/act nasal spray 1 spray into each nostril daily 16 g 2    fluticasone (FLOVENT HFA) 110 MCG/ACT inhaler 110 puffs      fluticasone (Flovent HFA) 110 MCG/ACT inhaler Inhale 2 puffs 2 (two) times a day Rinse mouth after use  12 g 1    HYDROcodone-acetaminophen (NORCO) 5-325 mg per tablet Take 1 tablet by mouth daily as needed for severe pain (Patient not taking: Reported on 9/20/2021)      HYDROcodone-acetaminophen (Norco) 5-325 mg per tablet Take 1 tablet by mouth every 6 (six) hours as needed for pain for up to 12 doses Max Daily Amount: 4 tablets 12 tablet 0    hydrocortisone 2 5 % cream hydrocortisone 2 5 % topical cream with perineal applicator      ipratropium (ATROVENT) 0 06 % nasal spray 2 SPRAYS INTO EACH NOSTRIL 4 (FOUR) TIMES A DAY (Patient not taking: Reported on 12/9/2020) 15 mL 1    loratadine (CLARITIN) 10 mg tablet TAKE 1 TABLET BY MOUTH EVERY DAY 30 tablet 1    LORazepam (ATIVAN) 1 mg tablet Take 1 mg by mouth 4 (four) times a day as needed for anxiety   1    naproxen (EC NAPROSYN) 500 MG EC tablet Take 1 tablet (500 mg total) by mouth 2 (two) times a day as needed for mild pain 30 tablet 0    naproxen (Naprosyn) 500 mg tablet Take 1 tablet (500 mg total) by mouth 2 (two) times a day with meals 60 tablet 5    omeprazole (PriLOSEC) 20 mg delayed release capsule TAKE 1 CAPSULE BY MOUTH EVERY DAY 30 capsule 1    ondansetron (ZOFRAN-ODT) 4 mg disintegrating tablet Take 1 tablet (4 mg total) by mouth every 8 (eight) hours as needed for nausea or vomiting for up to 7 days 20 tablet 0    predniSONE 10 mg tablet prednisone 10 mg tablet (Patient not taking: Reported on 9/20/2021)      thiamine (VITAMIN B1) 100 mg tablet Take 100 mg by mouth daily      tiZANidine (ZANAFLEX) 4 mg tablet Take 4 mg by mouth every 6 (six) hours  5     No current facility-administered medications for this visit          Allergies   Allergen Reactions    Gabapentin Hives    Meloxicam GI Intolerance    Tramadol Hives     SEIZURES    Vistaril [Hydroxyzine] Review of Systems   Constitutional: Positive for appetite change, fatigue and fever  Negative for activity change, chills and unexpected weight change  Patient has poor appetite  HENT: Negative for congestion, ear discharge, ear pain, nosebleeds, postnasal drip, rhinorrhea, sinus pressure, sinus pain, sneezing, sore throat and voice change  Eyes: Negative for pain, redness and visual disturbance  Respiratory: Negative for cough, chest tightness, shortness of breath and wheezing  Cardiovascular: Negative for chest pain and palpitations  Gastrointestinal: Positive for nausea  Negative for abdominal distention, abdominal pain, blood in stool, constipation, diarrhea and vomiting  Gurgling of stomach  Endocrine: Negative  Genitourinary: Negative for difficulty urinating, dysuria, flank pain, frequency, hematuria and urgency  Musculoskeletal: Positive for myalgias  Negative for arthralgias  Generalized muscle aches and pain  Skin: Negative  Allergic/Immunologic: Negative  Neurological: Positive for headaches  Hematological: Negative  Psychiatric/Behavioral: Negative  Video Exam    There were no vitals filed for this visit  Physical Exam  Constitutional:       Appearance: Normal appearance  She is ill-appearing  HENT:      Right Ear: External ear normal       Left Ear: External ear normal       Nose: Congestion present  No rhinorrhea  Mouth/Throat:      Mouth: Mucous membranes are dry  Eyes:      Conjunctiva/sclera: Conjunctivae normal    Pulmonary:      Effort: Pulmonary effort is normal    Musculoskeletal:      Cervical back: Normal range of motion  Skin:     General: Skin is dry  Neurological:      Mental Status: She is alert and oriented to person, place, and time     Psychiatric:         Mood and Affect: Mood normal          Behavior: Behavior normal           I spent 25 minutes directly with the patient during this visit    VIRTUAL VISIT [address] Ke[address] Armstrong verbally agrees to participate in [employer]  Pt is aware that [employer] could be limited without vital signs or the ability to perform a full hands-on physical exam  Fatimah Garcia understands she or the provider may request at any time to terminate the video visit and request the patient to seek care or treatment in person

## 2022-02-16 NOTE — LETTER
February 16, 2022     Patient: Katy Klein   YOB: 1980   Date of Visit: 2/16/2022       To Whom it May Concern: Deretha Lundborg is under my professional care  She was seen in my office on 2/16/2022  She may return to work on 2/18/2022  If you have any questions or concerns, please don't hesitate to call           Sincerely,          SOLO Sparrow        CC: No Recipients

## 2022-02-17 ENCOUNTER — TELEPHONE (OUTPATIENT)
Dept: FAMILY MEDICINE CLINIC | Facility: CLINIC | Age: 42
End: 2022-02-17

## 2022-02-17 LAB
FLUAV RNA RESP QL NAA+PROBE: NEGATIVE
FLUBV RNA RESP QL NAA+PROBE: NEGATIVE
SARS-COV-2 RNA RESP QL NAA+PROBE: NEGATIVE

## 2022-02-17 NOTE — TELEPHONE ENCOUNTER
----- Message from Kevin Rios, [address] sent at 2/17/2022  1:44 PM EST -----  Please call patient and notify test results are normal

## 2022-03-12 DIAGNOSIS — J45.20 MILD INTERMITTENT ASTHMA, UNCOMPLICATED: ICD-10-CM

## 2022-03-14 RX ORDER — ALBUTEROL SULFATE 90 UG/1
AEROSOL, METERED RESPIRATORY (INHALATION)
Qty: 18 G | Refills: 1 | Status: SHIPPED | OUTPATIENT
Start: 2022-03-14 | End: 2022-05-16

## 2022-03-15 ENCOUNTER — TELEPHONE (OUTPATIENT)
Dept: FAMILY MEDICINE CLINIC | Facility: CLINIC | Age: 42
End: 2022-03-15

## 2022-03-15 ENCOUNTER — TELEPHONE (OUTPATIENT)
Dept: HEMATOLOGY ONCOLOGY | Facility: CLINIC | Age: 42
End: 2022-03-15

## 2022-03-15 NOTE — TELEPHONE ENCOUNTER
New Patient Intake Form   Patient Details: Natalya Rivas  1980  [id number]    Appointment Information   Who is calling to schedule? Patient   If not self, what is the caller's name? Please put name of [facility] nurse as well  Referring provider SOLO Farris   What is the diagnosis? Low hemoglobin and low hematocrit     Is there a confirmed tissue diagnosis? No   Is there a biopsy ordered or pending? Please specify dates   n/a     Is patient aware of diagnosis? Yes   Have you had any imaging or labs done? If yes, where? (If imaging done outside of [facility], please remind patient to bring a disk ) Yes     03/14     If imaging done at outside facility, did you instruct patient to obtain discs and bring to visit? n/a   Have you been seen by another Oncologist/Hematologist?  If so, who and where? no   Are the records in [facility] or Care Everywhere? yes   Does the patient have records at another facility/hospital? no   If yes, Name of facility, city and state where facility is located  Did you instruct patient to have records faxed to [facility] and provide [facility] number? n/a   Preferred [place]   Is the patient willing to be seen by another provider?   (This is for breast patients only) n/a   Miscellaneous Information: appt made for 03/30

## 2022-03-15 NOTE — TELEPHONE ENCOUNTER
Pt called - pt had xray and labs done and results are in care everywhere - pt was at [facility] on 3/8 and 3/14 - she is having a lot of pain still - she would like to know her plan of care  - she is calling Hematology and Oncology to schedule

## 2022-03-15 NOTE — TELEPHONE ENCOUNTER
This is the information from the [facility] visit note  X-rays showed patient chronic DDD no new fracture  Likely patients chronic pain  Advised continue therapy as planned  Continue follow up with chiropractor and PCP  Plan of care was discussed in detail with the patient  All questions were answered  Patient verbalized understanding and agreed to current plan  The patient was advised to follow up with questions or concerns and to go directly to the emergency room with worsening or severe symptoms  See patient instructions as below

## 2022-03-17 NOTE — TELEPHONE ENCOUNTER
She needs to have consultation at the spine center, pain management and see hematology 1st  Until she has these things done, and she is evaluated and provided with the necessary information  I would not see a chiropracter at si time  If she needs a follow-up in the office, that would be fine to evaluated her at this time

## 2022-03-17 NOTE — TELEPHONE ENCOUNTER
Patient called back she is still having a lot of pain she said her condition got worse per the x rays   She made an appointment with [facility] is this the right thing to do  she also has an appointment with hematology   She wants a better team of doctors to manage her care   She also would like to discuss her lab work she said it was not all good -she said she was on a predisome taper did that make her labs off?   ---she is trying to find a chiropractor through [facility]   ----should she see a pain specialist?  ----

## 2022-03-28 ENCOUNTER — TELEPHONE (OUTPATIENT)
Dept: HEMATOLOGY ONCOLOGY | Facility: CLINIC | Age: 42
End: 2022-03-28

## 2022-03-28 NOTE — TELEPHONE ENCOUNTER
Called patient and [facility] confirming appointment on 3/30 at 2pm with Tyrese Herron at the [place] office and advise her of the no show policy  I encouraged a call back to [phone] if she had any questions or concerns

## 2022-03-29 NOTE — PROGRESS NOTES
Hematology/Oncology Consult Note    Date of Service: 3/30/2022    [facility]AT[facility]  [address] [facility] [address] [phone]    Reason for Consultation: anemia     AJCC 8th Edition Cancer Stage:  Cancer Staging  No matching staging information was found for the patient  Referral Physician: Luciano Lesch, CRNP    Oncology/Hematology History:  · 3/14/22, WBC 8 6, Hgb 9 6, MCV 78, PLT 417K, abs lymphocytes 3 9  otherwise normal diff  CMP --> Cr 1 28  eGFR 54  Otherwise normal CMP  · Anemia present since at least 9/2021  Assessment and Recommendations:     I personally reviewed the lab results,  the image studies,  pathology, other specialty/physicians consult notes and recommendations, and outside medical records  I had a lengthy discussion with the patient and shared the work-up findings  We discussed the diagnosis and management plan as below  1  Hypochromic, Microcytic Anemia   2  Likely reactive thrombocytosis  This is a 39year old female with finding of anemia since at least 9/2021  Most recent as above show 3/2022, Hgb 9 6g/dL, MCV 78  PLT 417K  WBC 8 6  diff showed abs lymph 3 9  otherwise normal diff  She does not have an iron panel for me to evaluate  She does have mildly elevated Creatinine and decreased eGFR as above  Patient etiology of anemia is likely iron deficiency anemia secondary to her poor diet + rectal bleeding likely from history of hemorrhoidal bleeding + anal fissure hx per patient  She is not taking oral iron  I do not believe she would benefit due to her hx of hemorrhoids and anal fissure  I recommended patient Venofer 300mg x4 or Feraheme 510mg x2  For now, patient would like to wait until iron panel returns to decide to schedule IV iron  I will call her once result is back  I will also order SPEP due to kidney dysfunction finding   Will also refer to GI for additional management, evaluation of rectal bleeding, fissures  Will tentatively plan for follow up in 2 months  Patient education provided regarding IV iron  Side effects include but are not limited to the following: hypotension, muscle aches, headache, chills, diarrhea, nausea, lightheadedness, rashes, and very rarely anaphylactic reaction  Patient understands & would still like to proceed with treatment  3  Elevated lymphocytes   Seen x 1 on last CBC  Will repeat CBC with iron panel and if still elevated, will obtain further workup  4  Elevated Creatinine, eGFR  Patient has history of LATIA  Will obtain SPEP to ensure anemia not related to plasma disorder  5  Tobacco Abuse  Patient smokes 1/2 to 1 ppd x 25 years  Smoking cessation advised  Thank you very much for your consultation and making us part of this nice patient's care  I will continue to follow closely with you  Please contact me with any additional questions  Disclaimer: This document was prepared using [employer] Flu[employer] Direct technology  If a word or phrase is confusing, or does not make sense, this is likely due to recognition error which was not discovered during the providers review  If you believe an error has occurred, please contact me through [employer] service for travis? cation  HPI:   Greer Mustafa is a 39 y o  female with a past medical history of GERD, asthma, acute sinusitis, Fibromyalgia, degenerative disc disease, Fibrocystic breast disease, HLA-B27 positive, Tobacco abuse, ETOH use presenting for consultation regarding anemia  Patient's labs reveal hypochromic microcytic anemia as above  No PICA for ice, etc  No CP, SOB, significant lightheadedness, dizziness  Does report fatigue  Does have bleeding into stools intermittently  Has a history of anal fissure, hemorrhoids  Was supposed to see GI for additional testing, and then never saw them  No dark stools  Not on oral iron  Patient has a poor diet   She consumes 1 meal a day x many years  No malabsorptive conditions known such as celiac, crohns, UC, gastric bypass, etc  Menstrual cycle is about 5 days and is very regular per patient  Not heavy  She has no hx of IV iron or blood transfusions  Not on blood thinners  No chronic PPI use per patient  Patient smokes 1/2 to 1 ppd since age 22  Does not drink  Is sober and recovering alcoholic  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  No personal hx of cancer  Father had lung cancer stage IV  Sister had rectal cancer and is in remission  No other 1st degree fhx of cancer  +tearful during visit  +anxious  + fatigue  No fever or chills  No exertional chest pain, diaphoresis or shortness of breath  No cough and phlegm, no hemoptysis  Patient denied nausea  and vomiting  No abdominal pain  No diarrhea or constipation  No  symptoms  No headache or blurred vision  No seizure activity  No significant weight loss or weight gain  +body weight is stable  The patient denies bleeding anywhere      Review of system:  12-point review of system was performed, pertinent positive and negative were detailed as above    Past Medical History:   Diagnosis Date    Allergies     Anxiety     Asthma     Chronic pain     Depression     Eating disorder     Fibrocystic breast changes of both breasts     Seizures (HCC)        Past Surgical History:   Procedure Laterality Date    INDUCED       MAMMO STEREOTACTIC BREAST BIOPSY LEFT (ALL INC)      Benign       Family History   Problem Relation Age of Onset    Diabetes Sister     Alcohol abuse Sister     Cancer Father         Diagnosed stage 3 - metastazied    Psychiatric Illness Mother     Hypertension Family     Diabetes Family        Social History     Socioeconomic History    Marital status: /Civil Union     Spouse name: Not on file    Number of children: Not on file    Years of education: Not on file    Highest education level: High school graduate   Occupational History    Occupation: part time    Tobacco Use    Smoking status: Current Every Day Smoker     Packs/day: 0 50     Types: Cigarettes    Smokeless tobacco: Never Used    Tobacco comment: 10-15 cig/day   Vaping Use    Vaping Use: Never used   Substance and Sexual Activity    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    Drug use: No    Sexual activity: Yes     Partners: Male   Other Topics Concern    Not on file   Social History Narrative    · Most recent tobacco use screenin-      · Do you currently or have you served in the [employer]:   No      · Were you activated, into active duty, as a member of the [employer] or as a Reservist:   No      · Tobacco cessation counseling provided date:   03-      Social Determinants of Health     Financial Resource Strain: Not on file   Food Insecurity: Not on file   Transportation Needs: Not on file   Physical Activity: Not on file   Stress: Not on file   Social Connections: Not on file   Intimate Partner Violence: Not on file   Housing Stability: Not on file       Allergies   Allergen Reactions    Gabapentin Hives    Meloxicam GI Intolerance    Tramadol Hives     SEIZURES    Vistaril [Hydroxyzine]        Current Outpatient Medications   Medication Sig Dispense Refill    albuterol (PROVENTIL HFA,VENTOLIN HFA) 90 mcg/act inhaler TAKE 2 PUFFS BY MOUTH EVERY 6 HOURS AS NEEDED FOR WHEEZE OR FOR SHORTNESS OF BREATH 18 g 1    Ascorbic Acid (VITAMIN C) 100 MG tablet Take 100 mg by mouth daily      Biotin w/ Vitamins C & E (HAIR/SKIN/NAILS PO) Take by mouth      busPIRone (BUSPAR) 15 mg tablet Take by mouth 3 (three) times a day        CALCIUM-MAGNESIUM-ZINC PO Take by mouth      fluticasone (FLOVENT HFA) 110 MCG/ACT inhaler 110 puffs      fluticasone (Flovent HFA) 110 MCG/ACT inhaler Inhale 2 puffs 2 (two) times a day Rinse mouth after use   12 g 1    loratadine (CLARITIN) 10 mg tablet TAKE 1 TABLET BY MOUTH EVERY DAY 30 tablet 1    LORazepam (ATIVAN) 1 mg tablet Take 1 mg by mouth 6 (six) times a day    1    naproxen (Naprosyn) 500 mg tablet Take 1 tablet (500 mg total) by mouth 2 (two) times a day with meals 60 tablet 5    omeprazole (PriLOSEC) 20 mg delayed release capsule TAKE 1 CAPSULE BY MOUTH EVERY DAY 30 capsule 1    thiamine (VITAMIN B1) 100 mg tablet Take 100 mg by mouth daily      amoxicillin (AMOXIL) 875 mg tablet amoxicillin 875 mg tablet (Patient not taking: Reported on 9/20/2021)      azithromycin (ZITHROMAX) 250 mg tablet azithromycin 250 mg tablet (Patient not taking: Reported on 9/20/2021)      co-enzyme Q-10 30 MG capsule Take 30 mg by mouth 3 (three) times a day      fluticasone (FLONASE) 50 mcg/act nasal spray 1 spray into each nostril daily 16 g 2    HYDROcodone-acetaminophen (NORCO) 5-325 mg per tablet Take 1 tablet by mouth daily as needed for severe pain (Patient not taking: Reported on 9/20/2021)      HYDROcodone-acetaminophen (Norco) 5-325 mg per tablet Take 1 tablet by mouth every 6 (six) hours as needed for pain for up to 12 doses Max Daily Amount: 4 tablets 12 tablet 0    hydrocortisone 2 5 % cream hydrocortisone 2 5 % topical cream with perineal applicator (Patient not taking: Reported on 3/30/2022)      ipratropium (ATROVENT) 0 06 % nasal spray 2 SPRAYS INTO EACH NOSTRIL 4 (FOUR) TIMES A DAY (Patient not taking: Reported on 12/9/2020) 15 mL 1    naproxen (EC NAPROSYN) 500 MG EC tablet Take 1 tablet (500 mg total) by mouth 2 (two) times a day as needed for mild pain 30 tablet 0    ondansetron (ZOFRAN-ODT) 4 mg disintegrating tablet Take 1 tablet (4 mg total) by mouth every 8 (eight) hours as needed for nausea or vomiting for up to 7 days 20 tablet 0    predniSONE 10 mg tablet prednisone 10 mg tablet (Patient not taking: Reported on 9/20/2021)      tiZANidine (ZANAFLEX) 4 mg tablet Take 4 mg by mouth every 6 (six) hours  5     No current facility-administered medications for this visit  Objective:     24 Hour Vitals Assessment:     Vitals:    03/30/22 1358   BP: 102/72   Pulse: 104   Resp: 17   Temp: 97 8 °F (36 6 °C)   SpO2: 94%       PHYSICIAN EXAM:  General: Appearance: alert, cooperative, no distress  HEENT: Normocephalic, atraumatic  No scleral icterus  conjunctivae clear  PERRL, EOM's intact  No sinus drainage or tenderness  Chest: No tenderness  Lungs: Clear to auscultation bilaterally, Respirations unlabored  Cardiac: Regular rate and rhythm, S1and S2 are normal, no murmur  Abdomen: Soft, non-tender, non-distended  Bowel sounds are normal  No masses, no organomegaly  Pelvic: deferred  Extremities:  No edema, cyanosis, clubbing  Skin: Skin color, turgor are normal  No rashes  Lymphatics: no palpable adenopathy  Neurologic: Alert and oriented X 3  No focal neuro deficits  Normal strength and sensation  DATA REVIEW:    Pathology Result:    No results found for: FINALDX     Image Results:   Image result are reviewed and documented in Hematology/Oncology history    Mammo screening bilateral w cad  6 3 133 [id number] [id number]      LABS:  Lab data are reviewed and documented in HemOnc history  No results found for this or any previous visit (from the past 72 hour(s))      By:  Allie Anderson PA-C, 3/30/2022, 2:59 PM                                  Primary Care Physician:  SOLO Thompson

## 2022-03-30 ENCOUNTER — CONSULT (OUTPATIENT)
Dept: HEMATOLOGY ONCOLOGY | Facility: CLINIC | Age: 42
End: 2022-03-30
Payer: COMMERCIAL

## 2022-03-30 ENCOUNTER — APPOINTMENT (OUTPATIENT)
Dept: LAB | Facility: HOSPITAL | Age: 42
End: 2022-03-30
Payer: COMMERCIAL

## 2022-03-30 VITALS
DIASTOLIC BLOOD PRESSURE: 72 MMHG | HEIGHT: 61 IN | TEMPERATURE: 97.8 F | RESPIRATION RATE: 17 BRPM | SYSTOLIC BLOOD PRESSURE: 102 MMHG | HEART RATE: 104 BPM | BODY MASS INDEX: 17.84 KG/M2 | WEIGHT: 94.5 LBS | OXYGEN SATURATION: 94 %

## 2022-03-30 DIAGNOSIS — D50.8 IRON DEFICIENCY ANEMIA SECONDARY TO INADEQUATE DIETARY IRON INTAKE: ICD-10-CM

## 2022-03-30 DIAGNOSIS — D50.8 IRON DEFICIENCY ANEMIA SECONDARY TO INADEQUATE DIETARY IRON INTAKE: Primary | ICD-10-CM

## 2022-03-30 DIAGNOSIS — Z72.0 TOBACCO ABUSE: ICD-10-CM

## 2022-03-30 DIAGNOSIS — D64.9 LOW HEMOGLOBIN AND LOW HEMATOCRIT: ICD-10-CM

## 2022-03-30 DIAGNOSIS — D75.838 REACTIVE THROMBOCYTOSIS: ICD-10-CM

## 2022-03-30 DIAGNOSIS — K62.5 RECTAL BLEEDING: ICD-10-CM

## 2022-03-30 LAB
BASOPHILS # BLD AUTO: 0.09 THOUSANDS/ΜL (ref 0–0.1)
BASOPHILS NFR BLD AUTO: 1 % (ref 0–1)
EOSINOPHIL # BLD AUTO: 0.25 THOUSAND/ΜL (ref 0–0.61)
EOSINOPHIL NFR BLD AUTO: 3 % (ref 0–6)
ERYTHROCYTE [DISTWIDTH] IN BLOOD BY AUTOMATED COUNT: 18.3 % (ref 11.6–15.1)
FERRITIN SERPL-MCNC: 2 NG/ML (ref 8–388)
HCT VFR BLD AUTO: 31.8 % (ref 34.8–46.1)
HGB BLD-MCNC: 10.3 G/DL (ref 11.5–15.4)
IMM GRANULOCYTES # BLD AUTO: 0.02 THOUSAND/UL (ref 0–0.2)
IMM GRANULOCYTES NFR BLD AUTO: 0 % (ref 0–2)
IRON SATN MFR SERPL: 4 % (ref 15–50)
IRON SERPL-MCNC: 18 UG/DL (ref 50–170)
LYMPHOCYTES # BLD AUTO: 3.3 THOUSANDS/ΜL (ref 0.6–4.47)
LYMPHOCYTES NFR BLD AUTO: 35 % (ref 14–44)
MCH RBC QN AUTO: 25 PG (ref 26.8–34.3)
MCHC RBC AUTO-ENTMCNC: 32.4 G/DL (ref 31.4–37.4)
MCV RBC AUTO: 77 FL (ref 82–98)
MONOCYTES # BLD AUTO: 0.63 THOUSAND/ΜL (ref 0.17–1.22)
MONOCYTES NFR BLD AUTO: 7 % (ref 4–12)
NEUTROPHILS # BLD AUTO: 5.05 THOUSANDS/ΜL (ref 1.85–7.62)
NEUTS SEG NFR BLD AUTO: 54 % (ref 43–75)
NRBC BLD AUTO-RTO: 0 /100 WBCS
PLATELET # BLD AUTO: 428 THOUSANDS/UL (ref 149–390)
PMV BLD AUTO: 8.4 FL (ref 8.9–12.7)
RBC # BLD AUTO: 4.12 MILLION/UL (ref 3.81–5.12)
TIBC SERPL-MCNC: 421 UG/DL (ref 250–450)
WBC # BLD AUTO: 9.34 THOUSAND/UL (ref 4.31–10.16)

## 2022-03-30 PROCEDURE — 85025 COMPLETE CBC W/AUTO DIFF WBC: CPT

## 2022-03-30 PROCEDURE — 99204 OFFICE O/P NEW MOD 45 MIN: CPT | Performed by: INTERNAL MEDICINE

## 2022-03-30 PROCEDURE — 82728 ASSAY OF FERRITIN: CPT

## 2022-03-30 PROCEDURE — 4004F PT TOBACCO SCREEN RCVD TLK: CPT | Performed by: INTERNAL MEDICINE

## 2022-03-30 PROCEDURE — 84165 PROTEIN E-PHORESIS SERUM: CPT

## 2022-03-30 PROCEDURE — 84165 PROTEIN E-PHORESIS SERUM: CPT | Performed by: SPECIALIST

## 2022-03-30 PROCEDURE — 83550 IRON BINDING TEST: CPT

## 2022-03-30 PROCEDURE — 3008F BODY MASS INDEX DOCD: CPT | Performed by: INTERNAL MEDICINE

## 2022-03-30 PROCEDURE — 83540 ASSAY OF IRON: CPT

## 2022-03-30 PROCEDURE — 36415 COLL VENOUS BLD VENIPUNCTURE: CPT

## 2022-03-31 LAB
ALBUMIN SERPL ELPH-MCNC: 4.25 G/DL (ref 3.5–5)
ALBUMIN SERPL ELPH-MCNC: 62.5 % (ref 52–65)
ALPHA1 GLOB SERPL ELPH-MCNC: 0.29 G/DL (ref 0.1–0.4)
ALPHA1 GLOB SERPL ELPH-MCNC: 4.2 % (ref 2.5–5)
ALPHA2 GLOB SERPL ELPH-MCNC: 0.8 G/DL (ref 0.4–1.2)
ALPHA2 GLOB SERPL ELPH-MCNC: 11.7 % (ref 7–13)
BETA GLOB ABNORMAL SERPL ELPH-MCNC: 0.44 G/DL (ref 0.4–0.8)
BETA1 GLOB SERPL ELPH-MCNC: 6.5 % (ref 5–13)
BETA2 GLOB SERPL ELPH-MCNC: 4.7 % (ref 2–8)
BETA2+GAMMA GLOB SERPL ELPH-MCNC: 0.32 G/DL (ref 0.2–0.5)
GAMMA GLOB ABNORMAL SERPL ELPH-MCNC: 0.71 G/DL (ref 0.5–1.6)
GAMMA GLOB SERPL ELPH-MCNC: 10.4 % (ref 12–22)
IGG/ALB SER: 1.67 {RATIO} (ref 1.1–1.8)
PROT PATTERN SERPL ELPH-IMP: ABNORMAL
PROT SERPL-MCNC: 6.8 G/DL (ref 6.4–8.2)

## 2022-04-01 ENCOUNTER — TELEPHONE (OUTPATIENT)
Dept: HEMATOLOGY ONCOLOGY | Facility: CLINIC | Age: 42
End: 2022-04-01

## 2022-04-01 DIAGNOSIS — D50.8 IRON DEFICIENCY ANEMIA SECONDARY TO INADEQUATE DIETARY IRON INTAKE: Primary | ICD-10-CM

## 2022-04-01 RX ORDER — ACETAMINOPHEN 325 MG/1
650 TABLET ORAL ONCE
Status: CANCELLED
Start: 2022-04-11 | End: 2022-04-11

## 2022-04-01 RX ORDER — DEXAMETHASONE 4 MG/1
4 TABLET ORAL ONCE
Status: CANCELLED
Start: 2022-04-11 | End: 2022-04-11

## 2022-04-01 RX ORDER — SODIUM CHLORIDE 9 MG/ML
20 INJECTION, SOLUTION INTRAVENOUS ONCE
Status: CANCELLED | OUTPATIENT
Start: 2022-04-11

## 2022-04-01 NOTE — TELEPHONE ENCOUNTER
Called patient  Reviewed lab work with her  Hemoglobin 10 3, MCV 77, platelets 318 K, ferritin 2, iron 18, TIBC 421, iron saturation 4%  I rediscussed IV iron treatment with her  We will proceed with Venofer 300mg x4  Patient would like pre-medications prior to treatment as she is concerned of side effects  She has a history of reactions to medications  We will add on Tylenol 650mg PO & Dexamethasone 4mg PO prior to treatment  She has bad side effects to Benadryl  She is already on Claratin 10mg PO  She will take this prior to treatment as well  '    Patient education provided regarding IV iron  Side effects include but are not limited to the following: hypotension, muscle aches, headache, chills, diarrhea, nausea, lightheadedness, rashes, and very rarely anaphylactic reaction  Patient understands & would still like to proceed with treatment  Schedulers, can you help make Venofer 300mg x4 appts for this patient? She is a [facility] patient  Thank you!!!      MO or matthew, can you assist in obtaining a GI appt for this patient?  Thank you!!

## 2022-04-01 NOTE — TELEPHONE ENCOUNTER
I'm only able to schedule for [facility] and [facility] infusion centers at this time  Can one of you please schedule her Melanie in [place]? Thank you!

## 2022-04-05 ENCOUNTER — TELEPHONE (OUTPATIENT)
Dept: HEMATOLOGY ONCOLOGY | Facility: CLINIC | Age: 42
End: 2022-04-05

## 2022-04-05 NOTE — TELEPHONE ENCOUNTER
Patient is calling in to confirm the address to where her infusion appointments will be, I have provided her with [address], [place], [place], [address]   Patient voiced understanding

## 2022-04-07 DIAGNOSIS — J30.2 SEASONAL ALLERGIES: ICD-10-CM

## 2022-04-07 RX ORDER — LORATADINE 10 MG/1
TABLET ORAL
Qty: 30 TABLET | Refills: 1 | Status: SHIPPED | OUTPATIENT
Start: 2022-04-07 | End: 2022-06-01 | Stop reason: SDUPTHER

## 2022-04-08 DIAGNOSIS — K21.9 GASTROESOPHAGEAL REFLUX DISEASE WITHOUT ESOPHAGITIS: ICD-10-CM

## 2022-04-08 RX ORDER — OMEPRAZOLE 20 MG/1
CAPSULE, DELAYED RELEASE ORAL
Qty: 30 CAPSULE | Refills: 1 | Status: SHIPPED | OUTPATIENT
Start: 2022-04-08 | End: 2022-06-01 | Stop reason: SDUPTHER

## 2022-04-19 ENCOUNTER — TELEPHONE (OUTPATIENT)
Dept: HEMATOLOGY ONCOLOGY | Facility: CLINIC | Age: 42
End: 2022-04-19

## 2022-04-19 NOTE — TELEPHONE ENCOUNTER
Called and spoke with patient to advise her Saurabh Maldonado is no longer working out of the [facility] so her appointment on 5/31/22 would now be with Dr Shabana Ivy at 1:40pm   She voiced understanding

## 2022-04-20 ENCOUNTER — TELEPHONE (OUTPATIENT)
Dept: INFUSION CENTER | Facility: HOSPITAL | Age: 42
End: 2022-04-20

## 2022-04-20 DIAGNOSIS — D50.8 IRON DEFICIENCY ANEMIA SECONDARY TO INADEQUATE DIETARY IRON INTAKE: Primary | ICD-10-CM

## 2022-04-20 RX ORDER — ACETAMINOPHEN 325 MG/1
650 TABLET ORAL ONCE
Status: CANCELLED
Start: 2022-04-21 | End: 2022-04-21

## 2022-04-20 RX ORDER — DEXAMETHASONE 4 MG/1
4 TABLET ORAL ONCE
Status: CANCELLED
Start: 2022-04-21 | End: 2022-04-21

## 2022-04-20 RX ORDER — SODIUM CHLORIDE 9 MG/ML
20 INJECTION, SOLUTION INTRAVENOUS ONCE
Status: CANCELLED | OUTPATIENT
Start: 2022-04-21

## 2022-04-20 NOTE — TELEPHONE ENCOUNTER
Reminded pt of appt time and duration   Explained visitor policy and gave instructions on how to get to the infusion center

## 2022-04-21 ENCOUNTER — HOSPITAL ENCOUNTER (OUTPATIENT)
Dept: INFUSION CENTER | Facility: HOSPITAL | Age: 42
Discharge: HOME | End: 2022-04-21
Attending: INTERNAL MEDICINE
Payer: COMMERCIAL

## 2022-04-21 VITALS
HEART RATE: 83 BPM | TEMPERATURE: 97 F | SYSTOLIC BLOOD PRESSURE: 109 MMHG | RESPIRATION RATE: 16 BRPM | DIASTOLIC BLOOD PRESSURE: 64 MMHG

## 2022-04-21 DIAGNOSIS — R11.0 NAUSEA: Primary | ICD-10-CM

## 2022-04-21 DIAGNOSIS — D50.8 IRON DEFICIENCY ANEMIA SECONDARY TO INADEQUATE DIETARY IRON INTAKE: Primary | ICD-10-CM

## 2022-04-21 PROCEDURE — 96365 THER/PROPH/DIAG IV INF INIT: CPT

## 2022-04-21 PROCEDURE — 96366 THER/PROPH/DIAG IV INF ADDON: CPT

## 2022-04-21 RX ORDER — SODIUM CHLORIDE 9 MG/ML
20 INJECTION, SOLUTION INTRAVENOUS ONCE
Status: CANCELLED | OUTPATIENT
Start: 2022-04-28

## 2022-04-21 RX ORDER — ACETAMINOPHEN 325 MG/1
650 TABLET ORAL ONCE
Status: COMPLETED | OUTPATIENT
Start: 2022-04-21 | End: 2022-04-21

## 2022-04-21 RX ORDER — DEXAMETHASONE 4 MG/1
4 TABLET ORAL ONCE
Status: CANCELLED
Start: 2022-04-28 | End: 2022-04-28

## 2022-04-21 RX ORDER — ACETAMINOPHEN 325 MG/1
650 TABLET ORAL ONCE
Status: CANCELLED
Start: 2022-04-28 | End: 2022-04-28

## 2022-04-21 RX ORDER — DEXAMETHASONE 4 MG/1
4 TABLET ORAL ONCE
Status: COMPLETED | OUTPATIENT
Start: 2022-04-21 | End: 2022-04-21

## 2022-04-21 RX ORDER — SODIUM CHLORIDE 9 MG/ML
20 INJECTION, SOLUTION INTRAVENOUS ONCE
Status: COMPLETED | OUTPATIENT
Start: 2022-04-21 | End: 2022-04-21

## 2022-04-21 RX ADMIN — DEXAMETHASONE 4 MG: 4 TABLET ORAL at 13:05

## 2022-04-21 RX ADMIN — SODIUM CHLORIDE 20 ML/HR: 9 INJECTION, SOLUTION INTRAVENOUS at 13:06

## 2022-04-21 RX ADMIN — IRON SUCROSE 300 MG: 20 INJECTION, SOLUTION INTRAVENOUS at 13:15

## 2022-04-21 RX ADMIN — ACETAMINOPHEN 650 MG: 325 TABLET ORAL at 13:05

## 2022-04-21 NOTE — PROGRESS NOTES
Patient arrived to infusion for first venofer infusion tearful and extremely anxious  Patient states she's worried about side effects and she's very sensitive to medications  Oral premeds given as ordered  Patient also immediately asking if she can have anti nausea medications for after she goes home in case she gets nauseous  Spoke to Jesse and something will be sent to patient's pharmacy  Emotional support given  Patient completed venofer without incident  After infusion patient c/o fatigue and some back pain  Also states she has chronic back pain  Encouraged patient to call Dr Jean Reed office with any concerns after she goes home before her next appointment

## 2022-04-22 RX ORDER — ONDANSETRON 8 MG/1
8 TABLET, ORALLY DISINTEGRATING ORAL EVERY 8 HOURS PRN
Qty: 20 TABLET | Refills: 0 | Status: SHIPPED | OUTPATIENT
Start: 2022-04-22 | End: 2022-05-12

## 2022-04-25 ENCOUNTER — CONSULT (OUTPATIENT)
Dept: PAIN MEDICINE | Facility: CLINIC | Age: 42
End: 2022-04-25
Payer: COMMERCIAL

## 2022-04-25 VITALS
SYSTOLIC BLOOD PRESSURE: 110 MMHG | BODY MASS INDEX: 19.26 KG/M2 | HEIGHT: 61 IN | WEIGHT: 102 LBS | DIASTOLIC BLOOD PRESSURE: 74 MMHG | HEART RATE: 98 BPM

## 2022-04-25 DIAGNOSIS — G89.29 CHRONIC BILATERAL LOW BACK PAIN, UNSPECIFIED WHETHER SCIATICA PRESENT: Primary | ICD-10-CM

## 2022-04-25 DIAGNOSIS — M54.50 CHRONIC BILATERAL LOW BACK PAIN, UNSPECIFIED WHETHER SCIATICA PRESENT: Primary | ICD-10-CM

## 2022-04-25 DIAGNOSIS — M54.12 CERVICAL RADICULOPATHY: ICD-10-CM

## 2022-04-25 DIAGNOSIS — M79.7 FIBROMYALGIA: ICD-10-CM

## 2022-04-25 DIAGNOSIS — M54.2 NECK PAIN: ICD-10-CM

## 2022-04-25 DIAGNOSIS — M54.9 MID BACK PAIN: ICD-10-CM

## 2022-04-25 PROCEDURE — 4004F PT TOBACCO SCREEN RCVD TLK: CPT | Performed by: ANESTHESIOLOGY

## 2022-04-25 PROCEDURE — 99204 OFFICE O/P NEW MOD 45 MIN: CPT | Performed by: ANESTHESIOLOGY

## 2022-04-25 PROCEDURE — 3008F BODY MASS INDEX DOCD: CPT | Performed by: ANESTHESIOLOGY

## 2022-04-25 NOTE — PATIENT INSTRUCTIONS
Neck Pain   WHAT YOU NEED TO KNOW:   What do I need to know about neck pain? You may have sudden neck pain that increases quickly  You may instead feel pain build slowly over time  Neck pain may go away in a few days or weeks, or it may continue for months  The pain may come and go, or be worse with certain movements  The pain may be only in your neck, or it may move to your arms, back, or shoulders  You may also have pain that starts in another body area and moves to your neck  Some types of neck pain are permanent  What causes or increases my risk for neck pain? · Stenosis (narrowing) of your spinal column, or degeneration (breakdown) or inflammation of the discs in your neck    · Inflammation from a condition such as rheumatoid arthritis, polymyalgia rheumatica, or rotator cuff tendinitis    · A condition that affects neck to arm nerves, such as thoracic outlet syndrome or brachial neuritis     · Trauma or injury to your neck, such as being hit from behind in a car (whiplash) or sleeping in a bad position    · A fracture of a neck bone that causes nerve damage    · A medical condition, such as shingles, meningitis, a tumor, or coronary artery disease (CAD)    How is the cause of neck pain diagnosed? Your healthcare provider will ask about your symptoms and when they began  Tell him if you were recently in an accident or had another injury to your neck  He will examine your neck and shoulders  He may also have you move your head and arms in certain ways to see if any position causes or relieves the pain  · Blood tests  may be used to measure the amount of inflammation or to check for signs of an infection  · X-ray or MRI  pictures may show a neck injury or medical condition  Do not enter the MRI room with anything metal  Metal can cause serious damage  Tell the healthcare provider if you have any metal in or on your body  How is neck pain treated?   Treatment will depend on what is causing your pain   · Medicines  may be prescribed or recommended by your healthcare provider for pain  You may need medicine to treat nerve pain or to stop muscle spasms  Medicines may also be given to reduce inflammation  Your healthcare provider may inject medicine into a nerve to block pain  Over-the-counter NSAID medicine or acetaminophen may be recommended to help treat minor pain or inflammation  · Traction  is used to relieve pressure from nerves  Your head is gently pulled up and away from your neck  This stretches muscles and ligaments and makes more room for the spine  Your healthcare provider will tell you the kind of traction that will help your neck pain  Do not use traction devices at home unless directed by your healthcare provider  · Surgery  may be needed if the pain is severe or other treatments do not work  Surgery will not help every kind of neck pain  You may need surgery to stabilize a fractured bone or to remove a tumor  Surgery may also be used to widen a narrowed spinal column or to remove a disc from between neck bones  What can I do to manage or prevent neck pain? · Rest your neck as directed  Do not make sudden movements, such as turning your head quickly  Your healthcare provider may recommend you wear a cervical collar for a short time  The collar will prevent you from moving your head  This will give your neck time to heal if an injury is causing your neck pain  Ask your healthcare provider when you can return to sports or other normal daily activities  · Apply heat as directed  Heat helps relieve pain and swelling  Use a heat wrap, or soak a small towel in warm water  Wring out the extra water  Apply the heat wrap or towel for 20 minutes every hour, or as directed  · Apply ice as directed  Ice helps relieve pain and swelling, and can help prevent tissue damage  Use an ice pack, or put ice in a bag  Cover the ice pack or back with a towel before you apply it to your neck   Apply the ice pack or ice for 15 minutes every hour, or as directed  Your healthcare provider can tell you how often to apply ice  · Do neck exercises as directed  Neck exercises help strengthen the muscles and increase range of motion  Your healthcare provider will tell you which exercises are right for you  He may give you instructions, or he may recommend that you work with a physical therapist  Your healthcare provider or therapist can make sure you are doing the exercises correctly  · Maintain good posture  Try to keep your head and shoulders lifted when you sit  If you work in front of a computer, make sure the monitor is at the right level  You should not need to look up down to see the screen  You should also not have to lean forward to be able to read what is on the screen  Make sure your keyboard, mouse, and other computer items are placed where you do not have to extend your shoulder to reach them  Get up often if you work in front of a computer or sit for long periods of time  Stretch or walk around to keep your neck muscles loose  When should I seek immediate care? · You have an injury that causes neck pain and shooting pain down your arms or legs  · Your neck pain suddenly becomes severe  · You have neck pain along with numbness, tingling, or weakness in your arms or legs  · You have a stiff neck, a headache, and a fever  When should I contact my healthcare provider? · You have new or worsening symptoms  · Your symptoms continue even after treatment  · You have questions or concerns about your condition or care  CARE AGREEMENT:   You have the right to help plan your care  Learn about your health condition and how it may be treated  Discuss treatment options with your healthcare providers to decide what care you want to receive  You always have the right to refuse treatment  The above information is an  only   It is not intended as medical advice for individual conditions or treatments  Talk to your doctor, nurse or pharmacist before following any medical regimen to see if it is safe and effective for you  © Copyright [employer] [facility] 2022 Information is for End User's use only and may not be sold, redistributed or otherwise used for commercial purposes   All illustrations and images included in CareNotes® are the copyrighted property of A D A M , Inc  or [address] [place] [address]

## 2022-04-25 NOTE — PROGRESS NOTES
Assessment  1  Chronic bilateral low back pain, unspecified whether sciatica present    2  Neck pain    3  Cervical radiculopathy    4  Fibromyalgia    5  Mid back pain        Plan  26-year-old female with a history of fibromyalgia and alcohol abuse, self-referred, presenting for initial consultation regarding neck pain that radiates into the left shoulder with associated numbness and paresthesias in both hands and thoracolumbar back pain without any radicular symptoms into her thorax, abdomen, or lower extremities  She has been dealing with the symptoms for years and denies any trauma or inciting event  MRI of the cervical spine from 2020 shows C5-6 disc herniation with left foraminal stenosis and spondylosis  X-ray of the thoracic spine and SI joints were unremarkable  With exception of some tiny spurs along the anterior margin of the midthoracic vertebra  The patient states she did have an EMG of her upper extremities which were normal, however I do not have this report to review  The patient has found good relief with chiropractic treatment in the past   She has however only attended 1 session in the past 6 months in March  She has had near resolution of her symptoms with chiropractic treatment in the past   She does take naproxen p r n  with some relief  No relief with Tylenol  She was unable tolerate side effects from gabapentin, tramadol, and meloxicam   The patient did have an injection in cervical spine without much relief by an outside pain specialist   The patient does have a myofascial component contributing to her cervical, thoracic, and lumbar complaints  No evidence of radiculopathy in the lower extremities  Left shoulder and hand symptoms may be secondary to radiculopathy considering C5-6 disc herniation  I am unable to explain symptoms in the right hand  May be compressive neuropathy verses components of fibromyalgia  No evidence of sacroiliitis      1  Advised patient to proceed with chiropractic treatment considering she has had significant relief with this in the past  2  I will order an x-ray of the lumbar spine  3  Patient may continue with naproxen p r n   4  I will follow up the patient in 6 weeks         My impressions and treatment recommendations were discussed in detail with the patient who verbalized understanding and had no further questions  Discharge instructions were provided  I personally saw and examined the patient and I agree with the above discussed plan of care  No orders of the defined types were placed in this encounter  No orders of the defined types were placed in this encounter  History of Present Illness    Vladislav Leyva is a 39 y o  female with a history of fibromyalgia and alcohol abuse, self-referred, presenting for initial consultation regarding neck pain that radiates into the left shoulder with associated numbness and paresthesias in both hands and thoracolumbar back pain without any radicular symptoms into her thorax, abdomen, or lower extremities  She denies any bladder or bowel incontinence, saddle anesthesia, or balance issues  She has been dealing with the symptoms for years and denies any trauma or inciting event  MRI of the cervical spine from 2020 shows C5-6 disc herniation with left foraminal stenosis and spondylosis  X-ray of the thoracic spine and SI joints were unremarkable  With exception of some tiny spurs along the anterior margin of the midthoracic vertebra  The patient states she did have an EMG of her upper extremities which were normal, however I do not have this report to review  The patient has found good relief with chiropractic treatment in the past   She has however only attended 1 session in the past 6 months in March  She has had near resolution of her symptoms with chiropractic treatment in the past   She does take naproxen p r n  with some relief  No relief with Tylenol    She was unable tolerate side effects from gabapentin, tramadol, and meloxicam   The patient did have an injection in cervical spine without much relief by an outside pain specialist   The patient rates her pain a 5 to 9/10 on the pain is nearly constant  The pain is worse in the evening  The pain is described as burning, cramping, sharp, and throbbing  The pain is increased with standing, bending, sitting, exercise, and menstruation  The pain is decreased with lying down  She does find some relief with heat and ice application and a 57G unit  Other than as stated above, the patient denies any interval changes in medications, medical condition, mental condition, symptoms, or allergies since the last office visit  I have personally reviewed and/or updated the patient's past medical history, past surgical history, family history, social history, current medications, allergies, and vital signs today       Review of Systems    Patient Active Problem List   Diagnosis    Encounter for gynecological examination (general) (routine) without abnormal findings    Asthma    Fibrocystic breast disease    Depression    Fibromyalgia    HLA B27 (HLA B27 positive)    Memory loss    Tremor    Paresthesia    Binge eating disorder    Exposure to SARS-associated coronavirus    Short of breath on exertion    Acute recurrent frontal sinusitis    Visit for laboratory test    Gastroesophageal reflux disease without esophagitis    Seasonal allergies    Tobacco abuse counseling    Alcohol use disorder, severe, in early remission ([facility] [address] )    Major depressive disorder, recurrent, mild (HCC)    Severe protein-calorie malnutrition (HCC)    Acute nonintractable headache    Nasal congestion    Cough    Iron deficiency anemia secondary to inadequate dietary iron intake       Past Medical History:   Diagnosis Date    Allergies     Anxiety     Asthma     Chronic pain     Depression     Eating disorder     Fibrocystic breast changes of both breasts  Seizures ([address] )        Past Surgical History:   Procedure Laterality Date    INDUCED       MAMMO STEREOTACTIC BREAST BIOPSY LEFT (ALL INC)  2015    Benign       Family History   Problem Relation Age of Onset    Diabetes Sister     Alcohol abuse Sister     Cancer Father         Diagnosed stage 3 - metastazied    Psychiatric Illness Mother     Hypertension Family     Diabetes Family        Social History     Occupational History    Occupation: part time    Tobacco Use    Smoking status: Current Every Day Smoker     Packs/day: 0 50     Types: Cigarettes    Smokeless tobacco: Never Used    Tobacco comment: 10-15 cig/day   Vaping Use    Vaping Use: Never used   Substance and Sexual Activity    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    Drug use: No    Sexual activity: Yes     Partners: Male       Current Outpatient Medications on File Prior to Visit   Medication Sig    albuterol (PROVENTIL HFA,VENTOLIN HFA) 90 mcg/act inhaler TAKE 2 PUFFS BY MOUTH EVERY 6 HOURS AS NEEDED FOR WHEEZE OR FOR SHORTNESS OF BREATH    Ascorbic Acid (VITAMIN C) 100 MG tablet Take 100 mg by mouth daily    Biotin w/ Vitamins C & E (HAIR/SKIN/NAILS PO) Take by mouth    busPIRone (BUSPAR) 15 mg tablet Take by mouth 3 (three) times a day      CALCIUM-MAGNESIUM-ZINC PO Take by mouth    fluticasone (FLOVENT HFA) 110 MCG/ACT inhaler 110 puffs    fluticasone (Flovent HFA) 110 MCG/ACT inhaler Inhale 2 puffs 2 (two) times a day Rinse mouth after use      loratadine (CLARITIN) 10 mg tablet TAKE 1 TABLET BY MOUTH EVERY DAY    LORazepam (ATIVAN) 1 mg tablet Take 1 mg by mouth 6 (six) times a day      naproxen (Naprosyn) 500 mg tablet Take 1 tablet (500 mg total) by mouth 2 (two) times a day with meals    omeprazole (PriLOSEC) 20 mg delayed release capsule TAKE 1 CAPSULE BY MOUTH EVERY DAY    ondansetron (Zofran ODT) 8 mg disintegrating tablet Take 1 tablet (8 mg total) by mouth every 8 (eight) hours as needed for nausea or vomiting    thiamine (VITAMIN B1) 100 mg tablet Take 100 mg by mouth daily    co-enzyme Q-10 30 MG capsule Take 30 mg by mouth 3 (three) times a day    fluticasone (FLONASE) 50 mcg/act nasal spray 1 spray into each nostril daily    hydrocortisone 2 5 % cream hydrocortisone 2 5 % topical cream with perineal applicator (Patient not taking: Reported on 3/30/2022)    ipratropium (ATROVENT) 0 06 % nasal spray 2 SPRAYS INTO EACH NOSTRIL 4 (FOUR) TIMES A DAY (Patient not taking: Reported on 12/9/2020)    naproxen (EC NAPROSYN) 500 MG EC tablet Take 1 tablet (500 mg total) by mouth 2 (two) times a day as needed for mild pain    ondansetron (ZOFRAN-ODT) 4 mg disintegrating tablet Take 1 tablet (4 mg total) by mouth every 8 (eight) hours as needed for nausea or vomiting for up to 7 days    predniSONE 10 mg tablet prednisone 10 mg tablet (Patient not taking: Reported on 9/20/2021)     No current facility-administered medications on file prior to visit  Allergies   Allergen Reactions    Gabapentin Hives    Meloxicam GI Intolerance    Tramadol Hives     SEIZURES    Vistaril [Hydroxyzine]        Physical Exam    /74   Pulse 98   Ht 5' 1" (1 549 m)   Wt 46 3 kg (102 lb)   BMI 19 27 kg/m²     Constitutional: normal, well developed, well nourished, alert, in no distress and non-toxic and no overt pain behavior  Eyes: anicteric  HEENT: grossly intact  Neck: supple, symmetric, trachea midline and no masses   Pulmonary:even and unlabored  Cardiovascular:No edema or pitting edema present  Skin:Normal without rashes or lesions and well hydrated  Psychiatric:Mood and affect appropriate  Neurologic:Cranial Nerves II-XII grossly intact  Musculoskeletal:normal gait  Left cervical paraspinals and trapezius tender to palpation ropy in texture  Full range of motion of cervical spine in all planes    Bilateral biceps, triceps, brachioradialis, patellar, and Achilles reflexes were 2/4 and symmetrical  Negative Gonzalez's reflex bilaterally  No clonus was noted bilaterally  Bilateral upper extremity strength 5/5 in all muscle groups  Sensation intact to light touch in C5 through T1 dermatomes bilaterally  Negative Spurling's bilaterally  Bilateral thoracic and lumbar paraspinal musculature diffusely tender to palpation ropy in texture  Bilateral SI joints nontender to palpation  Bilateral lower extremity strength 5/5 in all muscle groups  Sensation intact to light touch in L3 through S1 dermatomes bilaterally  Negative straight leg raise bilaterally  Negative Chase's test bilaterally      Imaging    PACS Images     Show images for CT spine cervical wo contrast    Study Result    Narrative & Impression   1 [id number]       Imaging    CT spine cervical wo contrast (Order: [id number]) - 7/21/2021    Order Report     Order Details

## 2022-04-28 ENCOUNTER — HOSPITAL ENCOUNTER (OUTPATIENT)
Dept: INFUSION CENTER | Facility: HOSPITAL | Age: 42
Discharge: HOME | End: 2022-04-28
Attending: INTERNAL MEDICINE

## 2022-04-28 ENCOUNTER — APPOINTMENT (EMERGENCY)
Dept: RADIOLOGY | Facility: HOSPITAL | Age: 42
End: 2022-04-28
Payer: COMMERCIAL

## 2022-04-28 ENCOUNTER — HOSPITAL ENCOUNTER (EMERGENCY)
Facility: HOSPITAL | Age: 42
Discharge: HOME | End: 2022-04-28
Attending: EMERGENCY MEDICINE
Payer: COMMERCIAL

## 2022-04-28 VITALS
RESPIRATION RATE: 20 BRPM | TEMPERATURE: 97.9 F | HEART RATE: 106 BPM | OXYGEN SATURATION: 99 % | DIASTOLIC BLOOD PRESSURE: 83 MMHG | SYSTOLIC BLOOD PRESSURE: 116 MMHG

## 2022-04-28 DIAGNOSIS — S06.0X9A CONCUSSION: ICD-10-CM
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
## 2022-04-28 PROCEDURE — G1004 CDSM NDSC: HCPCS

## 2022-04-28 PROCEDURE — 70450 CT HEAD/BRAIN W/O DYE: CPT

## 2022-04-28 PROCEDURE — 99284 EMERGENCY DEPT VISIT MOD MDM: CPT | Performed by: EMERGENCY MEDICINE

## 2022-04-28 PROCEDURE — 72125 CT NECK SPINE W/O DYE: CPT

## 2022-04-28 PROCEDURE — 99284 EMERGENCY DEPT VISIT MOD MDM: CPT

## 2022-04-28 RX ORDER — ACETAMINOPHEN 325 MG/1
650 TABLET ORAL ONCE
Status: COMPLETED | OUTPATIENT
Start: 2022-04-28 | End: 2022-04-28

## 2022-04-28 RX ADMIN — ACETAMINOPHEN 650 MG: 325 TABLET ORAL at 14:00

## 2022-04-28 NOTE — Clinical Note
Zen Mcardle was seen and treated in our emergency department on 4/28/2022  Diagnosis: concussion    Tessy Burk  may return to work on return date  She may return on this date: 05/05/2022         If you have any questions or concerns, please don't hesitate to call        Carin Fletcher MD    ______________________________           _______________          _______________  [facility] Representative                              Date                                Time
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
## 2022-04-29 ENCOUNTER — TELEPHONE (OUTPATIENT)
Dept: PAIN MEDICINE | Facility: MEDICAL CENTER | Age: 42
End: 2022-04-29
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
## 2022-04-29 NOTE — TELEPHONE ENCOUNTER
CT of the cervical spine was negative for fracture or high-grade canal stenosis    Okay to proceed with chiropractic treatment
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
## 2022-05-02 ENCOUNTER — TELEPHONE (OUTPATIENT)
Dept: HEMATOLOGY ONCOLOGY | Facility: CLINIC | Age: 42
End: 2022-05-02

## 2022-05-02 ENCOUNTER — TELEPHONE (OUTPATIENT)
Dept: FAMILY MEDICINE CLINIC | Facility: CLINIC | Age: 42
End: 2022-05-02

## 2022-05-02 DIAGNOSIS — D50.8 IRON DEFICIENCY ANEMIA SECONDARY TO INADEQUATE DIETARY IRON INTAKE: Primary | ICD-10-CM
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
## 2022-05-02 PROBLEM — Z12.31 ENCOUNTER FOR SCREENING MAMMOGRAM FOR MALIGNANT NEOPLASM OF BREAST: Status: ACTIVE | Noted: 2020-05-18

## 2022-05-02 PROBLEM — S00.93XD: Status: ACTIVE | Noted: 2022-05-02

## 2022-05-02 PROBLEM — Z09 FOLLOW-UP EXAM: Status: ACTIVE | Noted: 2022-05-02

## 2022-05-02 RX ORDER — SODIUM CHLORIDE 9 MG/ML
20 INJECTION, SOLUTION INTRAVENOUS ONCE
Status: CANCELLED | OUTPATIENT
Start: 2022-05-05

## 2022-05-02 RX ORDER — DEXAMETHASONE 4 MG/1
4 TABLET ORAL ONCE
Status: CANCELLED
Start: 2022-05-05 | End: 2022-05-05

## 2022-05-02 RX ORDER — ACETAMINOPHEN 325 MG/1
650 TABLET ORAL ONCE
Status: CANCELLED
Start: 2022-05-05 | End: 2022-05-05

## 2022-05-02 NOTE — TELEPHONE ENCOUNTER
CALL RETURN FORM   Reason for patient call? Patient called in stating she missed an infusion appointment on 4/28/22    Wants to know if she can go on 5/4/22 with her having an concussion  (was in emergency room)    Also wants to know repercussions of missing infusion on 4/28/22   Patient's primary oncologist?  Dr Jackson May   Name of person the patient was calling for? Dr Jackson May   Any additional information to add, if applicable? Informed patient that the message will be forwarded to the team and someone will get back to them as soon as possible    Did you relay this information to the patient?  Yes

## 2022-05-02 NOTE — TELEPHONE ENCOUNTER
If she is worsening she should go to the emergency room immediately  A virtual visit may make it difficult to evaluate her adequately
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
## 2022-05-02 NOTE — PATIENT INSTRUCTIONS
Allergies   WHAT YOU NEED TO KNOW:   What are allergies? Allergies are an immune system reaction to a substance called an allergen  Your immune system sees the allergen as harmful and attacks it  What causes allergies? You may have allergies at certain times of the year or all year  The following are common allergies:  · Seasonal airborne allergies  happen during certain times of the year  This is also called hay fever  Tree, weed, or grass pollen are examples of allergens that you breathe in  · Environmental airborne allergy  triggers you may breathe in year-round include dust, mold, and pet hair  · Contact allergies  include latex, found in items such as condoms and medical gloves  Latex allergies can be very serious  · Insect sting allergies  may be caused by bees, hornets, fire ants, or other insects that sting or bite you  Insect allergies can be very serious  · Food allergies  commonly include shellfish, wheat, and eggs  Some foods must be eaten to produce an allergic reaction  Other foods can trigger a reaction if they touch your skin or are breathed in  What increases my risk for allergies? Allergic reactions can happen at any time, even if you have not had allergies before  You may develop an allergy after you have been exposed to an allergen more than once  Allergies are most common in children and elderly people, but anyone can have an allergic reaction  Your risk is also increased if you have a family history of allergies or a medical condition such as asthma  What are the signs and symptoms of allergies? · Mild symptoms  include sneezing and a runny, itchy, or stuffy nose  You may also have swollen, watery, or itchy eyes, or skin itching  You may have swelling or pain where an insect bit or stung you  · Anaphylaxis symptoms  include trouble breathing or swallowing, a rash or hives, or severe swelling  You may also have a cough, wheezing, or feel lightheaded or dizzy  Anaphylaxis is a sudden, life-threatening reaction that needs immediate treatment  How are allergies diagnosed? Your healthcare provider will ask about your signs and symptoms  He or she will ask what allergens you have been exposed to and if you have ever had other allergic reactions  He or she may look in your nose, ears, or throat  You may need additional testing if you developed anaphylaxis after you were exposed to a trigger and then exercised  This is called exercise-induced anaphylaxis  You may also need the following tests:  · Blood tests  are used to check for signs of a reaction to allergens  · Nasal tests  are used to see how your nasal passages react to allergens  A sample of your nasal fluid may also be tested  · Skin tests  can help your healthcare provider find what you are allergic to  He will place a small amount of allergen on your arm or back and then prick your skin with a needle  He will watch how your skin reacts to the allergen  How are allergies treated? · Antihistamines  help decrease itching, sneezing, and swelling  You may take them as a pill or use drops in your nose or eyes  · Decongestants  help your nose feel less stuffy  · Steroids  decrease swelling and redness  · Topical treatments  help decrease itching or swelling  You also may be given nasal sprays or eyedrops  · Epinephrine  is medicine used to treat severe allergic reactions such as anaphylaxis  · Desensitization  gets your body used to allergens you cannot avoid  Your healthcare provider will give you a shot that contains a small amount of an allergen  He or she will treat any allergic reaction you have  Your provider will give you more of the allergen a little at a time until your body gets used to it  Your reaction to the allergen may be less serious after this treatment  Your healthcare provider will tell you how long to get the shots      What steps do I need to take for signs or symptoms of anaphylaxis? · Immediately  give 1 shot of epinephrine only into the outer thigh muscle  · Leave the shot in place  as directed  Your healthcare provider may recommend you leave it in place for up to 10 seconds before you remove it  This helps make sure all of the epinephrine is delivered  · Call 911 and go to the emergency department,  even if the shot improved symptoms  Do not drive yourself  Bring the used epinephrine shot with you  What safety precautions do I need to take if I am at risk for anaphylaxis? · Keep 2 shots of epinephrine with you at all times  You may need a second shot, because epinephrine only works for about 20 minutes and symptoms may return  Your healthcare provider can show you and family members how to give the shot  Check the expiration date every month and replace it before it expires  · Create an action plan  Your healthcare provider can help you create a written plan that explains the allergy and an emergency plan to treat a reaction  The plan explains when to give a second epinephrine shot if symptoms return or do not improve after the first  Give copies of the action plan and emergency instructions to family members and work staff  Show them how to give a shot of epinephrine  · Be careful when you exercise  If you have had exercise-induced anaphylaxis, do not exercise right after you eat  Stop exercising right away if you start to develop any signs or symptoms of anaphylaxis  You may first feel tired, warm, or have itchy skin  Hives, swelling, and severe breathing problems may develop if you continue to exercise  · Carry medical alert identification  Wear medical alert jewelry or carry a card that explains the allergy  Ask your healthcare provider where to get these items  · Inform all healthcare providers of the allergy  This includes dentists, nurses, doctors, and surgeons  How can I manage allergies? · Use nasal rinses as directed    Rinse with a saline solution daily  This will help clear allergens out of your nose  Use distilled water if possible  You can also boil tap water and let it cool before you use it  Do not use tap water that has not been boiled  · Do not smoke  Allergy symptoms may decrease if you are not around smoke  Nicotine and other chemicals in cigarettes and cigars can cause lung damage  Ask your healthcare provider for information if you currently smoke and need help to quit  E-cigarettes or smokeless tobacco still contain nicotine  Talk to your healthcare provider before you use these products  How can I prevent an allergic reaction? · Do not go outside when pollen counts are high if you have seasonal allergies  Your symptoms may be better if you go outside only in the morning or evening  Use your air conditioner, and change air filters often  · Avoid dust, fur, and mold  Dust and vacuum your home often  You may want to wear a mask when you vacuum  Keep pets in certain rooms, and bathe them often  Use a dehumidifier (machine that decreases moisture) to help prevent mold  · Do not use products that contain latex if you have a latex allergy  Use nonlatex gloves if you work in healthcare or in food preparation  Always tell healthcare providers about a latex allergy  · Avoid areas that attract insects if you have an insect bite or sting allergy  Areas include trash cans, gardens, and picnics  Do not wear bright clothing or strong scents when you will be outside  · Prevent an allergic reaction caused by food  You may have a reaction if your food is not prepared safely  For example, you could be served food that touched your trigger food during preparation  This is called cross-contamination  Kitchen tools can also cause cross-contamination  You may also eat baked foods that contain a trigger food you do not know about  Ask if the food contains your trigger food before you handle or eat it      Call 377 for signs or symptoms of anaphylaxis,  such as trouble breathing, swelling in your mouth or throat, or wheezing  You may also have itching, a rash, hives, or feel like you are going to faint  When should I seek immediate care? · You have tingling in your hands or feet  · Your skin is red or flushed  When should I contact my healthcare provider? · You have questions or concerns about your condition or care  CARE AGREEMENT:   You have the right to help plan your care  Learn about your health condition and how it may be treated  Discuss treatment options with your healthcare providers to decide what care you want to receive  You always have the right to refuse treatment  The above information is an  only  It is not intended as medical advice for individual conditions or treatments  Talk to your doctor, nurse or pharmacist before following any medical regimen to see if it is safe and effective for you  © Copyright [employer] 2022 Information is for End User's use only and may not be sold, redistributed or otherwise used for commercial purposes  All illustrations and images included in CareNotes® are the copyrighted property of [employer]  or [facility] Joni Akins     Depression   WHAT YOU NEED TO KNOW:   What is depression? Depression is a medical condition that causes feelings of sadness or hopelessness that do not go away  Depression may cause you to lose interest in things you used to enjoy  These feelings may interfere with your daily life  What causes or increases my risk for depression? Depression may be caused by changes in brain chemicals that affect your mood   Your risk for depression may be higher if you have any of the following:  · Stressful events such as the death of a loved one, unemployment, or divorce    · A family history of depression    · A chronic medical condition, such as diabetes, heart disease, or cancer    · Drug or alcohol abuse    What are the signs and symptoms of depression? · Appetite changes, or weight gain or loss    · Trouble going to sleep or staying asleep, or sleeping too much    · Fatigue or lack of energy    · Feeling restless, irritable, or withdrawn    · Feeling worthless, hopeless, discouraged, or guilty    · Trouble concentrating, remembering things, doing daily tasks, or making decisions    · Thoughts about hurting or killing yourself    How is depression diagnosed? Your healthcare provider will ask about your symptoms and how long you have had them  He or she will ask if you have any family members with depression  Tell your healthcare provider about any stressful events in your life  He or she may ask about any other health conditions or medicines you take  How is depression treated? · Therapy  is often used together with medicine  Therapy is a way for you to talk about your feelings and anything that may be causing depression  Therapy can be done alone or in a group  It may also be done with family members or a significant other  · Antidepressant medicine  may be given to relieve depression  You may need to take this medicine for several weeks before you begin to feel better  Tell your healthcare provider about any side effects or problems you have with your medicine  The type or amount of medicine may need to be changed  How can I manage depression? · Get regular physical activity  Try to be active for 30 minutes, 3 to 5 days a week  Physical activity can help relieve depression  Work with your healthcare provider to develop a plan that you enjoy  It may help to ask someone to be active with you  · Create a regular sleep schedule  A routine can help you relax before bed  Listen to music, read, or do yoga  Try to go to bed and wake up at the same time every day  Sleep is important for emotional health  · Eat a variety of healthy foods    Healthy foods include fruits, vegetables, whole-grain breads, low-fat dairy products, lean meats, fish, and cooked beans  A healthy meal plan is low in fat, salt, and added sugar  · Do not drink alcohol or use drugs  Alcohol and drugs can make depression worse  Talk to your therapist or doctor if you need help quitting  The following resources are available at any time to help you, if needed:   · [address]: 8-817-683-213.925.2388 (8-399-912-SXVB)     · Suicide Hotline: 3-250.592.5109 (1-285-VONMLKW)     · For a list of international numbers: https://save org/find-help/international-resources/    Call your local emergency number (911 in the [address]) if:   · You think about harming yourself or someone else  · You have done something on purpose to hurt yourself  When should I call my therapist or doctor? · Your symptoms do not improve  · You cannot make it to your next appointment  · You have new symptoms  · You have questions or concerns about your condition or care  CARE AGREEMENT:   You have the right to help plan your care  Learn about your health condition and how it may be treated  Discuss treatment options with your healthcare providers to decide what care you want to receive  You always have the right to refuse treatment  The above information is an  only  It is not intended as medical advice for individual conditions or treatments  Talk to your doctor, nurse or pharmacist before following any medical regimen to see if it is safe and effective for you  © Copyright [employer] 2022 Information is for End User's use only and may not be sold, redistributed or otherwise used for commercial purposes  All illustrations and images included in CareNotes® are the copyrighted property of [employer]  or [facility] Joni Akins   Concussion   WHAT YOU NEED TO KNOW:   What is a concussion? A concussion is a mild brain injury  It is usually caused by a bump or blow to the head from a fall, a motor vehicle crash, or a sports injury   Being shaken forcefully may also cause a concussion  What are the signs and symptoms of a concussion? Symptoms may occur right away, or they may appear days after the concussion:  · A mild to moderate headache    · Dizziness, loss of balance, or blurry vision    · Nausea or vomiting    · A change in mood, such as restlessness or irritability    · Trouble thinking, remembering things, or concentrating    · Ringing in the ears    · Drowsiness or decreased energy    · Changes in your normal sleeping pattern    How is a concussion diagnosed? Your healthcare provider will ask how you were injured, and about your symptoms  He or she will also examine you  You may need any of the following:  · A neurologic exam  is also called neuro signs, neuro checks, or neuro status  A neurologic exam can show healthcare providers how well your brain works after your injury  Healthcare providers will check how your pupils react to light  They may check your memory and how easily you wake up  Your hand grasp and balance may also be tested  · CT or MRI pictures  may be taken of your head  You may be given contrast liquid to help the pictures show up better  Tell the healthcare provider if you have ever had an allergic reaction to contrast liquid  Do not enter the MRI room with anything metal  Metal can cause serious injury  Tell the healthcare provider if you have any metal in or on your body  How is a concussion managed? Usually no treatment is needed for a mild concussion  Concussion symptoms usually go away within about 10 days, but they may last longer  The following may be recommended to manage your symptoms:  · Rest from physical and mental activities as directed  Mental activities are those that require thinking, concentration, and attention  You will need to rest until your symptoms are gone  Rest will allow you to recover from your concussion   Ask your healthcare provider when you can return to work and other daily activities  · Have someone stay with you for the first 24 hours after your injury  Your healthcare provider should be contacted if your symptoms get worse, or you develop new symptoms  · Do not participate in sports and physical activities until your healthcare provider says it is okay  They could make your symptoms worse or lead to another concussion  Your healthcare provider will tell you when it is okay for you to return to sports or physical activities  Ask for more information about sports concussions  · Acetaminophen  decreases pain and fever  It is available without a doctor's order  Ask how much to take and how often to take it  Follow directions  Read the labels of all other medicines you are using to see if they also contain acetaminophen, or ask your doctor or pharmacist  Acetaminophen can cause liver damage if not taken correctly  Do not use more than 4 grams (4,000 milligrams) total of acetaminophen in one day  · NSAIDs  help decrease swelling and pain or fever  This medicine is available with or without a doctor's order  NSAIDs can cause stomach bleeding or kidney problems in certain people  If you take blood thinner medicine, always ask your healthcare provider if NSAIDs are safe for you  Always read the medicine label and follow directions  How can I help prevent another concussion? · Wear protective sports equipment that fits properly  Helmets help decrease your risk for a serious brain injury  Talk to your healthcare provider about ways you can decrease your risk for a concussion if you play sports  · Wear your seatbelt every time you travel  This helps to decrease your risk for a head injury if you are in a car accident  Have someone call 911 for any of the following:   · You cannot be woken  · You have a seizure, increasing confusion, or a change in personality  · Your speech becomes slurred  When should I seek immediate care?    · You have sudden and new vision problems  · You have a severe headache that does not go away  · You have arm or leg weakness, numbness, or new problems with coordination  · You have blood or clear fluid coming out of the ears or nose  When should I contact my healthcare provider? · You have nausea or are vomiting  · You feel more sleepy than usual     · Your symptoms get worse  · Your symptoms last longer than 6 weeks after the injury  · You have questions or concerns about your condition or care  CARE AGREEMENT:   You have the right to help plan your care  Learn about your health condition and how it may be treated  Discuss treatment options with your healthcare providers to decide what care you want to receive  You always have the right to refuse treatment  The above information is an  only  It is not intended as medical advice for individual conditions or treatments  Talk to your doctor, nurse or pharmacist before following any medical regimen to see if it is safe and effective for you  © Copyright [employer] 2022 Information is for End User's use only and may not be sold, redistributed or otherwise used for commercial purposes   All illustrations and images included in CareNotes® are the copyrighted property of [employer] M , Inc  or [address] [place] [address]
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
## 2022-05-02 NOTE — TELEPHONE ENCOUNTER
Returned telephone call to patient in regards to questions about infusion appointment  Reviewed CT scans and imagining that was obtained during ED evaluation with Jeremy Blanco PA-C  Reviewed that patient is okay for Iron infusion for 5/5/22  Patient needs to be scheduled for additional venofer appointment  Per Jeremy Blanco PA-C appointment does not need to be exactly 1 week inbetween last infusion

## 2022-05-03 ENCOUNTER — TELEMEDICINE (OUTPATIENT)
Dept: FAMILY MEDICINE CLINIC | Facility: CLINIC | Age: 42
End: 2022-05-03
Payer: COMMERCIAL

## 2022-05-03 VITALS — BODY MASS INDEX: 19.26 KG/M2 | WEIGHT: 102 LBS | HEIGHT: 61 IN

## 2022-05-03 DIAGNOSIS — F33.0 MAJOR DEPRESSIVE DISORDER, RECURRENT, MILD (HCC): ICD-10-CM

## 2022-05-03 DIAGNOSIS — Z12.31 ENCOUNTER FOR SCREENING MAMMOGRAM FOR MALIGNANT NEOPLASM OF BREAST: ICD-10-CM

## 2022-05-03 DIAGNOSIS — Z09 FOLLOW-UP EXAM: ICD-10-CM
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
## 2022-05-03 DIAGNOSIS — Z71.6 TOBACCO ABUSE COUNSELING: ICD-10-CM

## 2022-05-03 DIAGNOSIS — J30.2 SEASONAL ALLERGIES: ICD-10-CM

## 2022-05-03 DIAGNOSIS — M54.12 CERVICAL RADICULOPATHY: ICD-10-CM

## 2022-05-03 DIAGNOSIS — S00.93XD CONTUSION OF HEAD, SUBSEQUENT ENCOUNTER: Primary | ICD-10-CM

## 2022-05-03 PROCEDURE — 99215 OFFICE O/P EST HI 40 MIN: CPT | Performed by: NURSE PRACTITIONER

## 2022-05-03 RX ORDER — ACETAMINOPHEN, ASPIRIN AND CAFFEINE 250; 250; 65 MG/1; MG/1; MG/1
1 TABLET, FILM COATED ORAL EVERY 6 HOURS PRN
Qty: 30 TABLET | Refills: 0 | Status: SHIPPED | OUTPATIENT
Start: 2022-05-03 | End: 2022-05-10 | Stop reason: SDUPTHER

## 2022-05-03 NOTE — ASSESSMENT & PLAN NOTE
For your neck pain use rest, ice, compresses to the area for 20 minutes 4 times a day  Range of motion exercises of the neck

## 2022-05-03 NOTE — ASSESSMENT & PLAN NOTE
Malnutrition Findings:  BMI currently        patient has gained weight since last visit  BMI Findings: Body mass index is 19 27 kg/m²

## 2022-05-03 NOTE — ASSESSMENT & PLAN NOTE
CT results of the head reviewed with patient  Patient follow-up with concussion Center for evaluation    Show images for CT head without contrast    Study Result    Narrative & Impression   CT BRAIN - WITHOUT CONTRAST     INDICATION:   Head trauma, moderate-severe  head injury      COMPARISON:  Head CT 8/13/2017     TECHNIQUE:  CT examination of the brain was performed  In addition to axial images, sagittal and coronal 2D reformatted images were created and submitted for interpretation      Radiation dose length product (DLP) for this visit:  736 14 mGy-cm   This examination, like all CT scans performed in the [facility], was performed utilizing techniques to minimize radiation dose exposure, including the use of iterative   reconstruction and automated exposure control        IMAGE QUALITY:  Diagnostic      FINDINGS:     PARENCHYMA:  No intracranial mass, mass effect or midline shift  No CT signs of acute infarction    No acute parenchymal hemorrhage      VENTRICLES AND EXTRA-AXIAL SPACES:  Normal for the patient's age      VISUALIZED ORBITS AND PARANASAL SINUSES:  Unremarkable      CALVARIUM AND EXTRACRANIAL SOFT TISSUES:  Normal      IMPRESSION:     No acute intracranial CT abnormality

## 2022-05-03 NOTE — ASSESSMENT & PLAN NOTE
Patient noted to have seasonal allergies  Currently maintained on Flonase over-the-counter as directed and continue loratadine 10 mg p o  Q h s   Allergy triggers to be avoided

## 2022-05-03 NOTE — LETTER
May 3, 2022     Patient: Berny Owens  YOB: 1980  Date of Visit: 5/3/2022      To Whom it May Concern: Verdon Mcardle is under my professional care  Tessy Burk was seen in my office on 5/3/2022  Tessy Burk may return to work on 5/9/2022  If you have any questions or concerns, please don't hesitate to call           Sincerely,          SOLO Ellis        CC: No Recipients
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
## 2022-05-04 ENCOUNTER — EVALUATION (OUTPATIENT)
Dept: PHYSICAL THERAPY | Facility: CLINIC | Age: 42
End: 2022-05-04
Payer: COMMERCIAL

## 2022-05-04 DIAGNOSIS — S06.0X0D CONCUSSION WITHOUT LOSS OF CONSCIOUSNESS, SUBSEQUENT ENCOUNTER: Primary | ICD-10-CM

## 2022-05-04 PROCEDURE — 97112 NEUROMUSCULAR REEDUCATION: CPT

## 2022-05-04 PROCEDURE — 97162 PT EVAL MOD COMPLEX 30 MIN: CPT
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
## 2022-05-05 ENCOUNTER — HOSPITAL ENCOUNTER (OUTPATIENT)
Dept: INFUSION CENTER | Facility: HOSPITAL | Age: 42
Discharge: HOME | End: 2022-05-05
Attending: INTERNAL MEDICINE
Payer: COMMERCIAL

## 2022-05-05 VITALS
SYSTOLIC BLOOD PRESSURE: 109 MMHG | TEMPERATURE: 98.3 F | DIASTOLIC BLOOD PRESSURE: 78 MMHG | RESPIRATION RATE: 20 BRPM | HEART RATE: 104 BPM

## 2022-05-05 DIAGNOSIS — D50.8 IRON DEFICIENCY ANEMIA SECONDARY TO INADEQUATE DIETARY IRON INTAKE: Primary | ICD-10-CM

## 2022-05-05 PROCEDURE — 96365 THER/PROPH/DIAG IV INF INIT: CPT

## 2022-05-05 PROCEDURE — 96366 THER/PROPH/DIAG IV INF ADDON: CPT

## 2022-05-05 RX ORDER — SODIUM CHLORIDE 9 MG/ML
20 INJECTION, SOLUTION INTRAVENOUS ONCE
Status: CANCELLED | OUTPATIENT
Start: 2022-05-12

## 2022-05-05 RX ORDER — ACETAMINOPHEN 325 MG/1
650 TABLET ORAL ONCE
Status: COMPLETED | OUTPATIENT
Start: 2022-05-05 | End: 2022-05-05

## 2022-05-05 RX ORDER — DEXAMETHASONE 4 MG/1
4 TABLET ORAL ONCE
Status: COMPLETED | OUTPATIENT
Start: 2022-05-05 | End: 2022-05-05

## 2022-05-05 RX ORDER — SODIUM CHLORIDE 9 MG/ML
20 INJECTION, SOLUTION INTRAVENOUS ONCE
Status: COMPLETED | OUTPATIENT
Start: 2022-05-05 | End: 2022-05-05

## 2022-05-05 RX ORDER — DEXAMETHASONE 4 MG/1
4 TABLET ORAL ONCE
Status: CANCELLED
Start: 2022-05-12 | End: 2022-05-12

## 2022-05-05 RX ORDER — ACETAMINOPHEN 325 MG/1
650 TABLET ORAL ONCE
Status: CANCELLED
Start: 2022-05-12 | End: 2022-05-12

## 2022-05-05 RX ADMIN — ACETAMINOPHEN 650 MG: 325 TABLET ORAL at 12:59

## 2022-05-05 RX ADMIN — DEXAMETHASONE 4 MG: 4 TABLET ORAL at 12:59

## 2022-05-05 RX ADMIN — IRON SUCROSE 300 MG: 20 INJECTION, SOLUTION INTRAVENOUS at 12:59

## 2022-05-05 RX ADMIN — SODIUM CHLORIDE 20 ML/HR: 0.9 INJECTION, SOLUTION INTRAVENOUS at 12:59

## 2022-05-05 NOTE — PROGRESS NOTES
Per pt, after her last Venofer infusion she experienced "bad" edema in b/l ankles/feet for about 3 days  Denies any other symptoms  States swelling "went away on its own " KARINA Knight w/ Dr Montoya [facility] office made aware, okay to proceed w/ infusion today  If edema returns, pt instructed to elevate her legs and increase protein intake  Pt verbalized understanding

## 2022-05-09 ENCOUNTER — OFFICE VISIT (OUTPATIENT)
Dept: PHYSICAL THERAPY | Facility: CLINIC | Age: 42
End: 2022-05-09
Payer: COMMERCIAL

## 2022-05-09 DIAGNOSIS — S06.0X0D CONCUSSION WITHOUT LOSS OF CONSCIOUSNESS, SUBSEQUENT ENCOUNTER: Primary | ICD-10-CM

## 2022-05-09 PROCEDURE — 97150 GROUP THERAPEUTIC PROCEDURES: CPT

## 2022-05-09 PROCEDURE — 97112 NEUROMUSCULAR REEDUCATION: CPT

## 2022-05-09 NOTE — PROGRESS NOTES
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; will discuss with her PCP for a further delay in return to work  Notes her C/S pain is nearing back to baseline and her tolerance to light and sound is improving  Most prominent complaint now is continued difficulties with word finding, information processing, and carrying on conversations  Objective: See treatment diary below    Grouped: [id number]  One-to-one with Isra Vergara, PT, DPT [id number]    Assessment: Balance assessment completed today highlighting minor integration deficits per MCTSIB however based on subjective responses throughout I feel that her balance may more so be affected by psychological manifestations such as anxiety  Based on her word finding/information processing/ability to carry on a conversation, I have recommended a speech therapy consult and will reach out to her PCP for a referral  I will follow with her at her scheduled speech therapy evaluation for further assessment of symptom improvements  Plan: Continue per plan of care  Schedule speech therapy evaluation; follow-up concomitantly with speech       Short Term Goal Expiration Date:(22)   Long Term Goal Expiration Date: (22)  POC Expiration Date: (22)       Precautions: Anxiety, depression, past Hx alcohol abuse - acute remission/recovery       Manuals 22 IE  Neuro Re-Ed         Pt Education Concussion pathophysiology, concussion recovery timeline for adults, possible exacerbating factors, human balance system components, vestibular system, VOR function, post-concussion education on facilitating recovery, encouraging self efficacy through return to PLOF, use of 2-point symptom severity scale for symptom regulation   x25 min Symptom improvement discussion; subjective reports of symptoms          Assessment  MCTSIB:  EO firm 30s  EC firm 30s  EO foam 30s  EC foam 22s    FGA: 28/30                                              Ther Ex                                                                        Ther Activity                        Gait Training                        Modalities
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
## 2022-05-10 ENCOUNTER — OFFICE VISIT (OUTPATIENT)
Dept: FAMILY MEDICINE CLINIC | Facility: CLINIC | Age: 42
End: 2022-05-10
Payer: COMMERCIAL

## 2022-05-10 VITALS
SYSTOLIC BLOOD PRESSURE: 96 MMHG | HEIGHT: 61 IN | HEART RATE: 98 BPM | BODY MASS INDEX: 17.75 KG/M2 | OXYGEN SATURATION: 99 % | DIASTOLIC BLOOD PRESSURE: 66 MMHG | TEMPERATURE: 98.1 F | WEIGHT: 94 LBS | RESPIRATION RATE: 20 BRPM

## 2022-05-10 DIAGNOSIS — R63.6 UNDERWEIGHT: ICD-10-CM

## 2022-05-10 DIAGNOSIS — S00.93XD CONTUSION OF HEAD, SUBSEQUENT ENCOUNTER: ICD-10-CM

## 2022-05-10 DIAGNOSIS — M54.12 CERVICAL RADICULOPATHY: ICD-10-CM

## 2022-05-10 DIAGNOSIS — Z09 FOLLOW-UP EXAM: ICD-10-CM

## 2022-05-10 DIAGNOSIS — R47.89 OTHER SPEECH DISTURBANCE: ICD-10-CM
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
## 2022-05-10 DIAGNOSIS — Z71.6 TOBACCO ABUSE COUNSELING: ICD-10-CM

## 2022-05-10 DIAGNOSIS — R51.9 ACUTE NONINTRACTABLE HEADACHE, UNSPECIFIED HEADACHE TYPE: Primary | ICD-10-CM

## 2022-05-10 DIAGNOSIS — F33.0 MAJOR DEPRESSIVE DISORDER, RECURRENT, MILD (HCC): ICD-10-CM

## 2022-05-10 PROBLEM — R47.9 SPEECH DISTURBANCE: Status: ACTIVE | Noted: 2022-05-10

## 2022-05-10 PROCEDURE — 99215 OFFICE O/P EST HI 40 MIN: CPT | Performed by: NURSE PRACTITIONER

## 2022-05-10 RX ORDER — ACETAMINOPHEN, ASPIRIN AND CAFFEINE 250; 250; 65 MG/1; MG/1; MG/1
1 TABLET, FILM COATED ORAL EVERY 6 HOURS PRN
Qty: 30 TABLET | Refills: 0 | Status: SHIPPED | OUTPATIENT
Start: 2022-05-10

## 2022-05-10 NOTE — ASSESSMENT & PLAN NOTE
Chronic neck pain  Patient has 4/10 pain scale  RICE therapy encouraged  Patient currently taking naproxen ibuprofen as needed for neck pain  Patient starting physical therapy be this week  Patient also to start speech therapy

## 2022-05-10 NOTE — ASSESSMENT & PLAN NOTE
Patient taking Excedrin migraine which really reports that it helps with her headaches  Patient has also been follow up by concussion management

## 2022-05-10 NOTE — LETTER
May 10, 2022     Patient: Greta Coates  YOB: 1980  Date of Visit: 5/10/2022      To Whom it May Concern: Clayton Lawrence is under my professional care  Mery Asif was seen in my office on 5/10/2022  Mery Asif may return to work on 5/18/2022  If you have any questions or concerns, please don't hesitate to call           Sincerely,          SOLO Buckner        CC: No Recipients

## 2022-05-12 ENCOUNTER — HOSPITAL ENCOUNTER (OUTPATIENT)
Dept: INFUSION CENTER | Facility: HOSPITAL | Age: 42
Discharge: HOME | End: 2022-05-12
Payer: COMMERCIAL

## 2022-05-12 ENCOUNTER — EVALUATION (OUTPATIENT)
Dept: SPEECH THERAPY | Facility: CLINIC | Age: 42
End: 2022-05-12
Payer: COMMERCIAL

## 2022-05-12 VITALS
HEART RATE: 96 BPM | TEMPERATURE: 97.9 F | SYSTOLIC BLOOD PRESSURE: 99 MMHG | RESPIRATION RATE: 18 BRPM | DIASTOLIC BLOOD PRESSURE: 67 MMHG
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
## 2022-05-12 DIAGNOSIS — D50.8 IRON DEFICIENCY ANEMIA SECONDARY TO INADEQUATE DIETARY IRON INTAKE: Primary | ICD-10-CM

## 2022-05-12 DIAGNOSIS — R11.0 NAUSEA: ICD-10-CM

## 2022-05-12 DIAGNOSIS — R48.8 OTHER SYMBOLIC DYSFUNCTIONS: Primary | ICD-10-CM

## 2022-05-12 PROCEDURE — 96125 COGNITIVE TEST BY HC PRO: CPT

## 2022-05-12 PROCEDURE — 96366 THER/PROPH/DIAG IV INF ADDON: CPT

## 2022-05-12 PROCEDURE — 92507 TX SP LANG VOICE COMM INDIV: CPT

## 2022-05-12 PROCEDURE — 96365 THER/PROPH/DIAG IV INF INIT: CPT

## 2022-05-12 RX ORDER — DEXAMETHASONE 4 MG/1
4 TABLET ORAL ONCE
Status: COMPLETED | OUTPATIENT
Start: 2022-05-12 | End: 2022-05-12

## 2022-05-12 RX ORDER — DEXAMETHASONE 4 MG/1
4 TABLET ORAL ONCE
Status: CANCELLED
Start: 2022-05-19 | End: 2022-05-19

## 2022-05-12 RX ORDER — ACETAMINOPHEN 325 MG/1
650 TABLET ORAL ONCE
Status: CANCELLED
Start: 2022-05-19 | End: 2022-05-19

## 2022-05-12 RX ORDER — ACETAMINOPHEN 325 MG/1
650 TABLET ORAL ONCE
Status: COMPLETED | OUTPATIENT
Start: 2022-05-12 | End: 2022-05-12

## 2022-05-12 RX ORDER — SODIUM CHLORIDE 9 MG/ML
20 INJECTION, SOLUTION INTRAVENOUS ONCE
Status: CANCELLED | OUTPATIENT
Start: 2022-05-19

## 2022-05-12 RX ORDER — SODIUM CHLORIDE 9 MG/ML
20 INJECTION, SOLUTION INTRAVENOUS ONCE
Status: COMPLETED | OUTPATIENT
Start: 2022-05-12 | End: 2022-05-12

## 2022-05-12 RX ORDER — ONDANSETRON 8 MG/1
8 TABLET, ORALLY DISINTEGRATING ORAL EVERY 8 HOURS PRN
Qty: 20 TABLET | Refills: 2 | Status: SHIPPED | OUTPATIENT
Start: 2022-05-12 | End: 2022-08-05 | Stop reason: SDUPTHER

## 2022-05-12 RX ADMIN — DEXAMETHASONE 4 MG: 4 TABLET ORAL at 14:11

## 2022-05-12 RX ADMIN — SODIUM CHLORIDE 20 ML/HR: 0.9 INJECTION, SOLUTION INTRAVENOUS at 14:10

## 2022-05-12 RX ADMIN — IRON SUCROSE 300 MG: 20 INJECTION, SOLUTION INTRAVENOUS at 14:20

## 2022-05-12 RX ADMIN — ACETAMINOPHEN 650 MG: 325 TABLET ORAL at 14:11

## 2022-05-12 NOTE — PROGRESS NOTES
Pt tolerated Venofer today without incident  Pt requested refill for Zofran, reached out to Micky Mathis RN, she will have refill sent to pt's pharmacy - made pt aware  Pt has 1 more Venofer appt next week at [facility]; although there is an appt scheduled 5/26 which will be cancelled
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
## 2022-05-12 NOTE — TELEPHONE ENCOUNTER
Received notification from infusion in regards to patient asking for refill of zofran  Prescription pending provider signature

## 2022-05-15 DIAGNOSIS — J45.20 MILD INTERMITTENT ASTHMA, UNCOMPLICATED: ICD-10-CM

## 2022-05-16 ENCOUNTER — OFFICE VISIT (OUTPATIENT)
Dept: PHYSICAL THERAPY | Facility: CLINIC | Age: 42
End: 2022-05-16
Payer: COMMERCIAL

## 2022-05-16 DIAGNOSIS — S06.0X0D CONCUSSION WITHOUT LOSS OF CONSCIOUSNESS, SUBSEQUENT ENCOUNTER: Primary | ICD-10-CM

## 2022-05-16 PROCEDURE — 97112 NEUROMUSCULAR REEDUCATION: CPT

## 2022-05-16 RX ORDER — ALBUTEROL SULFATE 90 UG/1
AEROSOL, METERED RESPIRATORY (INHALATION)
Qty: 18 G | Refills: 1 | Status: SHIPPED | OUTPATIENT
Start: 2022-05-16 | End: 2022-07-13
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
## 2022-05-19 ENCOUNTER — OFFICE VISIT (OUTPATIENT)
Dept: SPEECH THERAPY | Facility: CLINIC | Age: 42
End: 2022-05-19
Payer: COMMERCIAL

## 2022-05-19 ENCOUNTER — HOSPITAL ENCOUNTER (OUTPATIENT)
Dept: INFUSION CENTER | Facility: HOSPITAL | Age: 42
Discharge: HOME | End: 2022-05-19
Attending: INTERNAL MEDICINE
Payer: COMMERCIAL

## 2022-05-19 VITALS
TEMPERATURE: 98.9 F | HEART RATE: 94 BPM | RESPIRATION RATE: 18 BRPM | DIASTOLIC BLOOD PRESSURE: 77 MMHG | SYSTOLIC BLOOD PRESSURE: 101 MMHG

## 2022-05-19 DIAGNOSIS — D50.8 IRON DEFICIENCY ANEMIA SECONDARY TO INADEQUATE DIETARY IRON INTAKE: Primary | ICD-10-CM
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
## 2022-05-19 DIAGNOSIS — R48.8 OTHER SYMBOLIC DYSFUNCTIONS: Primary | ICD-10-CM

## 2022-05-19 PROCEDURE — 96365 THER/PROPH/DIAG IV INF INIT: CPT

## 2022-05-19 PROCEDURE — 92507 TX SP LANG VOICE COMM INDIV: CPT

## 2022-05-19 RX ORDER — ACETAMINOPHEN 325 MG/1
650 TABLET ORAL ONCE
Status: CANCELLED
Start: 2022-05-19 | End: 2022-05-19

## 2022-05-19 RX ORDER — SODIUM CHLORIDE 9 MG/ML
20 INJECTION, SOLUTION INTRAVENOUS ONCE
Status: CANCELLED | OUTPATIENT
Start: 2022-05-19

## 2022-05-19 RX ORDER — DEXAMETHASONE 4 MG/1
4 TABLET ORAL ONCE
Status: CANCELLED
Start: 2022-05-19 | End: 2022-05-19

## 2022-05-19 RX ORDER — ACETAMINOPHEN 325 MG/1
650 TABLET ORAL ONCE
Status: COMPLETED | OUTPATIENT
Start: 2022-05-19 | End: 2022-05-19

## 2022-05-19 RX ORDER — DEXAMETHASONE 4 MG/1
4 TABLET ORAL ONCE
Status: COMPLETED | OUTPATIENT
Start: 2022-05-19 | End: 2022-05-19

## 2022-05-19 RX ORDER — SODIUM CHLORIDE 9 MG/ML
20 INJECTION, SOLUTION INTRAVENOUS ONCE
Status: COMPLETED | OUTPATIENT
Start: 2022-05-19 | End: 2022-05-19

## 2022-05-19 RX ADMIN — IRON SUCROSE 300 MG: 20 INJECTION, SOLUTION INTRAVENOUS at 13:30

## 2022-05-19 RX ADMIN — SODIUM CHLORIDE 20 ML/HR: 0.9 INJECTION, SOLUTION INTRAVENOUS at 13:21

## 2022-05-19 RX ADMIN — ACETAMINOPHEN 650 MG: 325 TABLET ORAL at 13:22

## 2022-05-19 RX ADMIN — DEXAMETHASONE 4 MG: 4 TABLET ORAL at 13:22

## 2022-05-19 NOTE — PROGRESS NOTES
Patient tolerated #4/4 venofer well today without complications  Confirmed patient's next appt with Dr Shabana Ivy on Tue 5-31 and to get her labs Fri 5-27, scripts given and AVS provided

## 2022-05-19 NOTE — PROGRESS NOTES
Daily Speech Treatment Note    Today's date: 2022  Patients name: Vladislav Leyva  : 1980  MRN: [id number]  Safety measures: Seizures  Referring provider: SOLO Fuentes    Primary diagnosis/billing code: R48 8  Secondary diagnosis/billing code: [id number]  2XXS    Visit tracking:  -Referring provider: Epic  -Billing guidelines: CMS  -Visit #2  -Insurance: [address] due 22    Subjective/Behavioral:  -"I want to get better"    -Patient continues with with cognitive and physcial fatigue - she was visually upset    Objective/Assessment:  -Reviewed testing results and goals in plan care with patient  Patient is in agreement at this time  Short-term goals:  1  Patient will be provided with education on functional cognitive disorders and identify relevant information related to their experience, to be met in 1 week  2  Patient will identify any factors that may contribute to their cognitive overload, to be met in 1 week  3  Patient will participate in cognitive strategy training to aide in return to work, to be met in 1-2 weeks  Discussed POC, patient would like to continue with cognitive strategy training as needed  Treatment today: To target generative naming with initial letter and category provided, patient completed category members acitvity (i e , SPORT, begins with /g/)  Patient appropriately named 30/32 words; increasing to 32/32 with verbal cues  Memory (3 words in reverse): 7/10    To target semantic association and lexicon building, patient asked to name an opposite/antonym and synonym of an abstract word (i e , rigid)  Opposite naming completed  opp  Yuliet Minion naming completed  op  Plan:  -Patient was provided with home exercises/activities to target goals in plan of care at the end of today's session   -Continue with current plan of care

## 2022-05-20 ENCOUNTER — TELEPHONE (OUTPATIENT)
Dept: GASTROENTEROLOGY | Facility: MEDICAL CENTER | Age: 42
End: 2022-05-20

## 2022-05-26 ENCOUNTER — OFFICE VISIT (OUTPATIENT)
Dept: SPEECH THERAPY | Facility: CLINIC | Age: 42
End: 2022-05-26
Payer: COMMERCIAL
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
## 2022-05-26 DIAGNOSIS — R48.8 OTHER SYMBOLIC DYSFUNCTIONS: Primary | ICD-10-CM

## 2022-05-26 PROCEDURE — 92507 TX SP LANG VOICE COMM INDIV: CPT

## 2022-05-26 NOTE — PROGRESS NOTES
Daily Speech Treatment Note    Today's date: 2022  Patients name: Sari Ayala  : 1980  MRN: [id number]  Safety measures: Seizures  Referring provider: SOLO Schrader    Primary diagnosis/billing code: R48 8  Secondary diagnosis/billing code: [id number] [facility]  2XXS    Visit tracking:  -Referring provider: Epic  -Billing guidelines: CMS  -Visit #3  -Insurance: Gurpreet Learn  -RE due 22    Subjective/Behavioral:  "I feel like I am healing"    Objective/Assessment:  -Reviewed patient's home exercises/activities completed since last appointment  Name words by letter - mild difficulty     Short-term goals:  1  Patient will be provided with education on functional cognitive disorders and identify relevant information related to their experience, to be met in 1 week  2  Patient will identify any factors that may contribute to their cognitive overload, to be met in 1 week  3  Patient will participate in cognitive strategy training to aide in return to work, to be met in 1-2 weeks  Fill in blanks with recall: Patient was asked to fill in missing letters within a 10 word list of words within a category (i e , fruits; o_an_e (orange))  Patient completed ID of words in  opp  Patient was then educated on "chunking" memory strategy, and asked to use this strategy to study or memorize the two lists of words  Patient was provided with min-mod cues to create groups/chunking of words  Immediate recall completed in  opp  DELAYED RECALL:     Anagrams: To target auditory attention and mental manipulation during a word finding activity, patient was asked to listen to a word, and rearrange the letters within the word to create a new word (i e , late = tale)  Task completed over 30 trials  Completed  independently, increased to  with use of written support         Plan:  -Patient was provided with home exercises/activities to target goals in plan of care at the end of today's session   -Continue with current plan of care

## 2022-05-27 ENCOUNTER — APPOINTMENT (EMERGENCY)
Dept: RADIOLOGY | Facility: HOSPITAL | Age: 42
End: 2022-05-27
Payer: COMMERCIAL

## 2022-05-27 ENCOUNTER — HOSPITAL ENCOUNTER (EMERGENCY)
Facility: HOSPITAL | Age: 42
Discharge: HOME | End: 2022-05-27
Attending: EMERGENCY MEDICINE
Payer: COMMERCIAL

## 2022-05-27 ENCOUNTER — TELEPHONE (OUTPATIENT)
Dept: FAMILY MEDICINE CLINIC | Facility: CLINIC | Age: 42
End: 2022-05-27

## 2022-05-27 VITALS
DIASTOLIC BLOOD PRESSURE: 79 MMHG | RESPIRATION RATE: 18 BRPM | HEART RATE: 99 BPM | OXYGEN SATURATION: 100 % | HEIGHT: 61 IN | WEIGHT: 94 LBS | BODY MASS INDEX: 17.75 KG/M2 | SYSTOLIC BLOOD PRESSURE: 114 MMHG | TEMPERATURE: 98.9 F

## 2022-05-27 DIAGNOSIS — H93.19 TINNITUS: ICD-10-CM

## 2022-05-27 DIAGNOSIS — S06.0X9A CONCUSSION: ICD-10-CM

## 2022-05-27 DIAGNOSIS — R11.0 NAUSEA: ICD-10-CM

## 2022-05-27 DIAGNOSIS — R42 VERTIGO: ICD-10-CM

## 2022-05-27 DIAGNOSIS — R51.9 ACUTE HEADACHE: Primary | ICD-10-CM

## 2022-05-27 DIAGNOSIS — H53.149 PHOTOPHOBIA: ICD-10-CM

## 2022-05-27 PROCEDURE — G1004 CDSM NDSC: HCPCS

## 2022-05-27 PROCEDURE — 99284 EMERGENCY DEPT VISIT MOD MDM: CPT

## 2022-05-27 PROCEDURE — 99284 EMERGENCY DEPT VISIT MOD MDM: CPT | Performed by: EMERGENCY MEDICINE

## 2022-05-27 PROCEDURE — 70450 CT HEAD/BRAIN W/O DYE: CPT

## 2022-05-27 RX ORDER — MECLIZINE HCL 12.5 MG/1
12.5 TABLET ORAL ONCE
Status: COMPLETED | OUTPATIENT
Start: 2022-05-27 | End: 2022-05-27

## 2022-05-27 RX ORDER — MECLIZINE HYDROCHLORIDE 25 MG/1
25 TABLET ORAL 3 TIMES DAILY PRN
Qty: 30 TABLET | Refills: 0 | Status: SHIPPED | OUTPATIENT
Start: 2022-05-27 | End: 2022-06-01

## 2022-05-27 RX ORDER — IBUPROFEN 400 MG/1
400 TABLET ORAL ONCE
Status: COMPLETED | OUTPATIENT
Start: 2022-05-27 | End: 2022-05-27

## 2022-05-27 RX ORDER — ONDANSETRON 4 MG/1
4 TABLET, ORALLY DISINTEGRATING ORAL EVERY 6 HOURS PRN
Qty: 20 TABLET | Refills: 0 | Status: SHIPPED | OUTPATIENT
Start: 2022-05-27

## 2022-05-27 RX ORDER — ONDANSETRON 4 MG/1
4 TABLET, ORALLY DISINTEGRATING ORAL ONCE
Status: COMPLETED | OUTPATIENT
Start: 2022-05-27 | End: 2022-05-27

## 2022-05-27 RX ADMIN — MECLIZINE HCL 12.5 MG 12.5 MG: 12.5 TABLET ORAL at 15:47

## 2022-05-27 RX ADMIN — IBUPROFEN 400 MG: 400 TABLET ORAL at 17:48

## 2022-05-27 RX ADMIN — ONDANSETRON 4 MG: 4 TABLET, ORALLY DISINTEGRATING ORAL at 15:47
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
## 2022-05-27 NOTE — DISCHARGE INSTRUCTIONS
You were evaluated in the emergency department for: headache  You can access your current and pending results through Zari Sullivan  A radiologist will take a second look at your X-Rays, if you had any, and you will be contacted with any new findings  Please use the following pain medications as prescribed:  - Tylenol 650mg every 6 hours  - Motrin 400mg every 6 hours  They work in different ways so can be used together at the same time  You should follow-up with your primary care provider, as soon as possible, for re-evaluation  You have also been referred to neurology, physical therapy, and the concussion clinic and you should follow up with them as well  Your workup revealed no emergent features at this time; however, many disease processes are dynamic:    Please, return to the emergency department if you experience new or worsening symptoms, fever, chest pain, shortness of breath, difficulty breathing, dizziness, abdominal pain, persistent nausea/vomiting, syncope or passing out, blood in your urine or stool, coughing up blood, leg swelling/pain, urinary retention, bowel or bladder incontinence, numbness between your legs
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
## 2022-05-27 NOTE — Clinical Note
Michael Denson was seen and treated in our emergency department on 5/27/2022  No work until cleared by Family Doctor/Orthopedics        Diagnosis:     Rohit Richardson    She may return on this date: If you have any questions or concerns, please don't hesitate to call        Malika Verdugo MD    ______________________________           _______________          _______________  Hospital Representative                              Date                                Time

## 2022-05-31 ENCOUNTER — OFFICE VISIT (OUTPATIENT)
Dept: HEMATOLOGY ONCOLOGY | Facility: CLINIC | Age: 42
End: 2022-05-31
Payer: COMMERCIAL

## 2022-05-31 ENCOUNTER — APPOINTMENT (OUTPATIENT)
Dept: LAB | Facility: HOSPITAL | Age: 42
End: 2022-05-31
Payer: COMMERCIAL

## 2022-05-31 ENCOUNTER — HOSPITAL ENCOUNTER (OUTPATIENT)
Dept: RADIOLOGY | Facility: HOSPITAL | Age: 42
Discharge: HOME | End: 2022-05-31
Attending: ANESTHESIOLOGY
Payer: COMMERCIAL

## 2022-05-31 VITALS
TEMPERATURE: 98.1 F | HEIGHT: 61 IN | RESPIRATION RATE: 17 BRPM | HEART RATE: 118 BPM | DIASTOLIC BLOOD PRESSURE: 60 MMHG | BODY MASS INDEX: 17.18 KG/M2 | SYSTOLIC BLOOD PRESSURE: 92 MMHG | OXYGEN SATURATION: 97 % | WEIGHT: 91 LBS

## 2022-05-31 DIAGNOSIS — M54.50 CHRONIC BILATERAL LOW BACK PAIN, UNSPECIFIED WHETHER SCIATICA PRESENT: ICD-10-CM

## 2022-05-31 DIAGNOSIS — D50.8 IRON DEFICIENCY ANEMIA SECONDARY TO INADEQUATE DIETARY IRON INTAKE: Primary | ICD-10-CM

## 2022-05-31 DIAGNOSIS — G89.29 CHRONIC BILATERAL LOW BACK PAIN, UNSPECIFIED WHETHER SCIATICA PRESENT: ICD-10-CM

## 2022-05-31 DIAGNOSIS — D50.8 IRON DEFICIENCY ANEMIA SECONDARY TO INADEQUATE DIETARY IRON INTAKE: ICD-10-CM

## 2022-05-31 LAB
BASOPHILS # BLD AUTO: 0.06 THOUSANDS/ΜL (ref 0–0.1)
BASOPHILS NFR BLD AUTO: 1 % (ref 0–1)
EOSINOPHIL # BLD AUTO: 0.22 THOUSAND/ΜL (ref 0–0.61)
EOSINOPHIL NFR BLD AUTO: 2 % (ref 0–6)
ERYTHROCYTE [DISTWIDTH] IN BLOOD BY AUTOMATED COUNT: 24.2 % (ref 11.6–15.1)
FERRITIN SERPL-MCNC: 161 NG/ML (ref 8–388)
HCT VFR BLD AUTO: 40 % (ref 34.8–46.1)
HGB BLD-MCNC: 12.8 G/DL (ref 11.5–15.4)
IMM GRANULOCYTES # BLD AUTO: 0.03 THOUSAND/UL (ref 0–0.2)
IMM GRANULOCYTES NFR BLD AUTO: 0 % (ref 0–2)
IRON SATN MFR SERPL: 21 % (ref 15–50)
IRON SERPL-MCNC: 64 UG/DL (ref 50–170)
LYMPHOCYTES # BLD AUTO: 3.53 THOUSANDS/ΜL (ref 0.6–4.47)
LYMPHOCYTES NFR BLD AUTO: 36 % (ref 14–44)
MCH RBC QN AUTO: 28.3 PG (ref 26.8–34.3)
MCHC RBC AUTO-ENTMCNC: 32 G/DL (ref 31.4–37.4)
MCV RBC AUTO: 88 FL (ref 82–98)
MONOCYTES # BLD AUTO: 0.73 THOUSAND/ΜL (ref 0.17–1.22)
MONOCYTES NFR BLD AUTO: 7 % (ref 4–12)
NEUTROPHILS # BLD AUTO: 5.35 THOUSANDS/ΜL (ref 1.85–7.62)
NEUTS SEG NFR BLD AUTO: 54 % (ref 43–75)
NRBC BLD AUTO-RTO: 0 /100 WBCS
PLATELET # BLD AUTO: 369 THOUSANDS/UL (ref 149–390)
PMV BLD AUTO: 8.6 FL (ref 8.9–12.7)
RBC # BLD AUTO: 4.53 MILLION/UL (ref 3.81–5.12)
TIBC SERPL-MCNC: 306 UG/DL (ref 250–450)
WBC # BLD AUTO: 9.92 THOUSAND/UL (ref 4.31–10.16)

## 2022-05-31 PROCEDURE — 36415 COLL VENOUS BLD VENIPUNCTURE: CPT

## 2022-05-31 PROCEDURE — 72100 X-RAY EXAM L-S SPINE 2/3 VWS: CPT

## 2022-05-31 PROCEDURE — 83540 ASSAY OF IRON: CPT

## 2022-05-31 PROCEDURE — 3008F BODY MASS INDEX DOCD: CPT | Performed by: ANESTHESIOLOGY

## 2022-05-31 PROCEDURE — 99214 OFFICE O/P EST MOD 30 MIN: CPT | Performed by: INTERNAL MEDICINE

## 2022-05-31 PROCEDURE — 85025 COMPLETE CBC W/AUTO DIFF WBC: CPT

## 2022-05-31 PROCEDURE — 83550 IRON BINDING TEST: CPT

## 2022-05-31 PROCEDURE — 82728 ASSAY OF FERRITIN: CPT
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
## 2022-06-01 ENCOUNTER — TELEPHONE (OUTPATIENT)
Dept: NEUROLOGY | Facility: CLINIC | Age: 42
End: 2022-06-01

## 2022-06-01 ENCOUNTER — OFFICE VISIT (OUTPATIENT)
Dept: FAMILY MEDICINE CLINIC | Facility: CLINIC | Age: 42
End: 2022-06-01
Payer: COMMERCIAL

## 2022-06-01 ENCOUNTER — CONSULT (OUTPATIENT)
Dept: NEUROLOGY | Facility: CLINIC | Age: 42
End: 2022-06-01
Payer: COMMERCIAL

## 2022-06-01 VITALS
WEIGHT: 93 LBS | HEIGHT: 61 IN | DIASTOLIC BLOOD PRESSURE: 68 MMHG | OXYGEN SATURATION: 99 % | SYSTOLIC BLOOD PRESSURE: 98 MMHG | TEMPERATURE: 96.6 F | BODY MASS INDEX: 17.56 KG/M2 | RESPIRATION RATE: 16 BRPM | HEART RATE: 88 BPM

## 2022-06-01 VITALS
SYSTOLIC BLOOD PRESSURE: 104 MMHG | WEIGHT: 93 LBS | HEART RATE: 99 BPM | HEIGHT: 61 IN | DIASTOLIC BLOOD PRESSURE: 79 MMHG | TEMPERATURE: 97.5 F | BODY MASS INDEX: 17.56 KG/M2

## 2022-06-01 DIAGNOSIS — G44.319 ACUTE POST-TRAUMATIC HEADACHE, NOT INTRACTABLE: Primary | ICD-10-CM

## 2022-06-01 DIAGNOSIS — M54.12 CERVICAL RADICULOPATHY: ICD-10-CM

## 2022-06-01 DIAGNOSIS — S00.93XD CONTUSION OF HEAD, SUBSEQUENT ENCOUNTER: Primary | ICD-10-CM

## 2022-06-01 DIAGNOSIS — E43 SEVERE PROTEIN-CALORIE MALNUTRITION (HCC): ICD-10-CM

## 2022-06-01 DIAGNOSIS — K21.9 GASTROESOPHAGEAL REFLUX DISEASE WITHOUT ESOPHAGITIS: ICD-10-CM

## 2022-06-01 DIAGNOSIS — F44.7 FUNCTIONAL NEUROLOGICAL SYMPTOM DISORDER WITH MIXED SYMPTOMS: ICD-10-CM

## 2022-06-01 DIAGNOSIS — Z09 FOLLOW-UP EXAM: ICD-10-CM

## 2022-06-01 DIAGNOSIS — Z71.6 TOBACCO ABUSE COUNSELING: ICD-10-CM

## 2022-06-01 DIAGNOSIS — G43.009 MIGRAINE WITHOUT AURA AND WITHOUT STATUS MIGRAINOSUS, NOT INTRACTABLE: ICD-10-CM

## 2022-06-01 DIAGNOSIS — F43.10 PTSD (POST-TRAUMATIC STRESS DISORDER): ICD-10-CM

## 2022-06-01 DIAGNOSIS — D50.8 IRON DEFICIENCY ANEMIA SECONDARY TO INADEQUATE DIETARY IRON INTAKE: ICD-10-CM

## 2022-06-01 DIAGNOSIS — J30.2 SEASONAL ALLERGIES: ICD-10-CM

## 2022-06-01 DIAGNOSIS — F33.0 MAJOR DEPRESSIVE DISORDER, RECURRENT, MILD (HCC): ICD-10-CM

## 2022-06-01 PROBLEM — S00.93XS: Status: ACTIVE | Noted: 2022-05-02

## 2022-06-01 PROCEDURE — 99205 OFFICE O/P NEW HI 60 MIN: CPT | Performed by: PSYCHIATRY & NEUROLOGY

## 2022-06-01 PROCEDURE — 99215 OFFICE O/P EST HI 40 MIN: CPT | Performed by: NURSE PRACTITIONER

## 2022-06-01 PROCEDURE — 96372 THER/PROPH/DIAG INJ SC/IM: CPT | Performed by: PSYCHIATRY & NEUROLOGY

## 2022-06-01 RX ORDER — ACETAMINOPHEN, ASPIRIN AND CAFFEINE 250; 250; 65 MG/1; MG/1; MG/1
1 TABLET, FILM COATED ORAL EVERY 6 HOURS PRN
Qty: 30 TABLET | Refills: 0 | Status: CANCELLED | OUTPATIENT
Start: 2022-06-01

## 2022-06-01 RX ORDER — DEXAMETHASONE 2 MG/1
TABLET ORAL
Qty: 5 TABLET | Refills: 0 | Status: SHIPPED | OUTPATIENT
Start: 2022-06-01 | End: 2022-06-06 | Stop reason: SDUPTHER

## 2022-06-01 RX ORDER — IBUPROFEN 600 MG/1
TABLET ORAL EVERY 6 HOURS PRN
COMMUNITY

## 2022-06-01 RX ORDER — ACETAMINOPHEN 500 MG
500 TABLET ORAL EVERY 6 HOURS PRN
COMMUNITY

## 2022-06-01 RX ORDER — KETOROLAC TROMETHAMINE 30 MG/ML
30 INJECTION, SOLUTION INTRAMUSCULAR; INTRAVENOUS ONCE
Status: COMPLETED | OUTPATIENT
Start: 2022-06-01 | End: 2022-06-01

## 2022-06-01 RX ORDER — OMEPRAZOLE 20 MG/1
20 CAPSULE, DELAYED RELEASE ORAL DAILY
Qty: 30 CAPSULE | Refills: 2 | Status: SHIPPED | OUTPATIENT
Start: 2022-06-01 | End: 2022-08-02 | Stop reason: SDUPTHER

## 2022-06-01 RX ORDER — LORATADINE 10 MG/1
10 TABLET ORAL DAILY
Qty: 30 TABLET | Refills: 2 | Status: SHIPPED | OUTPATIENT
Start: 2022-06-01

## 2022-06-01 RX ADMIN — KETOROLAC TROMETHAMINE 30 MG: 30 INJECTION, SOLUTION INTRAMUSCULAR; INTRAVENOUS at 12:21

## 2022-06-01 NOTE — ASSESSMENT & PLAN NOTE
Patient currently on loratadine 10 mg p o  daily  Currently not using Flonase because of the side effects

## 2022-06-01 NOTE — ASSESSMENT & PLAN NOTE
Patient currently has seen Hematology and had intravenous iron replacement  Repeat lab work and recent iron studies within normal limits  Iron Saturation %  Order: [id number] - Part of Panel Order [id number]   Status: Final result     Visible to patient: Yes (seen)     Next appt: 06/02/2022 at 12:30 PM in Physical Therapy Carmella Devine, PT)     Dx: Iron deficiency anemia secondary to i        0 Result Notes    Component Ref Range & Units 5/31/22  2:37 PM 3/30/22  3:55 PM   Iron Saturation 15 - 50 % 21  4 Low     TIBC 250 - 450 ug/dL 306  421    Iron 50 - 170 ug/dL 64  18 Low  CM

## 2022-06-01 NOTE — PROGRESS NOTES
Review of Systems   Constitutional: Negative for appetite change and fever  HENT: Positive for tinnitus  Negative for hearing loss, trouble swallowing and voice change  Ringing mostly in left ear but a lightly in right ear too   Eyes: Positive for photophobia  Negative for pain  Respiratory: Negative  Negative for shortness of breath  Cardiovascular: Negative  Negative for palpitations  Gastrointestinal: Positive for nausea  Negative for vomiting  Endocrine: Negative  Negative for cold intolerance  Genitourinary: Negative  Negative for dysuria, frequency and urgency  Musculoskeletal: Positive for neck pain  Negative for myalgias  Skin: Negative  Negative for rash  Neurological: Positive for dizziness, speech difficulty, light-headedness and headaches  Negative for tremors, seizures, syncope, facial asymmetry, weakness and numbness  Hematological: Negative  Does not bruise/bleed easily  Psychiatric/Behavioral: Positive for confusion and sleep disturbance  Negative for hallucinations  Trouble both going to sleep and staying asleep   All other systems reviewed and are negative

## 2022-06-01 NOTE — PATIENT INSTRUCTIONS
If you had a concussion, it is likely over by now  We discussed I do not recommend any concussion specific therapies  A lot of this is post traumatic headache and post traumatic stress    "the body keeps the score" - great book on post traumatic stress    Ideally long term would recommend coming off of ativan as this can worsen PTSD and anxiety Symptoms which can interfere with memory and processing of the trauma occurred  6 mg is a very high dose that can be concerning long term  We discussed gradual return to normalcy     Ok to take this week off work and return 6/8/22    Stop meclizine, drink more water     Headache/migraine treatment:   Abortive medications (for immediate treatment of a headache): It is ok to take ibuprofen, acetaminophen or naproxen (Advil, Tylenol,  Aleve, Excedrin) if they help your headaches you should limit these to No more than 3 times a week to avoid medication overuse/rebound headaches  Decadron 1 tab with breakfast for 5 mornings to try and calm down post traumatic headache      Over the counter preventive supplements for headaches/migraines (if you try, try for 3 months straight)  (to take every day to help prevent headaches - not to take at the time of headache): There are combo pills online of these - none of which regulated by FDA and double check dosing - take appropriate dose only once a day- preventa migraine, migravent, mind ease, migrelief   [] Magnesium 400mg daily (If any diarrhea or upset stomach, decrease dose  as tolerated)  [] Riboflavin (Vitamin B2) 400mg daily - try online   (FYI B2 may make your urine bright/neon yellow)  AND/OR  [] Herbal medication: Petasites/Butterbur 150 mg daily - try online  (When choosing your Butterbur online or in the store, beware that there are some poor preps containing pyrrolizidine alkaloids (PAs) that can be harmful to the liver   Therefore, do not use butterbur products that are not labeled as PA-free )      Prescription preventive medications for headaches/migraines   (to take every day to help prevent headaches - not to take at the time of headache):  [x] we have options if you became interested       Lifestyle Recommendations:  [x] SLEEP - Maintain a regular sleep schedule: Adults need at least 7-8 hours of uninterrupted a night  Maintain good sleep hygiene:  Going to bed and waking up at consistent times, avoiding excessive daytime naps, avoiding caffeinated beverages in the evening, avoid excessive stimulation in the evening and generally using bed primarily for sleeping  One hour before bedtime would recommend turning lights down lower, decreasing your activity (may read quietly, listen to music at a low volume)  When you get into bed, should eliminate all technology (no texting, emailing, playing with your phone, iPad or tablet in bed)  [x] HYDRATION - Maintain good hydration  Drink  2L of fluid a day (4 typical small water bottles)  [x] DIET - Maintain good nutrition  In particular don't skip meals and try and eat healthy balanced meals regularly  [x] TRIGGERS - Look for other triggers and avoid them: Limit caffeine to 1-2 cups a day or less  Avoid dietary triggers that you have noticed bring on your headaches (this could include aged cheese, peanuts, MSG, aspartame and nitrates)  [x] EXERCISE - physical exercise as we all know is good for you in many ways, and not only is good for your heart, but also is beneficial for your mental health, cognitive health and  chronic pain/headaches  I would encourage at the least 5 days of physical exercise weekly for at least 30 minutes  Education and Follow-up  [x] Please call with any questions or concerns  Of course if any new concerning symptoms go to the emergency department    [x] Follow up 3-6 weeks, contact me sooner if needed

## 2022-06-01 NOTE — PATIENT INSTRUCTIONS
Migraine Headache   WHAT YOU NEED TO KNOW:   What is a migraine headache? A migraine is a severe headache  The pain can be so severe that it interferes with your daily activities  A migraine can last a few hours up to several days  The exact cause of migraines is not known  A family history of migraines increases your risk  Your risk is also higher if you are a woman or take medicines such as estrogen or a vasodilator  What are the warning signs that a migraine headache is about to start? Warning signs usually start 15 to 60 minutes before the headache:  Visual changes (auras), such as blurred vision, temporary blind or bright spots, lines, or hallucinations    Unusual tiredness or frequent yawning    Tingling in an arm or leg    What are the signs and symptoms of a migraine headache? A migraine headache usually begins as a dull ache around the eye or temple  The pain may get worse with movement  You may also have the following:  Pain in your head that may increase to the point that you cannot do everyday activities    Pain on one or both sides of your head    Throbbing, pulsing, or pounding pain in your head    Nausea and vomiting    Sensitivity to light, noise, or smells    What can trigger a migraine headache? Stress, eye strain, oversleeping, or not getting enough sleep    Hormone changes in women from birth control pills, pregnancy, menopause, or during a monthly period    Skipping meals, going too long without eating, or not drinking enough liquids    Certain foods or drinks such as chocolate, hard cheese, alcohol, or drinks that contain caffeine    Foods that contain gluten, nitrates, MSG, or artificial sweeteners    Sunlight, bright or flashing lights, loud noises, smoke, or strong smells    Heat, humidity, or changes in the weather    How is a migraine headache diagnosed? Your healthcare provider will ask about your headaches  Describe the pain and any other symptoms, such as nausea   Tell the provider if you think anything triggered the pain  The provider will also want to know what you ate and drank before the pain started  Tell the provider about any medical conditions you have or that run in your family  Include any recent stressors you have had  You may also need any of the following:  A neurologic exam  is used to check how your pupils react to light  Your healthcare provider may check your memory, hand grasp, and balance  CT or MRI pictures  may be taken of your brain  You may be given contrast liquid to help your brain show up better in the pictures  Tell the healthcare provider if you have ever had an allergic reaction to contrast liquid  Do not enter the MRI room with anything metal  Metal can cause serious injury  Tell the healthcare provider if you have any metal in or on your body  How is a migraine headache treated? Migraines cannot be cured  The goal of treatment is to reduce your symptoms  Medicines  may be given to help you manage migraines  Take medicine as soon as you feel a migraine begin, or as directed  Your healthcare provider may recommend any of the following:    Migraine medicines  are used to help prevent or stop a migraine  NSAIDs  help decrease swelling and pain or fever  This medicine is available with or without a doctor's order  NSAIDs can cause stomach bleeding or kidney problems in certain people  If you take blood thinner medicine, always ask your healthcare provider if NSAIDs are safe for you  Always read the medicine label and follow directions  Acetaminophen  decreases pain and fever  It is available without a doctor's order  Ask how much to take and how often to take it  Follow directions  Read the labels of all other medicines you are using to see if they also contain acetaminophen, or ask your doctor or pharmacist  Acetaminophen can cause liver damage if not taken correctly   Do not use more than 4 grams (4,000 milligrams) total of acetaminophen in one day      Prescription pain medicine  may be given  Ask your healthcare provider how to take this medicine safely  Some prescription pain medicines contain acetaminophen  Do not take other medicines that contain acetaminophen without talking to your healthcare provider  Too much acetaminophen may cause liver damage  Prescription pain medicine may cause constipation  Ask your healthcare provider how to prevent or treat constipation  Antinausea medicine  may be given to calm your stomach and to help prevent vomiting  This medicine can also help relieve pain  Cognitive behavior therapy (CBT)  can help you learn ways to manage and prevent migraines  A therapist can teach you to relax and to reduce stress  You may learn ways to create healthy nutrition, activity, and sleep habits to prevent migraines  The therapist can also help you manage conditions that can affect migraines, such as anxiety or depression  What can I do to manage my symptoms? Rest in a dark, quiet room  This will help decrease your pain  Sleep may also help relieve the pain  Apply ice to decrease pain  Use an ice pack, or put crushed ice in a plastic bag  Cover the ice pack with a towel and place it on your head  Apply ice for 15 to 20 minutes every hour  Apply heat to decrease pain and muscle spasms  Use a small towel dampened with warm water or a heating pad, or sit in a warm bath  Apply heat on the area for 20 to 30 minutes every 2 hours  You may alternate heat and ice  Keep a migraine record  Write down when your migraines start and stop  Include your symptoms and what you were doing when a migraine began  Record what you ate or drank for 24 hours before the migraine started  Keep track of what you did to treat your migraine and if it worked  Bring the migraine record with you to visits with your healthcare provider  What can I do to prevent another migraine headache? Prevent a medicine overuse headache    Take pain medicines only as long as directed  A medicine may be limited to a certain amount each month  Your healthcare provider can help you create a plan so you get a safe amount each month  Do not smoke  Nicotine and other chemicals in cigarettes and cigars can trigger a migraine or make it worse  Ask your healthcare provider for information if you currently smoke and need help to quit  E-cigarettes or smokeless tobacco still contain nicotine  Talk to your healthcare provider before you use these products  Do not drink alcohol  Alcohol can trigger a migraine  It can also keep medicines used to treat your migraines from working  Be physically active  Physical activity, such as exercise, may help prevent migraines  Talk to your healthcare provider about the best activity plan for you  Try to get at least 30 minutes of physical activity on most days  Manage stress  Stress may trigger a migraine  Learn new ways to relax, such as deep breathing  Create a sleep schedule  Go to bed and get up at the same times each day  Do not watch television before bed  Eat a variety of healthy foods  Include healthy foods such as include fruit, vegetables, whole-grain breads, low-fat dairy products, beans, lean meat, and fish  Do not have food or drinks that trigger your migraines  Drink more liquids to prevent dehydration  Your healthcare provider can tell you how much liquid to drink each day and which liquids are best for you  Call your local emergency number (911 in the [address] [facility]) or have someone call if:   You feel like you are going to faint, you become confused, or you have a seizure  When should I seek immediate care? You have a headache that seems different or much worse than your usual migraine headache  You have a severe headache with a fever or a stiff neck  You have new problems with speech, vision, balance, or movement  When should I call my doctor?    Your migraines interfere with your daily activities  Your medicines or treatments stop working  You have questions or concerns about your condition or care  CARE AGREEMENT:   You have the right to help plan your care  Learn about your health condition and how it may be treated  Discuss treatment options with your healthcare providers to decide what care you want to receive  You always have the right to refuse treatment  The above information is an  only  It is not intended as medical advice for individual conditions or treatments  Talk to your doctor, nurse or pharmacist before following any medical regimen to see if it is safe and effective for you  © Copyright [employer] 2022 Information is for End User's use only and may not be sold, redistributed or otherwise used for commercial purposes  All illustrations and images included in CareNotes® are the copyrighted property of [employer]  or [employer]  Dizziness   WHAT YOU NEED TO KNOW:   What is dizziness? Dizziness is a feeling of being off balance or unsteady  Common causes of dizziness are an inner ear fluid imbalance or a lack of oxygen in your blood  Dizziness may be acute (lasts 3 days or less) or chronic (lasts longer than 3 days)  You may have dizzy spells that last from seconds to a few hours  What increases my risk for dizziness? Dizziness may get worse during certain activities or when you move a certain way  The following may also increase your risk for dizziness:  Older age    An infection, ear surgery, or an inner ear condition, such as Ménière disease    Stroke, a brain tumor, or a recent head trauma     An injury that causes a large amount of blood loss    Heart or blood pressure problems     Exposure to chemicals, or long-term alcohol use     Medicines used to treat high blood pressure, seizure disorders, or anxiety and depression     A nerve disorder, such as multiple sclerosis    What signs and symptoms may happen with dizziness?    A feeling that your surroundings are moving even though you are standing still    Ringing in your ears or hearing loss     Feeling faint or lightheaded     Weakness or unsteadiness     Double vision or eye movements you cannot control    Nausea or vomiting     Confusion    How is the cause of dizziness diagnosed? Your healthcare provider may ask when the dizziness started  Tell the provider if you have dizzy spells, and how long they last  Tell him or her what happens before you become dizzy  The provider will ask if you have other health conditions and if you take any medicines  He or she will check your blood pressure and pulse to see if your dizziness may be related to your heart  Your balance, strength, reflexes, and the way you walk may also be checked  You may need any of the following tests to help find the cause of your dizziness: An EKG  records the electrical activity of your heart  An EKG can be used to check for an abnormal heart beat or heart damage  Blood tests  will check your blood sugar level, infection, and your blood cell levels  CT or MRI  pictures check for a stroke, head injury, or brain tumor  They also check for brain bleeding or swelling  You may be given contrast liquid to help your brain show up better in the pictures  Tell the healthcare provider if you have ever had an allergic reaction to contrast liquid  Do not enter the MRI room with anything metal  Metal can cause serious injury  Tell the healthcare provider if you have any metal in or on your body  How is dizziness treated? Treatment will depend on the cause of your dizziness  Your healthcare provider may give you oxygen or medicines to decrease your dizziness and nausea  He may also refer you to a specialist  [place] may need to be admitted to the hospital for treatment  How can I manage my symptoms? Do not drive  or operate heavy machinery when you are dizzy  Get up slowly  from sitting or lying down       Drink plenty of liquids  Liquids help prevent dehydration  Ask how much liquid to drink each day and which liquids are best for you  When should I seek immediate care? You have a headache and a stiff neck  You have shaking chills and a fever  You vomit over and over with no relief  Your vomit or bowel movements are red or black  You have pain in your chest, back, or abdomen  You have numbness, especially in your face, arms, or legs  You have trouble moving your arms or legs  You are confused  When should I contact my healthcare provider? You have a fever  Your symptoms do not get better with treatment  You have questions or concerns about your condition or care  CARE AGREEMENT:   You have the right to help plan your care  Learn about your health condition and how it may be treated  Discuss treatment options with your healthcare providers to decide what care you want to receive  You always have the right to refuse treatment  The above information is an  only  It is not intended as medical advice for individual conditions or treatments  Talk to your doctor, nurse or pharmacist before following any medical regimen to see if it is safe and effective for you  © Copyright [employer] 2022 Information is for End User's use only and may not be sold, redistributed or otherwise used for commercial purposes   All illustrations and images included in CareNotes® are the copyrighted property of [employer]  or [address] [place] [address]

## 2022-06-01 NOTE — ASSESSMENT & PLAN NOTE
Patient sees psych on a regular basis  Currently on BuSpar 15mg p o  t i d   In Ativan 1 mg every 4 hours

## 2022-06-01 NOTE — PROGRESS NOTES
BMI Counseling: Body mass index is 17 57 kg/m²  The BMI is below normal  Patient advised to gain weight and dietary education for weight gain was provided  Rationale for BMI follow-up plan is due to patient being underweight  Assessment/Plan:         Problem List Items Addressed This Visit        Digestive    Gastroesophageal reflux disease without esophagitis     Patient taking omeprazole 20mg p o  daily  To maintain a GERD diet  Relevant Medications    omeprazole (PriLOSEC) 20 mg delayed release capsule       Nervous and Auditory    Cervical radiculopathy     Cervical neck pain noted  Patient may Tylenol or ibuprofen as needed for neck pain  Rest, ice, compresses encouraged  Other    Seasonal allergies     Patient currently on loratadine 10 mg p o  daily  Currently not using Flonase because of the side effects  Relevant Medications    loratadine (CLARITIN) 10 mg tablet    Tobacco abuse counseling     Patient smoking half a pack of cigarettes per day  Encouraged to quit smoking to help with her migraine headaches  Major depressive disorder, recurrent, mild ([facility] )     Patient sees psych on a regular basis  Currently on BuSpar 15mg p o  t i d   In Ativan 1 mg every 4 hours  Severe protein-calorie malnutrition ([facility] )     Malnutrition Findings:  BMI 17 57 kg/M2  Patient encouraged to gain weight at this time  Refuses nutrition consult at this time  BMI Findings: Body mass index is 17 57 kg/m²  Iron deficiency anemia secondary to inadequate dietary iron intake     Patient currently has seen Hematology and had intravenous iron replacement  Repeat lab work and recent iron studies within normal limits    Iron Saturation %  Order: [id number] - Part of Panel Order [id number]  Status: Final result    Visible to patient: Yes (seen)    Next appt: 06/02/2022 at 12:30 PM in Physical Therapy Trang Rodriguez PT) Cholesterol and Triglycerides Tests: About These Tests What are they? Cholesterol and triglycerides tests measure the amount of fats in your blood. These fats have both \"good\" (HDL) and \"bad\" (LDL) cholesterol. Why are these tests done? These tests are done to help find out your risk of a heart attack and stroke. They can help your doctor find out how well medicine is working for some health problems. How can you prepare for these tests? · Your doctor may tell you to fast before your tests. This means that you do not eat or drink anything except water for 9 to 12 hours before the tests. In most cases, you can take your medicines with water the morning of the test. 
· Do not eat high-fat foods the night before the tests. · Do not drink alcohol or do intense exercise the night before the tests. · Be sure to tell your doctor about all the over-the-counter and prescription medicines and herbs or other supplements you take. They can affect the results of these tests. What happens during these tests? A health professional takes a sample of your blood. What else should you know about these tests? Your cholesterol levels can help your doctor find out your risk for having a heart attack or stroke. But it's not just about your cholesterol. Your doctor uses your cholesterol levels plus other things to calculate your risk. These include: 
· Your blood pressure. · Whether or not you have diabetes. · Your age, sex, and race. · Whether or not you smoke. You and your doctor can talk about whether you need to lower your risk and what treatment is best for you. Where can you learn more? Go to http://lukasz-aditya.info/. Enter A516 in the search box to learn more about \"Cholesterol and Triglycerides Tests: About These Tests. \" Current as of: July 22, 2018 Content Version: 11.9 © 3291-6478 [employer].  Care instructions adapted under license by [address] (which disclaims liability or warranty for this information). If you have questions about a medical condition or this instruction, always ask your healthcare professional. [address] any warranty or liability for your use of this information.    Dx: Iron deficiency anemia secondary to i       0 Result Notes    Component Ref Range & Units 22  2:37 PM 3/30/22  3:55 PM   Iron Saturation 15 - 50 % 21  4 Low     TIBC 250 - 450 ug/dL 306  421    Iron 50 - 170 ug/dL 64  18 Low  CM                    Contusion of head, subsequent encounter - Primary     Patient has been seen by Neurology recently  Have a follow-up  Patient also maintained on Decadron 2 mg p o  daily prevention of migraines  She has been instructed that she can use Tylenol naproxen ibuprofen 3 times a week as needed for migraine symptoms  Currently she received a Toradol shot in Neurology office  Reports that her symptoms of headache her returning  Follow-up exam            Subjective:      Patient ID: Nima Nye is a 39 y o  female  Patient is a 40-year-old female presents for follow-up evaluation of recent ER visit 2022 for reports of head contusion after episode of dizziness  Recently saw Neurology today Dr Hiram Goodwin, due to increased frequency and severity of headaches and migraines she recommend further evaluation with MRI brain with without contrast to rule out structural or treatable causes of symptoms  Further medication and lifestyle management education provided by Neurology for patient at this time  The following portions of the patient's history were reviewed and updated as appropriate:   Past Medical History:  She has a past medical history of Allergies, Anxiety, Asthma, Chronic pain, Contusion of head, subsequent encounter (2022), Depression, Eating disorder, Fibrocystic breast changes of both breasts, and Seizures ([facility] [address] [facility] )  ,  _______________________________________________________________________  Medical Problems:  does not have any pertinent problems on file ,  _______________________________________________________________________  Past Surgical History:   has a past surgical history that includes Induced  () and Mammo stereotactic breast biopsy left (all inc) (2015)  ,  _______________________________________________________________________  Family History:  family history includes Alcohol abuse in her sister; Cancer in her father; Diabetes in her family and sister; Hypertension in her family; Psychiatric Illness in her mother ,  _______________________________________________________________________  Social History:   reports that she has been smoking cigarettes  She has a 14 00 pack-year smoking history  She has never used smokeless tobacco  She reports that she does not drink alcohol and does not use drugs  ,  _______________________________________________________________________  Allergies:  is allergic to gabapentin, meloxicam, tramadol, and vistaril [hydroxyzine]     _______________________________________________________________________  Current Outpatient Medications   Medication Sig Dispense Refill    acetaminophen (TYLENOL) 500 mg tablet Take 500 mg by mouth every 6 (six) hours as needed for mild pain      albuterol (PROVENTIL HFA,VENTOLIN HFA) 90 mcg/act inhaler TAKE 2 PUFFS BY MOUTH EVERY 6 HOURS AS NEEDED FOR WHEEZE OR FOR SHORTNESS OF BREATH 18 g 1    Ascorbic Acid (VITAMIN C) 100 MG tablet Take 100 mg by mouth daily      Biotin w/ Vitamins C & E (HAIR/SKIN/NAILS PO) Take by mouth      busPIRone (BUSPAR) 15 mg tablet Take by mouth 3 (three) times a day        CALCIUM-MAGNESIUM-ZINC PO Take by mouth      dexamethasone (DECADRON) 2 mg tablet One tab with breakfast for 1-5 days for unrelenting migraine 5 tablet 0    fluticasone (FLOVENT HFA) 110 MCG/ACT inhaler 110 puffs      fluticasone (Flovent HFA) 110 MCG/ACT inhaler Inhale 2 puffs 2 (two) times a day Rinse mouth after use   12 g 1    ibuprofen (MOTRIN) 600 mg tablet Take by mouth every 6 (six) hours as needed for mild pain      loratadine (CLARITIN) 10 mg tablet Take 1 tablet (10 mg total) by mouth daily 30 tablet 2    LORazepam (ATIVAN) 1 mg tablet Take 1 mg by mouth 6 (six) times a day  1    omeprazole (PriLOSEC) 20 mg delayed release capsule Take 1 capsule (20 mg total) by mouth daily 30 capsule 2    ondansetron (Zofran ODT) 4 mg disintegrating tablet Take 1 tablet (4 mg total) by mouth every 6 (six) hours as needed for nausea or vomiting 20 tablet 0    ondansetron (Zofran ODT) 8 mg disintegrating tablet Take 1 tablet (8 mg total) by mouth every 8 (eight) hours as needed for nausea or vomiting 20 tablet 2    thiamine (VITAMIN B1) 100 mg tablet Take 100 mg by mouth daily      aspirin-acetaminophen-caffeine (EXCEDRIN MIGRAINE) 250-250-65 MG per tablet Take 1 tablet by mouth every 6 (six) hours as needed for headaches (Patient not taking: No sig reported) 30 tablet 0    co-enzyme Q-10 30 MG capsule Take 30 mg by mouth 3 (three) times a day      fluticasone (FLONASE) 50 mcg/act nasal spray 1 spray into each nostril daily (Patient not taking: Reported on 5/10/2022 ) 16 g 2    hydrocortisone 2 5 % cream hydrocortisone 2 5 % topical cream with perineal applicator (Patient not taking: No sig reported)      ipratropium (ATROVENT) 0 06 % nasal spray 2 SPRAYS INTO EACH NOSTRIL 4 (FOUR) TIMES A DAY (Patient not taking: No sig reported) 15 mL 1    naproxen (EC NAPROSYN) 500 MG EC tablet Take 1 tablet (500 mg total) by mouth 2 (two) times a day as needed for mild pain (Patient not taking: Reported on 5/10/2022 ) 30 tablet 0    naproxen (Naprosyn) 500 mg tablet Take 1 tablet (500 mg total) by mouth 2 (two) times a day with meals (Patient not taking: No sig reported) 60 tablet 5     No current facility-administered medications for this visit      _______________________________________________________________________  Review of Systems   Constitutional: Positive for fatigue  Negative for activity change, appetite change, chills, fever and unexpected weight change     HENHERMILO: Negative for congestion, ear discharge, ear pain, nosebleeds, postnasal drip, rhinorrhea, sinus pressure, sinus pain, sneezing, sore throat and voice change  Eyes: Positive for photophobia  Negative for pain, redness and visual disturbance  Respiratory: Negative for cough, chest tightness, shortness of breath and wheezing  Cardiovascular: Negative for chest pain and palpitations  Gastrointestinal: Negative for abdominal distention, abdominal pain, constipation, diarrhea, nausea and vomiting  Endocrine: Negative  Genitourinary: Negative for difficulty urinating, dysuria, flank pain, frequency, hematuria and urgency  Musculoskeletal: Negative for arthralgias and myalgias  Skin: Negative  Allergic/Immunologic: Negative  Neurological: Positive for dizziness, speech difficulty and headaches  Negative for tremors, seizures, syncope, facial asymmetry, weakness, light-headedness and numbness  Had a tordol shot earlier with reliefs, feels headache is coming back  Intermittent dizziness and headaches  Patient currently working with speech therapy for improvement in her speech after concussion  Hematological: Negative  Psychiatric/Behavioral: The patient is nervous/anxious  Chronic anxiety  Objective:  Vitals:    06/01/22 1420   BP: 98/68   BP Location: Left arm   Patient Position: Sitting   Cuff Size: Standard   Pulse: 88   Resp: 16   Temp: (!) 96 6 °F (35 9 °C)   TempSrc: Temporal   SpO2: 99%   Weight: 42 2 kg (93 lb)   Height: 5' 1" (1 549 m)     Body mass index is 17 57 kg/m²  Physical Exam  Vitals and nursing note reviewed  Constitutional:       Appearance: Normal appearance  She is well-developed  She is ill-appearing  Comments: Underweight  HENT:      Head: Normocephalic and atraumatic  Right Ear: Tympanic membrane, ear canal and external ear normal       Left Ear: Tympanic membrane, ear canal and external ear normal       Nose: Nose normal  No congestion or rhinorrhea        Mouth/Throat:      Mouth: Mucous membranes are moist       Pharynx: No oropharyngeal exudate or posterior oropharyngeal erythema  Eyes:      Extraocular Movements: Extraocular movements intact  Conjunctiva/sclera: Conjunctivae normal       Pupils: Pupils are equal, round, and reactive to light  Cardiovascular:      Rate and Rhythm: Normal rate and regular rhythm  Pulses: Normal pulses  Heart sounds: Normal heart sounds  No murmur heard  Pulmonary:      Effort: Pulmonary effort is normal       Breath sounds: Normal breath sounds  Abdominal:      General: Bowel sounds are normal       Palpations: Abdomen is soft  Musculoskeletal:         General: Normal range of motion  Cervical back: Normal range of motion  Skin:     General: Skin is warm  Capillary Refill: Capillary refill takes less than 2 seconds  Neurological:      General: No focal deficit present  Mental Status: She is alert and oriented to person, place, and time     Psychiatric:         Mood and Affect: Mood normal          Behavior: Behavior normal

## 2022-06-01 NOTE — ASSESSMENT & PLAN NOTE
Malnutrition Findings:  BMI 17 57 kg/M2  Patient encouraged to gain weight at this time  Refuses nutrition consult at this time  BMI Findings: Body mass index is 17 57 kg/m²

## 2022-06-01 NOTE — ASSESSMENT & PLAN NOTE
Cervical neck pain noted  Patient may Tylenol or ibuprofen as needed for neck pain  Rest, ice, compresses encouraged

## 2022-06-01 NOTE — ASSESSMENT & PLAN NOTE
Patient has been seen by Neurology recently  Have a follow-up  Patient also maintained on Decadron 2 mg p o  daily prevention of migraines  She has been instructed that she can use Tylenol naproxen ibuprofen 3 times a week as needed for migraine symptoms  Currently she received a Toradol shot in Neurology office  Reports that her symptoms of headache her returning

## 2022-06-01 NOTE — LETTER
June 1, 2022     Patient: Santiago Moser  YOB: 1980  Date of Visit: 6/1/2022      To Whom it May Concern: Rafa Huang is under my professional care  Xavi Neri was seen in my office on 6/1/2022  Xavi Neri may return to work on 6/8/2022  If you have any questions or concerns, please don't hesitate to call           Sincerely,          SOLO Blackwell        CC: No Recipients

## 2022-06-01 NOTE — ASSESSMENT & PLAN NOTE
Patient smoking half a pack of cigarettes per day  Encouraged to quit smoking to help with her migraine headaches

## 2022-06-01 NOTE — PROGRESS NOTES
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 appetite change and fever  HENT: Positive for tinnitus  Negative for hearing loss, trouble swallowing and voice change  Eyes: Positive for photophobia  Negative for pain  Respiratory: Negative  Negative for shortness of breath  Cardiovascular: Negative  Negative for palpitations  Gastrointestinal: Positive for nausea  Negative for vomiting  Endocrine: Negative  Negative for cold intolerance  Genitourinary: Negative  Negative for dysuria, frequency and urgency  Musculoskeletal: Positive for neck tension  Negative for myalgias  Skin: Negative  Negative for rash  Neurological: Positive for  headaches  Negative for tremors, seizures, syncope, facial asymmetry, weakness and numbness  Hematological: Negative  Does not bruise/bleed easily  Psychiatric/Behavioral: Positive for sleep disturbance  Negative for hallucinations  All other systems reviewed and are negative      I have spent 72 minutes with Patient today in which greater than 50% of this time was spent in counseling/coordination of care regarding Diagnostic results, Prognosis, Risks and benefits of tx options, Intructions for management, Patient education, Importance of tx compliance, Risk factor reductions and Impressions  I also spent 15 minutes non face to face for this patient the same day           Author:  Mike Esparza MD 6/1/2022 12:09 PM

## 2022-06-02 ENCOUNTER — EVALUATION (OUTPATIENT)
Dept: PHYSICAL THERAPY | Facility: CLINIC | Age: 42
End: 2022-06-02
Payer: COMMERCIAL

## 2022-06-02 ENCOUNTER — OFFICE VISIT (OUTPATIENT)
Dept: SPEECH THERAPY | Facility: CLINIC | Age: 42
End: 2022-06-02
Payer: COMMERCIAL

## 2022-06-02 ENCOUNTER — TELEPHONE (OUTPATIENT)
Dept: PAIN MEDICINE | Facility: CLINIC | Age: 42
End: 2022-06-02

## 2022-06-02 DIAGNOSIS — R48.8 OTHER SYMBOLIC DYSFUNCTIONS: Primary | ICD-10-CM

## 2022-06-02 DIAGNOSIS — S06.0X0D CONCUSSION WITHOUT LOSS OF CONSCIOUSNESS, SUBSEQUENT ENCOUNTER: Primary | ICD-10-CM
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
## 2022-06-02 PROCEDURE — 97112 NEUROMUSCULAR REEDUCATION: CPT

## 2022-06-02 PROCEDURE — 92507 TX SP LANG VOICE COMM INDIV: CPT
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
## 2022-06-03 NOTE — TELEPHONE ENCOUNTER
RN attempted to contact pt  Detailed VMMLOM(ok per medical communication consent on file)  Provided with CB# and OH

## 2022-06-06 ENCOUNTER — TELEPHONE (OUTPATIENT)
Dept: NEUROLOGY | Facility: CLINIC | Age: 42
End: 2022-06-06

## 2022-06-06 ENCOUNTER — OFFICE VISIT (OUTPATIENT)
Dept: SPEECH THERAPY | Facility: CLINIC | Age: 42
End: 2022-06-06
Payer: COMMERCIAL
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
## 2022-06-06 DIAGNOSIS — G43.009 MIGRAINE WITHOUT AURA AND WITHOUT STATUS MIGRAINOSUS, NOT INTRACTABLE: ICD-10-CM

## 2022-06-06 DIAGNOSIS — G43.019 INTRACTABLE MIGRAINE WITHOUT AURA AND WITHOUT STATUS MIGRAINOSUS: Primary | ICD-10-CM

## 2022-06-06 DIAGNOSIS — G44.319 ACUTE POST-TRAUMATIC HEADACHE, NOT INTRACTABLE: ICD-10-CM

## 2022-06-06 DIAGNOSIS — R48.8 OTHER SYMBOLIC DYSFUNCTIONS: Primary | ICD-10-CM

## 2022-06-06 PROCEDURE — 92507 TX SP LANG VOICE COMM INDIV: CPT

## 2022-06-06 RX ORDER — DEXAMETHASONE 2 MG/1
TABLET ORAL
Qty: 5 TABLET | Refills: 0 | Status: SHIPPED | OUTPATIENT
Start: 2022-06-06

## 2022-06-06 RX ORDER — DIVALPROEX SODIUM 500 MG/1
TABLET, DELAYED RELEASE ORAL
Qty: 5 TABLET | Refills: 0 | Status: SHIPPED | OUTPATIENT
Start: 2022-06-06 | End: 2022-06-06

## 2022-06-06 NOTE — TELEPHONE ENCOUNTER
Pt called and advised of the below  She verbalized understanding  Pt refused depakote  She needs something that she can take during the day  She does not want to try new med  Pt would like to try tolradol in pill form

## 2022-06-06 NOTE — TELEPHONE ENCOUNTER
Pt made aware of below  States that she doesn't remember her side effect with meloxicam but did not have any side effects with toradol injection  She is asking if she can have a couple more days of decadron or if not agreeable to decadron then is agreeable to trying toradol and hopefully doesn't have GI intolerance      She again states that smaller dose of toradol inj that was given (30mg) only lasted about 2 hours  Please advise
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
## 2022-06-06 NOTE — PROGRESS NOTES
Daily Speech Treatment Note    Today's date: 2022  Patients name: Aaron Louise  : 1980  MRN: [id number]  Safety measures: Seizures  Referring provider: SOLO Taylor    Primary diagnosis/billing code: R48 8  [id number]  [facility]    Visit tracking:  -Referring provider: Epic  -Billing guidelines: CMS  -Visit #5  -Insurance: [address] Drea Gunderson due 22    Subjective/Behavioral:  Patient called to let us know she would arriving late today  She reports she stopped her steroid; and feels her headaches are worse  She hopes to contact Neuro to discuss options  She plans to return to work again this week and expressed some concerns regarding tolerating it with her current headaches/pain  Objective/Assessment:  Patient expressed she felt she did better with word recall/memory exercises today (compared to day 1)    Short-term goals:  1  Patient will be provided with education on functional cognitive disorders and identify relevant information related to their experience, to be met in 1 week  2  Patient will identify any factors that may contribute to their cognitive overload, to be met in 1 week  3  Patient will participate in cognitive strategy training to aide in return to work, to be met in 1-2 weeks  To target reasoning and executive functioning skills, patient was asked to complete deduction puzzles  Using written clues provided, patient completed the following thought organization task;      Deduction from exclusion: 100% acc independently  (#1: 3x4): min-mod cues to introduce new learning task  Completed to 100% acc with cues  To target auditory processing and mental manipulation; patient was asked to listen to two words read aloud and identify the one letter that occurred in the first word, but not the second (i e , "this letter is in RAPID, but not in DAIRY"___P)   Patient was not provided with any visual cues, and was asked to listen and answer after clue was read aloud  Task completed in 32/35 opp  To target immediate memory; patient participated in a mental manipulation task  Words were read aloud by clinician, and patient was asked to recall words in a specific order denoted by SLP  Patient completed recall of words in word order (field of 3) in 17/18 opp;   Patient completed recall of words in word order (field of 4) in 3/8 opp on first attempt; increased to 8/8 with additional repetition;     Plan:  -Patient was provided with home exercises/activities to target goals in plan of care at the end of today's session   -Continue with current plan of care

## 2022-06-07 ENCOUNTER — OFFICE VISIT (OUTPATIENT)
Dept: PAIN MEDICINE | Facility: CLINIC | Age: 42
End: 2022-06-07
Payer: COMMERCIAL

## 2022-06-07 VITALS
DIASTOLIC BLOOD PRESSURE: 70 MMHG | HEART RATE: 104 BPM | BODY MASS INDEX: 17.37 KG/M2 | SYSTOLIC BLOOD PRESSURE: 101 MMHG | HEIGHT: 61 IN | WEIGHT: 92 LBS

## 2022-06-07 DIAGNOSIS — M79.18 MYOFASCIAL PAIN SYNDROME: Primary | ICD-10-CM

## 2022-06-07 DIAGNOSIS — M79.7 FIBROMYALGIA: ICD-10-CM

## 2022-06-07 DIAGNOSIS — G89.29 CHRONIC BILATERAL LOW BACK PAIN, UNSPECIFIED WHETHER SCIATICA PRESENT: ICD-10-CM

## 2022-06-07 DIAGNOSIS — M54.12 CERVICAL RADICULOPATHY: ICD-10-CM

## 2022-06-07 DIAGNOSIS — M54.2 NECK PAIN: ICD-10-CM

## 2022-06-07 DIAGNOSIS — M54.50 CHRONIC BILATERAL LOW BACK PAIN, UNSPECIFIED WHETHER SCIATICA PRESENT: ICD-10-CM

## 2022-06-07 PROCEDURE — 4004F PT TOBACCO SCREEN RCVD TLK: CPT | Performed by: NURSE PRACTITIONER

## 2022-06-07 PROCEDURE — 99213 OFFICE O/P EST LOW 20 MIN: CPT | Performed by: NURSE PRACTITIONER
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
## 2022-06-14 ENCOUNTER — TELEPHONE (OUTPATIENT)
Dept: PAIN MEDICINE | Facility: MEDICAL CENTER | Age: 42
End: 2022-06-14

## 2022-06-14 DIAGNOSIS — M54.50 CHRONIC BILATERAL LOW BACK PAIN, UNSPECIFIED WHETHER SCIATICA PRESENT: ICD-10-CM

## 2022-06-14 DIAGNOSIS — M79.18 MYOFASCIAL PAIN SYNDROME: ICD-10-CM

## 2022-06-14 DIAGNOSIS — G89.29 CHRONIC BILATERAL LOW BACK PAIN, UNSPECIFIED WHETHER SCIATICA PRESENT: ICD-10-CM

## 2022-06-14 DIAGNOSIS — M54.12 CERVICAL RADICULOPATHY: Primary | ICD-10-CM
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
## 2022-06-16 ENCOUNTER — APPOINTMENT (OUTPATIENT)
Dept: SPEECH THERAPY | Facility: CLINIC | Age: 42
End: 2022-06-16
Payer: COMMERCIAL

## 2022-06-18 DIAGNOSIS — M79.7 FIBROMYALGIA: ICD-10-CM

## 2022-06-19 DIAGNOSIS — J45.20 MILD INTERMITTENT ASTHMA WITHOUT COMPLICATION: ICD-10-CM

## 2022-06-21 RX ORDER — NAPROXEN 500 MG/1
TABLET ORAL
Qty: 60 TABLET | Refills: 5 | Status: SHIPPED | OUTPATIENT
Start: 2022-06-21

## 2022-06-21 RX ORDER — DEXAMETHASONE 4 MG/1
2 TABLET ORAL 2 TIMES DAILY
Qty: 12 G | Refills: 1 | Status: SHIPPED | OUTPATIENT
Start: 2022-06-21

## 2022-06-22 ENCOUNTER — TELEPHONE (OUTPATIENT)
Dept: HEMATOLOGY ONCOLOGY | Facility: CLINIC | Age: 42
End: 2022-06-22

## 2022-06-22 ENCOUNTER — TELEPHONE (OUTPATIENT)
Dept: HEMATOLOGY ONCOLOGY | Facility: HOSPITAL | Age: 42
End: 2022-06-22

## 2022-06-22 NOTE — TELEPHONE ENCOUNTER
Called patient and LVM with rescheduled appt due to provider having meetings in afternoon  LVM with new appt date and time and location  LVM with hope line # if the appt needed to be rescheduled

## 2022-06-22 NOTE — TELEPHONE ENCOUNTER
Appointment Cancellation Or Reschedule     Person calling in Patient    Provider Dr Bustamante Record Date and Time 9/2 at [place] [id number]   Did patient want to reschedule their office appointment? If so, when was it scheduled to? Yes  8/23 at 3pm   Is this patient calling to reschedule an infusion appointment? no   When is their next infusion appointment? n/a   Is this patient a Chemo patient? no   Reason for Cancellation or Reschedule Patient could not make appointment due to work  If the patient is a treatment patient, please route this to the office nurse  If the patient is not on treatment, please route to the office MA  If the patient is a surgical oncology patient, please route to surg/onc clinical pool

## 2022-06-23 ENCOUNTER — EVALUATION (OUTPATIENT)
Dept: SPEECH THERAPY | Facility: CLINIC | Age: 42
End: 2022-06-23
Payer: COMMERCIAL
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
## 2022-06-23 DIAGNOSIS — R48.8 OTHER SYMBOLIC DYSFUNCTIONS: Primary | ICD-10-CM

## 2022-06-23 PROCEDURE — 92507 TX SP LANG VOICE COMM INDIV: CPT

## 2022-06-23 NOTE — PROGRESS NOTES
Speech-Language Pathology Re-Evaluation    Today's date: 2022  Patients name: Romeo Martinez  : 1980  MRN: [id number]  Safety measures: Seizures  Referring provider: SOLO Tejeda    Primary diagnosis/billing code: R48 8  Secondary diagnosis/billing code: [id number] Osmel María Elena  2XXS    Visit tracking:  -Referring provider: Epic  -Billing guidelines: CMS  -Visit #6  -Insurance: [address] Gunderson due 22    Subjective comments: "I am doing better"    Patient's goal(s): To return to work    Assessments    Progress Update: No formal testing completed on this date    The patient reports she continues to improve and do better each week  She is still getting used to her new routine and getting through work  She expressed some concerns with time/dates today  I had recommended crossing off the days in her calendar as they are completed  Goals    Short-term goals:    1  Patient will be provided with education on functional cognitive disorders and identify relevant information related to their experience, to be met in 1 week  MET    2  Patient will identify any factors that may contribute to their cognitive overload, to be met in 1 week  MET    3  Patient will participate in cognitive strategy training to aide in return to work, to be met in 1-2 weeks  (TREATMENT TODAY)  BITS: [facility]    To target attention and sequencing; patient completed the following tasks:    Trail making (field of 30 - numbers/letters)  Task completed over 4 trials  First trial 100% acc  persistence off, central fixation off  Second trial 96% acc persistence on, central fixation off  Third  96% acc, with 100% on fixation  persistence off, central fixation on (FLASHING)  Fourth  86% acc, with 100% on fixation  persistence on, central fixation on  Long-term goals:  1  Patient will return to work and feel confident in performing her cognitive duties, to be met in 2-4 weeks       2  Patient will independently identify needs for cognitive strategies in her day to day to decrease burden on her cognitive load, to be met in 2-4 weeks  Impressions/Recommendations    Impressions: -Patient continues to make improvements and return to her PLOF  She remains positive about her recovery and has found the cognitive exercises and strategies to be positive and helpful during her recovery  Patient is scheduled for one more session next week; and then we plan to DC  Recommendations:  -Patient would benefit from outpatient skilled Speech Therapy services: Cognitive-linguistic therapy    -Frequency: 1x weekly      -Duration: 4-6 weeks    -Intervention certification from: 8/45/2880  -Intervention certification to: 7/15/1555    -Intervention comments: none

## 2022-06-24 ENCOUNTER — TELEPHONE (OUTPATIENT)
Dept: PAIN MEDICINE | Facility: CLINIC | Age: 42
End: 2022-06-24
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
## 2022-06-27 ENCOUNTER — OFFICE VISIT (OUTPATIENT)
Dept: SPEECH THERAPY | Facility: CLINIC | Age: 42
End: 2022-06-27
Payer: COMMERCIAL

## 2022-06-27 ENCOUNTER — TELEPHONE (OUTPATIENT)
Dept: FAMILY MEDICINE CLINIC | Facility: CLINIC | Age: 42
End: 2022-06-27

## 2022-06-27 DIAGNOSIS — R48.8 OTHER SYMBOLIC DYSFUNCTIONS: Primary | ICD-10-CM

## 2022-06-27 DIAGNOSIS — S00.93XD CONTUSION OF HEAD, SUBSEQUENT ENCOUNTER: Primary | ICD-10-CM
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
## 2022-06-27 PROCEDURE — 92507 TX SP LANG VOICE COMM INDIV: CPT

## 2022-06-27 NOTE — TELEPHONE ENCOUNTER
[facility] neurology called and left msg stating that they need a physician to physician referral to [facility] neurology pt is being seen on juty 6th

## 2022-06-27 NOTE — PROGRESS NOTES
Daily Speech Treatment Note    Today's date: 2022  Patients name: Santiago Moser  : 1980  MRN: [id number]  Safety measures: Seizures  Referring provider: SOLO Ovalles    Primary diagnosis/billing code: R48 8  [id number]  [facility]    Visit tracking:  -Referring provider: Epic  -Billing guidelines: CMS  -Visit #7  -Insurance: [facility] [employer]    Subjective/Behavioral:  Patient agreeable to discharge today; as she is feeling better overall    Objective/Assessment:  HEP provided    Short-term goals:  1  Patient will be provided with education on functional cognitive disorders and identify relevant information related to their experience, to be met in 1 week  2  Patient will identify any factors that may contribute to their cognitive overload, to be met in 1 week  3  Patient will participate in cognitive strategy training to aide in return to work, to be met in 1-2 weeks  Anagrams: To target auditory attention and mental manipulation during a word finding activity, patient was asked to listen to a word, and rearrange the letters within the word to create a new word (i e , late = tale)  Task completed over 30 trials  Completed 29/30 independently, increased to 30/30 with use of written support  Picture Retention Activity: To target working memory, the patient was shown a detailed picture (i e , kitchen scene) and was given 60 seconds to review it  Then they were asked [facility]- questions about picture  (i e , what two items were on the counter)  Task completed over 5 trials with 48/50 questions answered accurately  Plan:  -Patient was provided with home exercises/activities to target goals in plan of care at the end of today's session   -Discharge

## 2022-06-28 ENCOUNTER — TELEPHONE (OUTPATIENT)
Dept: NEUROLOGY | Facility: CLINIC | Age: 42
End: 2022-06-28

## 2022-06-28 ENCOUNTER — EVALUATION (OUTPATIENT)
Dept: PHYSICAL THERAPY | Facility: REHABILITATION | Age: 42
End: 2022-06-28
Payer: COMMERCIAL

## 2022-06-28 DIAGNOSIS — M54.12 CERVICAL RADICULOPATHY: Primary | ICD-10-CM

## 2022-06-28 PROCEDURE — 97162 PT EVAL MOD COMPLEX 30 MIN: CPT

## 2022-06-28 NOTE — TELEPHONE ENCOUNTER
Called and left message for patient confirming their upcoming appointment with Dr Alfred Mcdonald on 7/6/22
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
## 2022-06-30 ENCOUNTER — PROCEDURE VISIT (OUTPATIENT)
Dept: PAIN MEDICINE | Facility: CLINIC | Age: 42
End: 2022-06-30
Payer: COMMERCIAL

## 2022-06-30 VITALS
HEART RATE: 106 BPM | BODY MASS INDEX: 17.03 KG/M2 | DIASTOLIC BLOOD PRESSURE: 79 MMHG | SYSTOLIC BLOOD PRESSURE: 114 MMHG | HEIGHT: 61 IN | WEIGHT: 90.2 LBS

## 2022-06-30 DIAGNOSIS — M79.18 MYOFASCIAL PAIN SYNDROME: Primary | ICD-10-CM

## 2022-06-30 PROCEDURE — 3008F BODY MASS INDEX DOCD: CPT | Performed by: NURSE PRACTITIONER

## 2022-06-30 PROCEDURE — 20553 NJX 1/MLT TRIGGER POINTS 3/>: CPT | Performed by: ANESTHESIOLOGY

## 2022-06-30 RX ORDER — BUPIVACAINE HYDROCHLORIDE 2.5 MG/ML
10 INJECTION, SOLUTION EPIDURAL; INFILTRATION; INTRACAUDAL ONCE
Status: COMPLETED | OUTPATIENT
Start: 2022-06-30 | End: 2022-06-30

## 2022-06-30 RX ORDER — TRIAMCINOLONE ACETONIDE 40 MG/ML
40 INJECTION, SUSPENSION INTRA-ARTICULAR; INTRAMUSCULAR ONCE
Status: COMPLETED | OUTPATIENT
Start: 2022-06-30 | End: 2022-06-30

## 2022-06-30 RX ADMIN — TRIAMCINOLONE ACETONIDE 40 MG: 40 INJECTION, SUSPENSION INTRA-ARTICULAR; INTRAMUSCULAR at 11:04

## 2022-06-30 RX ADMIN — BUPIVACAINE HYDROCHLORIDE 10 ML: 2.5 INJECTION, SOLUTION EPIDURAL; INFILTRATION; INTRACAUDAL at 11:04

## 2022-06-30 NOTE — PROGRESS NOTES
40-year-old female with a history of chronic myofascial pain presenting for trigger point injections into left cervical paraspinal musculature, bilateral trapezii, bilateral rhomboids, and bilateral lumbar paraspinal musculature  After discussing the risks, benefits, and alternatives to the procedure, the patient expressed understanding and wished to proceed  Procedural pause conducted to verify:  correct patient identity, procedure to be performed and as applicable, correct side and site, correct patient position, and availability of implants, special equipment and special requirements  The appropriate hyper irritable musculoskeletal tender points were marked with a sterile pen, prepped with alcohol and sterilely draped  After appropriate reproduction of pain at each of the tender points marked, a total of 9 mL of 0 25% bupivacaine and 36 mg of triamcinolone was injected after negative aspiration of air, CSF, heme, or other bodily fluids with a 1 5 in 25-gauge needle  A total number of 4 muscle groups were injected  The patient was discharged with no apparent complications on their own power after an appropriate observation

## 2022-07-05 ENCOUNTER — TELEPHONE (OUTPATIENT)
Dept: PAIN MEDICINE | Facility: CLINIC | Age: 42
End: 2022-07-05

## 2022-07-05 ENCOUNTER — OFFICE VISIT (OUTPATIENT)
Dept: PHYSICAL THERAPY | Facility: REHABILITATION | Age: 42
End: 2022-07-05
Payer: COMMERCIAL

## 2022-07-05 DIAGNOSIS — M54.12 CERVICAL RADICULOPATHY: Primary | ICD-10-CM

## 2022-07-05 PROCEDURE — 97110 THERAPEUTIC EXERCISES: CPT | Performed by: PHYSICAL THERAPIST

## 2022-07-05 PROCEDURE — 97112 NEUROMUSCULAR REEDUCATION: CPT | Performed by: PHYSICAL THERAPIST

## 2022-07-05 PROCEDURE — 97140 MANUAL THERAPY 1/> REGIONS: CPT | Performed by: PHYSICAL THERAPIST

## 2022-07-05 NOTE — PROGRESS NOTES
Daily Note     Today's date: 2022  Patient name: Katy Klein  : 1980  MRN: [id number]  Referring provider: Nigel Bence, CRNP  Dx:   Encounter Diagnosis     ICD-10-CM    1  Cervical radiculopathy  M54 12                   Subjective: The patient returns after IE reporting a slight decrease in pain with steroid injections last Thursday  She currently rates her pain 3/10 stating she took pain meds prior to therapy  Objective: See treatment diary below      Assessment: The patient demonstrated fair tolerance to initiation of TE program requiring cueing for scap retraction and limited cervical retraction ROM  Moderate tone noted along cervical spine and UT during manuals  Good response to manual traction  Plan: Continue per plan of care  Assess response to treatment NV        Precautions: multiple allergies(gabapentin),       Manuals            traction  manual NB           STM UT, cervical spine  NB                                     Neuro Re-Ed             Chin tucks  5"x15           scap retract  5"x15           TB Rows  3x10 OTB                                                               Ther Ex             Cerv Rotation AROM  3"x10 ea           Cerv Flex/Ext AROM  3"x10 ea           UT St    30"x3                                                                            Ther Activity                                       Gait Training                                       Modalities             HP

## 2022-07-05 NOTE — TELEPHONE ENCOUNTER
Pt called stating that since her TPI injections she has been feeling shaky, blurred vision, sick to her stomach, headache, sluggish with no anergy low grade fever 99 with medicine  She has been like this since Thursday 6/30 and still having pain  Pt asking should she get blood work?  Please advise    Pt # [phone]

## 2022-07-05 NOTE — TELEPHONE ENCOUNTER
Does the patient have any redness or drainage at injection site? If not, this is unlikely a result of her trigger point injections as we used a small amount of local anesthetic and steroid and these do not sound like side effects from those medications    I would have her follow up with PCP to rule out infectious etiology

## 2022-07-05 NOTE — TELEPHONE ENCOUNTER
S/w pt  Advised of same  Pt denies any signs of infection, drainage or swelling at the site  Pt will call PCP  Appreciative of call

## 2022-07-05 NOTE — TELEPHONE ENCOUNTER
S/w pt  Pt states that the symptoms below started the day after her injection and have gotten progressively worse, especially the headache  T max 99 4 but pt takes Ibuprofen and naproxen and normally runs much lower  Pt denies any signs of infection  Pt has been feeling lethargic , not eating well and drinking water and Pedialyte  Pt is concerned that her electrolytes could be off  Pt has not contacted PCP as she was thinking it could be r/t TPI on 6/30  Advised will notify JUICE and CB

## 2022-07-07 ENCOUNTER — TELEPHONE (OUTPATIENT)
Dept: PAIN MEDICINE | Facility: CLINIC | Age: 42
End: 2022-07-07

## 2022-07-12 ENCOUNTER — CONSULT (OUTPATIENT)
Dept: GASTROENTEROLOGY | Facility: CLINIC | Age: 42
End: 2022-07-12
Payer: COMMERCIAL

## 2022-07-12 VITALS
TEMPERATURE: 98.9 F | WEIGHT: 87 LBS | DIASTOLIC BLOOD PRESSURE: 71 MMHG | HEIGHT: 61 IN | SYSTOLIC BLOOD PRESSURE: 102 MMHG | HEART RATE: 109 BPM | BODY MASS INDEX: 16.42 KG/M2

## 2022-07-12 DIAGNOSIS — K21.9 GASTROESOPHAGEAL REFLUX DISEASE WITHOUT ESOPHAGITIS: Primary | ICD-10-CM

## 2022-07-12 DIAGNOSIS — D50.8 IRON DEFICIENCY ANEMIA SECONDARY TO INADEQUATE DIETARY IRON INTAKE: ICD-10-CM

## 2022-07-12 DIAGNOSIS — Z80.0 FAMILY HISTORY OF RECTAL CANCER: ICD-10-CM

## 2022-07-12 PROCEDURE — 99204 OFFICE O/P NEW MOD 45 MIN: CPT | Performed by: INTERNAL MEDICINE

## 2022-07-12 RX ORDER — POLYETHYLENE GLYCOL 3350 17 G/17G
POWDER, FOR SOLUTION ORAL
Qty: 238 G | Refills: 0 | OUTPATIENT
Start: 2022-07-12 | End: 2022-09-02

## 2022-07-12 NOTE — PATIENT INSTRUCTIONS
Scheduled date of colonoscopy/egd  (as of today): 9/2/22  Physician performing colonoscopy: Dr Lindsay Alford  Location of colonoscopy: [facility]  Bowel prep reviewed with patient: miralax/dulcolax  Instructions reviewed with patient by: Meena CASTRO  Clearances:   n/a

## 2022-07-12 NOTE — PROGRESS NOTES
[facility] - Outpatient Consultation  Ej Clifford 39 y o  female MRN: [id number]  Encounter: [id number]          ASSESSMENT AND PLAN:          1  Gastroesophageal reflux disease without esophagitis  EGD   2  Iron deficiency anemia secondary to inadequate dietary iron intake  EGD   3  Family history of rectal cancer  Colonoscopy    EGD    polyethylene glycol (GLYCOLAX) 17 GM/SCOOP powder         Will proceed with EGD and colonoscopy to evaluate for causes of iron deficiency anemia  Differential includes but is not limited to arteriovenous malformations, esophagitis, dieulafoy's lesion, peptic ulcer disease, polyps, and malignancy  She does have family history of rectal cancer diagnosed at age 47/49  She has persistent reflux and takes 20 mg once daily in the morning when she wakes  Take omeprazole     The rationale for Upper endoscopy and colonoscopy with possible biopsy, possible polypectomy, with IV sedation as well as all risks, benefits, and alternatives were discussed with the patient in detail  Risks including but not limited to perforation, bleeding, need for blood transfusion or emergent surgery, and missed neoplasm were reviewed in detail with the patient  The patient demonstrates full understanding and wishes to proceed  ______________________________________________________________    HPI:  Ej Clifford is a 39y o  year old female who presents to the office for evaluation of iron deficiency anemia  She were iron infusions in the past and now is in range so is not needing any further infusions  She does have history of fibromyalgia, alcohol abuse, asthma  She does have acid reflux  She takes omeprazole 20 mg once daily in the morning when she wakes up  She doesn't eat til afternoon/evening  Reflux is worst as soon as she eats anything in the afternoon    She has not EGD or colonoscopy in the past  She went to a gastroenterologist a few years ago as she had hemorrhoids that were present during her pregnancy in the past and then had rectal bleeding  She was told to have a colonoscopy but didn't have one at the time  Her sister just diagnosed with rectal cancer  She is very nervous as her friend just had a colonoscopy and had a complication that she  from  She does take ibuprofen and naproxen for back pain/neck pain and fibromyalgia's  They have not noted recent overt bleeding in their stool  REVIEW OF SYSTEMS:    CONSTITUTIONAL: Denies any fever, chills, rigors, and weight loss  HEENT: No earache or tinnitus  Denies hearing loss or visual disturbances  CARDIOVASCULAR: No chest pain or palpitations  RESPIRATORY: Denies any cough, hemoptysis, shortness of breath or dyspnea on exertion  GASTROINTESTINAL: As noted in the History of Present Illness  GENITOURINARY: No problems with urination  Denies any hematuria or dysuria  NEUROLOGIC: No dizziness or vertigo, denies headaches  MUSCULOSKELETAL: Denies any muscle or joint pain  SKIN: Denies skin rashes or itching  ENDOCRINE: Denies excessive thirst  Denies intolerance to heat or cold  PSYCHOSOCIAL: Denies depression or anxiety  Denies any recent memory loss         Historical Information   Past Medical History:   Diagnosis Date    Allergies     Anxiety     Asthma     Chronic pain     Contusion of head, subsequent encounter 2022    Depression     Eating disorder     Fibrocystic breast changes of both breasts     Seizures (HCC)      Past Surgical History:   Procedure Laterality Date    INDUCED       MAMMO STEREOTACTIC BREAST BIOPSY LEFT (ALL INC)  2015    Benign     Social History   Social History     Substance and Sexual Activity   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     Social History     Substance and Sexual Activity   Drug Use No     Social History     Tobacco Use   Smoking Status Current Every Day Smoker    Packs/day: 0 50    Years: 28 00    Pack years: 14 00    Types: Cigarettes   Smokeless Tobacco Never Used   Tobacco Comment    10-15 cig/day     Family History   Problem Relation Age of Onset    Diabetes Sister     Alcohol abuse Sister     Cancer Father         Diagnosed stage 3 - metastazied    Psychiatric Illness Mother     Hypertension Family     Diabetes Family        Meds/Allergies       Current Outpatient Medications:     acetaminophen (TYLENOL) 500 mg tablet    albuterol (PROVENTIL HFA,VENTOLIN HFA) 90 mcg/act inhaler    Ascorbic Acid (VITAMIN C) 100 MG tablet    Biotin w/ Vitamins C & E (HAIR/SKIN/NAILS PO)    busPIRone (BUSPAR) 15 mg tablet    CALCIUM-MAGNESIUM-ZINC PO    dexamethasone (DECADRON) 2 mg tablet    Flovent  MCG/ACT inhaler    fluticasone (FLOVENT HFA) 110 MCG/ACT inhaler    ibuprofen (MOTRIN) 600 mg tablet    loratadine (CLARITIN) 10 mg tablet    LORazepam (ATIVAN) 1 mg tablet    naproxen (NAPROSYN) 500 mg tablet    omeprazole (PriLOSEC) 20 mg delayed release capsule    ondansetron (Zofran ODT) 8 mg disintegrating tablet    thiamine (VITAMIN B1) 100 mg tablet    aspirin-acetaminophen-caffeine (EXCEDRIN MIGRAINE) 250-250-65 MG per tablet    co-enzyme Q-10 30 MG capsule    fluticasone (FLONASE) 50 mcg/act nasal spray    hydrocortisone 2 5 % cream    ipratropium (ATROVENT) 0 06 % nasal spray    naproxen (EC NAPROSYN) 500 MG EC tablet    ondansetron (Zofran ODT) 4 mg disintegrating tablet    Allergies   Allergen Reactions    Gabapentin Hives    Meloxicam GI Intolerance    Tramadol Hives     SEIZURES    Vistaril [Hydroxyzine]            Objective     Blood pressure 102/71, pulse (!) 109, temperature 98 9 °F (37 2 °C), temperature source Tympanic, height 5' 1" (1 549 m), weight 39 5 kg (87 lb)  Body mass index is 16 44 kg/m²  PHYSICAL EXAM:      General Appearance:   Alert, cooperative, no distress   HEENT:   Normocephalic, atraumatic, anicteric       Lungs:   Equal chest rise and unlabored breathing, normal cough   Heart:   No visualized JVD   Abdomen:   Soft, non-tender, non-distended; no masses, no organomegaly    Genitalia:   Deferred    Rectal:   Deferred    Extremities:  No cyanosis, clubbing or edema    Pulses:  Musculoskeletal:  2+ and symmetric  Normal range of motion visualized    Skin:  Neuro:  No jaundice, rashes, or lesions   Alert and appropriate       Lab Results:   No visits with results within 1 Day(s) from this visit  Latest known visit with results is:   Appointment on 05/31/2022   Component Date Value    WBC 05/31/2022 9 92     RBC 05/31/2022 4 53     Hemoglobin 05/31/2022 12 8     Hematocrit 05/31/2022 40 0     MCV 05/31/2022 88     MCH 05/31/2022 28 3     MCHC 05/31/2022 32 0     RDW 05/31/2022 24 2 (A)    MPV 05/31/2022 8 6 (A)    Platelets 18/52/7457 369     nRBC 05/31/2022 0     Neutrophils Relative 05/31/2022 54     Immat GRANS % 05/31/2022 0     Lymphocytes Relative 05/31/2022 36     Monocytes Relative 05/31/2022 7     Eosinophils Relative 05/31/2022 2     Basophils Relative 05/31/2022 1     Neutrophils Absolute 05/31/2022 5 35     Immature Grans Absolute 05/31/2022 0 03     Lymphocytes Absolute 05/31/2022 3 53     Monocytes Absolute 05/31/2022 0 73     Eosinophils Absolute 05/31/2022 0 22     Basophils Absolute 05/31/2022 0 06     Iron Saturation 05/31/2022 21     TIBC 05/31/2022 306     Iron 05/31/2022 64     Ferritin 05/31/2022 161          Radiology Results:   No results found

## 2022-07-13 DIAGNOSIS — J45.20 MILD INTERMITTENT ASTHMA, UNCOMPLICATED: ICD-10-CM

## 2022-07-13 RX ORDER — ALBUTEROL SULFATE 90 UG/1
AEROSOL, METERED RESPIRATORY (INHALATION)
Qty: 18 G | Refills: 1 | Status: SHIPPED | OUTPATIENT
Start: 2022-07-13 | End: 2022-09-07

## 2022-07-14 ENCOUNTER — OFFICE VISIT (OUTPATIENT)
Dept: PHYSICAL THERAPY | Facility: REHABILITATION | Age: 42
End: 2022-07-14
Payer: COMMERCIAL

## 2022-07-14 DIAGNOSIS — M54.12 CERVICAL RADICULOPATHY: Primary | ICD-10-CM

## 2022-07-14 PROCEDURE — 97110 THERAPEUTIC EXERCISES: CPT | Performed by: PHYSICAL THERAPIST

## 2022-07-14 PROCEDURE — 97140 MANUAL THERAPY 1/> REGIONS: CPT | Performed by: PHYSICAL THERAPIST

## 2022-07-14 NOTE — PROGRESS NOTES
Daily Note     Today's date: 2022  Patient name: Natalya Rivas  : 1980  MRN: [id number]  Referring provider: SOLO Crooks  Dx:   Encounter Diagnosis     ICD-10-CM    1  Cervical radiculopathy  M54 12                   Subjective: Patient reports that the pain is still there but no worse than usual        Objective: See treatment diary below      Assessment: Patient arrived late to treatment so the session had to be abbreviated  Due to patient's high irritability treatment consisted of manuals and AROM exercises  Ppatient demonstrated fair tolerance to initiation of TE program requiring cueing for scap retraction and limited cervical retraction ROM  Moderate tone noted along cervical spine and UT during manuals  Patient had a good response to manual traction  Patient treated by GEE Sanchez under direct supervision of DPT TB  Plan: Continue per plan of care  Assess response to treatment NV        Precautions: multiple allergies(gabapentin),       Manuals           traction  manual NB Manual KK          STM UT, cervical spine  NB                                     Neuro Re-Ed             Chin tucks  5"x15 5"x15          scap retract  5"x15           TB Rows  3x10 OTB 10x          Cervical Retraction   10x                                                 Ther Ex             Cerv Rotation AROM  3"x10 ea 3"x10 ea          Cerv Flex/Ext AROM  3"x10 ea 3"x10 ea          UT St    30"x3 30"x3                                                                           Ther Activity                                       Gait Training                                       Modalities             HP

## 2022-07-19 ENCOUNTER — OFFICE VISIT (OUTPATIENT)
Dept: PHYSICAL THERAPY | Facility: REHABILITATION | Age: 42
End: 2022-07-19
Payer: COMMERCIAL

## 2022-07-19 DIAGNOSIS — M54.12 CERVICAL RADICULOPATHY: Primary | ICD-10-CM

## 2022-07-19 PROCEDURE — 97112 NEUROMUSCULAR REEDUCATION: CPT

## 2022-07-19 PROCEDURE — 97110 THERAPEUTIC EXERCISES: CPT

## 2022-07-19 PROCEDURE — 97140 MANUAL THERAPY 1/> REGIONS: CPT

## 2022-07-21 ENCOUNTER — OFFICE VISIT (OUTPATIENT)
Dept: PHYSICAL THERAPY | Facility: REHABILITATION | Age: 42
End: 2022-07-21
Payer: COMMERCIAL

## 2022-07-21 DIAGNOSIS — M54.12 CERVICAL RADICULOPATHY: Primary | ICD-10-CM

## 2022-07-21 PROCEDURE — 97112 NEUROMUSCULAR REEDUCATION: CPT

## 2022-07-21 PROCEDURE — 97140 MANUAL THERAPY 1/> REGIONS: CPT

## 2022-07-21 PROCEDURE — 97110 THERAPEUTIC EXERCISES: CPT

## 2022-07-21 NOTE — PROGRESS NOTES
Daily Note     Today's date: 2022  Patient name: Jaqueline Vásquez  : 1980  MRN: [id number]  Referring provider: SOLO Lemus  Dx:   Encounter Diagnosis     ICD-10-CM    1  Cervical radiculopathy  M54 12                   Subjective: Pt  reports no change in status upon arrival   Pt states she is making follow up appt with  since she has had no relief with injections  Objective: See treatment diary below      Assessment: Tolerated treatment well  Patient would benefit from continued PT   Pt  able to complete all exercises with no increase in pain during or after session  Pt reported minor improvement at end of session after manuals, but states she still feels motion restriction present  Pt states restriction feels constant, though can fluctuate in intensity  Pt  1:1 with PTA for entirety  Pt arrived late for session  Plan: Continue per plan of care  Precautions: multiple allergies(gabapentin),       Manuals         traction  manual NB Manual KK TC 12' KP 8'        STM UT, cervical spine  NB  TC 12' KP 8'                                  Neuro Re-Ed             Chin tucks  5"x15 5"x15 5"x15 15x5"        scap retract  5"x15  5"x15 15x5"        TB Rows  3x10 OTB 10x          Cervical Retraction   10x 10                                                Ther Ex             Cerv Rotation AROM  3"x10 ea 3"x10 ea 3"x10 ea  15x3" ea        Cerv Flex/Ext AROM  3"x10 ea 3"x10 ea 3"x10 ea  15x3" ea        UT St    30"x3 30"x3 30"x3 ea   3x30" ea b/l                                                                         Ther Activity                                       Gait Training                                       Modalities             HP

## 2022-07-26 ENCOUNTER — OFFICE VISIT (OUTPATIENT)
Dept: PHYSICAL THERAPY | Facility: REHABILITATION | Age: 42
End: 2022-07-26
Payer: COMMERCIAL

## 2022-07-26 DIAGNOSIS — M54.12 CERVICAL RADICULOPATHY: Primary | ICD-10-CM

## 2022-07-26 PROCEDURE — 97140 MANUAL THERAPY 1/> REGIONS: CPT | Performed by: PHYSICAL THERAPIST

## 2022-07-26 PROCEDURE — 97112 NEUROMUSCULAR REEDUCATION: CPT | Performed by: PHYSICAL THERAPIST

## 2022-07-26 NOTE — PROGRESS NOTES
Daily Note     Today's date: 2022  Patient name: Adelia Garcia  : 1980  MRN: [id number]  Referring provider: SOLO Washington  Dx:   Encounter Diagnosis     ICD-10-CM    1  Cervical radiculopathy  M54 12                   Subjective: pt reports that her neck and shoulder have felt a little better since last visit with direct pressure, massage, and heat  Objective: See treatment diary below      Assessment: Tolerated treatment well  Patient demonstrated fatigue post treatment and would benefit from continued PT  Abbreviated session as pt arrived 15 minutes late to appointment  Trialed STM with static, prolonged holds and pt demonstrated alleviation of upper trap tension  Added cervical isometrics and shrugs to improve pain and UT muscle performance  Continue with POC as tolerated  1:1 with GEE Mary under the direct supervision of Fawn Friedman DPT for entirety of tx  Plan: Continue per plan of care  Precautions: multiple allergies(gabapentin),       Manuals        traction  manual NB Manual KK TC 12' KP 8' BV 8'       STM UT, cervical spine  NB  TC 12' KP 8' BV 8'                                 Neuro Re-Ed             Chin tucks  5"x15 5"x15 5"x15 15x5"        scap retract  5"x15  5"x15 15x5"        TB Rows  3x10 OTB 10x          Cervical Retraction   10x 10  x10       Cervical Iso      10"x5       Shrugs      12# 5"x20                     Ther Ex             Cerv Rotation AROM  3"x10 ea 3"x10 ea 3"x10 ea  15x3" ea        Cerv Flex/Ext AROM  3"x10 ea 3"x10 ea 3"x10 ea  15x3" ea        UT St    30"x3 30"x3 30"x3 ea   3x30" ea b/l                                                                         Ther Activity                                       Gait Training                                       Modalities             HP

## 2022-08-02 ENCOUNTER — APPOINTMENT (OUTPATIENT)
Dept: PHYSICAL THERAPY | Facility: REHABILITATION | Age: 42
End: 2022-08-02
Payer: COMMERCIAL

## 2022-08-02 DIAGNOSIS — K21.9 GASTROESOPHAGEAL REFLUX DISEASE WITHOUT ESOPHAGITIS: ICD-10-CM

## 2022-08-02 RX ORDER — OMEPRAZOLE 20 MG/1
CAPSULE, DELAYED RELEASE ORAL
Qty: 60 CAPSULE | Refills: 0 | Status: SHIPPED | OUTPATIENT
Start: 2022-08-02 | End: 2022-09-01

## 2022-08-02 NOTE — TELEPHONE ENCOUNTER
Medication Refill Request     Name omeprazole (PriLOSEC) 20 mg delayed release capsule  Dose/Frequency :Patient is taking 40 mg daily now, two 20 mg tablets once a day by mouth  Quantity 60  Verified pharmacy   [x]  Verified ordering Provider   [x]  Does patient have enough for the next 3 days? Yes [x] No []    She said that she needs the script changed to 2 tablets 1 a day if possible

## 2022-08-03 ENCOUNTER — TELEPHONE (OUTPATIENT)
Dept: HEMATOLOGY ONCOLOGY | Facility: CLINIC | Age: 42
End: 2022-08-03

## 2022-08-03 NOTE — TELEPHONE ENCOUNTER
Patient calling in requesting status of medication refill of her Ondansetron 8 mg  I advise the patient request in progress

## 2022-08-04 ENCOUNTER — OFFICE VISIT (OUTPATIENT)
Dept: PHYSICAL THERAPY | Facility: REHABILITATION | Age: 42
End: 2022-08-04
Payer: COMMERCIAL

## 2022-08-04 DIAGNOSIS — M54.12 CERVICAL RADICULOPATHY: Primary | ICD-10-CM

## 2022-08-04 PROCEDURE — 97140 MANUAL THERAPY 1/> REGIONS: CPT

## 2022-08-04 PROCEDURE — 97112 NEUROMUSCULAR REEDUCATION: CPT

## 2022-08-04 PROCEDURE — 97110 THERAPEUTIC EXERCISES: CPT

## 2022-08-04 NOTE — PROGRESS NOTES
Daily Note     Today's date: 2022  Patient name: Kavya aDmon  : 1980  MRN: [id number]  Referring provider: SOLO Pratt  Dx:   Encounter Diagnosis     ICD-10-CM    1  Cervical radiculopathy  M54 12                   Subjective: Pt noted she had some pain following shrugs last time, states her clavicle had developed some pain that lasted about a day or so  Objective: See treatment diary below      Assessment: Tolerated treatment well  Patient would benefit from continued PT   Pt  able to complete all exercises with no increase in pain during or after session  Pt notes some minor relief with manuals, but no changes overall  Pt  1:1 with PTA for entirety  Plan: Continue per plan of care  Precautions: multiple allergies(gabapentin),       Manuals       traction  manual NB Manual KK TC 12' KP 8' BV 8' KP 5'      STM UT, cervical spine  NB  TC 12' KP 8' BV 8' KP 5'                                Neuro Re-Ed             Chin tucks  5"x15 5"x15 5"x15 15x5"  15x5" supine      scap retract  5"x15  5"x15 15x5"  20x5"      TB Rows  3x10 OTB 10x          Cervical Retraction   10x 10  x10 10x3"      Cervical Iso      10"x5 10x5" ea 4 way      Shrugs      12# 5"x20  decl                   Ther Ex             Cerv Rotation AROM  3"x10 ea 3"x10 ea 3"x10 ea  15x3" ea  15x3" ea      Cerv Flex/Ext AROM  3"x10 ea 3"x10 ea 3"x10 ea  15x3" ea  15x3" ea      UT St    30"x3 30"x3 30"x3 ea   3x30" ea b/l  2x30" ea b/l                                                                       Ther Activity                                       Gait Training                                       Modalities             HP

## 2022-08-05 ENCOUNTER — TELEPHONE (OUTPATIENT)
Dept: HEMATOLOGY ONCOLOGY | Facility: CLINIC | Age: 42
End: 2022-08-05

## 2022-08-05 DIAGNOSIS — R11.0 NAUSEA: ICD-10-CM

## 2022-08-05 RX ORDER — ONDANSETRON 8 MG/1
8 TABLET, ORALLY DISINTEGRATING ORAL EVERY 8 HOURS PRN
Qty: 20 TABLET | Refills: 2 | Status: SHIPPED | OUTPATIENT
Start: 2022-08-05 | End: 2022-09-20 | Stop reason: SDUPTHER

## 2022-08-05 NOTE — TELEPHONE ENCOUNTER
Medication Refill     Who is Calling  Patient   Medication  ondansetron (Zofran ODT) 8 mg disintegrating        How many pills left  none   Preferred Pharmacy / Address [facility]/pharmacy #[id number], [address] [facility], [address] [place] [address]   Phone:  [phone]  Fax:  [phone]    Who is your Physician?  Jameel   Call back number [phone]   Relevant Information  none

## 2022-08-08 ENCOUNTER — OFFICE VISIT (OUTPATIENT)
Dept: PHYSICAL THERAPY | Facility: REHABILITATION | Age: 42
End: 2022-08-08
Payer: COMMERCIAL

## 2022-08-08 DIAGNOSIS — M54.12 CERVICAL RADICULOPATHY: Primary | ICD-10-CM

## 2022-08-08 PROCEDURE — 97112 NEUROMUSCULAR REEDUCATION: CPT

## 2022-08-08 PROCEDURE — 97140 MANUAL THERAPY 1/> REGIONS: CPT

## 2022-08-08 NOTE — PROGRESS NOTES
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         Objective: See treatment diary below      Assessment: Tolerated treatment well  Pt demonstrating potential closing restriction during bilateral SB and ext AROM  Slight improvement in pain and AROM following low grade mobs  Pt able to progress several postural strengthening exercises today  Continue to progress as tolerated, monitor response to new interventions at f/u  Patient would benefit from continued PT  1:1 with Radhika Jovel DPT for entirety of tx  Plan: Continue per plan of care  Precautions: multiple allergies(gabapentin),       Manuals  7/ 8/ 8/     traction  manual NB Manual KK TC 12' KP 8' BV 8' KP 5' CM 5'     STM UT, cervical spine  NB  TC 12' KP 8' BV 8' KP 5' CM 5'     C/s mobs        CM 5' C3-4 side glides Gr 1-2 bilat  Neuro Re-Ed             Chin tucks  5"x15 5"x15 5"x15 15x5"  15x5" supine 3" 2x8 + supine head lift     scap retract  5"x15  5"x15 15x5"  20x5"      TB Rows  3x10 OTB 10x     2x10 rtb     Cervical Retraction   10x 10  x10 10x3"      Cervical Iso      10"x5 10x5" ea 4 way      Shrugs      12# 5"x20  decl      SRER        2x10 rtb     Ther Ex             Cerv Rotation AROM  3"x10 ea 3"x10 ea 3"x10 ea  15x3" ea  15x3" ea      Cerv Flex/Ext AROM  3"x10 ea 3"x10 ea 3"x10 ea  15x3" ea  15x3" ea      UT St    30"x3 30"x3 30"x3 ea   3x30" ea b/l  2x30" ea b/l                                                                       Ther Activity Gait Training                                       Modalities             HP

## 2022-08-15 DIAGNOSIS — J45.20 MILD INTERMITTENT ASTHMA WITHOUT COMPLICATION: ICD-10-CM

## 2022-08-16 RX ORDER — DEXAMETHASONE 4 MG/1
TABLET ORAL
Qty: 12 G | Refills: 1 | Status: SHIPPED | OUTPATIENT
Start: 2022-08-16 | End: 2022-10-27

## 2022-08-18 ENCOUNTER — APPOINTMENT (OUTPATIENT)
Dept: PHYSICAL THERAPY | Facility: REHABILITATION | Age: 42
End: 2022-08-18
Payer: COMMERCIAL

## 2022-08-19 ENCOUNTER — TELEPHONE (OUTPATIENT)
Dept: HEMATOLOGY ONCOLOGY | Facility: CLINIC | Age: 42
End: 2022-08-19

## 2022-08-21 ENCOUNTER — APPOINTMENT (OUTPATIENT)
Dept: LAB | Facility: CLINIC | Age: 42
End: 2022-08-21
Payer: COMMERCIAL

## 2022-08-21 DIAGNOSIS — D50.8 IRON DEFICIENCY ANEMIA SECONDARY TO INADEQUATE DIETARY IRON INTAKE: ICD-10-CM

## 2022-08-21 LAB
ALBUMIN SERPL BCP-MCNC: 3.5 G/DL (ref 3.5–5)
ALP SERPL-CCNC: 55 U/L (ref 46–116)
ALT SERPL W P-5'-P-CCNC: 22 U/L (ref 12–78)
ANION GAP SERPL CALCULATED.3IONS-SCNC: 8 MMOL/L (ref 4–13)
AST SERPL W P-5'-P-CCNC: 14 U/L (ref 5–45)
BASOPHILS # BLD AUTO: 0.06 THOUSANDS/ΜL (ref 0–0.1)
BASOPHILS NFR BLD AUTO: 1 % (ref 0–1)
BILIRUB SERPL-MCNC: 0.41 MG/DL (ref 0.2–1)
BUN SERPL-MCNC: 8 MG/DL (ref 5–25)
CALCIUM SERPL-MCNC: 8.6 MG/DL (ref 8.3–10.1)
CHLORIDE SERPL-SCNC: 93 MMOL/L (ref 96–108)
CO2 SERPL-SCNC: 29 MMOL/L (ref 21–32)
CREAT SERPL-MCNC: 1.31 MG/DL (ref 0.6–1.3)
EOSINOPHIL # BLD AUTO: 0.27 THOUSAND/ΜL (ref 0–0.61)
EOSINOPHIL NFR BLD AUTO: 3 % (ref 0–6)
ERYTHROCYTE [DISTWIDTH] IN BLOOD BY AUTOMATED COUNT: 14.5 % (ref 11.6–15.1)
FERRITIN SERPL-MCNC: 43 NG/ML (ref 8–388)
GFR SERPL CREATININE-BSD FRML MDRD: 50 ML/MIN/1.73SQ M
GLUCOSE P FAST SERPL-MCNC: 129 MG/DL (ref 65–99)
HCT VFR BLD AUTO: 40 % (ref 34.8–46.1)
HGB BLD-MCNC: 13.6 G/DL (ref 11.5–15.4)
IMM GRANULOCYTES # BLD AUTO: 0.02 THOUSAND/UL (ref 0–0.2)
IMM GRANULOCYTES NFR BLD AUTO: 0 % (ref 0–2)
IRON SATN MFR SERPL: 24 % (ref 15–50)
IRON SERPL-MCNC: 67 UG/DL (ref 50–170)
LYMPHOCYTES # BLD AUTO: 3.28 THOUSANDS/ΜL (ref 0.6–4.47)
LYMPHOCYTES NFR BLD AUTO: 37 % (ref 14–44)
MCH RBC QN AUTO: 30.9 PG (ref 26.8–34.3)
MCHC RBC AUTO-ENTMCNC: 34 G/DL (ref 31.4–37.4)
MCV RBC AUTO: 91 FL (ref 82–98)
MONOCYTES # BLD AUTO: 0.65 THOUSAND/ΜL (ref 0.17–1.22)
MONOCYTES NFR BLD AUTO: 7 % (ref 4–12)
NEUTROPHILS # BLD AUTO: 4.6 THOUSANDS/ΜL (ref 1.85–7.62)
NEUTS SEG NFR BLD AUTO: 52 % (ref 43–75)
NRBC BLD AUTO-RTO: 0 /100 WBCS
PLATELET # BLD AUTO: 360 THOUSANDS/UL (ref 149–390)
PMV BLD AUTO: 8.6 FL (ref 8.9–12.7)
POTASSIUM SERPL-SCNC: 3.7 MMOL/L (ref 3.5–5.3)
PROT SERPL-MCNC: 6.7 G/DL (ref 6.4–8.4)
RBC # BLD AUTO: 4.4 MILLION/UL (ref 3.81–5.12)
SODIUM SERPL-SCNC: 130 MMOL/L (ref 135–147)
TIBC SERPL-MCNC: 280 UG/DL (ref 250–450)
WBC # BLD AUTO: 8.88 THOUSAND/UL (ref 4.31–10.16)

## 2022-08-21 PROCEDURE — 83550 IRON BINDING TEST: CPT

## 2022-08-21 PROCEDURE — 85025 COMPLETE CBC W/AUTO DIFF WBC: CPT

## 2022-08-21 PROCEDURE — 82728 ASSAY OF FERRITIN: CPT

## 2022-08-21 PROCEDURE — 80053 COMPREHEN METABOLIC PANEL: CPT

## 2022-08-21 PROCEDURE — 83540 ASSAY OF IRON: CPT

## 2022-08-21 PROCEDURE — 36415 COLL VENOUS BLD VENIPUNCTURE: CPT

## 2022-08-23 ENCOUNTER — OFFICE VISIT (OUTPATIENT)
Dept: HEMATOLOGY ONCOLOGY | Facility: CLINIC | Age: 42
End: 2022-08-23
Payer: COMMERCIAL

## 2022-08-23 ENCOUNTER — TELEPHONE (OUTPATIENT)
Dept: HEMATOLOGY ONCOLOGY | Facility: HOSPITAL | Age: 42
End: 2022-08-23

## 2022-08-23 VITALS
SYSTOLIC BLOOD PRESSURE: 104 MMHG | DIASTOLIC BLOOD PRESSURE: 60 MMHG | BODY MASS INDEX: 16.84 KG/M2 | WEIGHT: 89.2 LBS | RESPIRATION RATE: 17 BRPM | TEMPERATURE: 97.7 F | HEART RATE: 98 BPM | HEIGHT: 61 IN | OXYGEN SATURATION: 99 %

## 2022-08-23 DIAGNOSIS — D50.8 IRON DEFICIENCY ANEMIA SECONDARY TO INADEQUATE DIETARY IRON INTAKE: Primary | ICD-10-CM

## 2022-08-23 PROCEDURE — 99213 OFFICE O/P EST LOW 20 MIN: CPT | Performed by: INTERNAL MEDICINE

## 2022-08-23 NOTE — TELEPHONE ENCOUNTER
Called patient and scheduled 4 month f/u appt, patient said to cancel the appt, patient has work and patient will call back and schedule her f/u appointment
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
## 2022-09-01 DIAGNOSIS — K21.9 GASTROESOPHAGEAL REFLUX DISEASE WITHOUT ESOPHAGITIS: ICD-10-CM

## 2022-09-01 RX ORDER — OMEPRAZOLE 20 MG/1
CAPSULE, DELAYED RELEASE ORAL
Qty: 60 CAPSULE | Refills: 0 | Status: SHIPPED | OUTPATIENT
Start: 2022-09-01 | End: 2022-09-30

## 2022-09-01 RX ORDER — OMEPRAZOLE 20 MG/1
CAPSULE, DELAYED RELEASE ORAL
Qty: 60 CAPSULE | Refills: 0 | Status: SHIPPED | OUTPATIENT
Start: 2022-09-01 | End: 2022-09-01 | Stop reason: SDUPTHER

## 2022-09-02 ENCOUNTER — ANESTHESIA (OUTPATIENT)
Dept: GASTROENTEROLOGY | Facility: HOSPITAL | Age: 42
End: 2022-09-02

## 2022-09-02 ENCOUNTER — HOSPITAL ENCOUNTER (OUTPATIENT)
Dept: GASTROENTEROLOGY | Facility: HOSPITAL | Age: 42
Setting detail: OUTPATIENT SURGERY
End: 2022-09-02
Attending: INTERNAL MEDICINE
Payer: COMMERCIAL

## 2022-09-02 ENCOUNTER — ANESTHESIA EVENT (OUTPATIENT)
Dept: GASTROENTEROLOGY | Facility: HOSPITAL | Age: 42
End: 2022-09-02

## 2022-09-02 VITALS
WEIGHT: 89 LBS | RESPIRATION RATE: 16 BRPM | DIASTOLIC BLOOD PRESSURE: 74 MMHG | BODY MASS INDEX: 16.8 KG/M2 | HEIGHT: 61 IN | HEART RATE: 81 BPM | SYSTOLIC BLOOD PRESSURE: 107 MMHG | OXYGEN SATURATION: 99 % | TEMPERATURE: 95.9 F

## 2022-09-02 DIAGNOSIS — D50.8 IRON DEFICIENCY ANEMIA SECONDARY TO INADEQUATE DIETARY IRON INTAKE: ICD-10-CM

## 2022-09-02 DIAGNOSIS — Z80.0 FAMILY HISTORY OF RECTAL CANCER: ICD-10-CM

## 2022-09-02 DIAGNOSIS — K21.9 GASTROESOPHAGEAL REFLUX DISEASE WITHOUT ESOPHAGITIS: ICD-10-CM

## 2022-09-02 PROBLEM — F41.9 ANXIETY: Status: ACTIVE | Noted: 2022-09-02

## 2022-09-02 LAB
EXT PREGNANCY TEST URINE: NEGATIVE
EXT. CONTROL: NORMAL

## 2022-09-02 PROCEDURE — 81025 URINE PREGNANCY TEST: CPT | Performed by: STUDENT IN AN ORGANIZED HEALTH CARE EDUCATION/TRAINING PROGRAM

## 2022-09-02 PROCEDURE — 88313 SPECIAL STAINS GROUP 2: CPT | Performed by: PATHOLOGY

## 2022-09-02 PROCEDURE — 88305 TISSUE EXAM BY PATHOLOGIST: CPT | Performed by: PATHOLOGY

## 2022-09-02 PROCEDURE — 43239 EGD BIOPSY SINGLE/MULTIPLE: CPT | Performed by: INTERNAL MEDICINE

## 2022-09-02 PROCEDURE — G0121 COLON CA SCRN NOT HI RSK IND: HCPCS | Performed by: INTERNAL MEDICINE

## 2022-09-02 RX ORDER — PROPOFOL 10 MG/ML
INJECTION, EMULSION INTRAVENOUS AS NEEDED
Status: DISCONTINUED | OUTPATIENT
Start: 2022-09-02 | End: 2022-09-02

## 2022-09-02 RX ORDER — LIDOCAINE HYDROCHLORIDE 20 MG/ML
INJECTION, SOLUTION EPIDURAL; INFILTRATION; INTRACAUDAL; PERINEURAL AS NEEDED
Status: DISCONTINUED | OUTPATIENT
Start: 2022-09-02 | End: 2022-09-02

## 2022-09-02 RX ORDER — ONDANSETRON 2 MG/ML
4 INJECTION INTRAMUSCULAR; INTRAVENOUS ONCE AS NEEDED
Status: CANCELLED | OUTPATIENT
Start: 2022-09-02

## 2022-09-02 RX ORDER — GLYCOPYRROLATE 0.2 MG/ML
INJECTION INTRAMUSCULAR; INTRAVENOUS AS NEEDED
Status: DISCONTINUED | OUTPATIENT
Start: 2022-09-02 | End: 2022-09-02

## 2022-09-02 RX ORDER — SODIUM CHLORIDE 9 MG/ML
INJECTION, SOLUTION INTRAVENOUS CONTINUOUS PRN
Status: DISCONTINUED | OUTPATIENT
Start: 2022-09-02 | End: 2022-09-02

## 2022-09-02 RX ORDER — MIDAZOLAM HYDROCHLORIDE 2 MG/2ML
INJECTION, SOLUTION INTRAMUSCULAR; INTRAVENOUS AS NEEDED
Status: DISCONTINUED | OUTPATIENT
Start: 2022-09-02 | End: 2022-09-02

## 2022-09-02 RX ADMIN — PROPOFOL 30 MG: 10 INJECTION, EMULSION INTRAVENOUS at 10:00

## 2022-09-02 RX ADMIN — MIDAZOLAM 2 MG: 1 INJECTION INTRAMUSCULAR; INTRAVENOUS at 09:56

## 2022-09-02 RX ADMIN — PROPOFOL 20 MG: 10 INJECTION, EMULSION INTRAVENOUS at 10:04

## 2022-09-02 RX ADMIN — PROPOFOL 90 MG: 10 INJECTION, EMULSION INTRAVENOUS at 09:59

## 2022-09-02 RX ADMIN — SODIUM CHLORIDE: 0.9 INJECTION, SOLUTION INTRAVENOUS at 09:52

## 2022-09-02 RX ADMIN — Medication 30 MG: at 09:59

## 2022-09-02 RX ADMIN — PROPOFOL 20 MG: 10 INJECTION, EMULSION INTRAVENOUS at 10:11

## 2022-09-02 RX ADMIN — PROPOFOL 20 MG: 10 INJECTION, EMULSION INTRAVENOUS at 10:15

## 2022-09-02 RX ADMIN — GLYCOPYRROLATE 0.1 MCG: 0.2 INJECTION INTRAMUSCULAR; INTRAVENOUS at 09:56

## 2022-09-02 RX ADMIN — LIDOCAINE HYDROCHLORIDE 100 MG: 20 INJECTION, SOLUTION EPIDURAL; INFILTRATION; INTRACAUDAL; PERINEURAL at 09:59

## 2022-09-02 RX ADMIN — PROPOFOL 20 MG: 10 INJECTION, EMULSION INTRAVENOUS at 10:09

## 2022-09-02 NOTE — ANESTHESIA POSTPROCEDURE EVALUATION
Post-Op Assessment Note    CV Status:  Stable  Pain Score: 0    Pain management: adequate     Mental Status:  Awake and sleepy   Hydration Status:  Euvolemic   PONV Controlled:  Controlled   Airway Patency:  Patent      Post Op Vitals Reviewed: Yes      Staff: Anesthesiologist, CRNA         No complications documented      BP (!) 87/58 (09/02/22 1025)    Temp (!) 95 9 °F (35 5 °C) (09/02/22 1025)    Pulse 84 (09/02/22 1025)   Resp 16 (09/02/22 1025)    SpO2 99 % (09/02/22 1025)

## 2022-09-02 NOTE — H&P
[facility] - History & Physical  Tiffany Emerson 43 y o  female MRN: [id number]      HPI: Tiffany Emerson is a 43y o  year old female who presents for EGD and colonoscopy for GERD, iron deficiency anemia, and colon cancer screening  No previous colonoscopy  Not on antiplatelets or anticoagulants  REVIEW OF SYSTEMS: Per the HPI, and otherwise unremarkable      Historical Information   Past Medical History:   Diagnosis Date    Allergies     Anxiety     Asthma     Chronic pain     Contusion of head, subsequent encounter 2022    Depression     Eating disorder     Fibrocystic breast changes of both breasts     Seizures (HCC)      Past Surgical History:   Procedure Laterality Date    INDUCED       MAMMO STEREOTACTIC BREAST BIOPSY LEFT (ALL INC)  2015    Benign     Social History   Social History     Substance and Sexual Activity   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     Social History     Substance and Sexual Activity   Drug Use No     Social History     Tobacco Use   Smoking Status Current Every Day Smoker    Packs/day: 0 50    Years: 28 00    Pack years: 14 00    Types: Cigarettes   Smokeless Tobacco Never Used   Tobacco Comment    10-15 cig/day     Family History   Problem Relation Age of Onset    Diabetes Sister     Alcohol abuse Sister     Cancer Father         Diagnosed stage 4 - metastazied    Psychiatric Illness Mother     Hypertension Family     Diabetes Family        MEDICATIONS & ALLERGIES:    Current Outpatient Medications   Medication Instructions    acetaminophen (TYLENOL) 500 mg, Oral, Every 6 hours PRN    albuterol (PROVENTIL HFA,VENTOLIN HFA) 90 mcg/act inhaler TAKE 2 PUFFS BY MOUTH EVERY 6 HOURS AS NEEDED FOR WHEEZE OR FOR SHORTNESS OF BREATH    Biotin w/ Vitamins C & E (HAIR/SKIN/NAILS PO) Oral    busPIRone (BUSPAR) 15 mg tablet Oral, 3 times daily    CALCIUM-MAGNESIUM-ZINC PO Oral    co-enzyme Q-10 30 mg, Oral, 3 times daily    Flovent  MCG/ACT inhaler INHALE 2 PUFFS BY MOUTH 2 TIMES A DAY RINSE MOUTH AFTER USE   fluticasone (FLONASE) 50 mcg/act nasal spray 1 spray, Nasal, Daily    fluticasone (FLOVENT HFA) 110 MCG/ACT inhaler 110 puffs    hydrocortisone 2 5 % cream hydrocortisone 2 5 % topical cream with perineal applicator    ibuprofen (MOTRIN) 600 mg tablet Oral, Every 6 hours PRN    ipratropium (ATROVENT) 0 06 % nasal spray 2 sprays, Nasal, 4 times daily    loratadine (CLARITIN) 10 mg, Oral, Daily    LORazepam (ATIVAN) 1 mg, Oral, 6 times daily    naproxen (EC NAPROSYN) 500 mg, Oral, 2 times daily PRN    naproxen (NAPROSYN) 500 mg tablet TAKE 1 TABLET BY MOUTH TWICE A DAY WITH MEALS    omeprazole (PriLOSEC) 20 mg delayed release capsule Take 1 capsule (20 MG total) by mouth twice daily    ondansetron (ZOFRAN ODT) 8 mg, Oral, Every 8 hours PRN    polyethylene glycol (GLYCOLAX) 17 GM/SCOOP powder At 5pm take 5mgx2 dulcolax  At 6pm 32oz miralax in 64oz gatorade  Drink 8oz glass every 5 mins until 32oz finished  Drink remaining as rec   thiamine (VITAMIN B1) 100 mg, Oral, Daily    vitamin C 100 mg, Oral, Daily     Allergies   Allergen Reactions    Gabapentin Hives    Meloxicam GI Intolerance    Tramadol Hives     SEIZURES    Vistaril [Hydroxyzine]        PHYSICAL EXAM:    Objective   Blood pressure 101/72, pulse 89, temperature (!) 96 5 °F (35 8 °C), temperature source Tympanic, resp  rate 20, height 5' 1" (1 549 m), weight 40 4 kg (89 lb), SpO2 99 %  Body mass index is 16 82 kg/m²  Gen: NAD  CV: RRR  CHEST: Clear  ABD: Soft, NT/ND  EXT: No edema    ASSESSMENT AND PLAN:  This is a 43y o  year old female here for EGD and colonoscopy, and she is stable and optimized for her procedure  DILCIA Ríos  Chief Gastroenterology Fellow  [facility]  Available on [email or web address]

## 2022-09-02 NOTE — ANESTHESIA PREPROCEDURE EVALUATION
Procedure:  COLONOSCOPY  EGD    Relevant Problems   CARDIO   (+) Short of breath on exertion   (-) CAD (coronary artery disease)   (-) Chest pain      GI/HEPATIC   (+) Gastroesophageal reflux disease without esophagitis      HEMATOLOGY   (+) Iron deficiency anemia secondary to inadequate dietary iron intake      MUSCULOSKELETAL   (+) Chronic bilateral low back pain   (+) Fibromyalgia   (+) Mid back pain   (+) Myofascial pain syndrome      NEURO/PSYCH   (+) Acute nonintractable headache   (+) Anxiety   (+) Chronic bilateral low back pain   (+) Depression   (+) Fibromyalgia   (+) Major depressive disorder, recurrent, mild (HCC)   (+) Myofascial pain syndrome   (+) Paresthesia      PULMONARY   (+) Asthma   (+) Short of breath on exertion        EKG 9 19 18  Sinus rhythm with sinus arrhythmia  Right atrial enlargement  Borderline ECG  HR 84      Physical Exam    Airway    Mallampati score: II  TM Distance: >3 FB  Neck ROM: limited     Dental       Cardiovascular  Cardiovascular exam normal    Pulmonary  Pulmonary exam normal     Other Findings        Anesthesia Plan  ASA Score- 3     Anesthesia Type- IV sedation with anesthesia with ASA Monitors  Additional Monitors:   Airway Plan:           Plan Factors-Exercise tolerance (METS): >4 METS  Chart reviewed  EKG reviewed  Existing labs reviewed  Patient summary reviewed  Patient is a current smoker  Patient not instructed to abstain from smoking on day of procedure  Patient smoked on day of surgery  Induction- intravenous  Postoperative Plan-     Informed Consent- Anesthetic plan and risks discussed with patient  I personally reviewed this patient with the CRNA  Discussed and agreed on the Anesthesia Plan with the CRNA  Levarольга Lorenzo is a 43 y o  female presenting today for EGD/Colonoscopy   Pertinent medical history includes: myasthenic syndrome, chronic neck pain, anxiety, and depression   History of sedation only, states she "woke up" Spoke to her in detail of benefits and risks of anesthesia and noted that GETA for this procedure for her history would have more risks than benefits  NPO since wednesday  Bowel prep completed  Denies N/V, F, faustina, CP, SOB  AQA  Plan for MAC

## 2022-09-07 DIAGNOSIS — J45.20 MILD INTERMITTENT ASTHMA, UNCOMPLICATED: ICD-10-CM

## 2022-09-07 RX ORDER — ALBUTEROL SULFATE 90 UG/1
AEROSOL, METERED RESPIRATORY (INHALATION)
Qty: 18 G | Refills: 1 | Status: SHIPPED | OUTPATIENT
Start: 2022-09-07

## 2022-09-08 NOTE — PROGRESS NOTES
Pt has not presented to formal PT in >1 month, d/c at this time  Objective measures and goals unable to be reassessed

## 2022-09-09 ENCOUNTER — TELEPHONE (OUTPATIENT)
Dept: OTHER | Facility: OTHER | Age: 42
End: 2022-09-09

## 2022-09-09 PROCEDURE — 88313 SPECIAL STAINS GROUP 2: CPT | Performed by: PATHOLOGY

## 2022-09-09 PROCEDURE — 88305 TISSUE EXAM BY PATHOLOGIST: CPT | Performed by: PATHOLOGY

## 2022-09-09 NOTE — TELEPHONE ENCOUNTER
Pt called in to discuss her results  She is requesting a call back today so she won't have to be anxious for the weekend

## 2022-09-11 DIAGNOSIS — J30.2 SEASONAL ALLERGIES: ICD-10-CM

## 2022-09-13 RX ORDER — LORATADINE 10 MG/1
TABLET ORAL
Qty: 30 TABLET | Refills: 2 | Status: SHIPPED | OUTPATIENT
Start: 2022-09-13

## 2022-09-19 ENCOUNTER — RA CDI HCC (OUTPATIENT)
Dept: OTHER | Facility: HOSPITAL | Age: 42
End: 2022-09-19

## 2022-09-20 ENCOUNTER — TELEMEDICINE (OUTPATIENT)
Dept: FAMILY MEDICINE CLINIC | Facility: CLINIC | Age: 42
End: 2022-09-20
Payer: COMMERCIAL

## 2022-09-20 ENCOUNTER — TELEPHONE (OUTPATIENT)
Dept: HEMATOLOGY ONCOLOGY | Facility: CLINIC | Age: 42
End: 2022-09-20

## 2022-09-20 DIAGNOSIS — J01.11 ACUTE RECURRENT FRONTAL SINUSITIS: ICD-10-CM

## 2022-09-20 DIAGNOSIS — J45.31 ASTHMA IN ADULT, MILD PERSISTENT, WITH ACUTE EXACERBATION: Primary | ICD-10-CM

## 2022-09-20 DIAGNOSIS — R05.9 COUGH: ICD-10-CM

## 2022-09-20 DIAGNOSIS — R11.0 NAUSEA: ICD-10-CM

## 2022-09-20 PROCEDURE — 99213 OFFICE O/P EST LOW 20 MIN: CPT | Performed by: NURSE PRACTITIONER

## 2022-09-20 PROCEDURE — 3725F SCREEN DEPRESSION PERFORMED: CPT | Performed by: NURSE PRACTITIONER

## 2022-09-20 RX ORDER — ONDANSETRON 8 MG/1
8 TABLET, ORALLY DISINTEGRATING ORAL EVERY 8 HOURS PRN
Qty: 20 TABLET | Refills: 2 | Status: SHIPPED | OUTPATIENT
Start: 2022-09-20

## 2022-09-20 RX ORDER — PREDNISONE 20 MG/1
40 TABLET ORAL DAILY
Qty: 10 TABLET | Refills: 0 | Status: SHIPPED | OUTPATIENT
Start: 2022-09-20 | End: 2022-09-23

## 2022-09-20 RX ORDER — AMOXICILLIN 875 MG/1
875 TABLET, COATED ORAL 2 TIMES DAILY
Qty: 20 TABLET | Refills: 0 | Status: SHIPPED | OUTPATIENT
Start: 2022-09-20 | End: 2022-09-30

## 2022-09-20 NOTE — ASSESSMENT & PLAN NOTE
Patient should be taking her allergy medication daily currently during change of season  Patient also noted to use her albuterol inhaler more often  Tobacco cessation encouraged at this time

## 2022-09-20 NOTE — PATIENT INSTRUCTIONS
Asthma   AMBULATORY CARE:   Asthma  is a lung disease that makes breathing difficult  Chronic inflammation and reactions to triggers narrow the airways in your lungs  Asthma can become life-threatening if it is not managed  Cough-variant asthma  is a type of asthma that causes a dry cough that keeps coming back  A dry cough may be your only symptom, or you may also have chest tightness  These symptoms may be caused by exercise or exposure to odors, allergens, or respiratory tract infections  Cough-variant asthma is treated the same way as typical asthma  Common symptoms include the following:   Coughing    Wheezing    Shortness of breath    Chest tightness    Call your local emergency number (911 in the [address]) if:   You have severe shortness of breath  The skin around your neck and ribs pulls in with each breath  Your peak flow numbers are in the red zone of your AAP  Seek care immediately if:   You have shortness of breath, even after you take your short-term medicine as directed  Your lips or nails turn blue or gray  Call your doctor or asthma specialist if:   You run out of medicine before your next refill is due  Your symptoms get worse  You need to take more medicine than usual to control your symptoms  You have questions or concerns about your condition or care  Treatment for asthma  will depend on how severe your asthma is  Medicine may be used to decrease inflammation, open airways, and make it easier to breathe  Medicines may be inhaled, taken as a pill, or injected  Short-term medicines relieve your symptoms quickly  Long-term medicines are used to prevent future attacks  Other medicines may be needed if your regular medicines are not able to prevent attacks  You may also need medicine to help control your allergies  Manage and prevent future asthma attacks: Follow your asthma action plan  This is a written plan that you and your healthcare provider create   It explains which medicine you need and when to change doses if necessary  It also explains how you can monitor symptoms and use a peak flow meter  The meter measures how well your lungs are working  Manage other health conditions , such as allergies, acid reflux, and sleep apnea  Identify and avoid triggers  These may include pets, dust mites, mold, and cockroaches  Do not smoke or be around others who smoke  Nicotine and other chemicals in cigarettes and cigars can cause lung damage  Ask your healthcare provider for information if you currently smoke and need help to quit  E-cigarettes or smokeless tobacco still contain nicotine  Talk to your healthcare provider before you use these products  Ask about the flu vaccine  The flu can make your asthma worse  You may need a yearly flu shot  Follow up with your healthcare provider as directed: You will need to return to make sure your medicine is working and your symptoms are controlled  You may be referred to an asthma or allergy specialist  Rahul Radha may be asked to keep a record of your peak flow values and bring it with you to your appointments  Write down your questions so you remember to ask them during your visits  © Copyright [employer] 2022 Information is for End User's use only and may not be sold, redistributed or otherwise used for commercial purposes  All illustrations and images included in CareNotes® are the copyrighted property of A D A M , Inc  or [address] [place] [address]  The above information is an  only  It is not intended as medical advice for individual conditions or treatments  Talk to your doctor, nurse or pharmacist before following any medical regimen to see if it is safe and effective for you  Acute Cough   WHAT YOU NEED TO KNOW:   What is an acute cough? An acute cough can last up to 3 weeks  Common causes of an acute cough include a cold, allergies, or a lung infection  How is the cause of an acute cough diagnosed?   Your healthcare provider will examine you and listen to your lungs  Tell your healthcare provider if you cough up any mucus, or have a fever or shortness of breath  Also tell your provider what makes the cough better or worse  Depending on your symptoms, you may need a chest x-ray  A sample of mucus may be collected and tested for infection  How is an acute cough treated? An acute cough usually goes away on its own  Ask your healthcare provider about medicines you can take to decrease your cough  You may need medicine to stop the cough, decrease swelling in your airways, or help open your airways  Medicine may also be given to help you cough up mucus  If you have an infection caused by bacteria, you may need antibiotics  What can I do to manage my cough? Do not smoke and stay away from others who smoke  Nicotine and other chemicals in cigarettes and cigars can cause lung damage and make your cough worse  Ask your healthcare provider for information if you currently smoke and need help to quit  E-cigarettes or smokeless tobacco still contain nicotine  Talk to your healthcare provider before you use these products  Drink extra liquids as directed  Liquids will help thin and loosen mucus so you can cough it up  Liquids will also help prevent dehydration  Examples of good liquids to drink include water, fruit juice, and broth  Do not drink liquids that contain caffeine  Caffeine can increase your risk for dehydration  Ask your healthcare provider how much liquid to drink each day  Rest as directed  Do not do activities that make your cough worse, such as exercise  Use a humidifier or vaporizer  Use a cool mist humidifier or a vaporizer to increase air moisture in your home  This may make it easier for you to breathe and help decrease your cough  Eat 2 to 5 mL of honey 2 times each day  Honey can help thin mucus and decrease your cough  Use cough drops or lozenges    These can help decrease throat irritation and your cough  When should I seek immediate care? You have trouble breathing or feel short of breath  You cough up blood, or you see blood in your mucus  You faint or feel weak or dizzy  You have chest pain when you cough or take a deep breath  You have new wheezing  When should I contact my healthcare provider? You have a fever  Your cough lasts longer than 4 weeks  Your symptoms do not improve with treatment  You have questions or concerns about your condition or care  CARE AGREEMENT:   You have the right to help plan your care  Learn about your health condition and how it may be treated  Discuss treatment options with your healthcare providers to decide what care you want to receive  You always have the right to refuse treatment  The above information is an  only  It is not intended as medical advice for individual conditions or treatments  Talk to your doctor, nurse or pharmacist before following any medical regimen to see if it is safe and effective for you  © Copyright [employer] 2022 Information is for End User's use only and may not be sold, redistributed or otherwise used for commercial purposes  All illustrations and images included in CareNotes® are the copyrighted property of [employer]  or [employer] [facility]  Sinusitis   WHAT YOU NEED TO KNOW:   What is sinusitis? Sinusitis is inflammation or infection of your sinuses  Sinusitis is most often caused by a virus  Acute sinusitis may last up to 12 weeks  Chronic sinusitis lasts longer than 12 weeks  Recurrent sinusitis means you have 4 or more infections in 1 year  What increases my risk for sinusitis?    Medical conditions, such as an upper respiratory infection, allergies, asthma, or cystic fibrosis    Dental infections or procedures, such as gum infections, tooth decay, or a root canal    Smoking or exposure to secondhand smoke    Abnormal sinus structure, such as nasal growths, swollen tonsils, or a deviated septum    A weak immune system, from diseases such as diabetes or HIV    What are the signs and symptoms of sinusitis? Fever    Pain, pressure, redness, or swelling around the forehead, cheeks, or eyes    Thick yellow or green discharge from your nose    Tenderness when you touch your face over your sinuses    Dry cough that happens mostly at night or when you lie down    Headache and face pain that is worse when you lean forward    Tooth pain, or pain when you chew    How is sinusitis diagnosed? Your healthcare provider will examine you and ask about your symptoms  He or she may check inside your nose using a nasal speculum  You may need any of the following tests:  A sample of mucus from your nose  may show what germ is causing your infection  A CT or MRI of your head  may show the mucous lining of your sinuses  You may be given contrast liquid to help your sinuses show up better in the pictures  Tell your healthcare provider if you have ever had an allergic reaction to contrast liquid  Do not enter the MRI room with anything metal  Metal can cause serious injury  Tell your healthcare provider if you have any metal in or on your body  How is sinusitis treated? Your symptoms may go away on their own  Your healthcare provider may recommend watchful waiting for up to 10 days before starting antibiotics  You may need any of the following:  Acetaminophen  decreases pain and fever  It is available without a doctor's order  Ask how much to take and how often to take it  Follow directions  Read the labels of all other medicines you are using to see if they also contain acetaminophen, or ask your doctor or pharmacist  Acetaminophen can cause liver damage if not taken correctly  Do not use more than 4 grams (4,000 milligrams) total of acetaminophen in one day  NSAIDs , such as ibuprofen, help decrease swelling, pain, and fever   This medicine is available with or without a doctor's order  NSAIDs can cause stomach bleeding or kidney problems in certain people  If you take blood thinner medicine, always ask your healthcare provider if NSAIDs are safe for you  Always read the medicine label and follow directions  Nasal steroid sprays  may help decrease inflammation in your nose and sinuses  Decongestants  help reduce swelling and drain mucus in the nose and sinuses  They may help you breathe easier  Antihistamines  help dry mucus in the nose and relieve sneezing  Antibiotics  help treat or prevent a bacterial infection  How can I manage my symptoms? Rinse your sinuses as directed  Use a sinus rinse device to rinse your nasal passages with a saline (salt water) solution or distilled water  Do not use tap water  This will help thin the mucus in your nose and rinse away pollen and dirt  It will also help reduce swelling so you can breathe normally  Use a humidifier  to increase air moisture in your home  This may make it easier for you to breathe and help decrease your cough  Sleep with your head elevated  Place an extra pillow under your head before you go to sleep to help your sinuses drain  Drink liquids as directed  Ask your healthcare provider how much liquid to drink each day and which liquids are best for you  Liquids will thin the mucus in your nose and help it drain  Avoid drinks that contain alcohol or caffeine  Do not smoke, and avoid secondhand smoke  Nicotine and other chemicals in cigarettes and cigars can make your symptoms worse  Ask your healthcare provider for information if you currently smoke and need help to quit  E-cigarettes or smokeless tobacco still contain nicotine  Talk to your healthcare provider before you use these products  How can I help prevent the spread of germs? Wash your hands often with soap and water  Wash your hands after you use the bathroom, change a child's diaper, or sneeze   Wash your hands before you prepare or eat food  Stay away from people who are sick  Some germs spread easily and quickly through contact  When should I seek immediate care? You have trouble breathing or wheezing that is getting worse  You have a stiff neck, a fever, or a bad headache  You cannot open your eye  Your eyeball bulges out or you cannot move your eye  You are more sleepy than normal, or you notice changes in your ability to think, move, or talk  You have swelling of your forehead or scalp  When should I call my doctor? You have vision changes, such as double vision  Your eye and eyelid are red, swollen, and painful  Your symptoms do not improve or go away after 10 days  You have nausea and are vomiting  Your nose is bleeding  You have questions or concerns about your condition or care  CARE AGREEMENT:   You have the right to help plan your care  Learn about your health condition and how it may be treated  Discuss treatment options with your healthcare providers to decide what care you want to receive  You always have the right to refuse treatment  The above information is an  only  It is not intended as medical advice for individual conditions or treatments  Talk to your doctor, nurse or pharmacist before following any medical regimen to see if it is safe and effective for you  © Copyright [employer] 2022 Information is for End User's use only and may not be sold, redistributed or otherwise used for commercial purposes   All illustrations and images included in CareNotes® are the copyrighted property of A D A M , Inc  or [address] [place] [address] [facility]

## 2022-09-20 NOTE — ASSESSMENT & PLAN NOTE
Patient may take over-the-counter cough and cold medication as needed  Also order to use prednisone 20 mg p o  daily for the next 3 days

## 2022-09-20 NOTE — LETTER
September 20, 2022     Patient: Jackelin End  YOB: 1980  Date of Visit: 9/20/2022      To Whom it May Concern: Edd Munoz is under my professional care  Payal Cortez was seen in my office on 9/20/2022  Payal Cortez may return to work on 9/23/2022  If you have any questions or concerns, please don't hesitate to call           Sincerely,          SOLO Aquino        CC: No Recipients

## 2022-09-20 NOTE — PROGRESS NOTES
[facility]  coding opportunities       Chart reviewed, no opportunity found:   [address]        Patients Insurance     Medicare Insurance: [employer]

## 2022-09-20 NOTE — TELEPHONE ENCOUNTER
Medication Refill     Who is Calling  Patient   Medication ondansetron (Zofran ODT) 8 mg disintegrating tablet      How many pills left  3   Preferred Pharmacy / Address  [id number] [address] [facility] [facility]   Phone:  [phone]  Fax:  [phone]   [address] [place] [address] #:  [id number]   Who is your Physician? Kirk Chauhan, DO    Call back number  [phone]   Relevant Information  Patient request if the medication can be placed with refills to avoid calling in  Please call the patient when the order is placed

## 2022-09-20 NOTE — ASSESSMENT & PLAN NOTE
Patient order for amoxicillin 875 mg p o  b i d   Patient to use a Neti pot routinely  Encouraged use Flonase during allergy season

## 2022-09-20 NOTE — PROGRESS NOTES
Virtual Regular Visit    Verification of patient location:    Patient is located in the following state in which I hold an active license [place]      Assessment/Plan:    Problem List Items Addressed This Visit        Respiratory    Acute recurrent frontal sinusitis - Primary    Relevant Medications    amoxicillin (AMOXIL) 875 mg tablet          BMI Counseling: There is no height or weight on file to calculate BMI  The BMI is below normal  Patient advised to gain weight and dietary education for weight gain was provided  Rationale for BMI follow-up plan is due to patient being underweight  Reason for visit is   Chief Complaint   Patient presents with    Fever     100 4    Cough    Nasal Congestion    Generalized Body Aches    Sore Throat    Fatigue    Virtual Regular Visit        Encounter provider Funmilayo Neri [place]    Provider located at [address] [place] [address]  AGATHA [address] [facility] [address] [phone]      Recent Visits  No visits were found meeting these conditions  Showing recent visits within past 7 days and meeting all other requirements  Today's Visits  Date Type Provider Dept   09/20/22 Telemedicine Z[facility] Arguello 112 today's visits and meeting all other requirements  Future Appointments  No visits were found meeting these conditions  Showing future appointments within next 150 days and meeting all other requirements       The patient was identified by name and date of birth  Mroro Dick was informed that this is a telemedicine visit and that the visit is being conducted through [address] Now and patient was informed that this is a secure, HIPAA-compliant platform  She agrees to proceed     My office door was closed  No one else was in the room  She acknowledged consent and understanding of privacy and security of the video platform   The patient has agreed to participate and understands they can discontinue the visit at any time  Patient is aware this is a billable service  Maria Guadalupe Ramsey is a 43 y o  female virtual visit for reports of fever, cough, nasal congestion, headaches and fatigue  Recently had symptoms starting Thursday fever, cough, headache, cervical discomfort, congestion, fatigue, chills watery eyes and shortness of breath         Past Medical History:   Diagnosis Date    Allergies     Anxiety     Asthma     Chronic pain     Contusion of head, subsequent encounter 2022    Depression     Eating disorder     Fibrocystic breast changes of both breasts     Seizures (HCC)        Past Surgical History:   Procedure Laterality Date    INDUCED       MAMMO STEREOTACTIC BREAST BIOPSY LEFT (ALL INC)  2015    Benign       Current Outpatient Medications   Medication Sig Dispense Refill    acetaminophen (TYLENOL) 500 mg tablet Take 500 mg by mouth every 6 (six) hours as needed for mild pain      albuterol (PROVENTIL HFA,VENTOLIN HFA) 90 mcg/act inhaler TAKE 2 PUFFS BY MOUTH EVERY 6 HOURS AS NEEDED FOR WHEEZE OR FOR SHORTNESS OF BREATH 18 g 1    amoxicillin (AMOXIL) 875 mg tablet Take 1 tablet (875 mg total) by mouth 2 (two) times a day for 10 days 20 tablet 0    Ascorbic Acid (VITAMIN C) 100 MG tablet Take 100 mg by mouth daily      Biotin w/ Vitamins C & E (HAIR/SKIN/NAILS PO) Take by mouth      busPIRone (BUSPAR) 15 mg tablet Take by mouth 3 (three) times a day        CALCIUM-MAGNESIUM-ZINC PO Take by mouth      Flovent  MCG/ACT inhaler INHALE 2 PUFFS BY MOUTH 2 TIMES A DAY RINSE MOUTH AFTER USE  12 g 1    fluticasone (FLONASE) 50 mcg/act nasal spray 1 spray into each nostril daily 16 g 2    fluticasone (FLOVENT HFA) 110 MCG/ACT inhaler 110 puffs      ibuprofen (MOTRIN) 600 mg tablet Take by mouth every 6 (six) hours as needed for mild pain      loratadine (CLARITIN) 10 mg tablet TAKE 1 TABLET BY MOUTH EVERY DAY 30 tablet 2    LORazepam (ATIVAN) 1 mg tablet Take 1 mg by mouth 6 (six) times a day  1    naproxen (EC NAPROSYN) 500 MG EC tablet Take 1 tablet (500 mg total) by mouth 2 (two) times a day as needed for mild pain 30 tablet 0    naproxen (NAPROSYN) 500 mg tablet TAKE 1 TABLET BY MOUTH TWICE A DAY WITH MEALS 60 tablet 5    omeprazole (PriLOSEC) 20 mg delayed release capsule Take 1 capsule (20 MG total) by mouth twice daily 60 capsule 0    ondansetron (Zofran ODT) 8 mg disintegrating tablet Take 1 tablet (8 mg total) by mouth every 8 (eight) hours as needed for nausea or vomiting 20 tablet 2    thiamine (VITAMIN B1) 100 mg tablet Take 100 mg by mouth daily      hydrocortisone 2 5 % cream hydrocortisone 2 5 % topical cream with perineal applicator (Patient not taking: No sig reported)      ipratropium (ATROVENT) 0 06 % nasal spray 2 SPRAYS INTO EACH NOSTRIL 4 (FOUR) TIMES A DAY (Patient not taking: No sig reported) 15 mL 1     No current facility-administered medications for this visit  Allergies   Allergen Reactions    Gabapentin Hives    Meloxicam GI Intolerance    Tramadol Hives     SEIZURES    Vistaril [Hydroxyzine]        Review of Systems   Constitutional: Positive for appetite change, chills and fatigue  Negative for activity change, fever and unexpected weight change  HENT: Positive for congestion, postnasal drip, rhinorrhea, sinus pressure, sinus pain, sneezing and sore throat  Negative for ear discharge, ear pain, nosebleeds and voice change  Eyes: Negative for pain, redness and visual disturbance  Respiratory: Positive for cough and shortness of breath  Negative for chest tightness and wheezing  Cardiovascular: Negative for chest pain and palpitations  Gastrointestinal: Negative for abdominal distention, abdominal pain, constipation, diarrhea, nausea and vomiting  Endocrine: Negative  Genitourinary: Negative for difficulty urinating, dysuria, flank pain, frequency, hematuria and urgency  Musculoskeletal: Negative for arthralgias and myalgias  Skin: Negative  Allergic/Immunologic: Negative  Seasonal Allergies  Neurological: Positive for headaches  Negative for dizziness, seizures, weakness, light-headedness and numbness  Hematological: Negative  Psychiatric/Behavioral: Negative  Video Exam    There were no vitals filed for this visit  Physical Exam  Nursing note reviewed  Constitutional:       General: She is in acute distress  Appearance: She is well-developed  She is ill-appearing  HENT:      Right Ear: Ear canal normal       Left Ear: Ear canal normal       Nose: Congestion and rhinorrhea present  Mouth/Throat:      Mouth: Mucous membranes are dry  Pharynx: Oropharynx is clear  Tonsils: No tonsillar exudate or tonsillar abscesses  0 on the right  0 on the left  Eyes:      Conjunctiva/sclera: Conjunctivae normal    Neck:      Thyroid: No thyromegaly  Pulmonary:      Effort: Pulmonary effort is normal    Musculoskeletal:      Cervical back: Normal range of motion  Lymphadenopathy:      Cervical: No cervical adenopathy  Skin:     General: Skin is dry  Neurological:      Mental Status: She is alert and oriented to person, place, and time     Psychiatric:         Mood and Affect: Mood normal          Behavior: Behavior normal           I spent 25 minutes directly with the patient during this visit

## 2022-09-27 ENCOUNTER — TELEPHONE (OUTPATIENT)
Dept: PAIN MEDICINE | Facility: CLINIC | Age: 42
End: 2022-09-27

## 2022-09-27 ENCOUNTER — OFFICE VISIT (OUTPATIENT)
Dept: PAIN MEDICINE | Facility: CLINIC | Age: 42
End: 2022-09-27
Payer: COMMERCIAL

## 2022-09-27 VITALS
DIASTOLIC BLOOD PRESSURE: 70 MMHG | HEART RATE: 106 BPM | BODY MASS INDEX: 16.24 KG/M2 | HEIGHT: 61 IN | SYSTOLIC BLOOD PRESSURE: 94 MMHG | WEIGHT: 86 LBS

## 2022-09-27 DIAGNOSIS — M79.18 MYOFASCIAL PAIN SYNDROME: ICD-10-CM

## 2022-09-27 DIAGNOSIS — M54.12 CERVICAL RADICULOPATHY: Primary | ICD-10-CM

## 2022-09-27 DIAGNOSIS — M54.2 NECK PAIN: ICD-10-CM

## 2022-09-27 PROCEDURE — 99214 OFFICE O/P EST MOD 30 MIN: CPT | Performed by: NURSE PRACTITIONER

## 2022-09-27 NOTE — PROGRESS NOTES
Assessment:  1  Cervical radiculopathy    2  Myofascial pain syndrome    3  Neck pain        Plan:  1  Will order a C6-7 cervical epidural steroid injection to address the inflammatory component the patient's pain  Complete risks and benefits including bleeding, infection, tissue reaction, nerve injury and allergic reaction were discussed  The patient was agreeable and verbalized an understanding  2  May consider trial of duloxetine in the future   3  We will avoid NSAIDs at the recommendation of GI  4  Continue to follow with Neurology as scheduled  5  Patient is non opioid therapy from our practice secondary to ETOH abuse in the past  6  Patient will follow up after procedure or sooner if needed     History of Present Illness: The patient is a 43 y o  female with a history of fibromyalgia and alcohol abuse last seen on 6/7/22 who presents for a follow up office visit in regards to chronic neck pain that radiates in between the scapula and into the upper extremities in no specific dermatomal distribution with associated paresthesias secondary to cervical degenerative disc disease, cervical spondylolisthesis, cervical stenosis, cervical radiculopathy and chronic pain syndrome  The patient denies bowel or bladder incontinence  Patient is status post trigger point injections in June 2022 without relief  She has tried cervical epidural steroid injections in the past by an outside pain specialist without much relief but none recently  She has allergies to gabapentin and has been unable to tolerate side effects of Tramadol  She does take ibuprofen and naproxen on a rotation but states GI told her to avoid these secondary to GI irritation  EMG of the upper extremities was unremarkable  The patient rates her pain as 7/10 on the numerical pain rating scale    She constantly has pain in the evening which is described as burning, sharp, throbbing, cramping, pressure-like and pins    I have personally reviewed and/or updated the patient's past medical history, past surgical history, family history, social history, current medications, allergies, and vital signs today  Review of Systems:    Review of Systems   Respiratory: Negative for shortness of breath  Cardiovascular: Negative for chest pain  Gastrointestinal: Negative for constipation, diarrhea, nausea and vomiting  Musculoskeletal: Positive for gait problem  Negative for arthralgias, joint swelling and myalgias  Skin: Negative for rash  Neurological: Negative for dizziness, seizures and weakness  All other systems reviewed and are negative          Past Medical History:   Diagnosis Date    Allergies     Anxiety     Asthma     Chronic pain     Contusion of head, subsequent encounter 2022    Depression     Eating disorder     Fibrocystic breast changes of both breasts     Seizures (HCC)        Past Surgical History:   Procedure Laterality Date    INDUCED       MAMMO STEREOTACTIC BREAST BIOPSY LEFT (ALL INC)  2015    Benign       Family History   Problem Relation Age of Onset    Diabetes Sister     Alcohol abuse Sister     Cancer Father         Diagnosed stage 3 - metastazied    Psychiatric Illness Mother     Hypertension Family     Diabetes Family        Social History     Occupational History    Occupation: part time    Tobacco Use    Smoking status: Current Every Day Smoker     Packs/day: 0 50     Years: 28 00     Pack years: 14 00     Types: Cigarettes    Smokeless tobacco: Never Used    Tobacco comment: 10-15 cig/day   Vaping Use    Vaping Use: Never used   Substance and Sexual Activity    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    Drug use: No    Sexual activity: Yes     Partners: Male         Current Outpatient Medications:     acetaminophen (TYLENOL) 500 mg tablet, Take 500 mg by mouth every 6 (six) hours as needed for mild pain, Disp: , Rfl:     albuterol (PROVENTIL HFA,VENTOLIN HFA) 90 mcg/act inhaler, TAKE 2 PUFFS BY MOUTH EVERY 6 HOURS AS NEEDED FOR WHEEZE OR FOR SHORTNESS OF BREATH, Disp: 18 g, Rfl: 1    amoxicillin (AMOXIL) 875 mg tablet, Take 1 tablet (875 mg total) by mouth 2 (two) times a day for 10 days, Disp: 20 tablet, Rfl: 0    Ascorbic Acid (VITAMIN C) 100 MG tablet, Take 100 mg by mouth daily, Disp: , Rfl:     Biotin w/ Vitamins C & E (HAIR/SKIN/NAILS PO), Take by mouth, Disp: , Rfl:     busPIRone (BUSPAR) 15 mg tablet, Take by mouth 3 (three) times a day  , Disp: , Rfl:     CALCIUM-MAGNESIUM-ZINC PO, Take by mouth, Disp: , Rfl:     Flovent  MCG/ACT inhaler, INHALE 2 PUFFS BY MOUTH 2 TIMES A DAY RINSE MOUTH AFTER USE , Disp: 12 g, Rfl: 1    fluticasone (FLONASE) 50 mcg/act nasal spray, 1 spray into each nostril daily, Disp: 16 g, Rfl: 2    fluticasone (FLOVENT HFA) 110 MCG/ACT inhaler, 110 puffs, Disp: , Rfl:     hydrocortisone 2 5 % cream, hydrocortisone 2 5 % topical cream with perineal applicator (Patient not taking: No sig reported), Disp: , Rfl:     ibuprofen (MOTRIN) 600 mg tablet, Take by mouth every 6 (six) hours as needed for mild pain, Disp: , Rfl:     ipratropium (ATROVENT) 0 06 % nasal spray, 2 SPRAYS INTO EACH NOSTRIL 4 (FOUR) TIMES A DAY (Patient not taking: No sig reported), Disp: 15 mL, Rfl: 1    loratadine (CLARITIN) 10 mg tablet, TAKE 1 TABLET BY MOUTH EVERY DAY, Disp: 30 tablet, Rfl: 2    LORazepam (ATIVAN) 1 mg tablet, Take 1 mg by mouth 6 (six) times a day, Disp: , Rfl: 1    naproxen (EC NAPROSYN) 500 MG EC tablet, Take 1 tablet (500 mg total) by mouth 2 (two) times a day as needed for mild pain, Disp: 30 tablet, Rfl: 0    naproxen (NAPROSYN) 500 mg tablet, TAKE 1 TABLET BY MOUTH TWICE A DAY WITH MEALS, Disp: 60 tablet, Rfl: 5    omeprazole (PriLOSEC) 20 mg delayed release capsule, Take 1 capsule (20 MG total) by mouth twice daily, Disp: 60 capsule, Rfl: 0    ondansetron (Zofran ODT) 8 mg disintegrating tablet, Take 1 tablet (8 mg total) by mouth every 8 (eight) hours as needed for nausea or vomiting, Disp: 20 tablet, Rfl: 2    thiamine (VITAMIN B1) 100 mg tablet, Take 100 mg by mouth daily, Disp: , Rfl:     Allergies   Allergen Reactions    Gabapentin Hives    Meloxicam GI Intolerance    Tramadol Hives     SEIZURES    Vistaril [Hydroxyzine]        Physical Exam:    BP 94/70   Pulse (!) 106   Ht 5' 1" (1 549 m)   Wt 39 kg (86 lb)   BMI 16 25 kg/m²     Constitutional:normal, well developed, well nourished, alert, in no distress and non-toxic and no overt pain behavior  Eyes:anicteric  HEENT:grossly intact  Neck:supple, symmetric, trachea midline and no masses   Pulmonary:even and unlabored  Cardiovascular:No edema or pitting edema present  Skin:Normal without rashes or lesions and well hydrated  Psychiatric:Mood and affect appropriate  Neurologic:Cranial Nerves II-XII grossly intact  Musculoskeletal:normal gait  Decreased range of motion all planes of the cervical spine  Bilateral cervical paraspinal and trapezii musculature tender to palpation      Imaging  FL spine and pain procedure    (Results Pending)     IMPRESSION:     1  Somewhat suboptimal assessment of the marrow signal in the posterior soft   tissues, in part related to technical factors/inhomogeneous fat suppression  Within limits of the examination, no definite marrow edema  Noted the previous   study is also secondary to suboptimal fat suppression  If there is persistent   clinical concern or symptoms of pain referable to the cervical spine, and   additional sagittal fluid sensitive/STIR images are recommended when better   tolerated  2  Cord signal is slightly degraded, in part related to subtle motion/pulsation   artifact somewhat limiting assessment  No appreciable mass effect on distal   cord/conus  3  Findings of disc disease, most pronounced at the level of C5-C6 on the left,   mildly progressive compared with the prior MRI from 4/12/2019   Correlate   clinically for symptoms of left C6 radiculopathy  Findings as follows:     C5-C6: Small to moderate central and left paracentral disc protrusion/extrusion   with mild superior/inferior migration measuring up to 8 mm, effacing ventral CSF   space and extending towards left neural foramen, likely progressive compared   with the prior MRI from 4/12/2019  Mild/moderate to moderate narrowing of left   neural foramen, likely progressive compared with the prior study  Protrusion is   noted to approximate the exiting left C6 nerve root, with probable abutment and   equivocal impingement/compression noted on the corresponding axial gradient echo   images (series 8 image 33 through 36), with probable progression compared with   the prior MRI  The study is flagged for an Act 112 notice to be sent to the patient via in the   [employer] system  Workstation:[id number]   Narrative  MRI of the cervical spine without contrast     HISTORY: Cervical osteoarthritis; Cervical radiculopathy; ongoing pain and   stiffness and decreased range of motion; history of fibromyalgia     TECHNIQUE: Multiplanar and multisequence MR images of the cervical spine were   obtained at 1 5 [place]via without intravenous contrast      CONTRAST: None     COMPARISON: Radiograph of the cervical spine dated 11/4/2020 and MRI of the   cervical spine dated 4/12/2019     FINDINGS:     Straightening/reversal of the expected cervical lordosis is noted on the   sagittal images  Inhomogeneous fat suppression is noted on the sagittal images,   somewhat limiting assessment of the marrow signal, most notably at the level of   the mid to anterior cervical spine involving the posterior elements  No   appreciable signal abnormality on the corresponding T1-weighted images  Disc   degeneration, with loss of disc height most pronounced at C5-C6  Endplate marrow   edema signal, with minimal endplate marrow edema at the inferior endplate of C5   and superior endplate of C6       Cord signal is slightly degraded, in part related to subtle motion/pulsation   artifact somewhat limiting assessment  No appreciable mass effect on distal   cord/conus  Cervicomedullary junction is unremarkable  Somewhat limited assessment of the soft tissues on the T2-weighted images   secondary to inhomogeneous fat suppression/technical factors  Paraspinal soft   tissues are otherwise unremarkable  C2-C3: Spinal canal is patent  Neural foramina are patent bilaterally  C3-C4: Minimal central protrusion  Spinal canal is patent  Neural foramina are   patent bilaterally  C4-C5: Minimal disc bulge  Spinal canal is patent  Neural foramina are patent   bilaterally  C5-C6:1 to 2 mm retrolisthesis of C5 on C6  Small to moderate central and left   paracentral disc protrusion/extrusion with mild superior/inferior migration   measuring up to 8 mm, effacing ventral CSF space and extending towards left   neural foramen, likely progressive compared with the prior MRI from 4/12/2019  Mild to moderate uncovertebral degeneration more pronounced on the left  Spinal   canal is patent  Right neural foramen is patent  Mild/moderate to moderate   narrowing of left neural foramen, likely progressive compared with the prior   study  Protrusion is noted to approximate the exiting left C6 nerve root, with   probable abutment and equivocal impingement/compression noted on the   corresponding axial gradient echo images (series 8 image 33 through 36),   somewhat suboptimally assessed on the T2-weighted images secondary to volume   averaging  Findings are likely progressive  C6-C7: Minimal disc protrusion minimal disc protrusion with subtle annular   fissure  Spinal canal is patent  Neural foramina are patent bilaterally  C7-T1: Minimal disc bulge  Spinal canal is patent  Neural foramina are patent   bilaterally  T1-T2 and T2-T3: Lateral canal and neural foramina are patent     T3-T4: Spinal canal and neural foramina are patent  CT CERVICAL SPINE - WITHOUT CONTRAST   INDICATION: Neck pain, acute, no red flags   neck pain  COMPARISON: CT cervical spine 8/13/2017   TECHNIQUE: CT examination of the cervical spine was performed without intravenous contrast  Contiguous axial images were obtained  Sagittal and coronal reconstructions were performed  Radiation dose length product (DLP) for this visit: 181 91 mGy-cm   This examination, like all CT scans performed in the [facility], was performed utilizing techniques to minimize radiation dose exposure, including the use of iterative   reconstruction and automated exposure control  IMAGE QUALITY: Diagnostic  FINDINGS:   ALIGNMENT: There is reversal of normal cervical lordosis  Grade anterolisthesis of C4 on C5  Mild retrolisthesis of C5 on C6  VERTEBRAL BODIES: No fracture  DEGENERATIVE CHANGES: Mild degenerative change without high-grade canal stenosis  PREVERTEBRAL AND PARASPINAL SOFT TISSUES: Unremarkable  THORACIC INLET: Normal    IMPRESSION:   No acute cervical spine fracture or traumatic malalignment       Orders Placed This Encounter   Procedures    FL spine and pain procedure

## 2022-09-27 NOTE — TELEPHONE ENCOUNTER
Pt returned the  call    Please be advised   Pt can be reached @ [phone]  10/25/2022 @3:20 pm is good for her

## 2022-09-29 NOTE — TELEPHONE ENCOUNTER
Patient contacted and scheduled for C6-7 ELY  All pre procedure instructions were given to patient   Nothing to eat or drink for 1 hour prior  Loose fitting clothing   PATIENT INSTRUCTED TO STOP ALL NSAID'S 4 DAYS PRIOR TO PROCEDURE EXCEPT FOR IBUPROFEN IT CAN BE TAKEN UP TO 24 HOURS PRIOR TO PROCEDURE     DENIES ANTICOAG'S OR ASA    Denies Antibx   Needs    Patient instructed to contact our office if becomes sick or gets put on any ANTIBIOTIC or has any active infections   Refrain from any vaccines 2 weeks before & 2 weeks after  Insurance auth received but is not a guarantee of payment per your insurance company's authorization disclaimer and it is your responsibility to verify your benefits   COVID -19 screening complete

## 2022-09-30 DIAGNOSIS — K21.9 GASTROESOPHAGEAL REFLUX DISEASE WITHOUT ESOPHAGITIS: ICD-10-CM

## 2022-09-30 RX ORDER — OMEPRAZOLE 20 MG/1
CAPSULE, DELAYED RELEASE ORAL
Qty: 60 CAPSULE | Refills: 0 | Status: SHIPPED | OUTPATIENT
Start: 2022-09-30

## 2022-10-04 ENCOUNTER — OFFICE VISIT (OUTPATIENT)
Dept: GASTROENTEROLOGY | Facility: CLINIC | Age: 42
End: 2022-10-04
Payer: COMMERCIAL

## 2022-10-04 VITALS
SYSTOLIC BLOOD PRESSURE: 104 MMHG | BODY MASS INDEX: 16.62 KG/M2 | TEMPERATURE: 98.6 F | HEIGHT: 61 IN | DIASTOLIC BLOOD PRESSURE: 74 MMHG | HEART RATE: 77 BPM | WEIGHT: 88 LBS

## 2022-10-04 DIAGNOSIS — K21.9 GASTROESOPHAGEAL REFLUX DISEASE WITHOUT ESOPHAGITIS: ICD-10-CM

## 2022-10-04 DIAGNOSIS — Z80.0 FAMILY HISTORY OF RECTAL CANCER: ICD-10-CM

## 2022-10-04 DIAGNOSIS — D50.8 IRON DEFICIENCY ANEMIA SECONDARY TO INADEQUATE DIETARY IRON INTAKE: Primary | ICD-10-CM

## 2022-10-04 PROCEDURE — 99214 OFFICE O/P EST MOD 30 MIN: CPT | Performed by: INTERNAL MEDICINE

## 2022-10-04 NOTE — PROGRESS NOTES
[facility] - Outpatient Follow-up Note  Declan Casiano 43 y o  female MRN: [id number]  Encounter: [id number]          ASSESSMENT AND PLAN:          1  Iron deficiency anemia secondary to inadequate dietary iron intake  Will recheck  Hg/CBC and iron panel  Her hg was normal last checked in the end of August   - CBC and differential; Future  - Iron Panel (Includes Ferritin, Iron Sat%, Iron, and TIBC); Future    2  Gastroesophageal reflux disease without esophagitis  Continue taking omeprazole 20 mg once daily half hour before meal    3  Family history of rectal cancer  Repeat colonoscopy in 5 years    RTC in 6 months  ______________________________________________    SUBJECTIVE:  Patient here for follow up of iron deficiency  She had EGD/colonoscopy in the beggnining of September revealing antral gastritis and hemorrhoids on colonoscopy  Biopsies negative for h pylori gastritis and celiac disease  Last infusion was on  for iron  She has maki taking omeprazole and ondansetron for nausea when needed  She has been feeling well  Her energy level is ok, not great  Her last Hg was 13 6 on   She does take naproxen for cervical radiculopathy and upper back pain  She is unable to come off the medicine  She has had steroid shots but this hasn't helped  She is going for an epidural     REVIEW OF SYSTEMS IS OTHERWISE NEGATIVE        Historical Information   Past Medical History:   Diagnosis Date    Allergies     Anxiety     Asthma     Chronic pain     Contusion of head, subsequent encounter 2022    Depression     Eating disorder     Fibrocystic breast changes of both breasts     Seizures (HCC)      Past Surgical History:   Procedure Laterality Date    INDUCED       MAMMO STEREOTACTIC BREAST BIOPSY LEFT (ALL INC)  2015    Benign     Social History   Social History     Substance and Sexual Activity   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     Social History Substance and Sexual Activity   Drug Use No     Social History     Tobacco Use   Smoking Status Current Every Day Smoker    Packs/day: 0 50    Years: 28 00    Pack years: 14 00    Types: Cigarettes   Smokeless Tobacco Never Used   Tobacco Comment    10-15 cig/day     Family History   Problem Relation Age of Onset    Diabetes Sister     Alcohol abuse Sister     Cancer Father         Diagnosed stage 3 - metastazied    Psychiatric Illness Mother     Hypertension Family     Diabetes Family        Meds/Allergies       Current Outpatient Medications:     acetaminophen (TYLENOL) 500 mg tablet    albuterol (PROVENTIL HFA,VENTOLIN HFA) 90 mcg/act inhaler    Ascorbic Acid (VITAMIN C) 100 MG tablet    Biotin w/ Vitamins C & E (HAIR/SKIN/NAILS PO)    busPIRone (BUSPAR) 15 mg tablet    CALCIUM-MAGNESIUM-ZINC PO    Flovent  MCG/ACT inhaler    fluticasone (FLONASE) 50 mcg/act nasal spray    fluticasone (FLOVENT HFA) 110 MCG/ACT inhaler    hydrocortisone 2 5 % cream    ibuprofen (MOTRIN) 600 mg tablet    ipratropium (ATROVENT) 0 06 % nasal spray    loratadine (CLARITIN) 10 mg tablet    LORazepam (ATIVAN) 1 mg tablet    naproxen (EC NAPROSYN) 500 MG EC tablet    naproxen (NAPROSYN) 500 mg tablet    omeprazole (PriLOSEC) 20 mg delayed release capsule    ondansetron (Zofran ODT) 8 mg disintegrating tablet    thiamine (VITAMIN B1) 100 mg tablet    Allergies   Allergen Reactions    Gabapentin Hives    Meloxicam GI Intolerance    Tramadol Hives     SEIZURES    Vistaril [Hydroxyzine]            Objective     Blood pressure 104/74, pulse 77, temperature 98 6 °F (37 °C), temperature source Tympanic, height 5' 1" (1 549 m), weight 39 9 kg (88 lb)  Body mass index is 16 63 kg/m²  PHYSICAL EXAM:      General Appearance:   Alert, cooperative, no distress   HEENT:   Normocephalic, atraumatic, anicteric           Lungs:   Equal chest rise and unlabored breathing, normal cough   Heart:   No visualized JVD   Abdomen:   Soft, non-tender, non-distended; no masses, no organomegaly    Genitalia:   Deferred    Rectal:   Deferred    Extremities:  No cyanosis, clubbing or edema    Pulses:  Musculoskeletal:  2+ and symmetric  Normal range of motion visualized    Skin:  Neuro:  No jaundice, rashes, or lesions   Alert and appropriate           Lab Results:   No visits with results within 1 Day(s) from this visit  Latest known visit with results is:   Hospital Outpatient Visit on 09/02/2022   Component Date Value    EXT Preg Test, Ur 09/02/2022 Negative     Control 09/02/2022 Valid     Case Report 09/02/2022                      Value:Surgical Pathology Report                         Case: [id number]                                   Authorizing Provider:  Sangeetha Anderson DO              Collected:           09/02/2022 1001              Ordering Location:     [address] [place] [address]      Received:            09/02/2022 [facility]                                                           Pathologist:           Hanh Banerjee DO                                                            Specimens:   A) - Duodenum, cold bx - r/o celiac                                                                 B) - Stomach, cold bx - r/o H pylori                                                       Final Diagnosis 09/02/2022                      Value: This result contains rich text formatting which cannot be displayed here   Note 09/02/2022                      Value: This result contains rich text formatting which cannot be displayed here   Additional Information 09/02/2022                      Value: This result contains rich text formatting which cannot be displayed here  Argentina Covarrubias Gross Description 09/02/2022                      Value: This result contains rich text formatting which cannot be displayed here  Radiology Results:   No results found
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
## 2022-10-12 PROBLEM — R05.9 COUGH: Status: RESOLVED | Noted: 2021-12-30 | Resolved: 2022-10-12

## 2022-10-12 PROBLEM — J01.11 ACUTE RECURRENT FRONTAL SINUSITIS: Status: RESOLVED | Noted: 2020-05-15 | Resolved: 2022-10-12

## 2022-10-25 ENCOUNTER — HOSPITAL ENCOUNTER (OUTPATIENT)
Dept: RADIOLOGY | Facility: CLINIC | Age: 42
Discharge: HOME | End: 2022-10-25
Payer: COMMERCIAL

## 2022-10-25 VITALS
DIASTOLIC BLOOD PRESSURE: 72 MMHG | HEART RATE: 81 BPM | TEMPERATURE: 97.2 F | RESPIRATION RATE: 16 BRPM | OXYGEN SATURATION: 99 % | SYSTOLIC BLOOD PRESSURE: 108 MMHG

## 2022-10-25 DIAGNOSIS — M54.12 CERVICAL RADICULOPATHY: ICD-10-CM

## 2022-10-25 PROCEDURE — 62321 NJX INTERLAMINAR CRV/THRC: CPT | Performed by: ANESTHESIOLOGY

## 2022-10-25 RX ORDER — PAPAVERINE HCL 150 MG
10 CAPSULE, EXTENDED RELEASE ORAL ONCE
Status: COMPLETED | OUTPATIENT
Start: 2022-10-25 | End: 2022-10-25

## 2022-10-25 RX ADMIN — IOHEXOL 1 ML: 300 INJECTION, SOLUTION INTRAVENOUS at 16:18

## 2022-10-25 RX ADMIN — DEXAMETHASONE SODIUM PHOSPHATE 10 MG: 10 INJECTION, SOLUTION INTRAMUSCULAR; INTRAVENOUS at 16:18

## 2022-10-25 NOTE — DISCHARGE INSTRUCTIONS
Epidural Steroid Injection   WHAT YOU NEED TO KNOW:   An epidural steroid injection (KALE) is a procedure to inject steroid medicine into the epidural space  The epidural space is between your spinal cord and vertebrae  Steroids reduce inflammation and fluid buildup in your spine that may be causing pain  You may be given pain medicine along with the steroids  ACTIVITY  Do not drive or operate machinery today  No strenuous activity today - bending, lifting, etc   You may resume normal activites starting tomorrow - start slowly and as tolerated  You may shower today, but no tub baths or hot tubs  You may have numbness for several hours from the local anesthetic  Please use caution and common sense, especially with weight-bearing activities  CARE OF THE INJECTION SITE  If you have soreness or pain, apply ice to the area today (20 minutes on/20 minutes off)  Starting tomorrow, you may use warm, moist heat or ice if needed  You may have an increase or change in your discomfort for 36-48 hours after your treatment  Apply ice and continue with any pain medication you have been prescribed  Notify the [facility] if you have any of the following: redness, drainage, swelling, headache, stiff neck or fever above 100°F     SPECIAL INSTRUCTIONS  Our office will contact you in approximately 7 days for a progress report  MEDICATIONS  Continue to take all routine medications  Our office may have instructed you to hold some medications  As no general anesthesia was used in today's procedure, you should not experience any side effects related to anesthesia  If you are diabetic, the steroids used in today's injection may temporarily increase your blood sugar levels after the first few days after your injection  Please keep a close eye on your sugars and alert the doctor who manages your diabetes if your sugars are significantly high from your baseline or you are symptomatic       If you have a problem specifically related to your procedure, please call our office at [phone]  Problems not related to your procedure should be directed to your primary care physician

## 2022-10-26 ENCOUNTER — TELEPHONE (OUTPATIENT)
Dept: PAIN MEDICINE | Facility: MEDICAL CENTER | Age: 42
End: 2022-10-26

## 2022-10-26 DIAGNOSIS — J45.20 MILD INTERMITTENT ASTHMA WITHOUT COMPLICATION: ICD-10-CM

## 2022-10-26 DIAGNOSIS — M79.18 MYOFASCIAL PAIN SYNDROME: Primary | ICD-10-CM

## 2022-10-26 RX ORDER — TIZANIDINE 2 MG/1
2 TABLET ORAL EVERY 8 HOURS PRN
Qty: 90 TABLET | Refills: 2 | Status: SHIPPED | OUTPATIENT
Start: 2022-10-26

## 2022-10-26 NOTE — TELEPHONE ENCOUNTER
Pt was seen for procedure yesterday, was Tizanidine refill discussed at that time? Please advise, thank you

## 2022-10-26 NOTE — TELEPHONE ENCOUNTER
Caller: Patient    Doctor: Hu Cruz    Reason for call: Patient stated Tizanidine was going to be send over    Pharmacy did not receive it    [facility]/pharmacy #[id number]  [phone]    Call back#: [phone]

## 2022-10-27 RX ORDER — DEXAMETHASONE 4 MG/1
TABLET ORAL
Qty: 12 G | Refills: 1 | Status: SHIPPED | OUTPATIENT
Start: 2022-10-27

## 2022-10-31 DIAGNOSIS — K21.9 GASTROESOPHAGEAL REFLUX DISEASE WITHOUT ESOPHAGITIS: ICD-10-CM

## 2022-10-31 RX ORDER — OMEPRAZOLE 20 MG/1
CAPSULE, DELAYED RELEASE ORAL
Qty: 60 CAPSULE | Refills: 0 | Status: SHIPPED | OUTPATIENT
Start: 2022-10-31

## 2022-11-01 ENCOUNTER — TELEPHONE (OUTPATIENT)
Dept: PAIN MEDICINE | Facility: CLINIC | Age: 42
End: 2022-11-01

## 2022-11-01 NOTE — TELEPHONE ENCOUNTER
No further recommendations at this time  Agree with current recommendations  Patient needs to give the injection some more time for steroids reach full effect    Will check in at the 2 week sam

## 2022-11-01 NOTE — TELEPHONE ENCOUNTER
Patient reports: 0% improvement post injection  Pain Level: 10/10  Patient feels she should not be in so much pain -would like to speak with a nurse

## 2022-11-01 NOTE — TELEPHONE ENCOUNTER
S/w the patient to inquire and she stated the pain is pretty bad rating it a "10"  She has tried alternating ice vs heat to the area but no real relief and she stated the pain seems different more concentrated and local in the neck and it is a burning pain  She stated before it would radiate into the mid back but that has since stopped  She stopped taking the Tizanidine because it was causing increased SE's including a HA  Encouraged her to give the ELY another week to receive the full effects of the steroid  Encouraged ice vs heat and she stated she is taking ibuprofen and naproxen with no real relief  Please advise   Thanks

## 2022-11-09 ENCOUNTER — TELEPHONE (OUTPATIENT)
Dept: HEMATOLOGY ONCOLOGY | Facility: CLINIC | Age: 42
End: 2022-11-09

## 2022-11-09 DIAGNOSIS — R11.0 NAUSEA: ICD-10-CM

## 2022-11-09 RX ORDER — ONDANSETRON 8 MG/1
8 TABLET, ORALLY DISINTEGRATING ORAL EVERY 8 HOURS PRN
Qty: 20 TABLET | Refills: 2 | Status: SHIPPED | OUTPATIENT
Start: 2022-11-09

## 2022-11-09 NOTE — TELEPHONE ENCOUNTER
MEDICATION REFILL ROUTE TO RN    Who is Calling  Patient   Medication ondansetron (Zofran ODT) 8 mg disintegrating tablet       How many pills left  1   Preferred Pharmacy / Address    [address] [facility]  [phone]     Who is your Physician?  Jameel    Call back number [phone]   Relevant Information Wants a call back when its approved

## 2022-11-30 DIAGNOSIS — K21.9 GASTROESOPHAGEAL REFLUX DISEASE WITHOUT ESOPHAGITIS: ICD-10-CM

## 2022-11-30 RX ORDER — OMEPRAZOLE 20 MG/1
CAPSULE, DELAYED RELEASE ORAL
Qty: 60 CAPSULE | Refills: 0 | Status: SHIPPED | OUTPATIENT
Start: 2022-11-30

## 2022-12-07 DIAGNOSIS — M79.7 FIBROMYALGIA: ICD-10-CM

## 2022-12-08 DIAGNOSIS — J30.2 SEASONAL ALLERGIES: ICD-10-CM

## 2022-12-08 RX ORDER — NAPROXEN 500 MG/1
TABLET ORAL
Qty: 60 TABLET | Refills: 5 | Status: SHIPPED | OUTPATIENT
Start: 2022-12-08

## 2022-12-13 PROBLEM — J06.9 URI WITH COUGH AND CONGESTION: Status: ACTIVE | Noted: 2022-12-13

## 2022-12-14 PROBLEM — J45.20 MILD INTERMITTENT ASTHMA, UNCOMPLICATED: Status: ACTIVE | Noted: 2022-12-14

## 2022-12-14 PROBLEM — J01.21 ACUTE RECURRENT ETHMOIDAL SINUSITIS: Status: ACTIVE | Noted: 2022-12-14

## 2022-12-14 RX ORDER — ALBUTEROL SULFATE 2.5 MG/3ML
2.5 SOLUTION RESPIRATORY (INHALATION) ONCE
Status: CANCELLED | OUTPATIENT
Start: 2022-12-14

## 2022-12-14 RX ORDER — FLUTICASONE PROPIONATE 110 UG/1
2 AEROSOL, METERED RESPIRATORY (INHALATION) 2 TIMES DAILY
Qty: 12 G | Refills: 2 | Status: CANCELLED | OUTPATIENT
Start: 2022-12-14

## 2022-12-14 RX ORDER — DOXYCYCLINE HYCLATE 100 MG
100 TABLET ORAL 2 TIMES DAILY
COMMUNITY
Start: 2022-12-11 | End: 2022-12-18

## 2022-12-14 RX ORDER — IPRATROPIUM BROMIDE 42 UG/1
2 SPRAY, METERED NASAL 4 TIMES DAILY
Qty: 15 ML | Refills: 1 | Status: CANCELLED | OUTPATIENT
Start: 2022-12-14

## 2022-12-14 RX ORDER — PREDNISONE 10 MG/1
TABLET ORAL
COMMUNITY
Start: 2022-11-22

## 2022-12-14 RX ORDER — AMOXICILLIN 875 MG/1
875 TABLET, COATED ORAL 2 TIMES DAILY
Qty: 20 TABLET | Refills: 0 | Status: CANCELLED | OUTPATIENT
Start: 2022-12-14 | End: 2022-12-24

## 2022-12-14 RX ORDER — AZITHROMYCIN 250 MG/1
TABLET, FILM COATED ORAL
COMMUNITY
Start: 2022-11-22

## 2022-12-14 RX ORDER — ALBUTEROL SULFATE 90 UG/1
2 AEROSOL, METERED RESPIRATORY (INHALATION) EVERY 6 HOURS PRN
Qty: 18 G | Refills: 2 | Status: CANCELLED | OUTPATIENT
Start: 2022-12-14

## 2022-12-14 NOTE — PROGRESS NOTES
Virtual Regular Visit    Verification of patient location:    Patient is located in the following state in which I hold an active license [place]      Assessment/Plan:    Problem List Items Addressed This Visit        Respiratory    Asthma     Patient has longstanding history of asthma instructed take her albuterol as directed  Acute frontal sinusitis     Patient on doxycycline 100 mg p o  twice daily to complete treatment 12/18/2022  Patient is not currently using her nasal sprays because she does not like the way these make her feel  Status education provided  URI with cough and congestion     Patient noted to have upper airway cough congestion secondary to bronchitis  Relevant Medications    azithromycin (ZITHROMAX) 250 mg tablet    doxycycline hyclate (VIBRA-TABS) 100 mg tablet       Other    Short of breath on exertion    Seasonal allergies     Patient maintain on loratadine 10 mg p o  nightly for seasonal allergies  Ducted to avoid allergy triggers  Relevant Medications    loratadine (CLARITIN) 10 mg tablet    Tobacco abuse counseling     Tobacco cessation counseling provided  Patient indicates she lives when she lights a cigarette she does not finish it because she is unable to  Nasal congestion     Patient noted to have some nasal congestion, patient reordered for loratadine 10 mg Po nightly  Structured to avoid allergy triggers and may use [place] pot as directed  Follow-up exam - Primary     Patient to have follow-up exam and office for her Medicare annual wellness next visit  Currently virtual visit due to snowing  BMI Counseling: There is no height or weight on file to calculate BMI  The BMI is below normal  Patient advised to gain weight  Rationale for BMI follow-up plan is due to patient being underweight  Depression Screening and Follow-up Plan: Clincally patient does not have depression  No treatment is required       Tobacco Cessation Counseling: Tobacco cessation counseling was provided  The patient is sincerely urged to quit consumption of tobacco  She is not ready to quit tobacco  Medication options and side effects of medication discussed  Patient refused medication  Reason for visit is   Chief Complaint   Patient presents with   • Virtual Regular Visit        Encounter provider Benitez Nava    Provider located at [address] [place] [address] [address] [facility] [id number] [phone]      Recent Visits  No visits were found meeting these conditions  Showing recent visits within past 7 days and meeting all other requirements  Today's Visits  Date Type Provider Dept   12/15/22 Telemedicine [facility] today's visits and meeting all other requirements  Future Appointments  No visits were found meeting these conditions  Showing future appointments within next 150 days and meeting all other requirements       The patient was identified by name and date of birth  Lorie Fry was informed that this is a telemedicine visit and that the visit is being conducted through the [address] [facility] platform  She agrees to proceed     My office door was closed  No one else was in the room  She acknowledged consent and understanding of privacy and security of the video platform  The patient has agreed to participate and understands they can discontinue the visit at any time  Patient is aware this is a billable service  Nikko Lan is a 43 y o  female virtual visit due to reports that for over a week she has had cough, congestion, head pressure, congestion, ear pain   Patient is a 43year old female that had been seen at urgent care for bronchitis and taking doxycycline 100 mg p o  bid  She reports indigestion, head pressure, congestion, and ears clogged         Past Medical History:   Diagnosis Date   • Allergies    • Anxiety • Asthma    • Chronic pain    • Contusion of head, subsequent encounter 2022   • Depression    • Eating disorder    • Fibrocystic breast changes of both breasts    • Seizures ([address] )        Past Surgical History:   Procedure Laterality Date   • INDUCED      • MAMMO STEREOTACTIC BREAST BIOPSY LEFT (ALL INC)      Benign       Current Outpatient Medications   Medication Sig Dispense Refill   • doxycycline hyclate (VIBRA-TABS) 100 mg tablet Take 100 mg by mouth 2 (two) times a day     • loratadine (CLARITIN) 10 mg tablet Take 1 tablet (10 mg total) by mouth daily 30 tablet 2   • acetaminophen (TYLENOL) 500 mg tablet Take 500 mg by mouth every 6 (six) hours as needed for mild pain     • albuterol (PROVENTIL HFA,VENTOLIN HFA) 90 mcg/act inhaler TAKE 2 PUFFS BY MOUTH EVERY 6 HOURS AS NEEDED FOR WHEEZE OR FOR SHORTNESS OF BREATH 18 g 1   • Ascorbic Acid (VITAMIN C) 100 MG tablet Take 100 mg by mouth daily     • azithromycin (ZITHROMAX) 250 mg tablet TAKE 2 TABLETS BY MOUTH TODAY, THEN TAKE 1 TABLET DAILY FOR 4 DAYS     • Biotin w/ Vitamins C & E (HAIR/SKIN/NAILS PO) Take by mouth     • busPIRone (BUSPAR) 15 mg tablet Take by mouth 3 (three) times a day       • CALCIUM-MAGNESIUM-ZINC PO Take by mouth     • Flovent  MCG/ACT inhaler INHALE 2 PUFFS BY MOUTH 2 TIMES A DAY RINSE MOUTH AFTER USE  12 g 1   • fluticasone (FLONASE) 50 mcg/act nasal spray 1 spray into each nostril daily 16 g 2   • fluticasone (FLOVENT HFA) 110 MCG/ACT inhaler 110 puffs     • hydrocortisone 2 5 % cream hydrocortisone 2 5 % topical cream with perineal applicator (Patient not taking: No sig reported)     • ibuprofen (MOTRIN) 600 mg tablet Take by mouth every 6 (six) hours as needed for mild pain     • ipratropium (ATROVENT) 0 06 % nasal spray 2 SPRAYS INTO EACH NOSTRIL 4 (FOUR) TIMES A DAY (Patient not taking: No sig reported) 15 mL 1   • LORazepam (ATIVAN) 1 mg tablet Take 1 mg by mouth 6 (six) times a day  1   • naproxen (EC NAPROSYN) 500 MG EC tablet Take 1 tablet (500 mg total) by mouth 2 (two) times a day as needed for mild pain 30 tablet 0   • naproxen (NAPROSYN) 500 mg tablet TAKE 1 TABLET BY MOUTH TWICE A DAY WITH MEALS 60 tablet 5   • omeprazole (PriLOSEC) 20 mg delayed release capsule TAKE 1 CAPSULE (20 MG TOTAL) BY MOUTH TWICE DAILY 60 capsule 0   • ondansetron (Zofran ODT) 8 mg disintegrating tablet Take 1 tablet (8 mg total) by mouth every 8 (eight) hours as needed for nausea or vomiting 20 tablet 2   • predniSONE 10 mg tablet TAKE 1 TABLET BY MOUTH 2 TIMES A DAY FOR 5 DAYS  • thiamine (VITAMIN B1) 100 mg tablet Take 100 mg by mouth daily     • tiZANidine (ZANAFLEX) 2 mg tablet Take 1 tablet (2 mg total) by mouth every 8 (eight) hours as needed for muscle spasms 90 tablet 2     No current facility-administered medications for this visit  Allergies   Allergen Reactions   • Prednisone Anxiety   • Gabapentin Hives   • Meloxicam GI Intolerance   • Tramadol Hives     SEIZURES   • Vistaril [Hydroxyzine]    • Latex Other (See Comments)     C/o itching        Review of Systems   Constitutional: Positive for fatigue  Negative for activity change, appetite change, chills, fever and unexpected weight change  HENT: Positive for congestion, ear pain, postnasal drip, rhinorrhea, sinus pressure, sinus pain, sneezing and sore throat  Negative for ear discharge, nosebleeds and voice change  Ears feels clogged  Eyes: Negative for pain, redness and visual disturbance  Respiratory: Positive for cough  Negative for chest tightness, shortness of breath and wheezing  Dry cough with sore throat  Cardiovascular: Negative for chest pain and palpitations  Gastrointestinal: Positive for nausea  Negative for abdominal distention, abdominal pain, constipation, diarrhea and vomiting  Antibiotics causing stomach upset  Endocrine: Negative      Genitourinary: Negative for difficulty urinating, dysuria, flank pain, frequency, hematuria and urgency  Musculoskeletal: Negative for arthralgias and myalgias  Skin: Negative  Allergic/Immunologic: Negative  Neurological: Negative  Hematological: Negative  Psychiatric/Behavioral: Negative  Video Exam    There were no vitals filed for this visit  Physical Exam  Vitals and nursing note reviewed  Constitutional:       Appearance: Normal appearance  She is well-developed and normal weight  HENT:      Head: Normocephalic and atraumatic  Right Ear: Tympanic membrane, ear canal and external ear normal       Left Ear: Tympanic membrane, ear canal and external ear normal       Nose: Congestion and rhinorrhea present  Mouth/Throat:      Mouth: Mucous membranes are dry  Pharynx: Oropharynx is clear  No oropharyngeal exudate or posterior oropharyngeal erythema  Eyes:      Conjunctiva/sclera: Conjunctivae normal    Pulmonary:      Effort: Pulmonary effort is normal       Breath sounds: Rhonchi present  Comments: Dry hacking cough  Musculoskeletal:         General: Normal range of motion  Cervical back: Normal range of motion  Skin:     General: Skin is dry  Neurological:      Mental Status: She is alert and oriented to person, place, and time     Psychiatric:         Behavior: Behavior normal           I spent 25 minutes directly with the patient during this visit

## 2022-12-14 NOTE — PATIENT INSTRUCTIONS
Acute Cough   WHAT YOU NEED TO KNOW:   What is an acute cough? An acute cough can last up to 3 weeks  Common causes of an acute cough include a cold, allergies, or a lung infection  How is the cause of an acute cough diagnosed? Your healthcare provider will examine you and listen to your lungs  Tell your healthcare provider if you cough up any mucus, or have a fever or shortness of breath  Also tell your provider what makes the cough better or worse  Depending on your symptoms, you may need a chest x-ray  A sample of mucus may be collected and tested for infection  How is an acute cough treated? An acute cough usually goes away on its own  Ask your healthcare provider about medicines you can take to decrease your cough  You may need medicine to stop the cough, decrease swelling in your airways, or help open your airways  Medicine may also be given to help you cough up mucus  If you have an infection caused by bacteria, you may need antibiotics  What can I do to manage my cough? Do not smoke and stay away from others who smoke  Nicotine and other chemicals in cigarettes and cigars can cause lung damage and make your cough worse  Ask your healthcare provider for information if you currently smoke and need help to quit  E-cigarettes or smokeless tobacco still contain nicotine  Talk to your healthcare provider before you use these products  Drink extra liquids as directed  Liquids will help thin and loosen mucus so you can cough it up  Liquids will also help prevent dehydration  Examples of good liquids to drink include water, fruit juice, and broth  Do not drink liquids that contain caffeine  Caffeine can increase your risk for dehydration  Ask your healthcare provider how much liquid to drink each day  Rest as directed  Do not do activities that make your cough worse, such as exercise  Use a humidifier or vaporizer  Use a cool mist humidifier or a vaporizer to increase air moisture in your home  This may make it easier for you to breathe and help decrease your cough  Eat 2 to 5 mL of honey 2 times each day  Honey can help thin mucus and decrease your cough  Use cough drops or lozenges  These can help decrease throat irritation and your cough  When should I seek immediate care? You have trouble breathing or feel short of breath  You cough up blood, or you see blood in your mucus  You faint or feel weak or dizzy  You have chest pain when you cough or take a deep breath  You have new wheezing  When should I contact my healthcare provider? You have a fever  Your cough lasts longer than 4 weeks  Your symptoms do not improve with treatment  You have questions or concerns about your condition or care  CARE AGREEMENT:   You have the right to help plan your care  Learn about your health condition and how it may be treated  Discuss treatment options with your healthcare providers to decide what care you want to receive  You always have the right to refuse treatment  The above information is an  only  It is not intended as medical advice for individual conditions or treatments  Talk to your doctor, nurse or pharmacist before following any medical regimen to see if it is safe and effective for you  © Copyright [employer] 2022 Information is for End User's use only and may not be sold, redistributed or otherwise used for commercial purposes  All illustrations and images included in CareNotes® are the copyrighted property of [employer]  or [employer] Tracy  Allergies   WHAT YOU NEED TO KNOW:   What are allergies? Allergies are an immune system reaction to a substance called an allergen  Your immune system sees the allergen as harmful and attacks it  What causes allergies? You may have allergies at certain times of the year or all year  The following are common allergies:  Seasonal airborne allergies  happen during certain times of the year   This is also called hay fever  Tree, weed, or grass pollen are examples of allergens that you breathe in  Environmental airborne allergy  triggers you may breathe in year-round include dust, mold, and pet hair  Contact allergies  include latex, found in items such as condoms and medical gloves  Latex allergies can be very serious  Insect sting allergies  may be caused by bees, hornets, fire ants, or other insects that sting or bite you  Insect allergies can be very serious  Food allergies  commonly include shellfish, wheat, and eggs  Some foods must be eaten to produce an allergic reaction  Other foods can trigger a reaction if they touch your skin or are breathed in  What increases my risk for allergies? Allergic reactions can happen at any time, even if you have not had allergies before  You may develop an allergy after you have been exposed to an allergen more than once  Allergies are most common in children and elderly people, but anyone can have an allergic reaction  Your risk is also increased if you have a family history of allergies or a medical condition such as asthma  What are the signs and symptoms of allergies? Mild symptoms  include sneezing and a runny, itchy, or stuffy nose  You may also have swollen, watery, or itchy eyes, or skin itching  You may have swelling or pain where an insect bit or stung you  Anaphylaxis symptoms  include trouble breathing or swallowing, a rash or hives, or severe swelling  You may also have a cough, wheezing, or feel lightheaded or dizzy  Anaphylaxis is a sudden, life-threatening reaction that needs immediate treatment  How are allergies diagnosed? Your healthcare provider will ask about your signs and symptoms  He or she will ask what allergens you have been exposed to and if you have ever had other allergic reactions  He or she may look in your nose, ears, or throat   You may need additional testing if you developed anaphylaxis after you were exposed to a trigger and then exercised  This is called exercise-induced anaphylaxis  You may also need the following tests:  Blood tests  are used to check for signs of a reaction to allergens  Nasal tests  are used to see how your nasal passages react to allergens  A sample of your nasal fluid may also be tested  Skin tests  can help your healthcare provider find what you are allergic to  He will place a small amount of allergen on your arm or back and then prick your skin with a needle  He will watch how your skin reacts to the allergen  How are allergies treated? Antihistamines  help decrease itching, sneezing, and swelling  You may take them as a pill or use drops in your nose or eyes  Decongestants  help your nose feel less stuffy  Steroids  decrease swelling and redness  Topical treatments  help decrease itching or swelling  You also may be given nasal sprays or eyedrops  Epinephrine  is medicine used to treat severe allergic reactions such as anaphylaxis  Desensitization  gets your body used to allergens you cannot avoid  Your healthcare provider will give you a shot that contains a small amount of an allergen  He or she will treat any allergic reaction you have  Your provider will give you more of the allergen a little at a time until your body gets used to it  Your reaction to the allergen may be less serious after this treatment  Your healthcare provider will tell you how long to get the shots  What steps do I need to take for signs or symptoms of anaphylaxis? Immediately  give 1 shot of epinephrine only into the outer thigh muscle  Leave the shot in place  as directed  Your healthcare provider may recommend you leave it in place for up to 10 seconds before you remove it  This helps make sure all of the epinephrine is delivered  Call 911 and go to the emergency department,  even if the shot improved symptoms  Do not drive yourself   Bring the used epinephrine shot with you     What safety precautions do I need to take if I am at risk for anaphylaxis? Keep 2 shots of epinephrine with you at all times  You may need a second shot, because epinephrine only works for about 20 minutes and symptoms may return  Your healthcare provider can show you and family members how to give the shot  Check the expiration date every month and replace it before it expires  Create an action plan  Your healthcare provider can help you create a written plan that explains the allergy and an emergency plan to treat a reaction  The plan explains when to give a second epinephrine shot if symptoms return or do not improve after the first  Give copies of the action plan and emergency instructions to family members and work staff  Show them how to give a shot of epinephrine  Be careful when you exercise  If you have had exercise-induced anaphylaxis, do not exercise right after you eat  Stop exercising right away if you start to develop any signs or symptoms of anaphylaxis  You may first feel tired, warm, or have itchy skin  Hives, swelling, and severe breathing problems may develop if you continue to exercise  Carry medical alert identification  Wear medical alert jewelry or carry a card that explains the allergy  Ask your healthcare provider where to get these items  Inform all healthcare providers of the allergy  This includes dentists, nurses, doctors, and surgeons  How can I manage allergies? Use nasal rinses as directed  Rinse with a saline solution daily  This will help clear allergens out of your nose  Use distilled water if possible  You can also boil tap water and let it cool before you use it  Do not use tap water that has not been boiled  Do not smoke  Allergy symptoms may decrease if you are not around smoke  Nicotine and other chemicals in cigarettes and cigars can cause lung damage   Ask your healthcare provider for information if you currently smoke and need help to quit  E-cigarettes or smokeless tobacco still contain nicotine  Talk to your healthcare provider before you use these products  How can I prevent an allergic reaction? Do not go outside when pollen counts are high if you have seasonal allergies  Your symptoms may be better if you go outside only in the morning or evening  Use your air conditioner, and change air filters often  Avoid dust, fur, and mold  Dust and vacuum your home often  You may want to wear a mask when you vacuum  Keep pets in certain rooms, and bathe them often  Use a dehumidifier (machine that decreases moisture) to help prevent mold  Do not use products that contain latex if you have a latex allergy  Use nonlatex gloves if you work in healthcare or in food preparation  Always tell healthcare providers about a latex allergy  Avoid areas that attract insects if you have an insect bite or sting allergy  Areas include trash cans, gardens, and picnics  Do not wear bright clothing or strong scents when you will be outside  Prevent an allergic reaction caused by food  You may have a reaction if your food is not prepared safely  For example, you could be served food that touched your trigger food during preparation  This is called cross-contamination  Kitchen tools can also cause cross-contamination  You may also eat baked foods that contain a trigger food you do not know about  Ask if the food contains your trigger food before you handle or eat it  Call 911 for signs or symptoms of anaphylaxis,  such as trouble breathing, swelling in your mouth or throat, or wheezing  You may also have itching, a rash, hives, or feel like you are going to faint  When should I seek immediate care? You have tingling in your hands or feet  Your skin is red or flushed  When should I contact my healthcare provider? You have questions or concerns about your condition or care        CARE AGREEMENT:   You have the right to help plan your care  Learn about your health condition and how it may be treated  Discuss treatment options with your healthcare providers to decide what care you want to receive  You always have the right to refuse treatment  The above information is an  only  It is not intended as medical advice for individual conditions or treatments  Talk to your doctor, nurse or pharmacist before following any medical regimen to see if it is safe and effective for you  © Copyright [employer] 2022 Information is for End User's use only and may not be sold, redistributed or otherwise used for commercial purposes  All illustrations and images included in CareNotes® are the copyrighted property of [employer] , Inc  or [facility]  Allergic Rhinitis   WHAT YOU NEED TO KNOW:   What is allergic rhinitis? Allergic rhinitis, or hay fever, is swelling of the inside of your nose  The swelling is a reaction to allergens in the air  An allergen can be anything that causes an allergic reaction  Allergies to weeds, grass, trees, or mold often cause seasonal allergic rhinitis  Indoor dust mites, cockroaches, pet dander, or mold can also cause allergic rhinitis  What are the signs and symptoms of allergic rhinitis? Sneezing    Nasal congestion    Runny nose    Itchy nose, eyes, or mouth    Red, watery eyes    Postnasal drip (nasal drainage down the back of your throat)    Cough or frequent throat clearing    Feeling tired or lethargic    Dark circles under your eyes    How is allergic rhinitis diagnosed? Your healthcare provider will ask about your symptoms and examine you  He may ask if you know what makes your symptoms worse  Tell him if you have pets  You may need any of the following:  Skin testing  may show what you are allergic to  Your healthcare provider lightly pricks or scratches your skin with tiny amounts of a possible allergen  He watches to see how your skin reacts   If a bump appears within a few minutes, you are likely allergic to the allergen  A nasal swab  is used to test fluid from your nose for allergic disease  A rhinoscopy  is a procedure used to check for another cause of your symptoms, such as polyps or a foreign object  Your healthcare provider will use a thin tube with a camera on the end to look inside your nose  How is allergic rhinitis treated? Antihistamines  help reduce itching, sneezing, and a runny nose  Some antihistamines can make you sleepy  Nasal steroids  help decrease inflammation in your nose  Decongestants  help clear your stuffy nose  Immunotherapy  may be needed if your symptoms are severe or other treatments do not work  Immunotherapy is used to inject an allergen into your skin  At first, the therapy contains tiny amounts of the allergen  Your healthcare provider will slowly increase the amount of allergen  This may help your body be less sensitive to the allergen and stop reacting to it  You may need immunotherapy for weeks or longer  How can I manage allergic rhinitis? The best way to manage allergic rhinitis is to avoid allergens that can trigger your symptoms  Any of the following may help decrease your symptoms:  Rinse your nose and sinuses  with a salt water solution or use a salt water nasal spray  This will help thin the mucus in your nose and rinse away pollen and dirt  It will also help reduce swelling so you can breathe normally  Ask your healthcare provider how often to rinse your nose  Reduce exposure to dust mites  Wash sheets and towels in hot water every week  Cover your pillows and mattresses with allergen-free covers  Limit the number of stuffed animals and soft toys your child has  Wash your child's toys in hot water regularly  Vacuum weekly and use a vacuum  with an air filter  If possible, get rid of carpets and curtains  These collect dust and dust mites  Reduce exposure to pollen    Keep windows and doors closed in your house and car  Stay inside when air pollution or the pollen count is high  Run your air conditioner on recycle, and change air filters often  Shower and wash your hair before bed every night to rinse away pollen  Reduce exposure to pet dander  If possible, do not keep cats, dogs, birds, or other pets  If you do keep pets in your home, keep them out of bedrooms and carpeted rooms  Bathe them often  Reduce exposure to mold  Do not spend time in basements  Choose artificial plants instead of live plants  Keep your home's humidity at less than 45%  Do not have ponds or standing water in your home or yard  Do not smoke  Avoid others who smoke  Ask your healthcare provider for information if you currently smoke and need help to quit  Call 911 for the following: You have chest pain or shortness of breath  When should I seek immediate care? You have severe pain  You cough up blood  When should I contact my healthcare provider? You have a fever  You have ear or sinus pain, or a headache  Your symptoms get worse, even after treatment  You have yellow, green, brown, or bloody mucus coming from your nose  Your nose is bleeding or you have pain inside your nose  You have trouble sleeping because of your symptoms  You have questions or concerns about your condition or care  CARE AGREEMENT:   You have the right to help plan your care  Learn about your health condition and how it may be treated  Discuss treatment options with your healthcare providers to decide what care you want to receive  You always have the right to refuse treatment  The above information is an  only  It is not intended as medical advice for individual conditions or treatments  Talk to your doctor, nurse or pharmacist before following any medical regimen to see if it is safe and effective for you    © Copyright [employer] 2022 Information is for End User's use only and may not be sold, redistributed or otherwise used for commercial purposes  All illustrations and images included in CareNotes® are the copyrighted property of [employer] , Inc  or [employer] [facility]  Sinusitis   WHAT YOU NEED TO KNOW:   What is sinusitis? Sinusitis is inflammation or infection of your sinuses  Sinusitis is most often caused by a virus  Acute sinusitis may last up to 12 weeks  Chronic sinusitis lasts longer than 12 weeks  Recurrent sinusitis means you have 4 or more infections in 1 year  What increases my risk for sinusitis? Medical conditions, such as an upper respiratory infection, allergies, asthma, or cystic fibrosis    Dental infections or procedures, such as gum infections, tooth decay, or a root canal    Smoking or exposure to secondhand smoke    Abnormal sinus structure, such as nasal growths, swollen tonsils, or a deviated septum    A weak immune system, from diseases such as diabetes or HIV    What are the signs and symptoms of sinusitis? Fever    Pain, pressure, redness, or swelling around the forehead, cheeks, or eyes    Thick yellow or green discharge from your nose    Tenderness when you touch your face over your sinuses    Dry cough that happens mostly at night or when you lie down    Headache and face pain that is worse when you lean forward    Tooth pain, or pain when you chew    How is sinusitis diagnosed? Your healthcare provider will examine you and ask about your symptoms  He or she may check inside your nose using a nasal speculum  You may need any of the following tests:  A sample of mucus from your nose  may show what germ is causing your infection  A CT or MRI of your head  may show the mucous lining of your sinuses  You may be given contrast liquid to help your sinuses show up better in the pictures  Tell your healthcare provider if you have ever had an allergic reaction to contrast liquid  Do not enter the MRI room with anything metal  Metal can cause serious injury   Tell your healthcare provider if you have any metal in or on your body  How is sinusitis treated? Your symptoms may go away on their own  Your healthcare provider may recommend watchful waiting for up to 10 days before starting antibiotics  You may need any of the following:  Acetaminophen  decreases pain and fever  It is available without a doctor's order  Ask how much to take and how often to take it  Follow directions  Read the labels of all other medicines you are using to see if they also contain acetaminophen, or ask your doctor or pharmacist  Acetaminophen can cause liver damage if not taken correctly  Do not use more than 4 grams (4,000 milligrams) total of acetaminophen in one day  NSAIDs , such as ibuprofen, help decrease swelling, pain, and fever  This medicine is available with or without a doctor's order  NSAIDs can cause stomach bleeding or kidney problems in certain people  If you take blood thinner medicine, always ask your healthcare provider if NSAIDs are safe for you  Always read the medicine label and follow directions  Nasal steroid sprays  may help decrease inflammation in your nose and sinuses  Decongestants  help reduce swelling and drain mucus in the nose and sinuses  They may help you breathe easier  Antihistamines  help dry mucus in the nose and relieve sneezing  Antibiotics  help treat or prevent a bacterial infection  How can I manage my symptoms? Rinse your sinuses as directed  Use a sinus rinse device to rinse your nasal passages with a saline (salt water) solution or distilled water  Do not use tap water  This will help thin the mucus in your nose and rinse away pollen and dirt  It will also help reduce swelling so you can breathe normally  Use a humidifier  to increase air moisture in your home  This may make it easier for you to breathe and help decrease your cough  Sleep with your head elevated    Place an extra pillow under your head before you go to sleep to help your sinuses drain  Drink liquids as directed  Ask your healthcare provider how much liquid to drink each day and which liquids are best for you  Liquids will thin the mucus in your nose and help it drain  Avoid drinks that contain alcohol or caffeine  Do not smoke, and avoid secondhand smoke  Nicotine and other chemicals in cigarettes and cigars can make your symptoms worse  Ask your healthcare provider for information if you currently smoke and need help to quit  E-cigarettes or smokeless tobacco still contain nicotine  Talk to your healthcare provider before you use these products  How can I help prevent the spread of germs? Wash your hands often with soap and water  Wash your hands after you use the bathroom, change a child's diaper, or sneeze  Wash your hands before you prepare or eat food  Stay away from people who are sick  Some germs spread easily and quickly through contact  When should I seek immediate care? You have trouble breathing or wheezing that is getting worse  You have a stiff neck, a fever, or a bad headache  You cannot open your eye  Your eyeball bulges out or you cannot move your eye  You are more sleepy than normal, or you notice changes in your ability to think, move, or talk  You have swelling of your forehead or scalp  When should I call my doctor? You have vision changes, such as double vision  Your eye and eyelid are red, swollen, and painful  Your symptoms do not improve or go away after 10 days  You have nausea and are vomiting  Your nose is bleeding  You have questions or concerns about your condition or care  CARE AGREEMENT:   You have the right to help plan your care  Learn about your health condition and how it may be treated  Discuss treatment options with your healthcare providers to decide what care you want to receive  You always have the right to refuse treatment   The above information is an  only  It is not intended as medical advice for individual conditions or treatments  Talk to your doctor, nurse or pharmacist before following any medical regimen to see if it is safe and effective for you  © Copyright [employer] 2022 Information is for End User's use only and may not be sold, redistributed or otherwise used for commercial purposes  All illustrations and images included in CareNotes® are the copyrighted property of [employer]  or [address]  Prednisone (By mouth)   Prednisone (PRED-ni-sone)  Treats many diseases and conditions, especially problems related to inflammation  This medicine is a corticosteroid  Brand Name(s): Tess, predniSONE Intensol   There may be other brand names for this medicine  When This Medicine Should Not Be Used: This medicine is not right for everyone  Do not use if you had an allergic reaction to prednisone or if you are pregnant  How to Use This Medicine:   Liquid, Tablet, Delayed Release Tablet  Take your medicine as directed  Your dose may need to be changed several times to find what works best for you  It is best to take this medicine with food or milk  Swallow the delayed-release tablet whole  Do not crush, break, or chew it  Measure the oral liquid medicine with a marked measuring spoon, oral syringe, or medicine cup  Missed dose: Take a dose as soon as you remember  If it is almost time for your next dose, wait until then and take a regular dose  Do not take extra medicine to make up for a missed dose  Store the medicine in a closed container at room temperature, away from heat, moisture, and direct light  Do not freeze the oral liquid  Drugs and Foods to Avoid:   Ask your doctor or pharmacist before using any other medicine, including over-the-counter medicines, vitamins, and herbal products    Tell your doctor if you use any of the following:  Aminoglutethimide, amphotericin B, carbamazepine, cholestyramine, cyclosporine, digoxin, isoniazid, ketoconazole, phenobarbital, phenytoin, or rifampin  Blood thinner, such as warfarin  NSAID pain or arthritis medicine, such as aspirin, diclofenac, ibuprofen, naproxen, celecoxib  Diuretic (water pill)  Diabetes medicine  Macrolide antibiotic, such as azithromycin, clarithromycin, erythromycin  Estrogen, including birth control pills or hormone replacement therapy  This medicine may interfere with vaccines  Ask your doctor before you get a flu shot or any other vaccines  Warnings While Using This Medicine: It is not safe to take this medicine during pregnancy  It could harm an unborn baby  Tell your doctor right away if you become pregnant  Tell your doctor if you are breastfeeding or if you have kidney problems, heart failure, high blood pressure, a recent heart attack, diabetes, glaucoma, osteoporosis, or thyroid problems  Tell your doctor about any infection you have  Also tell your doctor if you have had mental or emotional problems (such as depression) or stomach or bowel problems (such as an ulcer or diverticulitis)  This medicine may cause the following problems:  Mood or behavior changes  Higher blood pressure, retaining water, changes in salt or potassium levels in your body  Cataracts or glaucoma (with long-term use)  Weak bones or osteoporosis (with long-term use)  Slow growth in children (with long-term use)  Muscle problems (with high doses, especially if you have myasthenia gravis or similar nerve and muscle problems)  Do not stop using this medicine suddenly  Your doctor will need to slowly decrease your dose before you stop it completely  This medicine could cause you to get infections more easily  Tell your doctor right away if you are exposed to chicken pox, measles, or other serious infection  Tell your doctor if you had a serious infection in the past, such as tuberculosis or herpes  Tell your doctor about any extra stress or anxiety in your life   Your dose might need to be changed for a short time  Tell any doctor or dentist who treats you that you are using this medicine  This medicine may affect certain medical test results  Keep all medicine out of the reach of children  Never share your medicine with anyone  Possible Side Effects While Using This Medicine:   Call your doctor right away if you notice any of these side effects: Allergic reaction: Itching or hives, swelling in your face or hands, swelling or tingling in your mouth or throat, chest tightness, trouble breathing  Dark freckles, skin color changes, coldness, weakness, tiredness, nausea, vomiting, weight loss  Depression, unusual thoughts, feelings, or behaviors, trouble sleeping  Fever, chills, cough, sore throat, and body aches  Muscle pain or weakness  Rapid weight gain, swelling in your hands, ankles, or feet  Severe stomach pain, nausea, vomiting, or red or black stools  Skin changes or growths  Trouble seeing, eye pain, headache  If you notice these less serious side effects, talk with your doctor: Increased appetite  Round, puffy face  Weight gain around your neck, upper back, breast, face, or waist  If you notice other side effects that you think are caused by this medicine, tell your doctor  Call your doctor for medical advice about side effects  You may report side effects to FDA at 5-690-FDA-3105    © Copyright [employer] 2022 Information is for End User's use only and may not be sold, redistributed or otherwise used for commercial purposes  The above information is an  only  It is not intended as medical advice for individual conditions or treatments  Talk to your doctor, nurse or pharmacist before following any medical regimen to see if it is safe and effective for you  Doxycycline (By mouth)   Doxycycline ([id number])  Treats and prevents infections  Also used to prevent malaria and treat rosacea or severe acne  This medicine is a tetracycline antibiotic     Brand Name(s): Acticlate, Adoxa, Adoxa Tanner 1/150, Avidoxy, Doryx, Doryx MPC, LymePak, Mondoxyne NL, Monodox, Morgidox [id number], Morgidox [id number], Oracea, Targadox, Vibramycin Calcium, Vibramycin Hyclate   There may be other brand names for this medicine  When This Medicine Should Not Be Used: This medicine is not right for everyone  Do not use it if you had an allergic reaction to doxycycline or another tetracycline antibiotic, or if you are pregnant or breastfeeding  How to Use This Medicine:   Capsule, Delayed Release Capsule, Long Acting Capsule, Liquid, Tablet, Delayed Release Tablet  Your doctor will tell you how much medicine to use  Do not use more than directed  Ask your pharmacist or doctor if you need to take this medicine with or without food  Some forms can be taken with food or milk, but others must be taken on an empty stomach  Capsule: Swallow whole  Do not break, crush, chew, or open it  Oracea® capsules: This medicine must be taken on an empty stomach, at least 1 hour before or 2 hours after a meal   Acticlate® Cap capsules: You may take this medicine with food or milk to avoid stomach irritation  Delayed-release capsules: You may also take this medicine by sprinkling the open capsules onto cold, soft applesauce  Do not lose any pellets when transferring the contents  Swallow this mixture right away  Do not chew it  Do not store the mixture for later use  You may take this medicine with food or milk to avoid stomach upset  Delayed-release tablets: You may also take this medicine by sprinkling the broken tablets onto room-temperature applesauce  Swallow this mixture right away  Do not chew it  Do not store the mixture for later use  You may take this medicine with food or milk to avoid stomach upset  Oral liquid: Shake the bottle well just before each use  Measure the oral liquid medicine with a marked measuring spoon, oral syringe, or medicine cup  Tablets:  You may take this medicine with food or milk to avoid stomach irritation  To break a tablet, hold the tablet between your thumb and index fingers close to the appropriate scored line  Then, apply enough pressure to snap the tablet segments apart  Do not use the tablet if it does not break on the scored lines  Take all of the medicine in your prescription to clear up your infection, even if you feel better after the first few doses  Drink plenty of fluids to avoid throat problems, if you take the capsule or tablet form  Malaria prevention: Start taking the medicine 1 or 2 days before you travel  Take the medicine every day during your trip  Keep taking it for 4 weeks after you return  However, do not use the medicine for longer than 4 months  Do not use this medicine for more than 9 months if you are using it for rosacea  Use only the brand of medicine your doctor prescribed  Other brands may not work the same way  Read and follow the patient instructions that come with this medicine  Talk to your doctor or pharmacist if you have any questions  Missed dose: Take a dose as soon as you remember  If it is almost time for your next dose, wait until then and take a regular dose  Do not take extra medicine to make up for a missed dose  Store the medicine in a closed container at room temperature, away from heat, moisture, and direct light  Do not freeze the oral liquid  Drugs and Foods to Avoid:   Ask your doctor or pharmacist before using any other medicine, including over-the-counter medicines, vitamins, and herbal products  Some medicines can affect how doxycycline works   Tell your doctor if you are using any of the following:  Bismuth subsalicylate  Acne medicines (including isotretinoin)  Birth control pills  Blood thinner (including warfarin)  Medicine for seizures (including carbamazepine, phenobarbital, phenytoin)  Medicine that contains aluminum, calcium, magnesium, or iron (including an antacid or vitamin supplement)  Medicine to treat psoriasis (including acitretin)  Penicillin antibiotic  Stomach medicine  Warnings While Using This Medicine: This medicine may cause birth defects if either partner is using it during conception or pregnancy  Tell your doctor right away if you or your partner becomes pregnant  Birth control pills may not work as well when used together with this medicine  Use a second form of birth control to keep from getting pregnant  Tell your doctor if you have kidney disease, liver disease, asthma, or an allergy to sulfites  Tell your doctor if you had surgery on your stomach, or if you have a history of yeast infections  This medicine may cause the following problems:  Permanent change in tooth color (in children younger than 6years of age)  Serious skin reactions, including drug reaction with eosinophilia and systemic symptoms (DRESS)  Increased pressure inside the head  Yeast infection  Immune system problems  This medicine can cause diarrhea  Call your doctor if the diarrhea becomes severe, does not stop, or is bloody  Do not take any medicine to stop diarrhea until you have talked to your doctor  Diarrhea can occur 2 months or more after you stop taking this medicine  This medicine may make your skin more sensitive to sunlight  Wear sunscreen  Do not use sunlamps or tanning beds  Tell any doctor or dentist who treats you that you are using this medicine  This medicine may affect certain medical test results  Call your doctor if your symptoms do not improve or if they get worse  Your doctor will do lab tests at regular visits to check on the effects of this medicine  Keep all appointments  Keep all medicine out of the reach of children  Never share your medicine with anyone  Possible Side Effects While Using This Medicine:   Call your doctor right away if you notice any of these side effects:   Allergic reaction: Itching or hives, swelling in your face or hands, swelling or tingling in your mouth or throat, chest tightness, trouble breathing  Blistering, peeling, red skin rash  Burning, pain, or irritation in your upper stomach or throat  Diarrhea that may contain blood  Fever, chills, cough, runny or stuffy nose, sore throat, body aches  Joint pain, unusual tiredness or weakness  Severe headache, dizziness, vision changes  Sudden and severe stomach pain, nausea, vomiting, lightheadedness  Swollen, painful, or tender lymph glands in the neck, armpit, or groin, or yellow skin or eyes  If you notice these less serious side effects, talk with your doctor:   Darkening of your skin, scars, teeth, or gums  Sores or white patches on your lips, mouth, or throat  If you notice other side effects that you think are caused by this medicine, tell your doctor  Call your doctor for medical advice about side effects  You may report side effects to FDA at 9-796-FDA-5818    © Copyright [address] 2022 Information is for End User's use only and may not be sold, redistributed or otherwise used for commercial purposes  The above information is an  only  It is not intended as medical advice for individual conditions or treatments  Talk to your doctor, nurse or pharmacist before following any medical regimen to see if it is safe and effective for you

## 2022-12-15 ENCOUNTER — TELEMEDICINE (OUTPATIENT)
Dept: FAMILY MEDICINE CLINIC | Facility: CLINIC | Age: 42
End: 2022-12-15

## 2022-12-15 DIAGNOSIS — R06.02 SHORT OF BREATH ON EXERTION: ICD-10-CM

## 2022-12-15 DIAGNOSIS — J06.9 URI WITH COUGH AND CONGESTION: ICD-10-CM

## 2022-12-15 DIAGNOSIS — Z09 FOLLOW-UP EXAM: Primary | ICD-10-CM

## 2022-12-15 DIAGNOSIS — R09.81 NASAL CONGESTION: ICD-10-CM

## 2022-12-15 DIAGNOSIS — J30.2 SEASONAL ALLERGIES: ICD-10-CM

## 2022-12-15 DIAGNOSIS — J01.10 ACUTE FRONTAL SINUSITIS, RECURRENCE NOT SPECIFIED: ICD-10-CM

## 2022-12-15 DIAGNOSIS — J45.20 MILD INTERMITTENT ASTHMA WITHOUT COMPLICATION: ICD-10-CM

## 2022-12-15 DIAGNOSIS — Z71.6 TOBACCO ABUSE COUNSELING: ICD-10-CM

## 2022-12-15 RX ORDER — LORATADINE 10 MG/1
10 TABLET ORAL DAILY
Qty: 30 TABLET | Refills: 2 | Status: SHIPPED | OUTPATIENT
Start: 2022-12-15 | End: 2022-12-19

## 2022-12-15 NOTE — ASSESSMENT & PLAN NOTE
Tobacco cessation counseling provided  Patient indicates she lives when she lights a cigarette she does not finish it because she is unable to

## 2022-12-15 NOTE — ASSESSMENT & PLAN NOTE
Patient to have follow-up exam and office for her Medicare annual wellness next visit  Currently virtual visit due to snowing

## 2022-12-15 NOTE — ASSESSMENT & PLAN NOTE
Discharge instructions and POC discussed with father, he verbalizes understanding   Patient on doxycycline 100 mg p o  twice daily to complete treatment 12/18/2022  Patient is not currently using her nasal sprays because she does not like the way these make her feel  Status education provided

## 2022-12-15 NOTE — ASSESSMENT & PLAN NOTE
Patient maintain on loratadine 10 mg p o  nightly for seasonal allergies  Ducted to avoid allergy triggers

## 2022-12-15 NOTE — ASSESSMENT & PLAN NOTE
Patient noted to have some nasal congestion, patient reordered for loratadine 10 mg Po nightly  Structured to avoid allergy triggers and may use Samantha pot as directed

## 2022-12-19 DIAGNOSIS — J30.2 SEASONAL ALLERGIES: ICD-10-CM

## 2022-12-19 RX ORDER — LORATADINE 10 MG/1
TABLET ORAL
Qty: 30 TABLET | Refills: 2 | Status: SHIPPED | OUTPATIENT
Start: 2022-12-19 | End: 2022-12-19 | Stop reason: SDUPTHER

## 2022-12-19 RX ORDER — LORATADINE 10 MG/1
10 TABLET ORAL DAILY
Qty: 30 TABLET | Refills: 2 | Status: SHIPPED | OUTPATIENT
Start: 2022-12-19

## 2022-12-27 DIAGNOSIS — K21.9 GASTROESOPHAGEAL REFLUX DISEASE WITHOUT ESOPHAGITIS: ICD-10-CM

## 2022-12-27 DIAGNOSIS — R11.0 NAUSEA: ICD-10-CM

## 2022-12-27 RX ORDER — OMEPRAZOLE 20 MG/1
CAPSULE, DELAYED RELEASE ORAL
Qty: 60 CAPSULE | Refills: 0 | Status: SHIPPED | OUTPATIENT
Start: 2022-12-27

## 2022-12-27 RX ORDER — ONDANSETRON 8 MG/1
TABLET, ORALLY DISINTEGRATING ORAL
Qty: 20 TABLET | Refills: 2 | Status: SHIPPED | OUTPATIENT
Start: 2022-12-27

## 2022-12-31 NOTE — PATIENT INSTRUCTIONS
GERD (Gastroesophageal Reflux Disease)   WHAT YOU NEED TO KNOW:   What is gastroesophageal reflux disease (GERD)? GERD is reflux that happens more than 2 times a week for a few weeks  Reflux means acid and food in your stomach back up into your esophagus  GERD can cause other health problems over time if it is not treated  What causes GERD? GERD often happens because the lower muscle (sphincter) of the esophagus does not close properly  The sphincter normally opens to let food into the stomach  It then closes to keep food and stomach acid in the stomach  If the sphincter does not close properly, stomach acid and food back up (reflux) into the esophagus  The following may increase your risk for GERD:  Certain foods such as spicy foods, chocolate, foods that contain caffeine, peppermint, and fried foods    Hiatal hernia    Certain medicines such as calcium channel blockers (used to treat high blood pressure), allergy medicines, sedatives, or antidepressants    Pregnancy, obesity, or scleroderma    Lying down after a meal    Drinking alcohol or smoking cigarettes    What are the signs and symptoms of GERD? Heartburn (burning pain in your chest)    Pain after meals that spreads to your neck, jaw, or shoulder    Pain that gets better when you change positions    Bitter or acid taste in your mouth    A dry cough    Trouble swallowing or pain with swallowing    Hoarseness or a sore throat    Burping or hiccups    Feeling full soon after you start eating    How is GERD diagnosed? Your healthcare provider will ask about your symptoms and when they started  Tell him or her about other medical conditions you have, your eating habits, and your activities  You may also need any of the following: The amount of acid  in your esophagus and stomach may be checked  A small probe is used to check the amount  An endoscopy  is a procedure used to look at the inside of your esophagus and stomach   An endoscope is a bendable tube with a light and camera on the end  Your healthcare provider may remove a small sample of tissue and send it to a lab for tests  X-ray  pictures may be taken of your stomach and intestines (bowel)  You may be given a chalky liquid to drink before the pictures are taken  This liquid helps your stomach and intestines show up better on the x-rays  Pressure and function  tests of your esophagus can help find problems such as a hiatal hernia  How is GERD treated? Medicines  are used to decrease stomach acid  Medicine may also be used to help your lower esophageal sphincter and stomach contract (tighten) more  Surgery  is done to wrap the upper part of the stomach around the esophageal sphincter  This will strengthen the sphincter and prevent reflux  How can I manage GERD? Do not have foods or drinks that may increase heartburn  These include chocolate, peppermint, fried or fatty foods, drinks that contain caffeine, or carbonated drinks (soda)  Other foods include spicy foods, onions, tomatoes, and tomato-based foods  Do not have foods or drinks that can irritate your esophagus, such as citrus fruits, juices, and alcohol  Do not eat large meals  When you eat a lot of food at one time, your stomach needs more acid to digest it  Eat 6 small meals each day instead of 3 large ones, and eat slowly  Do not eat meals 2 to 3 hours before bedtime  Elevate the head of your bed  Place 6-inch blocks under the head of your bed frame  You may also use more than one pillow under your head and shoulders while you sleep  Maintain a healthy weight  If you are overweight, weight loss may help relieve symptoms of GERD  Do not smoke  Smoking weakens the lower esophageal sphincter and increases the risk of GERD  Ask your healthcare provider for information if you currently smoke and need help to quit  E-cigarettes or smokeless tobacco still contain nicotine   Talk to your healthcare provider before you use these products  Do not put pressure on your abdomen  Pressure pushes acid up into your esophagus  Do not wear clothing that is tight around your waist  Do not bend over  Bend at the knees if you need to pick something up  Call your local emergency number (911 in the [address]) if:   You have severe chest pain and sudden trouble breathing  When should I seek immediate care? You have trouble breathing after you vomit  You have trouble swallowing, or pain with swallowing  Your bowel movements are black, bloody, or tarry-looking  Your vomit looks like coffee grounds or has blood in it  When should I call my doctor? You feel full and cannot burp or vomit  You vomit large amounts, or you vomit often  You are losing weight without trying  Your symptoms get worse or do not improve with treatment  You have questions or concerns about your condition or care  CARE AGREEMENT:   You have the right to help plan your care  Learn about your health condition and how it may be treated  Discuss treatment options with your healthcare providers to decide what care you want to receive  You always have the right to refuse treatment  The above information is an  only  It is not intended as medical advice for individual conditions or treatments  Talk to your doctor, nurse or pharmacist before following any medical regimen to see if it is safe and effective for you  © Copyright [employer] 2022 Information is for End User's use only and may not be sold, redistributed or otherwise used for commercial purposes  All illustrations and images included in CareNotes® are the copyrighted property of [employer]  or [facility] Joni Akins   Asthma   AMBULATORY CARE:   Asthma  is a lung disease that makes breathing difficult  Chronic inflammation and reactions to triggers narrow the airways in your lungs  Asthma can become life-threatening if it is not managed         Cough-variant asthma  is a type of asthma that causes a dry cough that keeps coming back  A dry cough may be your only symptom, or you may also have chest tightness  These symptoms may be caused by exercise or exposure to odors, allergens, or respiratory tract infections  Cough-variant asthma is treated the same way as typical asthma  Common symptoms include the following:   Coughing    Wheezing    Shortness of breath    Chest tightness    Call your local emergency number (911 in the [address]) if:   You have severe shortness of breath  The skin around your neck and ribs pulls in with each breath  Your peak flow numbers are in the red zone of your AAP  Seek care immediately if:   You have shortness of breath, even after you take your short-term medicine as directed  Your lips or nails turn blue or gray  Call your doctor or asthma specialist if:   You run out of medicine before your next refill is due  Your symptoms get worse  You need to take more medicine than usual to control your symptoms  You have questions or concerns about your condition or care  Treatment for asthma  will depend on how severe your asthma is  Medicine may be used to decrease inflammation, open airways, and make it easier to breathe  Medicines may be inhaled, taken as a pill, or injected  Short-term medicines relieve your symptoms quickly  Long-term medicines are used to prevent future attacks  Other medicines may be needed if your regular medicines are not able to prevent attacks  You may also need medicine to help control your allergies  Manage and prevent future asthma attacks: Follow your asthma action plan  This is a written plan that you and your healthcare provider create  It explains which medicine you need and when to change doses if necessary  It also explains how you can monitor symptoms and use a peak flow meter  The meter measures how well your lungs are working      Manage other health conditions , such as allergies, acid reflux, and sleep apnea  Identify and avoid triggers  These may include pets, dust mites, mold, and cockroaches  Do not smoke or be around others who smoke  Nicotine and other chemicals in cigarettes and cigars can cause lung damage  Ask your healthcare provider for information if you currently smoke and need help to quit  E-cigarettes or smokeless tobacco still contain nicotine  Talk to your healthcare provider before you use these products  Ask about the flu vaccine  The flu can make your asthma worse  You may need a yearly flu shot  Follow up with your healthcare provider as directed: You will need to return to make sure your medicine is working and your symptoms are controlled  You may be referred to an asthma or allergy specialist  Lisa Snuffer may be asked to keep a record of your peak flow values and bring it with you to your appointments  Write down your questions so you remember to ask them during your visits  © Copyright [employer] 2022 Information is for End User's use only and may not be sold, redistributed or otherwise used for commercial purposes  All illustrations and images included in CareNotes® are the copyrighted property of A D A M , Inc  or [address] [place] [address]  The above information is an  only  It is not intended as medical advice for individual conditions or treatments  Talk to your doctor, nurse or pharmacist before following any medical regimen to see if it is safe and effective for you  Allergies   WHAT YOU NEED TO KNOW:   What are allergies? Allergies are an immune system reaction to a substance called an allergen  Your immune system sees the allergen as harmful and attacks it  What causes allergies? You may have allergies at certain times of the year or all year  The following are common allergies:  Seasonal airborne allergies  happen during certain times of the year  This is also called hay fever   Tree, weed, or grass pollen are examples of allergens that you breathe in  Environmental airborne allergy  triggers you may breathe in year-round include dust, mold, and pet hair  Contact allergies  include latex, found in items such as condoms and medical gloves  Latex allergies can be very serious  Insect sting allergies  may be caused by bees, hornets, fire ants, or other insects that sting or bite you  Insect allergies can be very serious  Food allergies  commonly include shellfish, wheat, and eggs  Some foods must be eaten to produce an allergic reaction  Other foods can trigger a reaction if they touch your skin or are breathed in  What increases my risk for allergies? Allergic reactions can happen at any time, even if you have not had allergies before  You may develop an allergy after you have been exposed to an allergen more than once  Allergies are most common in children and elderly people, but anyone can have an allergic reaction  Your risk is also increased if you have a family history of allergies or a medical condition such as asthma  What are the signs and symptoms of allergies? Mild symptoms  include sneezing and a runny, itchy, or stuffy nose  You may also have swollen, watery, or itchy eyes, or skin itching  You may have swelling or pain where an insect bit or stung you  Anaphylaxis symptoms  include trouble breathing or swallowing, a rash or hives, or severe swelling  You may also have a cough, wheezing, or feel lightheaded or dizzy  Anaphylaxis is a sudden, life-threatening reaction that needs immediate treatment  How are allergies diagnosed? Your healthcare provider will ask about your signs and symptoms  He or she will ask what allergens you have been exposed to and if you have ever had other allergic reactions  He or she may look in your nose, ears, or throat  You may need additional testing if you developed anaphylaxis after you were exposed to a trigger and then exercised   This is called exercise-induced anaphylaxis  You may also need the following tests:  Blood tests  are used to check for signs of a reaction to allergens  Nasal tests  are used to see how your nasal passages react to allergens  A sample of your nasal fluid may also be tested  Skin tests  can help your healthcare provider find what you are allergic to  He will place a small amount of allergen on your arm or back and then prick your skin with a needle  He will watch how your skin reacts to the allergen  How are allergies treated? Antihistamines  help decrease itching, sneezing, and swelling  You may take them as a pill or use drops in your nose or eyes  Decongestants  help your nose feel less stuffy  Steroids  decrease swelling and redness  Topical treatments  help decrease itching or swelling  You also may be given nasal sprays or eyedrops  Epinephrine  is medicine used to treat severe allergic reactions such as anaphylaxis  Desensitization  gets your body used to allergens you cannot avoid  Your healthcare provider will give you a shot that contains a small amount of an allergen  He or she will treat any allergic reaction you have  Your provider will give you more of the allergen a little at a time until your body gets used to it  Your reaction to the allergen may be less serious after this treatment  Your healthcare provider will tell you how long to get the shots  What steps do I need to take for signs or symptoms of anaphylaxis? Immediately  give 1 shot of epinephrine only into the outer thigh muscle  Leave the shot in place  as directed  Your healthcare provider may recommend you leave it in place for up to 10 seconds before you remove it  This helps make sure all of the epinephrine is delivered  Call 911 and go to the emergency department,  even if the shot improved symptoms  Do not drive yourself  Bring the used epinephrine shot with you      What safety precautions do I need to take if I am at risk for anaphylaxis? Keep 2 shots of epinephrine with you at all times  You may need a second shot, because epinephrine only works for about 20 minutes and symptoms may return  Your healthcare provider can show you and family members how to give the shot  Check the expiration date every month and replace it before it expires  Create an action plan  Your healthcare provider can help you create a written plan that explains the allergy and an emergency plan to treat a reaction  The plan explains when to give a second epinephrine shot if symptoms return or do not improve after the first  Give copies of the action plan and emergency instructions to family members and work staff  Show them how to give a shot of epinephrine  Be careful when you exercise  If you have had exercise-induced anaphylaxis, do not exercise right after you eat  Stop exercising right away if you start to develop any signs or symptoms of anaphylaxis  You may first feel tired, warm, or have itchy skin  Hives, swelling, and severe breathing problems may develop if you continue to exercise  Carry medical alert identification  Wear medical alert jewelry or carry a card that explains the allergy  Ask your healthcare provider where to get these items  Inform all healthcare providers of the allergy  This includes dentists, nurses, doctors, and surgeons  How can I manage allergies? Use nasal rinses as directed  Rinse with a saline solution daily  This will help clear allergens out of your nose  Use distilled water if possible  You can also boil tap water and let it cool before you use it  Do not use tap water that has not been boiled  Do not smoke  Allergy symptoms may decrease if you are not around smoke  Nicotine and other chemicals in cigarettes and cigars can cause lung damage  Ask your healthcare provider for information if you currently smoke and need help to quit  E-cigarettes or smokeless tobacco still contain nicotine  Talk to your healthcare provider before you use these products  How can I prevent an allergic reaction? Do not go outside when pollen counts are high if you have seasonal allergies  Your symptoms may be better if you go outside only in the morning or evening  Use your air conditioner, and change air filters often  Avoid dust, fur, and mold  Dust and vacuum your home often  You may want to wear a mask when you vacuum  Keep pets in certain rooms, and bathe them often  Use a dehumidifier (machine that decreases moisture) to help prevent mold  Do not use products that contain latex if you have a latex allergy  Use nonlatex gloves if you work in healthcare or in food preparation  Always tell healthcare providers about a latex allergy  Avoid areas that attract insects if you have an insect bite or sting allergy  Areas include trash cans, gardens, and picnics  Do not wear bright clothing or strong scents when you will be outside  Prevent an allergic reaction caused by food  You may have a reaction if your food is not prepared safely  For example, you could be served food that touched your trigger food during preparation  This is called cross-contamination  Kitchen tools can also cause cross-contamination  You may also eat baked foods that contain a trigger food you do not know about  Ask if the food contains your trigger food before you handle or eat it  Call 911 for signs or symptoms of anaphylaxis,  such as trouble breathing, swelling in your mouth or throat, or wheezing  You may also have itching, a rash, hives, or feel like you are going to faint  When should I seek immediate care? You have tingling in your hands or feet  Your skin is red or flushed  When should I contact my healthcare provider? You have questions or concerns about your condition or care  CARE AGREEMENT:   You have the right to help plan your care  Learn about your health condition and how it may be treated  Discuss treatment options with your healthcare providers to decide what care you want to receive  You always have the right to refuse treatment  The above information is an  only  It is not intended as medical advice for individual conditions or treatments  Talk to your doctor, nurse or pharmacist before following any medical regimen to see if it is safe and effective for you  © Copyright [employer] 2022 Information is for End User's use only and may not be sold, redistributed or otherwise used for commercial purposes  All illustrations and images included in CareNotes® are the copyrighted property of [employer]  or [facility] Joni Akins   Asthma   AMBULATORY CARE:   Asthma  is a lung disease that makes breathing difficult  Chronic inflammation and reactions to triggers narrow the airways in your lungs  Asthma can become life-threatening if it is not managed  Cough-variant asthma  is a type of asthma that causes a dry cough that keeps coming back  A dry cough may be your only symptom, or you may also have chest tightness  These symptoms may be caused by exercise or exposure to odors, allergens, or respiratory tract infections  Cough-variant asthma is treated the same way as typical asthma  Common symptoms include the following:   Coughing    Wheezing    Shortness of breath    Chest tightness    Call your local emergency number (911 in the [address]) if:   You have severe shortness of breath  The skin around your neck and ribs pulls in with each breath  Your peak flow numbers are in the red zone of your AAP  Seek care immediately if:   You have shortness of breath, even after you take your short-term medicine as directed  Your lips or nails turn blue or gray  Call your doctor or asthma specialist if:   You run out of medicine before your next refill is due  Your symptoms get worse  You need to take more medicine than usual to control your symptoms      You have questions or concerns about your condition or care  Treatment for asthma  will depend on how severe your asthma is  Medicine may be used to decrease inflammation, open airways, and make it easier to breathe  Medicines may be inhaled, taken as a pill, or injected  Short-term medicines relieve your symptoms quickly  Long-term medicines are used to prevent future attacks  Other medicines may be needed if your regular medicines are not able to prevent attacks  You may also need medicine to help control your allergies  Manage and prevent future asthma attacks: Follow your asthma action plan  This is a written plan that you and your healthcare provider create  It explains which medicine you need and when to change doses if necessary  It also explains how you can monitor symptoms and use a peak flow meter  The meter measures how well your lungs are working  Manage other health conditions , such as allergies, acid reflux, and sleep apnea  Identify and avoid triggers  These may include pets, dust mites, mold, and cockroaches  Do not smoke or be around others who smoke  Nicotine and other chemicals in cigarettes and cigars can cause lung damage  Ask your healthcare provider for information if you currently smoke and need help to quit  E-cigarettes or smokeless tobacco still contain nicotine  Talk to your healthcare provider before you use these products  Ask about the flu vaccine  The flu can make your asthma worse  You may need a yearly flu shot  Follow up with your healthcare provider as directed: You will need to return to make sure your medicine is working and your symptoms are controlled  You may be referred to an asthma or allergy specialist  Farida Mike may be asked to keep a record of your peak flow values and bring it with you to your appointments  Write down your questions so you remember to ask them during your visits    © Copyright [employer] 2022 Information is for End User's use only and may not be sold, redistributed or otherwise used for commercial purposes  All illustrations and images included in CareNotes® are the copyrighted property of [employer] M [employer]  or Jenn Tracy  The above information is an  only  It is not intended as medical advice for individual conditions or treatments  Talk to your doctor, nurse or pharmacist before following any medical regimen to see if it is safe and effective for you  Upper Respiratory Infection   WHAT YOU NEED TO KNOW:   What is an upper respiratory infection? An upper respiratory infection is also called a cold  It can affect your nose, throat, ears, and sinuses  You are more likely to get a cold in the winter  Your risk is also higher if you smoke cigarettes or have allergies, such as hay fever  What causes a cold? A cold is caused by a virus  Many viruses can cause a cold, and each is contagious  A virus may be spread to others through coughing, sneezing, or close contact  A virus can also stay on objects and surfaces  You can become infected if you touch the object or surface and then touch your eyes, mouth, or nose  What are the signs and symptoms of a cold? Cold symptoms are usually worst for the first 3 to 5 days  You may have any of the following:  Runny or stuffy nose    Sneezing and coughing    Sore throat or hoarseness    Red, watery, and sore eyes    A lack of energy and feeling tired    Chills and fever    Headache, body aches, or sore muscles    How is a cold treated? Colds are caused by viruses and do not get better with antibiotics  Most people get better in 7 to 14 days  You may continue to cough for 2 to 3 weeks  The following may help decrease your symptoms:  Decongestants  help reduce nasal congestion and help you breathe more easily  If you take decongestant pills, they may make you feel restless or cause problems with your sleep  Do not use decongestant sprays for longer than a few days      Cough suppressants  help reduce coughing  Ask your healthcare provider which type of cough medicine is best for you  NSAIDs , such as ibuprofen, help decrease swelling, pain, and fever  NSAIDs can cause stomach bleeding or kidney problems in certain people  If you take blood thinner medicine, always ask your healthcare provider if NSAIDs are safe for you  Always read the medicine label and follow directions  Acetaminophen  decreases pain and fever  It is available without a doctor's order  Ask how much to take and how often to take it  Follow directions  Read the labels of all other medicines you are using to see if they also contain acetaminophen, or ask your doctor or pharmacist  Acetaminophen can cause liver damage if not taken correctly  Do not use more than 4 grams (4,000 milligrams) total of acetaminophen in one day  How can I manage a cold? Rest as much as possible  Slowly start to do more each day  Drink more liquids as directed  Liquids will help thin and loosen mucus so you can cough it up  Liquids will also help prevent dehydration  Liquids that help prevent dehydration include water, fruit juice, and broth  Do not drink liquids that contain caffeine  Caffeine can increase your risk for dehydration  Ask your healthcare provider how much liquid to drink each day  Soothe a sore throat  Gargle with warm salt water  Make salt water by dissolving ¼ teaspoon salt in 1 cup warm water  You may also suck on hard candy or throat lozenges  You may use a sore throat spray  Use a humidifier or vaporizer  Use a cool mist humidifier or a vaporizer to increase air moisture in your home  This may make it easier for you to breathe and help decrease your cough  Use saline nasal drops as directed  These help relieve congestion  Apply petroleum-based jelly around the outside of your nostrils  This can decrease irritation from blowing your nose  Do not smoke    Nicotine and other chemicals in cigarettes and cigars can make your symptoms worse  They can also cause infections such as bronchitis or pneumonia  Ask your healthcare provider for information if you currently smoke and need help to quit  E-cigarettes or smokeless tobacco still contain nicotine  Talk to your healthcare provider before you use these products  What can I do to prevent the common cold? Wash your hands often  Use soap and water every time you wash your hands  Rub your soapy hands together, lacing your fingers  Use the fingers of one hand to scrub under the nails of the other hand  Wash for at least 20 seconds  Rinse with warm, running water for several seconds  Then dry your hands  Use germ-killing gel if soap and water are not available  Do not touch your eyes or mouth without washing your hands first          Cover a sneeze or cough  Use a tissue that covers your mouth and nose  Put the used tissue in the trash right away  Use the bend of your arm if a tissue is not available  Wash your hands well with soap and water or use a hand   Do not stand close to anyone who is sneezing or coughing  Try to stay away from others while you are sick  This is especially important during the first 2 to 3 days when the virus is more easily spread  Wait until a fever, cough, or other symptoms are gone before you return to work or other regular activities  Do not share items while you are sick  This includes food, drinks, eating utensils, and dishes  Call your local emergency number (911 in the [address] [place] [address],[address]) if:   You have chest pain or trouble breathing  When should I seek immediate care? You have a fever over 102ºF (39ºC)  When should I call my doctor? You have a low fever  Your sore throat gets worse or you see white or yellow spots in your throat  Your symptoms get worse after 3 to 5 days or are not better in 14 days  You have a rash anywhere on your skin  You have large, tender lumps in your neck      You have thick, green, or yellow drainage from your nose  You cough up thick yellow, green, or bloody mucus  You have a bad earache  You have questions or concerns about your condition or care  CARE AGREEMENT:   You have the right to help plan your care  Learn about your health condition and how it may be treated  Discuss treatment options with your healthcare providers to decide what care you want to receive  You always have the right to refuse treatment  The above information is an  only  It is not intended as medical advice for individual conditions or treatments  Talk to your doctor, nurse or pharmacist before following any medical regimen to see if it is safe and effective for you  © Copyright [address] 2022 Information is for End User's use only and may not be sold, redistributed or otherwise used for commercial purposes   All illustrations and images included in CareNotes® are the copyrighted property of [employer]  or [address] [place] [address]

## 2022-12-31 NOTE — PROGRESS NOTES
Virtual Regular Visit    Verification of patient location:    Patient is located in the following state in which I hold an active license [place]      Assessment/Plan:    Problem List Items Addressed This Visit        Digestive    Gastroesophageal reflux disease without esophagitis     Patient taking omeprazole 20 mg p o  daily  To maintain a GERD  If nausea and patient takes Zofran as directed diet  Respiratory    Asthma in adult, mild persistent, with acute exacerbation     Patient using albuterol 90 mcg inhaler every 6 hours as directed  Also on Flovent  mcg inhaler 2 puffs twice daily  URI with cough and congestion     Patient reports x-ray at this time  Instructed continue over-the-counter cough and cold medications as directed           Relevant Orders    XR chest pa & lateral    Acute recurrent ethmoidal sinusitis     Patient has chronic sinusitis and has been treated recently  Other    Seasonal allergies     Patient to continue Claritin 10 mg p o  nightly for seasonal allergies  Tobacco abuse counseling     Tobacco cessation counseling provided  Patient still occasionally smokes  Major depressive disorder, recurrent, mild ([facility] [address] )     Patient has history of major depression  Patient takes BuSpar 15 mg 3 times daily  Also Ativan for severe anxiety  Underweight     Patient instructed don weight gain through increased calories and protein  COVID-19 - Primary     She reports that she was exposed to family and COVID-19 positive  Patient tested positive on 1/1/2023  Currently counseled on Paxil for therapy  Instructed to take vitamin D vitamin C and zinc routinely  Patient follow-up with the emergency room if she has severe shortness of breath or chest pain           Relevant Medications    nirmatrelvir & ritonavir (Paxlovid, 150/100,) tablet therapy pack    Other Relevant Orders    Covid/Flu- Collected at [facility] or [facility] Now    Flu-like symptoms     Patient noted to be COVID-positive, however requests flu testing at this time as well  Relevant Orders    Covid/Flu- Collected at [facility] or [facility]       BMI Counseling: There is no height or weight on file to calculate BMI  The BMI is below normal  Patient advised to gain weight  Rationale for BMI follow-up plan is due to patient being underweight  Depression Screening and Follow-up Plan: Clincally patient does not have depression  No treatment is required  Tobacco Cessation Counseling: Tobacco cessation counseling was provided  The patient is sincerely urged to quit consumption of tobacco  She is not ready to quit tobacco  Medication options and side effects of medication discussed  Patient refused medication  Reason for visit is   Chief Complaint   Patient presents with   • Follow-up   • Virtual Regular Visit   • [id number]        [facility]    Provider located at [address] [place] [id number]  [phone]      Recent Visits  No visits were found meeting these conditions  Showing recent visits within past 7 days and meeting all other requirements  Today's Visits  Date Type Provider Dept   01/03/23 Telemedicine [facility] Powell 112 today's visits and meeting all other requirements  Future Appointments  No visits were found meeting these conditions  Showing future appointments within next 150 days and meeting all other requirements       The patient was identified by name and date of birth  Leann Singh was informed that this is a telemedicine visit and that the visit is being conducted through the [address] [place] [address] Now platform  She agrees to proceed     My office door was closed  No one else was in the room  She acknowledged consent and understanding of privacy and security of the video platform   The patient has agreed to participate and understands they can discontinue the visit at any time  Patient is aware this is a billable service  Caro Mead is a 43 y o  female virtual visit for follow-up covid-19 = ON 2023  Patient is a 43year old female for virtual visit follow-up for dizziness, shortness of breath and covid positive on 2023  She indicates "my chest tightness with cough and sputum with difficulty breathing, cough, runny nose, head is killing me, i'm shaking, but no fever, but I have the chills, I'm extremely tired, I have a buzzing sound in my ears that is making me dizzy         Past Medical History:   Diagnosis Date   • Allergies    • Anxiety    • Asthma    • Chronic pain    • Contusion of head, subsequent encounter 2022   • Depression    • Eating disorder    • Fibrocystic breast changes of both breasts    • Seizures ([address] )        Past Surgical History:   Procedure Laterality Date   • INDUCED      • MAMMO STEREOTACTIC BREAST BIOPSY LEFT (ALL INC)      Benign       Current Outpatient Medications   Medication Sig Dispense Refill   • nirmatrelvir & ritonavir (Paxlovid, 150/100,) tablet therapy pack Take 2 tablets by mouth 2 (two) times a day for 5 days Take 1 nirmatrelvir tablet + 1 ritonavir tablet together per dose 20 tablet 0   • acetaminophen (TYLENOL) 500 mg tablet Take 500 mg by mouth every 6 (six) hours as needed for mild pain     • albuterol (PROVENTIL HFA,VENTOLIN HFA) 90 mcg/act inhaler TAKE 2 PUFFS BY MOUTH EVERY 6 HOURS AS NEEDED FOR WHEEZE OR FOR SHORTNESS OF BREATH 18 g 1   • Ascorbic Acid (VITAMIN C) 100 MG tablet Take 100 mg by mouth daily     • azithromycin (ZITHROMAX) 250 mg tablet TAKE 2 TABLETS BY MOUTH TODAY, THEN TAKE 1 TABLET DAILY FOR 4 DAYS     • Biotin w/ Vitamins C & E (HAIR/SKIN/NAILS PO) Take by mouth     • busPIRone (BUSPAR) 15 mg tablet Take by mouth 3 (three) times a day       • CALCIUM-MAGNESIUM-ZINC PO Take by mouth     • Flovent  MCG/ACT inhaler INHALE 2 PUFFS BY MOUTH 2 TIMES A DAY RINSE MOUTH AFTER USE  12 g 1   • fluticasone (FLONASE) 50 mcg/act nasal spray 1 spray into each nostril daily 16 g 2   • fluticasone (FLOVENT HFA) 110 MCG/ACT inhaler 110 puffs     • hydrocortisone 2 5 % cream hydrocortisone 2 5 % topical cream with perineal applicator (Patient not taking: No sig reported)     • ibuprofen (MOTRIN) 600 mg tablet Take by mouth every 6 (six) hours as needed for mild pain     • ipratropium (ATROVENT) 0 06 % nasal spray 2 SPRAYS INTO EACH NOSTRIL 4 (FOUR) TIMES A DAY (Patient not taking: No sig reported) 15 mL 1   • loratadine (CLARITIN) 10 mg tablet Take 1 tablet (10 mg total) by mouth daily 30 tablet 2   • LORazepam (ATIVAN) 1 mg tablet Take 1 mg by mouth 6 (six) times a day  1   • naproxen (EC NAPROSYN) 500 MG EC tablet Take 1 tablet (500 mg total) by mouth 2 (two) times a day as needed for mild pain 30 tablet 0   • naproxen (NAPROSYN) 500 mg tablet TAKE 1 TABLET BY MOUTH TWICE A DAY WITH MEALS 60 tablet 5   • omeprazole (PriLOSEC) 20 mg delayed release capsule TAKE 1 CAPSULE (20 MG TOTAL) BY MOUTH TWICE DAILY 60 capsule 0   • ondansetron (ZOFRAN-ODT) 8 mg disintegrating tablet TAKE 1 TABLET BY MOUTH EVERY 8 HOURS AS NEEDED FOR NAUSEA OR VOMITING  20 tablet 2   • predniSONE 10 mg tablet TAKE 1 TABLET BY MOUTH 2 TIMES A DAY FOR 5 DAYS  • thiamine (VITAMIN B1) 100 mg tablet Take 100 mg by mouth daily     • tiZANidine (ZANAFLEX) 2 mg tablet Take 1 tablet (2 mg total) by mouth every 8 (eight) hours as needed for muscle spasms 90 tablet 2     No current facility-administered medications for this visit  Allergies   Allergen Reactions   • Prednisone Anxiety   • Gabapentin Hives   • Meloxicam GI Intolerance   • Tramadol Hives     SEIZURES   • Vistaril [Hydroxyzine]    • Latex Other (See Comments)     C/o itching        Review of Systems   Constitutional: Positive for chills, fatigue and fever   Negative for activity change, appetite change and unexpected weight change  HENT: Positive for congestion, sinus pressure and sinus pain  Negative for ear discharge, ear pain, nosebleeds, postnasal drip, rhinorrhea, sneezing, sore throat and voice change  Eyes: Negative for pain, redness and visual disturbance  Respiratory: Positive for cough, chest tightness and shortness of breath  Negative for wheezing  Phlegm on and off  Cardiovascular: Negative for chest pain and palpitations  Gastrointestinal: Negative for abdominal distention, abdominal pain, constipation, diarrhea, nausea and vomiting  Endocrine: Negative  Genitourinary: Negative for difficulty urinating, dysuria, flank pain, frequency, hematuria and urgency  Musculoskeletal: Negative for arthralgias and myalgias  Skin: Negative  Allergic/Immunologic: Negative  Neurological: Positive for dizziness and headaches  Hematological: Negative  Psychiatric/Behavioral: Negative  Video Exam    There were no vitals filed for this visit  Physical Exam  Vitals and nursing note reviewed  Constitutional:       Appearance: Normal appearance  She is well-developed  Comments: Underweight  HENT:      Right Ear: External ear normal       Left Ear: External ear normal       Nose: Nose normal  No congestion or rhinorrhea  Mouth/Throat:      Mouth: Mucous membranes are moist       Pharynx: No oropharyngeal exudate or posterior oropharyngeal erythema  Eyes:      Conjunctiva/sclera: Conjunctivae normal    Cardiovascular:      Pulses: Normal pulses  Pulmonary:      Effort: Pulmonary effort is normal    Musculoskeletal:         General: Normal range of motion  Cervical back: Normal range of motion  Skin:     General: Skin is dry  Neurological:      General: No focal deficit present  Mental Status: She is alert and oriented to person, place, and time     Psychiatric:         Mood and Affect: Mood normal          Behavior: Behavior normal  I spent 25 minutes directly with the patient during this visit

## 2023-01-02 ENCOUNTER — NURSE TRIAGE (OUTPATIENT)
Dept: OTHER | Facility: OTHER | Age: 43
End: 2023-01-02

## 2023-01-02 NOTE — TELEPHONE ENCOUNTER
Reason for Disposition  • Patient sounds very sick or weak to the triager    Answer Assessment - Initial Assessment Questions  1  COVID-19 DIAGNOSIS: "Who made your COVID-19 diagnosis?" "Was it confirmed by a positive lab test or self-test?" If not diagnosed by a doctor (or NP/PA), ask "Are there lots of cases (community spread) where you live?" Note: See public health department website, if unsure  Tested positive yesterday, 1/1  2  COVID-19 EXPOSURE: "Was there any known exposure to COVID before the symptoms began?" CDC Definition of close contact: within 6 feet (2 meters) for a total of 15 minutes or more over a 24-hour period  N/A  3  ONSET: "When did the COVID-19 symptoms start?"       1/1  4  WORST SYMPTOM: "What is your worst symptom?" (e g , cough, fever, shortness of breath, muscle aches)      Shortness of breath, heart racing, chest tightness  5  COUGH: "Do you have a cough?" If Yes, ask: "How bad is the cough?"        N/A  6  FEVER: "Do you have a fever?" If Yes, ask: "What is your temperature, how was it measured, and when did it start?"      Denies fever, but has chills  7  RESPIRATORY STATUS: "Describe your breathing?" (e g , shortness of breath, wheezing, unable to speak)       Been using inhalers more, is having mild SOB  Gets SOB with exertion  8  BETTER-SAME-WORSE: "Are you getting better, staying the same or getting worse compared to yesterday?"  If getting worse, ask, "In what way?"      Worse  9  HIGH RISK DISEASE: "Do you have any chronic medical problems?" (e g , asthma, heart or lung disease, weak immune system, obesity, etc )      Has asthma  10  VACCINE: "Have you had the COVID-19 vaccine?" If Yes, ask: "Which one, how many shots, when did you get it?"          11  BOOSTER: "Have you received your COVID-19 booster?" If Yes, ask: "Which one and when did you get it?"        *No Answer*  12   PREGNANCY: "Is there any chance you are pregnant?" "When was your last menstrual period?"        *No Answer*  13  OTHER SYMPTOMS: "Do you have any other symptoms?"  (e g , chills, fatigue, headache, loss of smell or taste, muscle pain, sore throat)        *No Answer*  14  O2 SATURATION MONITOR:  "Do you use an oxygen saturation monitor (pulse oximeter) at home?" If Yes, ask "What is your reading (oxygen level) today?" "What is your usual oxygen saturation reading?" (e g , 95%)        Denies    Protocols used: CORONAVIRUS (COVID-19) DIAGNOSED OR SUSPECTED-ADULT-

## 2023-01-02 NOTE — TELEPHONE ENCOUNTER
Patient called in having tested positive for Covid, complaining of racing heart and SOB that is worse than her baseline  Has been using inhalers, but still feeling SOB  Advised patient to go to the ED; patient refused  Advised that she should be seen tonight; patient stated she didn't want to go to the hospital and was going to call the office tomorrow and ended the call

## 2023-01-02 NOTE — TELEPHONE ENCOUNTER
Regarding: dizziness, shortness of breath - covid positive  ----- Message from Gricelda Thomson sent at 1/2/2023  4:38 PM EST -----  "my chest is racing, I have chest tightness and difficulty breathing, cough, runny nose, head is killing me, i'm shaking, but no fever is showing but I have the chills, I'm extremely tired, I have a buzzing sound in my ears its making me dizzy, what am I supposed to be doing? Could I get an anti viral? I tested positive at home twice   I had a bad reaction to prednisone "

## 2023-01-03 ENCOUNTER — TELEMEDICINE (OUTPATIENT)
Dept: FAMILY MEDICINE CLINIC | Facility: CLINIC | Age: 43
End: 2023-01-03

## 2023-01-03 DIAGNOSIS — F33.0 MAJOR DEPRESSIVE DISORDER, RECURRENT, MILD (HCC): ICD-10-CM

## 2023-01-03 DIAGNOSIS — K21.9 GASTROESOPHAGEAL REFLUX DISEASE WITHOUT ESOPHAGITIS: ICD-10-CM

## 2023-01-03 DIAGNOSIS — Z71.6 TOBACCO ABUSE COUNSELING: ICD-10-CM

## 2023-01-03 DIAGNOSIS — R68.89 FLU-LIKE SYMPTOMS: ICD-10-CM

## 2023-01-03 DIAGNOSIS — J30.2 SEASONAL ALLERGIES: ICD-10-CM

## 2023-01-03 DIAGNOSIS — J01.21 ACUTE RECURRENT ETHMOIDAL SINUSITIS: ICD-10-CM

## 2023-01-03 DIAGNOSIS — J45.31 ASTHMA IN ADULT, MILD PERSISTENT, WITH ACUTE EXACERBATION: ICD-10-CM

## 2023-01-03 DIAGNOSIS — U07.1 COVID-19: Primary | ICD-10-CM

## 2023-01-03 DIAGNOSIS — J06.9 URI WITH COUGH AND CONGESTION: ICD-10-CM

## 2023-01-03 DIAGNOSIS — R63.6 UNDERWEIGHT: ICD-10-CM

## 2023-01-03 PROBLEM — Z20.822 EXPOSURE TO COVID-19 VIRUS: Status: ACTIVE | Noted: 2020-05-15

## 2023-01-03 RX ORDER — NIRMATRELVIR AND RITONAVIR 150-100 MG
2 KIT ORAL 2 TIMES DAILY
Qty: 20 TABLET | Refills: 0 | Status: SHIPPED | OUTPATIENT
Start: 2023-01-03 | End: 2023-01-08

## 2023-01-03 NOTE — ASSESSMENT & PLAN NOTE
Patient using albuterol 90 mcg inhaler every 6 hours as directed  Also on Flovent  mcg inhaler 2 puffs twice daily

## 2023-01-03 NOTE — ASSESSMENT & PLAN NOTE
Patient taking omeprazole 20 mg p o  daily  To maintain a GERD  If nausea and patient takes Zofran as directed diet

## 2023-01-03 NOTE — ASSESSMENT & PLAN NOTE
Patient reports x-ray at this time    Instructed continue over-the-counter cough and cold medications as directed

## 2023-01-03 NOTE — ASSESSMENT & PLAN NOTE
She reports that she was exposed to family and COVID-19 positive  Patient tested positive on 1/1/2023  Currently counseled on Paxil for therapy  Instructed to take vitamin D vitamin C and zinc routinely  Patient follow-up with the emergency room if she has severe shortness of breath or chest pain

## 2023-01-03 NOTE — ASSESSMENT & PLAN NOTE
Patient has history of major depression  Patient takes BuSpar 15 mg 3 times daily  Also Ativan for severe anxiety

## 2023-01-03 NOTE — LETTER
January 3, 2023     Patient: Zachery Alicea  YOB: 1980  Date of Visit: 1/3/2023      To Whom it May Concern: Alyson Letha is under my professional care  Sandra Jacinto was seen in my office on 1/3/2023  Sandra Jacinto may return to work on 1/11/2023       If you have any questions or concerns, please don't hesitate to call           Sincerely,          SOLO Garcia        CC: No Recipients

## 2023-01-03 NOTE — PROGRESS NOTES
COVID-19 Outpatient Progress Note    Assessment/Plan:    Problem List Items Addressed This Visit        Digestive    Gastroesophageal reflux disease without esophagitis     Patient taking omeprazole 20 mg p o  daily  To maintain a GERD  If nausea and patient takes Zofran as directed diet  Respiratory    Asthma in adult, mild persistent, with acute exacerbation     Patient using albuterol 90 mcg inhaler every 6 hours as directed  Also on Flovent  mcg inhaler 2 puffs twice daily  URI with cough and congestion     Patient reports x-ray at this time  Instructed continue over-the-counter cough and cold medications as directed           Relevant Orders    XR chest pa & lateral    Acute recurrent ethmoidal sinusitis     Patient has chronic sinusitis and has been treated recently  Other    Seasonal allergies     Patient to continue Claritin 10 mg p o  nightly for seasonal allergies  Tobacco abuse counseling     Tobacco cessation counseling provided  Patient still occasionally smokes  Major depressive disorder, recurrent, mild ([facility] [address] )     Patient has history of major depression  Patient takes BuSpar 15 mg 3 times daily  Also Ativan for severe anxiety  Underweight     Patient instructed don weight gain through increased calories and protein  COVID-19 - Primary     She reports that she was exposed to family and COVID-19 positive  Patient tested positive on 1/1/2023  Currently counseled on Paxil for therapy  Instructed to take vitamin D vitamin C and zinc routinely  Patient follow-up with the emergency room if she has severe shortness of breath or chest pain  Relevant Medications    nirmatrelvir & ritonavir (Paxlovid, 150/100,) tablet therapy pack    Other Relevant Orders    Covid/Flu- Collected at [facility] or [facility]    Flu-like symptoms     Patient noted to be COVID-positive, however requests flu testing at this time as well  Relevant Orders    Covid/Flu- Collected at [facility] or [facility]      Disposition:     I recommended COVID-19 PCR test 5 days after close contact exposure  Patient does not need to quarantine and should wear a high quality mask for 10 days  If testing on day 5 reveals you are positive, you should immediately isolate  Risks and benefits of COVID-19 vaccination was discussed with patient  Discussed symptom directed medication options with patient  Discussed vitamin D, vitamin C, and/or zinc supplementation with patient  Patient meets criteria for PAXLOVID and they have been counseled appropriately according to EUA documentation released by the FDA  After discussion, patient agrees to treatment  Vicie Flight is an investigational medicine used to treat mild-to-moderate COVID-19 in adults and children (15years of age and older weighing at least 80 pounds (40 kg)) with positive results of direct SARS-CoV-2 viral testing, and who are at high risk for progression to severe COVID-19, including hospitalization or death  PAXLOVID is investigational because it is still being studied  There is limited information about the safety and effectiveness of using PAXLOVID to treat people with mild-to-moderate COVID-19  The FDA has authorized the emergency use of PAXLOVID for the treatment of mild-tomoderate COVID-19 in adults and children (15years of age and older weighing at least 80 pounds (40 kg)) with a positive test for the virus that causes COVID-19, and who are at high risk for progression to severe COVID-19, including hospitalization or death, under an EUA  What should I tell my healthcare provider before I take PAXLOVID?     Tell your healthcare provider if you:  - Have any allergies  - Have liver or kidney disease  - Are pregnant or plan to become pregnant  - Are breastfeeding a child  - Have any serious illnesses    Tell your healthcare provider about all the medicines you take, including prescription and over-the-counter medicines, vitamins, and herbal supplements  Some medicines may interact with PAXLOVID and may cause serious side effects  Keep a list of your medicines to show your healthcare provider and pharmacist when you get a new medicine  You can ask your healthcare provider or pharmacist for a list of medicines that interact with PAXLOVID  Do not start taking a new medicine without telling your healthcare provider  Your healthcare provider can tell you if it is safe to take PAXLOVID with other medicines  Tell your healthcare provider if you are taking combined hormonal contraceptive  PAXLOVID may affect how your birth control pills work  Females who are able to become pregnant should use another effective alternative form of contraception or an additional barrier method of contraception  Talk to your healthcare provider if you have any questions about contraceptive methods that might be right for you  How do I take PAXLOVID? PAXLOVID consists of 2 medicines: nirmatrelvir and ritonavir  - Take 2 pink tablets of nirmatrelvir with 1 white tablet of ritonavir by mouth 2 times each day (in the morning and in the evening) for 5 days  For each dose, take all 3 tablets at the same time  - If you have kidney disease, talk to your healthcare provider  You may need a different dose  - Swallow the tablets whole  Do not chew, break, or crush the tablets  - Take PAXLOVID with or without food  - Do not stop taking PAXLOVID without talking to your healthcare provider, even if you feel better  - If you miss a dose of PAXLOVID within 8 hours of the time it is usually taken, take it as soon as you remember  If you miss a dose by more than 8 hours, skip the missed dose and take the next dose at your regular time  Do not take 2 doses of PAXLOVID at the same time  - If you take too much PAXLOVID, call your healthcare provider or go to the nearest hospital emergency room right away    - If you are taking a ritonavir- or cobicistat-containing medicine to treat hepatitis C or Human Immunodeficiency Virus (HIV), you should continue to take your medicine as prescribed by your healthcare provider   - Talk to your healthcare provider if you do not feel better or if you feel worse after 5 days  Who should generally not take PAXLOVID? Do not take PAXLOVID if:  You are allergic to nirmatrelvir, ritonavir, or any of the ingredients in PAXLOVID  You are taking any of the following medicines:  - Alfuzosin  - Pethidine, piroxicam, propoxyphene  - Ranolazine  - Amiodarone, dronedarone, flecainide, propafenone, quinidine  - Colchicine  - Lurasidone, pimozide, clozapine  - Dihydroergotamine, ergotamine, methylergonovine  - Lovastatin, simvastatin  - Sildenafil (Revatio®) for pulmonary arterial hypertension (PAH)  - Triazolam, oral midazolam  - Apalutamide  - Carbamazepine, phenobarbital, phenytoin  - Rifampin  - St  Srinivasan’s Wort (hypericum perforatum)    What are the important possible side effects of PAXLOVID? Possible side effects of PAXLOVID are:  - Liver Problems  Tell your healthcare provider right away if you have any of these signs and symptoms of liver problems: loss of appetite, yellowing of your skin and the whites of eyes (jaundice), dark-colored urine, pale colored stools and itchy skin, stomach area (abdominal) pain  - Resistance to HIV Medicines  If you have untreated HIV infection, PAXLOVID may lead to some HIV medicines not working as well in the future  - Other possible side effects include: altered sense of taste, diarrhea, high blood pressure, or muscle aches    These are not all the possible side effects of PAXLOVID  Not many people have taken PAXLOVID  Serious and unexpected side effects may happen  [address] is still being studied, so it is possible that all of the risks are not known at this time  What other treatment choices are there?   Like Kenney Commander may allow for the emergency use of other medicines to treat people with COVID-19  Go to https://[employer]/ for information on the emergency use of other medicines that are authorized by FDA to treat people with COVID-19  Your healthcare provider may talk with you about clinical trials for which you may be eligible  It is your choice to be treated or not to be treated with PAXLOVID  Should you decide not to receive it or for your child not to receive it, it will not change your standard medical care  What if I am pregnant or breastfeeding? There is no experience treating pregnant women or breastfeeding mothers with PAXLOVID  For a mother and unborn baby, the benefit of taking PAXLOVID may be greater than the risk from the treatment  If you are pregnant, discuss your options and specific situation with your healthcare provider  It is recommended that you use effective barrier contraception or do not have sexual activity while taking PAXLOVID  If you are breastfeeding, discuss your options and specific situation with your healthcare provider  How do I report side effects with PAXLOVID? Contact your healthcare provider if you have any side effects that bother you or do not go away  Report side effects to FDA MedWatch at www fda gov/medwatch or call 2-649-PWH7595 or you can report side effects to [employer]  at the contact information provided below  Website Fax number Telephone number   [employer] [phone]     How should I store Kay Marrero? Store PAXLOVID tablets at room temperature between 68°F to 77°F (20°C to 25°C)  Full fact sheet for patients, parents, and caregivers can be found at: [employer]    I have spent 25 minutes directly with the patient   Greater than 50% of this time was spent in counseling/coordination of care regarding: prognosis, risks and benefits of treatment options, instructions for management, patient and family education, importance of treatment compliance, risk factor reductions and impressions  Encounter provider: SOLO Pena     Provider located at: [address] [place] [id number] [phone]     Recent Visits  No visits were found meeting these conditions  Showing recent visits within past 7 days and meeting all other requirements  Today's Visits  Date Type Provider Dept   01/03/23 Telemedicine [facility] today's visits and meeting all other requirements  Future Appointments  No visits were found meeting these conditions  Showing future appointments within next 150 days and meeting all other requirements     This virtual check-in was done via [employer] and patient was informed that this is a secure, HIPAA-compliant platform  She agrees to proceed  Patient agrees to participate in a virtual check in via telephone or video visit instead of presenting to the office to address urgent/immediate medical needs  Patient is aware this is a billable service  She acknowledged consent and understanding of privacy and security of the video platform  The patient has agreed to participate and understands they can discontinue the visit at any time  After connecting through [facility], the patient was identified by name and date of birth  Mara Riley was informed that this was a telemedicine visit and that the exam was being conducted confidentially over secure lines  No one else was in the room  Mara Riley acknowledged consent and understanding of privacy and security of the telemedicine visit  I informed the patient that I have reviewed her record in Epic and presented the opportunity for her to ask any questions regarding the visit today  The patient agreed to participate      Verification of patient location:  Patient is located in the following state in which I hold an active license: [place]    Subjective: Margaret Ly is a 43 y o  female who is concerned about COVID-19  Patient's symptoms include chills, fatigue, cough, shortness of breath, chest tightness, nausea, myalgias and headache  Patient denies fever, malaise, congestion, rhinorrhea, sore throat, anosmia, loss of taste, abdominal pain, vomiting and diarrhea  - Date of symptom onset: 1/1/2023      COVID-19 vaccination status: Fully vaccinated with booster    Exposure:   Contact with a person who is under investigation (PUI) for or who is positive for COVID-19 within the last 14 days?: Yes    Hospitalized recently for fever and/or lower respiratory symptoms?: No      Currently a healthcare worker that is involved in direct patient care?: Yes      Works in a special setting where the risk of COVID-19 transmission may be high? (this may include long-term care, correctional and [facility] facilities; homeless shelters; assisted-living facilities and group homes ): Yes      Resident in a special setting where the risk of COVID-19 transmission may be high? (this may include long-term care, correctional and [facility] facilities; homeless shelters; assisted-living facilities and group homes ): Yes      Lab Results   Component Value Date    SARSCOV2 Negative 02/16/2022    [address] [facility] Not Detected 05/15/2020    [address] [facility] [address] [facility] Not Detected 01/09/2021       Review of Systems   Constitutional: Positive for chills and fatigue  Negative for fever  HENT: Negative for congestion, rhinorrhea and sore throat  Respiratory: Positive for cough, chest tightness and shortness of breath  Gastrointestinal: Positive for nausea  Negative for abdominal pain, diarrhea and vomiting  Musculoskeletal: Positive for myalgias  Neurological: Positive for headaches       Current Outpatient Medications on File Prior to Visit   Medication Sig   • acetaminophen (TYLENOL) 500 mg tablet Take 500 mg by mouth every 6 (six) hours as needed for mild pain   • albuterol (PROVENTIL HFA,VENTOLIN HFA) 90 mcg/act inhaler TAKE 2 PUFFS BY MOUTH EVERY 6 HOURS AS NEEDED FOR WHEEZE OR FOR SHORTNESS OF BREATH   • Ascorbic Acid (VITAMIN C) 100 MG tablet Take 100 mg by mouth daily   • azithromycin (ZITHROMAX) 250 mg tablet TAKE 2 TABLETS BY MOUTH TODAY, THEN TAKE 1 TABLET DAILY FOR 4 DAYS   • Biotin w/ Vitamins C & E (HAIR/SKIN/NAILS PO) Take by mouth   • busPIRone (BUSPAR) 15 mg tablet Take by mouth 3 (three) times a day     • CALCIUM-MAGNESIUM-ZINC PO Take by mouth   • Flovent  MCG/ACT inhaler INHALE 2 PUFFS BY MOUTH 2 TIMES A DAY RINSE MOUTH AFTER USE  • fluticasone (FLONASE) 50 mcg/act nasal spray 1 spray into each nostril daily   • fluticasone (FLOVENT HFA) 110 MCG/ACT inhaler 110 puffs   • hydrocortisone 2 5 % cream hydrocortisone 2 5 % topical cream with perineal applicator (Patient not taking: No sig reported)   • ibuprofen (MOTRIN) 600 mg tablet Take by mouth every 6 (six) hours as needed for mild pain   • ipratropium (ATROVENT) 0 06 % nasal spray 2 SPRAYS INTO EACH NOSTRIL 4 (FOUR) TIMES A DAY (Patient not taking: No sig reported)   • loratadine (CLARITIN) 10 mg tablet Take 1 tablet (10 mg total) by mouth daily   • LORazepam (ATIVAN) 1 mg tablet Take 1 mg by mouth 6 (six) times a day   • naproxen (EC NAPROSYN) 500 MG EC tablet Take 1 tablet (500 mg total) by mouth 2 (two) times a day as needed for mild pain   • naproxen (NAPROSYN) 500 mg tablet TAKE 1 TABLET BY MOUTH TWICE A DAY WITH MEALS   • omeprazole (PriLOSEC) 20 mg delayed release capsule TAKE 1 CAPSULE (20 MG TOTAL) BY MOUTH TWICE DAILY   • ondansetron (ZOFRAN-ODT) 8 mg disintegrating tablet TAKE 1 TABLET BY MOUTH EVERY 8 HOURS AS NEEDED FOR NAUSEA OR VOMITING  • predniSONE 10 mg tablet TAKE 1 TABLET BY MOUTH 2 TIMES A DAY FOR 5 DAYS     • thiamine (VITAMIN B1) 100 mg tablet Take 100 mg by mouth daily   • tiZANidine (ZANAFLEX) 2 mg tablet Take 1 tablet (2 mg total) by mouth every 8 (eight) hours as needed for muscle spasms   • [DISCONTINUED] polyethylene glycol (GLYCOLAX) 17 GM/SCOOP powder At 5pm take 5mgx2 dulcolax  At 6pm 32oz miralax in 64oz gatorade  Drink 8oz glass every 5 mins until 32oz finished  Drink remaining as rec  Objective: There were no vitals taken for this visit       Physical Exam  SOLO Thompson

## 2023-01-05 ENCOUNTER — APPOINTMENT (EMERGENCY)
Dept: RADIOLOGY | Facility: HOSPITAL | Age: 43
End: 2023-01-05

## 2023-01-05 ENCOUNTER — HOSPITAL ENCOUNTER (EMERGENCY)
Facility: HOSPITAL | Age: 43
Discharge: HOME | End: 2023-01-06
Attending: EMERGENCY MEDICINE

## 2023-01-05 ENCOUNTER — TELEPHONE (OUTPATIENT)
Dept: FAMILY MEDICINE CLINIC | Facility: CLINIC | Age: 43
End: 2023-01-05

## 2023-01-05 VITALS
HEART RATE: 77 BPM | RESPIRATION RATE: 20 BRPM | SYSTOLIC BLOOD PRESSURE: 114 MMHG | DIASTOLIC BLOOD PRESSURE: 70 MMHG | TEMPERATURE: 97.9 F | OXYGEN SATURATION: 99 %

## 2023-01-05 DIAGNOSIS — E87.1 HYPONATREMIA: ICD-10-CM

## 2023-01-05 DIAGNOSIS — B34.9 VIRAL SYNDROME: Primary | ICD-10-CM

## 2023-01-05 LAB
ALBUMIN SERPL BCP-MCNC: 3.8 G/DL (ref 3.5–5)
ALP SERPL-CCNC: 55 U/L (ref 46–116)
ALT SERPL W P-5'-P-CCNC: 31 U/L (ref 12–78)
ANION GAP SERPL CALCULATED.3IONS-SCNC: 6 MMOL/L (ref 4–13)
AST SERPL W P-5'-P-CCNC: 24 U/L (ref 5–45)
BASOPHILS # BLD AUTO: 0.01 THOUSANDS/ÂΜL (ref 0–0.1)
BASOPHILS NFR BLD AUTO: 0 % (ref 0–1)
BILIRUB SERPL-MCNC: 0.33 MG/DL (ref 0.2–1)
BUN SERPL-MCNC: 8 MG/DL (ref 5–25)
CALCIUM SERPL-MCNC: 8.9 MG/DL (ref 8.3–10.1)
CHLORIDE SERPL-SCNC: 90 MMOL/L (ref 96–108)
CO2 SERPL-SCNC: 29 MMOL/L (ref 21–32)
CREAT SERPL-MCNC: 1.05 MG/DL (ref 0.6–1.3)
EOSINOPHIL # BLD AUTO: 0 THOUSAND/ÂΜL (ref 0–0.61)
EOSINOPHIL NFR BLD AUTO: 0 % (ref 0–6)
ERYTHROCYTE [DISTWIDTH] IN BLOOD BY AUTOMATED COUNT: 13.2 % (ref 11.6–15.1)
GFR SERPL CREATININE-BSD FRML MDRD: 65 ML/MIN/1.73SQ M
GLUCOSE SERPL-MCNC: 136 MG/DL (ref 65–140)
HCT VFR BLD AUTO: 38.3 % (ref 34.8–46.1)
HGB BLD-MCNC: 13.2 G/DL (ref 11.5–15.4)
IMM GRANULOCYTES # BLD AUTO: 0.02 THOUSAND/UL (ref 0–0.2)
IMM GRANULOCYTES NFR BLD AUTO: 1 % (ref 0–2)
LYMPHOCYTES # BLD AUTO: 1.02 THOUSANDS/ÂΜL (ref 0.6–4.47)
LYMPHOCYTES NFR BLD AUTO: 26 % (ref 14–44)
MCH RBC QN AUTO: 31.7 PG (ref 26.8–34.3)
MCHC RBC AUTO-ENTMCNC: 34.5 G/DL (ref 31.4–37.4)
MCV RBC AUTO: 92 FL (ref 82–98)
MONOCYTES # BLD AUTO: 0.09 THOUSAND/ÂΜL (ref 0.17–1.22)
MONOCYTES NFR BLD AUTO: 2 % (ref 4–12)
NEUTROPHILS # BLD AUTO: 2.8 THOUSANDS/ÂΜL (ref 1.85–7.62)
NEUTS SEG NFR BLD AUTO: 71 % (ref 43–75)
NRBC BLD AUTO-RTO: 0 /100 WBCS
PLATELET # BLD AUTO: 291 THOUSANDS/UL (ref 149–390)
PMV BLD AUTO: 8.9 FL (ref 8.9–12.7)
POTASSIUM SERPL-SCNC: 3.9 MMOL/L (ref 3.5–5.3)
PROT SERPL-MCNC: 7.5 G/DL (ref 6.4–8.4)
RBC # BLD AUTO: 4.17 MILLION/UL (ref 3.81–5.12)
SODIUM SERPL-SCNC: 125 MMOL/L (ref 135–147)
WBC # BLD AUTO: 3.94 THOUSAND/UL (ref 4.31–10.16)

## 2023-01-05 RX ORDER — ACETAMINOPHEN 325 MG/1
975 TABLET ORAL ONCE
Status: COMPLETED | OUTPATIENT
Start: 2023-01-05 | End: 2023-01-05

## 2023-01-05 RX ORDER — LORAZEPAM 2 MG/ML
0.5 INJECTION INTRAMUSCULAR ONCE
Status: COMPLETED | OUTPATIENT
Start: 2023-01-05 | End: 2023-01-05

## 2023-01-05 RX ORDER — SUCRALFATE 1 G/1
1 TABLET ORAL ONCE
Status: COMPLETED | OUTPATIENT
Start: 2023-01-05 | End: 2023-01-05

## 2023-01-05 RX ORDER — METOCLOPRAMIDE HYDROCHLORIDE 5 MG/ML
10 INJECTION INTRAMUSCULAR; INTRAVENOUS ONCE
Status: COMPLETED | OUTPATIENT
Start: 2023-01-05 | End: 2023-01-05

## 2023-01-05 RX ORDER — KETOROLAC TROMETHAMINE 30 MG/ML
15 INJECTION, SOLUTION INTRAMUSCULAR; INTRAVENOUS ONCE
Status: COMPLETED | OUTPATIENT
Start: 2023-01-05 | End: 2023-01-05

## 2023-01-05 RX ORDER — DICYCLOMINE HCL 20 MG
20 TABLET ORAL ONCE
Status: COMPLETED | OUTPATIENT
Start: 2023-01-05 | End: 2023-01-05

## 2023-01-05 RX ADMIN — DICYCLOMINE HYDROCHLORIDE 20 MG: 20 TABLET ORAL at 21:41

## 2023-01-05 RX ADMIN — SODIUM CHLORIDE 1000 ML: 0.9 INJECTION, SOLUTION INTRAVENOUS at 20:50

## 2023-01-05 RX ADMIN — METOCLOPRAMIDE HYDROCHLORIDE 10 MG: 5 INJECTION INTRAMUSCULAR; INTRAVENOUS at 20:52

## 2023-01-05 RX ADMIN — ACETAMINOPHEN 975 MG: 325 TABLET ORAL at 20:52

## 2023-01-05 RX ADMIN — LORAZEPAM 0.5 MG: 2 INJECTION INTRAMUSCULAR; INTRAVENOUS at 21:42

## 2023-01-05 RX ADMIN — SUCRALFATE 1 G: 1 TABLET ORAL at 21:42

## 2023-01-05 RX ADMIN — KETOROLAC TROMETHAMINE 15 MG: 30 INJECTION, SOLUTION INTRAMUSCULAR at 20:51

## 2023-01-05 NOTE — TELEPHONE ENCOUNTER
Pt called and left msg on machine stating that she would like to talk to Jatin's nurse , she stated that it has to do with her covid medication

## 2023-01-06 ENCOUNTER — TELEPHONE (OUTPATIENT)
Dept: FAMILY MEDICINE CLINIC | Facility: CLINIC | Age: 43
End: 2023-01-06

## 2023-01-06 NOTE — TELEPHONE ENCOUNTER
Pt called in stating she had a virtual with Jatin and was prescribed the medication for COVID and it made her sick to her stomach  Pt was wondering if there was something else you can prescribe to her   Pt stated she was in ER yesterday

## 2023-01-06 NOTE — ED ATTENDING ATTESTATION
Final Diagnoses:     1  Viral syndrome    2  Hyponatremia      ED Course as of 01/06/23 1727   Thu Jan 05, 2023 2107 WBC(!): 3 94   2107 Hemoglobin: 13 2   2107 Platelet Count: 751       I, Jb Ahuja MD, saw and evaluated the patient  All available labs and X-rays were ordered by me or the resident and have been reviewed by myself  I discussed the patient with the resident / non-physician and agree with the resident's / non-physician practitioner's findings and plan as documented in the resident's / non-physician practicitioner's note, except where noted  At this point, I agree with the current assessment done in the ED  I was present during key portions of all procedures performed unless otherwise stated  Nursing Triage:     Chief Complaint   Patient presents with   • Dizziness     Pt reports covid positive on Sunday  Pt reports having a headache, constant ringing in ears which makes her dizzy  Pt reports has been shaking since Tuesday  Patient is on paxlovid starting Tuesday and became sick from it so she stopped it  Patient still feeling nauseous  HPI:   This is a 43 y o  female presenting for evaluation of likely viral syndrome  The patient since a few days ago has been feeling unwell  Someone at work was sick with a viral infection diagnosed on Thursday  On Sunday because of progressive fevers, myalgias, feeling unwell, nausea, vomiting, diarrhea, anorexia, the sensation of dehydration and she did a COVID test at home which was positive  Her primary care doctor started her on Paxilovid  Her symptoms became significantly worse after starting it  She stopped the Paxilovid after 2 days  She does describe vertigo, sensation of the room spinning around her  It is worse with movement, she stays perfectly still is not really there but feeling unwell in general   She does mention that she has had this in the past to a degree  She has diffuse body aches all over    She is a frontal headache bilaterally, no photophobia, because of all the symptoms she is come in for an evaluation  To the resident she mentions some baseline paresthesias of the hands that are not new but did not to me  Denies any major medical problems but the last time that I saw her she was here for psychiatric illness, had mentioned a history of anorexia/bulimia  ASSESSMENT + PLAN:   Reassuring exam, I do not think that something dangerous going on in terms of acute vestibular syndrome/stroke  I think trialing symptomatic measures would be reasonable  We will do a migraine cocktail  The patient really wants IV fluids because she believes that they will help her, I do not think that is unreasonable  She does look mildly cachectic but given my previous note this sounds like it is about stable  She has no focal deficits making me think of doing a stroke work-up  We will check electrolytes given the poor oral intake  First nurse cardiac already started by nursing given the dizziness/vertigo which I don't think is unreasonable  Will follow-up on those results  Physical:   Pertinent: no focal deficits, normal speech  No belly tenderness  No tachycardia   5-5, no pronator drift, sensation intact throughout  Mild photophobia on exam, ranging her neck normally, answering yes/no questions with head movements  No obvious vertigo at all elicited  Would not be a hints exam candidate  2+ radial pulses, no tachycardia, no fever, no tachypnea, no coughing, normotensive per nursing  General: VS reviewed  Appears in NAD  awake, alert  Well-nourished, well-developed  Appears stated age  Speaking normally in full sentences  Head: Normocephalic, atraumatic  Eyes: EOM-I  No diplopia  No hyphema  No subconjunctival hemorrhages  Symmetrical lids  ENT: Atraumatic external nose and ears  MMM  No malocclusion  No stridor  Normal phonation  No drooling  Normal swallowing  Neck: No JVD    CV: No pallor noted  Lungs:   No tachypnea  No respiratory distress  Abd: soft nt nd no rebound/guarding  MSK:   FROM spontaneously  Skin: Dry, intact  Neuro: Awake, alert, GCS15, CN II-XII grossly intact  Motor grossly intact  Psychiatric/Behavioral: interacting normally; appropriate mood/affect   Exam: deferred    Vitals:    23 1616 23 2146   BP: 125/90 124/70 114/70   BP Location: Right arm Right arm Right arm   Pulse: 101 81 77   Resp: 20 18 20   Temp: 97 9 °F (36 6 °C)     TempSrc: Oral     SpO2: 97% 99% 99%     - There are no obvious limitations to social determinants of care  - Nursing note reviewed  - Vitals reviewed  - Orders placed by myself and/or advanced practitioner / resident     - Previous chart was reviewed  - No language barrier    - History obtained from patient  - There are no limitations to the history obtained:     Past Medical, Past Surgical History:    has a past medical history of Allergies, Anxiety, Asthma, Chronic pain, Contusion of head, subsequent encounter (2022), Depression, Eating disorder, Fibrocystic breast changes of both breasts, and Seizures ([facility] [address] )  has a past surgical history that includes Induced  () and Mammo stereotactic breast biopsy left (all inc) ()      Social:     Social History     Substance and Sexual Activity   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     Social History     Tobacco Use   Smoking Status Every Day   • Packs/day: 0 50   • Years: 28 00   • Pack years: 14 00   • Types: Cigarettes   Smokeless Tobacco Never   Tobacco Comments    10-15 cig/day     Social History     Substance and Sexual Activity   Drug Use No       Code Status: No Order  Advance Directive and Living Will:      Power of :    POLST:      Medications   sodium chloride 0 9 % bolus 1,000 mL (0 mL Intravenous Stopped 23)   ketorolac (TORADOL) injection 15 mg (15 mg Intravenous Given 23)   metoclopramide (REGLAN) injection 10 mg (10 mg Intravenous Given 1/5/23 2052)   acetaminophen (TYLENOL) tablet 975 mg (975 mg Oral Given 1/5/23 2052)   dicyclomine (BENTYL) tablet 20 mg (20 mg Oral Given 1/5/23 2141)   sucralfate (CARAFATE) tablet 1 g (1 g Oral Given 1/5/23 2142)   LORazepam (ATIVAN) injection 0 5 mg (0 5 mg Intravenous Given 1/5/23 2142)     XR chest 1 view portable   ED Interpretation   No obvious pneumonia / PTX   (Known COVID 19)      Final Result      No acute cardiopulmonary disease        Findings are stable            Workstation performed: [id number]           Orders Placed This Encounter   Procedures   • XR chest 1 view portable   • CBC and differential   • Comprehensive metabolic panel   • Continuous cardiac monitoring   • Continuous pulse oximetry   • ECG 12 lead     Labs Reviewed   CBC AND DIFFERENTIAL - Abnormal       Result Value Ref Range Status    WBC 3 94 (*) 4 31 - 10 16 Thousand/uL Final    RBC 4 17  3 81 - 5 12 Million/uL Final    Hemoglobin 13 2  11 5 - 15 4 g/dL Final    Hematocrit 38 3  34 8 - 46 1 % Final    MCV 92  82 - 98 fL Final    MCH 31 7  26 8 - 34 3 pg Final    MCHC 34 5  31 4 - 37 4 g/dL Final    RDW 13 2  11 6 - 15 1 % Final    MPV 8 9  8 9 - 12 7 fL Final    Platelets 244  085 - 390 Thousands/uL Final    nRBC 0  /100 WBCs Final    Neutrophils Relative 71  43 - 75 % Final    Immat GRANS % 1  0 - 2 % Final    Lymphocytes Relative 26  14 - 44 % Final    Monocytes Relative 2 (*) 4 - 12 % Final    Eosinophils Relative 0  0 - 6 % Final    Basophils Relative 0  0 - 1 % Final    Neutrophils Absolute 2 80  1 85 - 7 62 Thousands/µL Final    Immature Grans Absolute 0 02  0 00 - 0 20 Thousand/uL Final    Lymphocytes Absolute 1 02  0 60 - 4 47 Thousands/µL Final    Monocytes Absolute 0 09 (*) 0 17 - 1 22 Thousand/µL Final    Eosinophils Absolute 0 00  0 00 - 0 61 Thousand/µL Final    Basophils Absolute 0 01  0 00 - 0 10 Thousands/µL Final   COMPREHENSIVE METABOLIC PANEL - Abnormal    Sodium 125 (*) 135 - 147 mmol/L Final    Potassium 3 9  3 5 - 5 3 mmol/L Final    Chloride 90 (*) 96 - 108 mmol/L Final    CO2 29  21 - 32 mmol/L Final    ANION GAP 6  4 - 13 mmol/L Final    BUN 8  5 - 25 mg/dL Final    Creatinine 1 05  0 60 - 1 30 mg/dL Final    Comment: Standardized to IDMS reference method    Glucose 136  65 - 140 mg/dL Final    Comment: If the patient is fasting, the ADA then defines impaired fasting glucose as > 100 mg/dL and diabetes as > or equal to 123 mg/dL  Specimen collection should occur prior to Sulfasalazine administration due to the potential for falsely depressed results  Specimen collection should occur prior to Sulfapyridine administration due to the potential for falsely elevated results  Calcium 8 9  8 3 - 10 1 mg/dL Final    AST 24  5 - 45 U/L Final    Comment: Specimen collection should occur prior to Sulfasalazine administration due to the potential for falsely depressed results  ALT 31  12 - 78 U/L Final    Comment: Specimen collection should occur prior to Sulfasalazine and/or Sulfapyridine administration due to the potential for falsely depressed results  Alkaline Phosphatase 55  46 - 116 U/L Final    Total Protein 7 5  6 4 - 8 4 g/dL Final    Albumin 3 8  3 5 - 5 0 g/dL Final    Total Bilirubin 0 33  0 20 - 1 00 mg/dL Final    Comment: Use of this assay is not recommended for patients undergoing treatment with eltrombopag due to the potential for falsely elevated results      eGFR 65  ml/min/1 73sq m Final    Narrative:     [place] guidelines for Chronic Kidney Disease (CKD):   •  Stage 1 with normal or high GFR (GFR > 90 mL/min/1 73 square meters)  •  Stage 2 Mild CKD (GFR = 60-89 mL/min/1 73 square meters)  •  Stage 3A Moderate CKD (GFR = 45-59 mL/min/1 73 square meters)  •  Stage 3B Moderate CKD (GFR = 30-44 mL/min/1 73 square meters)  •  Stage 4 Severe CKD (GFR = 15-29 mL/min/1 73 square meters)  •  Stage 5 End Stage CKD (GFR <15 mL/min/1 73 square meters)  Note: GFR calculation is accurate only with a steady state creatinine     Time reflects when diagnosis was documented in both MDM as applicable and the Disposition within this note     Time User Action Codes Description Comment    1/6/2023 12:40 AM Kishore Mcintyre Add [B34 9] Viral syndrome     1/6/2023 12:40 AM Kishore Mcintyre Add [E87 1] Hyponatremia       ED Disposition     ED Disposition   Discharge    Condition   Stable    Date/Time   Fri Jan 6, 2023 12:40 AM    Comment   [place]o discharge to home/self care                 Follow-up Information     Follow up With Specialties Details Why Contact Info Additional Information    Shayy Joiner, [address] Nurse Practitioner, Family Medicine Schedule an appointment as soon as possible for a visit   [address] Emergency Department Emergency Medicine Go to  If symptoms worsen [address] Emergency Department, [address] [place] [address], [address]        Discharge Medication List as of 1/6/2023 12:41 AM      CONTINUE these medications which have NOT CHANGED    Details   acetaminophen (TYLENOL) 500 mg tablet Take 500 mg by mouth every 6 (six) hours as needed for mild pain, Historical Med      albuterol (PROVENTIL HFA,VENTOLIN HFA) 90 mcg/act inhaler TAKE 2 PUFFS BY MOUTH EVERY 6 HOURS AS NEEDED FOR WHEEZE OR FOR SHORTNESS OF BREATH, Normal      Ascorbic Acid (VITAMIN C) 100 MG tablet Take 100 mg by mouth daily, Historical Med      azithromycin (ZITHROMAX) 250 mg tablet TAKE 2 TABLETS BY MOUTH TODAY, THEN TAKE 1 TABLET DAILY FOR 4 DAYS, Historical Med      Biotin w/ Vitamins C & E (HAIR/SKIN/NAILS PO) Take by mouth, Historical Med      busPIRone (BUSPAR) 15 mg tablet Take by mouth 3 (three) times a day  , Historical Med      CALCIUM-MAGNESIUM-ZINC PO Take by mouth, Historical Med      !! Flovent  MCG/ACT inhaler INHALE 2 PUFFS BY MOUTH 2 TIMES A DAY RINSE MOUTH AFTER USE , Normal      fluticasone (FLONASE) 50 mcg/act nasal spray 1 spray into each nostril daily, Starting Mon 9/20/2021, Until Tue 9/20/2022, Normal      !! fluticasone (FLOVENT HFA) 110 MCG/ACT inhaler 110 puffs, Starting Wed 9/24/2014, Historical Med      hydrocortisone 2 5 % cream hydrocortisone 2 5 % topical cream with perineal applicator, Historical Med      ibuprofen (MOTRIN) 600 mg tablet Take by mouth every 6 (six) hours as needed for mild pain, Historical Med      ipratropium (ATROVENT) 0 06 % nasal spray 2 SPRAYS INTO EACH NOSTRIL 4 (FOUR) TIMES A DAY, Starting Wed 9/2/2020, Normal      loratadine (CLARITIN) 10 mg tablet Take 1 tablet (10 mg total) by mouth daily, Starting Mon 12/19/2022, Normal      LORazepam (ATIVAN) 1 mg tablet Take 1 mg by mouth 6 (six) times a day, Starting Thu 9/20/2018, Historical Med      naproxen (EC NAPROSYN) 500 MG EC tablet Take 1 tablet (500 mg total) by mouth 2 (two) times a day as needed for mild pain, Starting Mon 9/20/2021, Until Tue 9/20/2022 at 2359, Normal      naproxen (NAPROSYN) 500 mg tablet TAKE 1 TABLET BY MOUTH TWICE A DAY WITH MEALS, Normal      nirmatrelvir & ritonavir (Paxlovid, 150/100,) tablet therapy pack Take 2 tablets by mouth 2 (two) times a day for 5 days Take 1 nirmatrelvir tablet + 1 ritonavir tablet together per dose, Starting Tue 1/3/2023, Until Sun 1/8/2023, Normal      omeprazole (PriLOSEC) 20 mg delayed release capsule TAKE 1 CAPSULE (20 MG TOTAL) BY MOUTH TWICE DAILY, Normal      ondansetron (ZOFRAN-ODT) 8 mg disintegrating tablet TAKE 1 TABLET BY MOUTH EVERY 8 HOURS AS NEEDED FOR NAUSEA OR VOMITING , Normal      predniSONE 10 mg tablet TAKE 1 TABLET BY MOUTH 2 TIMES A DAY FOR 5 DAYS , Historical Med      thiamine (VITAMIN B1) 100 mg tablet Take 100 mg by mouth daily, Historical Med      tiZANidine (ZANAFLEX) 2 mg tablet Take 1 tablet (2 mg total) by mouth every 8 (eight) hours as needed for muscle spasms, Starting Wed 10/26/2022, Normal       !! - Potential duplicate medications found  Please discuss with provider  No discharge procedures on file  Prior to Admission Medications   Prescriptions Last Dose Informant Patient Reported? Taking? Ascorbic Acid (VITAMIN C) 100 MG tablet   Yes No   Sig: Take 100 mg by mouth daily   Biotin w/ Vitamins C & E (HAIR/SKIN/NAILS PO)   Yes No   Sig: Take by mouth   CALCIUM-MAGNESIUM-ZINC PO   Yes No   Sig: Take by mouth   Flovent  MCG/ACT inhaler   No No   Sig: INHALE 2 PUFFS BY MOUTH 2 TIMES A DAY RINSE MOUTH AFTER USE     LORazepam (ATIVAN) 1 mg tablet   Yes No   Sig: Take 1 mg by mouth 6 (six) times a day   acetaminophen (TYLENOL) 500 mg tablet   Yes No   Sig: Take 500 mg by mouth every 6 (six) hours as needed for mild pain   albuterol (PROVENTIL HFA,VENTOLIN HFA) 90 mcg/act inhaler   No No   Sig: TAKE 2 PUFFS BY MOUTH EVERY 6 HOURS AS NEEDED FOR WHEEZE OR FOR SHORTNESS OF BREATH   azithromycin (ZITHROMAX) 250 mg tablet   Yes No   Sig: TAKE 2 TABLETS BY MOUTH TODAY, THEN TAKE 1 TABLET DAILY FOR 4 DAYS   busPIRone (BUSPAR) 15 mg tablet   Yes No   Sig: Take by mouth 3 (three) times a day     fluticasone (FLONASE) 50 mcg/act nasal spray   No No   Si spray into each nostril daily   fluticasone (FLOVENT HFA) 110 MCG/ACT inhaler   Yes No   Si puffs   hydrocortisone 2 5 % cream   Yes No   Sig: hydrocortisone 2 5 % topical cream with perineal applicator   Patient not taking: No sig reported   ibuprofen (MOTRIN) 600 mg tablet   Yes No   Sig: Take by mouth every 6 (six) hours as needed for mild pain   ipratropium (ATROVENT) 0 06 % nasal spray   No No   Si SPRAYS INTO EACH NOSTRIL 4 (FOUR) TIMES A DAY   Patient not taking: No sig reported   loratadine (CLARITIN) 10 mg tablet   No No   Sig: Take 1 tablet (10 mg total) by mouth daily   naproxen (EC NAPROSYN) 500 MG EC tablet   No No   Sig: Take 1 tablet (500 mg total) by mouth 2 (two) times a day as needed for mild pain   naproxen (NAPROSYN) 500 mg tablet   No No   Sig: TAKE 1 TABLET BY MOUTH TWICE A DAY WITH MEALS   nirmatrelvir & ritonavir (Paxlovid, 150/100,) tablet therapy pack   No No   Sig: Take 2 tablets by mouth 2 (two) times a day for 5 days Take 1 nirmatrelvir tablet + 1 ritonavir tablet together per dose   omeprazole (PriLOSEC) 20 mg delayed release capsule   No No   Sig: TAKE 1 CAPSULE (20 MG TOTAL) BY MOUTH TWICE DAILY   ondansetron (ZOFRAN-ODT) 8 mg disintegrating tablet   No No   Sig: TAKE 1 TABLET BY MOUTH EVERY 8 HOURS AS NEEDED FOR NAUSEA OR VOMITING  predniSONE 10 mg tablet   Yes No   Sig: TAKE 1 TABLET BY MOUTH 2 TIMES A DAY FOR 5 DAYS    thiamine (VITAMIN B1) 100 mg tablet   Yes No   Sig: Take 100 mg by mouth daily   tiZANidine (ZANAFLEX) 2 mg tablet   No No   Sig: Take 1 tablet (2 mg total) by mouth every 8 (eight) hours as needed for muscle spasms      Facility-Administered Medications: None                    Cath:   No results found for this or any previous visit  Echo:   No results found for this or any previous visit  No results found for this or any previous visit  Portions of the record may have been created with voice recognition software  Occasional wrong word or "sound a like" substitutions may have occurred due to the inherent limitations of voice recognition software  Read the chart carefully and recognize, using context, where substitutions have occurred      Electronically signed by:  Reid Escobar

## 2023-01-06 NOTE — ED PROVIDER NOTES
History  Chief Complaint   Patient presents with   • Dizziness     Pt reports covid positive on Sunday  Pt reports having a headache, constant ringing in ears which makes her dizzy  Pt reports has been shaking since Tuesday  Patient is on paxlovid starting Tuesday and became sick from it so she stopped it  Patient still feeling nauseous  HPI  Patient 43year old female with hx fibromyalgia, asthma, anxiety concussion, alcohol abuse,  presenting with generalized myalgia, headache, loss of appetite, chills, n/v, diarrhea since being diagnosed with COVID  She was diagnosed 4 days ago and since then developed gradually increasing headache  No breaks in between  Wasn't sudden onset and had no neurologic symptoms  Patient has a history of vertigo and states that she had couple of episodes of vertigo since the symptom onset  Currently does not have any dizziness  Denies any weakness, new numbness (patient has baseline had paraesthesia), facial droop, slurred speech  Has taken left over decadron from few months ago with no relief  Patient is feeling lightheaded because she hasn't eaten or drank well since symptom onset  Has had multiple loose stool and vomiting since then as well  Denies any chest pain, shortness of breath, abdominal pain  Prior to Admission Medications   Prescriptions Last Dose Informant Patient Reported? Taking? Ascorbic Acid (VITAMIN C) 100 MG tablet   Yes No   Sig: Take 100 mg by mouth daily   Biotin w/ Vitamins C & E (HAIR/SKIN/NAILS PO)   Yes No   Sig: Take by mouth   CALCIUM-MAGNESIUM-ZINC PO   Yes No   Sig: Take by mouth   Flovent  MCG/ACT inhaler   No No   Sig: INHALE 2 PUFFS BY MOUTH 2 TIMES A DAY RINSE MOUTH AFTER USE     LORazepam (ATIVAN) 1 mg tablet   Yes No   Sig: Take 1 mg by mouth 6 (six) times a day   acetaminophen (TYLENOL) 500 mg tablet   Yes No   Sig: Take 500 mg by mouth every 6 (six) hours as needed for mild pain   albuterol (PROVENTIL HFA,VENTOLIN HFA) 90 mcg/act inhaler   No No   Sig: TAKE 2 PUFFS BY MOUTH EVERY 6 HOURS AS NEEDED FOR WHEEZE OR FOR SHORTNESS OF BREATH   azithromycin (ZITHROMAX) 250 mg tablet   Yes No   Sig: TAKE 2 TABLETS BY MOUTH TODAY, THEN TAKE 1 TABLET DAILY FOR 4 DAYS   busPIRone (BUSPAR) 15 mg tablet   Yes No   Sig: Take by mouth 3 (three) times a day     fluticasone (FLONASE) 50 mcg/act nasal spray   No No   Si spray into each nostril daily   fluticasone (FLOVENT HFA) 110 MCG/ACT inhaler   Yes No   Si puffs   hydrocortisone 2 5 % cream   Yes No   Sig: hydrocortisone 2 5 % topical cream with perineal applicator   Patient not taking: No sig reported   ibuprofen (MOTRIN) 600 mg tablet   Yes No   Sig: Take by mouth every 6 (six) hours as needed for mild pain   ipratropium (ATROVENT) 0 06 % nasal spray   No No   Si SPRAYS INTO EACH NOSTRIL 4 (FOUR) TIMES A DAY   Patient not taking: No sig reported   loratadine (CLARITIN) 10 mg tablet   No No   Sig: Take 1 tablet (10 mg total) by mouth daily   naproxen (EC NAPROSYN) 500 MG EC tablet   No No   Sig: Take 1 tablet (500 mg total) by mouth 2 (two) times a day as needed for mild pain   naproxen (NAPROSYN) 500 mg tablet   No No   Sig: TAKE 1 TABLET BY MOUTH TWICE A DAY WITH MEALS   nirmatrelvir & ritonavir (Paxlovid, 150/100,) tablet therapy pack   No No   Sig: Take 2 tablets by mouth 2 (two) times a day for 5 days Take 1 nirmatrelvir tablet + 1 ritonavir tablet together per dose   omeprazole (PriLOSEC) 20 mg delayed release capsule   No No   Sig: TAKE 1 CAPSULE (20 MG TOTAL) BY MOUTH TWICE DAILY   ondansetron (ZOFRAN-ODT) 8 mg disintegrating tablet   No No   Sig: TAKE 1 TABLET BY MOUTH EVERY 8 HOURS AS NEEDED FOR NAUSEA OR VOMITING     predniSONE 10 mg tablet   Yes No   Sig: TAKE 1 TABLET BY MOUTH 2 TIMES A DAY FOR 5 DAYS    thiamine (VITAMIN B1) 100 mg tablet   Yes No   Sig: Take 100 mg by mouth daily   tiZANidine (ZANAFLEX) 2 mg tablet   No No   Sig: Take 1 tablet (2 mg total) by mouth every 8 (eight) hours as needed for muscle spasms      Facility-Administered Medications: None       Past Medical History:   Diagnosis Date   • Allergies    • Anxiety    • Asthma    • Chronic pain    • Contusion of head, subsequent encounter 2022   • Depression    • Eating disorder    • Fibrocystic breast changes of both breasts    • Seizures ([facility] [address] )        Past Surgical History:   Procedure Laterality Date   • INDUCED      • MAMMO STEREOTACTIC BREAST BIOPSY LEFT (ALL INC)      Benign       Family History   Problem Relation Age of Onset   • Diabetes Sister    • Alcohol abuse Sister    • Cancer Father         Diagnosed stage 4 - metastazied   • Psychiatric Illness Mother    • Hypertension Family    • Diabetes Family      I have reviewed and agree with the history as documented  E-Cigarette/Vaping   • E-Cigarette Use Never User      E-Cigarette/Vaping Substances   • Nicotine No    • THC No    • CBD No    • Flavoring No    • Other No    • Unknown No      Social History     Tobacco Use   • Smoking status: Every Day     Packs/day: 0 50     Years: 28 00     Pack years: 14 00     Types: Cigarettes   • Smokeless tobacco: Never   • Tobacco comments:     10-15 cig/day   Vaping Use   • Vaping Use: Never used   Substance Use Topics   • 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   • Drug use: No        Review of Systems   Constitutional: Positive for appetite change and fatigue  Negative for chills, diaphoresis, fever and unexpected weight change  HENT: Negative for ear pain and sore throat  Eyes: Negative for visual disturbance  Respiratory: Negative for cough, chest tightness and shortness of breath  Cardiovascular: Negative for chest pain and leg swelling  Gastrointestinal: Positive for diarrhea, nausea and vomiting  Negative for abdominal distention, abdominal pain and constipation  Endocrine: Negative  Genitourinary: Negative for difficulty urinating and dysuria     Musculoskeletal: Positive for myalgias  Skin: Negative  Allergic/Immunologic: Negative  Neurological: Positive for light-headedness and headaches  Negative for dizziness  Hematological: Negative  Psychiatric/Behavioral: Negative  All other systems reviewed and are negative  Physical Exam  ED Triage Vitals [01/05/23 1616]   Temperature Pulse Respirations Blood Pressure SpO2   97 9 °F (36 6 °C) 101 20 125/90 97 %      Temp Source Heart Rate Source Patient Position - Orthostatic VS BP Location FiO2 (%)   Oral Monitor Sitting Right arm --      Pain Score       8             Orthostatic Vital Signs  Vitals:    01/05/23 1616 01/05/23 2027 01/05/23 2146   BP: 125/90 124/70 114/70   Pulse: 101 81 77   Patient Position - Orthostatic VS: Sitting Sitting Sitting       Physical Exam  Vitals and nursing note reviewed  Constitutional:       General: She is not in acute distress  Appearance: Normal appearance  She is not ill-appearing  HENT:      Head: Normocephalic and atraumatic  Right Ear: External ear normal       Left Ear: External ear normal       Nose: Nose normal       Mouth/Throat:      Mouth: Mucous membranes are moist       Pharynx: Oropharynx is clear  Eyes:      General: No scleral icterus  Right eye: No discharge  Left eye: No discharge  Extraocular Movements: Extraocular movements intact  Conjunctiva/sclera: Conjunctivae normal       Pupils: Pupils are equal, round, and reactive to light  Cardiovascular:      Rate and Rhythm: Normal rate and regular rhythm  Pulses: Normal pulses  Heart sounds: Normal heart sounds  Pulmonary:      Effort: Pulmonary effort is normal       Breath sounds: Normal breath sounds  Abdominal:      General: Abdomen is flat  Bowel sounds are normal  There is no distension  Palpations: Abdomen is soft  Tenderness: There is no abdominal tenderness  There is no guarding or rebound  Musculoskeletal:         General: Normal range of motion  Cervical back: Normal range of motion and neck supple  Skin:     General: Skin is warm and dry  Capillary Refill: Capillary refill takes less than 2 seconds  Neurological:      General: No focal deficit present  Mental Status: She is alert and oriented to person, place, and time  Mental status is at baseline  Cranial Nerves: No cranial nerve deficit  Sensory: No sensory deficit  Motor: No weakness  Gait: Gait normal    Psychiatric:         Mood and Affect: Mood normal          Behavior: Behavior normal          Thought Content:  Thought content normal          Judgment: Judgment normal          ED Medications  Medications   sodium chloride 0 9 % bolus 1,000 mL (0 mL Intravenous Stopped 1/6/23 0100)   ketorolac (TORADOL) injection 15 mg (15 mg Intravenous Given 1/5/23 2051)   metoclopramide (REGLAN) injection 10 mg (10 mg Intravenous Given 1/5/23 2052)   acetaminophen (TYLENOL) tablet 975 mg (975 mg Oral Given 1/5/23 2052)   dicyclomine (BENTYL) tablet 20 mg (20 mg Oral Given 1/5/23 2141)   sucralfate (CARAFATE) tablet 1 g (1 g Oral Given 1/5/23 2142)   LORazepam (ATIVAN) injection 0 5 mg (0 5 mg Intravenous Given 1/5/23 2142)       Diagnostic Studies  Results Reviewed     Procedure Component Value Units Date/Time    Comprehensive metabolic panel [[id number]]  (Abnormal) Collected: 01/05/23 2028    Lab Status: Final result Specimen: Blood from Arm, Right Updated: 01/05/23 2108     Sodium 125 mmol/L      Potassium 3 9 mmol/L      Chloride 90 mmol/L      CO2 29 mmol/L      ANION GAP 6 mmol/L      BUN 8 mg/dL      Creatinine 1 05 mg/dL      Glucose 136 mg/dL      Calcium 8 9 mg/dL      AST 24 U/L      ALT 31 U/L      Alkaline Phosphatase 55 U/L      Total Protein 7 5 g/dL      Albumin 3 8 g/dL      Total Bilirubin 0 33 mg/dL      eGFR 65 ml/min/1 73sq m     Narrative:      [place] guidelines for Chronic Kidney Disease (CKD):   •  Stage 1 with normal or high GFR (GFR > 90 mL/min/1 73 square meters)  •  Stage 2 Mild CKD (GFR = 60-89 mL/min/1 73 square meters)  •  Stage 3A Moderate CKD (GFR = 45-59 mL/min/1 73 square meters)  •  Stage 3B Moderate CKD (GFR = 30-44 mL/min/1 73 square meters)  •  Stage 4 Severe CKD (GFR = 15-29 mL/min/1 73 square meters)  •  Stage 5 End Stage CKD (GFR <15 mL/min/1 73 square meters)  Note: GFR calculation is accurate only with a steady state creatinine    CBC and differential [[id number]]  (Abnormal) Collected: 01/05/23 2028    Lab Status: Final result Specimen: Blood from Arm, Right Updated: 01/05/23 2046     WBC 3 94 Thousand/uL      RBC 4 17 Million/uL      Hemoglobin 13 2 g/dL      Hematocrit 38 3 %      MCV 92 fL      MCH 31 7 pg      MCHC 34 5 g/dL      RDW 13 2 %      MPV 8 9 fL      Platelets 993 Thousands/uL      nRBC 0 /100 WBCs      Neutrophils Relative 71 %      Immat GRANS % 1 %      Lymphocytes Relative 26 %      Monocytes Relative 2 %      Eosinophils Relative 0 %      Basophils Relative 0 %      Neutrophils Absolute 2 80 Thousands/µL      Immature Grans Absolute 0 02 Thousand/uL      Lymphocytes Absolute 1 02 Thousands/µL      Monocytes Absolute 0 09 Thousand/µL      Eosinophils Absolute 0 00 Thousand/µL      Basophils Absolute 0 01 Thousands/µL                  XR chest 1 view portable   ED Interpretation by Yadi Cain MD (01/05 2682)   No obvious pneumonia / PTX   (Known COVID 19)      Final Result by Jorge Emerson MD (01/06 5596)      No acute cardiopulmonary disease  Findings are stable            Workstation performed: [id number]               Procedures  Procedures      ED Course  ED Course as of 01/08/23 1203   Fri Jan 06, 2023   0025 Patient on re-examination states all her symptoms are gone  Wishing to leave   Will d/c with return precaution                                       Medical Decision Making  Patient 43year old female with known COVID diagnosis presenting with viral symptoms   Due to the ongoing weakness and fatigue will obtain CBC and BMP to assess for hb as well as electrolyte imbalances and kidney function    Hyponatremia: acute illness or injury     Details: Patient with hyponatremia on lab findings at 125  Patient was notified of the result and given the option of remaining in the hospital if patient was persistently symptomatic  Patient states that her symptoms have all resolved after the medication and would much prefer to go home and will plan to return if symptoms were not improving   Viral syndrome: acute illness or injury     Details: Patient with known COVID infection with multiple viral symptoms including, cough, headache, n/v, diarrhea, whole body ache  Does not appear toxic and witih normal vitals  Will not opt to obtain septic workup to assess for any bacterial infection   Amount and/or Complexity of Data Reviewed  Labs: ordered  Details: CBC CMP   Radiology: ordered and independent interpretation performed  Discussion of management or test interpretation with external provider(s): Patient with no neurologic symptoms and is not vertiginous  Not concerning for stroke  Patient most likely suffering from viral syndrome  Symptom management with medication achieved  Patient wishes to go home despite hyponatremia finding due to not being symptomatic  Patient instructed to not drink too much fluid in that setting  Patient agreed  Discharged with strict return precaution provided     Risk  OTC drugs  Prescription drug management              Disposition  Final diagnoses:   Viral syndrome   Hyponatremia     Time reflects when diagnosis was documented in both MDM as applicable and the Disposition within this note     Time User Action Codes Description Comment    1/6/2023 12:40 AM Carlee Butts [B34 9] Viral syndrome     1/6/2023 12:40 AM Carlee Thurston Add [E87 1] Hyponatremia       ED Disposition     ED Disposition   Discharge    Condition   Stable    Date/Time   Fri Jan 6, 2023 12:40 AM Comment   Karrimell Chill discharge to home/self care  Follow-up Information     Follow up With Specialties Details Why Contact Info Additional Information    Arsen Asencio Nurse Practitioner, Family Medicine Schedule an appointment as soon as possible for a visit   [address] Emergency Department Emergency Medicine Go to  If symptoms worsen [address] Emergency Department, [address], [place], [place], [address]          Discharge Medication List as of 1/6/2023 12:41 AM      CONTINUE these medications which have NOT CHANGED    Details   acetaminophen (TYLENOL) 500 mg tablet Take 500 mg by mouth every 6 (six) hours as needed for mild pain, Historical Med      albuterol (PROVENTIL HFA,VENTOLIN HFA) 90 mcg/act inhaler TAKE 2 PUFFS BY MOUTH EVERY 6 HOURS AS NEEDED FOR WHEEZE OR FOR SHORTNESS OF BREATH, Normal      Ascorbic Acid (VITAMIN C) 100 MG tablet Take 100 mg by mouth daily, Historical Med      azithromycin (ZITHROMAX) 250 mg tablet TAKE 2 TABLETS BY MOUTH TODAY, THEN TAKE 1 TABLET DAILY FOR 4 DAYS, Historical Med      Biotin w/ Vitamins C & E (HAIR/SKIN/NAILS PO) Take by mouth, Historical Med      busPIRone (BUSPAR) 15 mg tablet Take by mouth 3 (three) times a day  , Historical Med      CALCIUM-MAGNESIUM-ZINC PO Take by mouth, Historical Med      !!  Flovent  MCG/ACT inhaler INHALE 2 PUFFS BY MOUTH 2 TIMES A DAY RINSE MOUTH AFTER USE , Normal      fluticasone (FLONASE) 50 mcg/act nasal spray 1 spray into each nostril daily, Starting Mon 9/20/2021, Until Tue 9/20/2022, Normal      !! fluticasone (FLOVENT HFA) 110 MCG/ACT inhaler 110 puffs, Starting Wed 9/24/2014, Historical Med      hydrocortisone 2 5 % cream hydrocortisone 2 5 % topical cream with perineal applicator, Historical Med ibuprofen (MOTRIN) 600 mg tablet Take by mouth every 6 (six) hours as needed for mild pain, Historical Med      ipratropium (ATROVENT) 0 06 % nasal spray 2 SPRAYS INTO EACH NOSTRIL 4 (FOUR) TIMES A DAY, Starting Wed 9/2/2020, Normal      loratadine (CLARITIN) 10 mg tablet Take 1 tablet (10 mg total) by mouth daily, Starting Mon 12/19/2022, Normal      LORazepam (ATIVAN) 1 mg tablet Take 1 mg by mouth 6 (six) times a day, Starting Thu 9/20/2018, Historical Med      naproxen (EC NAPROSYN) 500 MG EC tablet Take 1 tablet (500 mg total) by mouth 2 (two) times a day as needed for mild pain, Starting Mon 9/20/2021, Until Tue 9/20/2022 at 2359, Normal      naproxen (NAPROSYN) 500 mg tablet TAKE 1 TABLET BY MOUTH TWICE A DAY WITH MEALS, Normal      nirmatrelvir & ritonavir (Paxlovid, 150/100,) tablet therapy pack Take 2 tablets by mouth 2 (two) times a day for 5 days Take 1 nirmatrelvir tablet + 1 ritonavir tablet together per dose, Starting Tue 1/3/2023, Until Sun 1/8/2023, Normal      omeprazole (PriLOSEC) 20 mg delayed release capsule TAKE 1 CAPSULE (20 MG TOTAL) BY MOUTH TWICE DAILY, Normal      ondansetron (ZOFRAN-ODT) 8 mg disintegrating tablet TAKE 1 TABLET BY MOUTH EVERY 8 HOURS AS NEEDED FOR NAUSEA OR VOMITING , Normal      predniSONE 10 mg tablet TAKE 1 TABLET BY MOUTH 2 TIMES A DAY FOR 5 DAYS , Historical Med      thiamine (VITAMIN B1) 100 mg tablet Take 100 mg by mouth daily, Historical Med      tiZANidine (ZANAFLEX) 2 mg tablet Take 1 tablet (2 mg total) by mouth every 8 (eight) hours as needed for muscle spasms, Starting Wed 10/26/2022, Normal       !! - Potential duplicate medications found  Please discuss with provider  No discharge procedures on file  PDMP Review     None           ED Provider  Attending physically available and evaluated Oralia Velasquez  CARYL managed the patient along with the ED Attending      Electronically Signed by         Roz Stacy MD  01/08/23 4575

## 2023-01-06 NOTE — DISCHARGE INSTRUCTIONS
We've discussed about your hyponatremia but due to your symptom resolution you've opted to be discharged home  Please return to the ED if you have worsening symptoms

## 2023-01-09 ENCOUNTER — TELEPHONE (OUTPATIENT)
Dept: FAMILY MEDICINE CLINIC | Facility: CLINIC | Age: 43
End: 2023-01-09

## 2023-01-09 ENCOUNTER — TELEMEDICINE (OUTPATIENT)
Dept: FAMILY MEDICINE CLINIC | Facility: CLINIC | Age: 43
End: 2023-01-09

## 2023-01-09 ENCOUNTER — HOSPITAL ENCOUNTER (OUTPATIENT)
Dept: RADIOLOGY | Facility: HOSPITAL | Age: 43
Discharge: HOME | End: 2023-01-09

## 2023-01-09 ENCOUNTER — APPOINTMENT (OUTPATIENT)
Dept: LAB | Facility: CLINIC | Age: 43
End: 2023-01-09

## 2023-01-09 DIAGNOSIS — U07.1 COVID-19: Primary | ICD-10-CM

## 2023-01-09 DIAGNOSIS — U07.1 COVID-19: ICD-10-CM

## 2023-01-09 DIAGNOSIS — E87.1 HYPONATREMIA: ICD-10-CM

## 2023-01-09 DIAGNOSIS — J45.901 MODERATE ASTHMA WITH EXACERBATION, UNSPECIFIED WHETHER PERSISTENT: ICD-10-CM

## 2023-01-09 LAB
ANION GAP SERPL CALCULATED.3IONS-SCNC: 8 MMOL/L (ref 4–13)
BASOPHILS # BLD AUTO: 0.03 THOUSANDS/ÂΜL (ref 0–0.1)
BASOPHILS NFR BLD AUTO: 0 % (ref 0–1)
BUN SERPL-MCNC: 9 MG/DL (ref 5–25)
CALCIUM SERPL-MCNC: 9.5 MG/DL (ref 8.3–10.1)
CHLORIDE SERPL-SCNC: 97 MMOL/L (ref 96–108)
CO2 SERPL-SCNC: 26 MMOL/L (ref 21–32)
CREAT SERPL-MCNC: 1.18 MG/DL (ref 0.6–1.3)
EOSINOPHIL # BLD AUTO: 0.07 THOUSAND/ÂΜL (ref 0–0.61)
EOSINOPHIL NFR BLD AUTO: 1 % (ref 0–6)
ERYTHROCYTE [DISTWIDTH] IN BLOOD BY AUTOMATED COUNT: 13.1 % (ref 11.6–15.1)
GFR SERPL CREATININE-BSD FRML MDRD: 57 ML/MIN/1.73SQ M
GLUCOSE SERPL-MCNC: 81 MG/DL (ref 65–140)
HCT VFR BLD AUTO: 38.3 % (ref 34.8–46.1)
HGB BLD-MCNC: 13 G/DL (ref 11.5–15.4)
IMM GRANULOCYTES # BLD AUTO: 0.01 THOUSAND/UL (ref 0–0.2)
IMM GRANULOCYTES NFR BLD AUTO: 0 % (ref 0–2)
LYMPHOCYTES # BLD AUTO: 2.64 THOUSANDS/ÂΜL (ref 0.6–4.47)
LYMPHOCYTES NFR BLD AUTO: 39 % (ref 14–44)
MCH RBC QN AUTO: 31.5 PG (ref 26.8–34.3)
MCHC RBC AUTO-ENTMCNC: 33.9 G/DL (ref 31.4–37.4)
MCV RBC AUTO: 93 FL (ref 82–98)
MONOCYTES # BLD AUTO: 0.57 THOUSAND/ÂΜL (ref 0.17–1.22)
MONOCYTES NFR BLD AUTO: 9 % (ref 4–12)
NEUTROPHILS # BLD AUTO: 3.39 THOUSANDS/ÂΜL (ref 1.85–7.62)
NEUTS SEG NFR BLD AUTO: 51 % (ref 43–75)
NRBC BLD AUTO-RTO: 0 /100 WBCS
OSMOLALITY UR/SERPL-RTO: 270 MMOL/KG (ref 282–298)
OSMOLALITY UR: 118 MMOL/KG
PLATELET # BLD AUTO: 334 THOUSANDS/UL (ref 149–390)
PMV BLD AUTO: 8.6 FL (ref 8.9–12.7)
POTASSIUM SERPL-SCNC: 4.1 MMOL/L (ref 3.5–5.3)
RBC # BLD AUTO: 4.13 MILLION/UL (ref 3.81–5.12)
SODIUM 24H UR-SCNC: 8 MOL/L
SODIUM SERPL-SCNC: 131 MMOL/L (ref 135–147)
URATE SERPL-MCNC: 4.1 MG/DL (ref 2–7.5)
WBC # BLD AUTO: 6.71 THOUSAND/UL (ref 4.31–10.16)

## 2023-01-09 RX ORDER — BENZONATATE 200 MG/1
200 CAPSULE ORAL 3 TIMES DAILY PRN
Qty: 20 CAPSULE | Refills: 0 | Status: SHIPPED | OUTPATIENT
Start: 2023-01-09

## 2023-01-09 RX ORDER — METHYLPREDNISOLONE 4 MG/1
TABLET ORAL
Qty: 21 EACH | Refills: 0 | Status: SHIPPED | OUTPATIENT
Start: 2023-01-09

## 2023-01-09 RX ORDER — AZITHROMYCIN 500 MG/1
500 TABLET, FILM COATED ORAL DAILY
Qty: 3 TABLET | Refills: 0 | Status: SHIPPED | OUTPATIENT
Start: 2023-01-09 | End: 2023-01-12

## 2023-01-09 NOTE — ASSESSMENT & PLAN NOTE
Recently her sodium level was found to be around 125 and 130  She is not on any diuretics  Recheck BMP  Check serum and urine osmolality  Check serum uric acid level  Check urine sodium level and urine osmolality

## 2023-01-09 NOTE — TELEPHONE ENCOUNTER
Patient called she was in [facility] er last Thursday   They told her her sodium was low   --and the white blood cells were low    ---she is covid positive   Feeling the same  She wants labs put in to check the levels    She also wants  another chest x ray   -----she is tomer patient and she has a virtual with you this afternoon

## 2023-01-09 NOTE — ASSESSMENT & PLAN NOTE
Recent COVID infection  Today's day #7 approximately  Intolerance to antiviral medicine so she stopped it  Will order benzonatate for control of her cough as well as azithromycin and Medrol Dosepak due to asthma flareup  She did have some intolerance to prednisone with higher dose  It caused some anxiety  But she is okay to try Medrol pack  Patient was advised to call back in 2 days with the progress  Go to emergency room if you feel any worse or develop any dyspnea

## 2023-01-09 NOTE — PROGRESS NOTES
Virtual Regular Visit    Verification of patient location:    Patient is located in the following state in which I hold an active license [place]      Assessment/Plan:    Problem List Items Addressed This Visit        Respiratory    Moderate asthma with exacerbation    Relevant Medications    azithromycin (ZITHROMAX) 500 MG tablet    benzonatate (TESSALON) 200 MG capsule    methylPREDNISolone 4 MG tablet therapy pack    Other Relevant Orders    XR chest pa & lateral       Other    COVID-19 - Primary     Recent COVID infection  Today's day #7 approximately  Intolerance to antiviral medicine so she stopped it  Will order benzonatate for control of her cough as well as azithromycin and Medrol Dosepak due to asthma flareup  She did have some intolerance to prednisone with higher dose  It caused some anxiety  But she is okay to try Medrol pack  Patient was advised to call back in 2 days with the progress  Go to emergency room if you feel any worse or develop any dyspnea  Relevant Orders    XR chest pa & lateral    Hyponatremia     Recently her sodium level was found to be around 125 and 130  She is not on any diuretics  Recheck BMP  Check serum and urine osmolality  Check serum uric acid level  Check urine sodium level and urine osmolality  Relevant Orders    Basic metabolic panel    CBC and differential    Osmolality-"If this is regarding a toxic alcohol, STOP  Test is not routinely indicated   Please consult medical  on call for further guidance "    Uric acid    Osmolality, urine    Sodium, urine, random            Reason for visit is   Chief Complaint   Patient presents with   • Virtual Regular Visit        Encounter provider Lord Lisa MD    Provider located at [address] [place] [address]  [facility] [address] [facility] [address] [id number]  [phone]      Recent Visits  Date Type Provider Dept   01/06/23 Telephone SOLO Gallardo [facility]   01/05/23 Telephone SOLO Tapia [facility]   01/03/23 Telemedicine Siddhartha Rosalynn Apley, [facility]   Showing recent visits within past 7 days and meeting all other requirements  Today's Visits  Date Type Provider Dept   01/09/23 Telemedicine Tahira Alfaro MD [facility]   01/09/23 Telephone ZURI Tapia 112 today's visits and meeting all other requirements  Future Appointments  No visits were found meeting these conditions  Showing future appointments within next 150 days and meeting all other requirements       The patient was identified by name and date of birth  Bull Cabrera was informed that this is a telemedicine visit and that the visit is being conducted through the [address] [employer] platform  She agrees to proceed     My office door was closed  No one else was in the room  She acknowledged consent and understanding of privacy and security of the video platform  The patient has agreed to participate and understands they can discontinue the visit at any time  Patient is aware this is a billable service  Gil Johnson is a 43 y o  female patient was recently diagnosed with COVID-19  She was given antiviral medication  She stopped taking it because of intolerance  Now she is coughing with yellow mucus with chest tightness and some wheezing  No chest pain or dyspnea  Her cough is constant  No lightheadedness  No gastrointestinal side effects or symptoms  No hemoptysis  Her temperature was 100  She has a known history of asthma  She was then seen in the emergency room where her sodium level was found to be running lower around 1 25-1 30  She never had low sodium before  She is not on any diuretics  No psycho genic polydipsia  No change in mental status  [facility]el [facility]     Past Medical History:   Diagnosis Date   • Allergies    • Anxiety    • Asthma    • Chronic pain    • Contusion of head, subsequent encounter 2022   • Depression    • Eating disorder    • Fibrocystic breast changes of both breasts    • Seizures ([address] )        Past Surgical History:   Procedure Laterality Date   • INDUCED      • MAMMO STEREOTACTIC BREAST BIOPSY LEFT (ALL INC)      Benign       Current Outpatient Medications   Medication Sig Dispense Refill   • azithromycin (ZITHROMAX) 500 MG tablet Take 1 tablet (500 mg total) by mouth daily for 3 days 3 tablet 0   • benzonatate (TESSALON) 200 MG capsule Take 1 capsule (200 mg total) by mouth 3 (three) times a day as needed for cough 20 capsule 0   • methylPREDNISolone 4 MG tablet therapy pack Use as directed on package 21 each 0   • acetaminophen (TYLENOL) 500 mg tablet Take 500 mg by mouth every 6 (six) hours as needed for mild pain     • albuterol (PROVENTIL HFA,VENTOLIN HFA) 90 mcg/act inhaler TAKE 2 PUFFS BY MOUTH EVERY 6 HOURS AS NEEDED FOR WHEEZE OR FOR SHORTNESS OF BREATH 18 g 1   • Ascorbic Acid (VITAMIN C) 100 MG tablet Take 100 mg by mouth daily     • Biotin w/ Vitamins C & E (HAIR/SKIN/NAILS PO) Take by mouth     • busPIRone (BUSPAR) 15 mg tablet Take by mouth 3 (three) times a day       • CALCIUM-MAGNESIUM-ZINC PO Take by mouth     • Flovent  MCG/ACT inhaler INHALE 2 PUFFS BY MOUTH 2 TIMES A DAY RINSE MOUTH AFTER USE  12 g 1   • fluticasone (FLONASE) 50 mcg/act nasal spray 1 spray into each nostril daily 16 g 2   • fluticasone (FLOVENT HFA) 110 MCG/ACT inhaler 110 puffs     • hydrocortisone 2 5 % cream hydrocortisone 2 5 % topical cream with perineal applicator (Patient not taking: No sig reported)     • ibuprofen (MOTRIN) 600 mg tablet Take by mouth every 6 (six) hours as needed for mild pain     • ipratropium (ATROVENT) 0 06 % nasal spray 2 SPRAYS INTO EACH NOSTRIL 4 (FOUR) TIMES A DAY (Patient not taking: No sig reported) 15 mL 1   • loratadine (CLARITIN) 10 mg tablet Take 1 tablet (10 mg total) by mouth daily 30 tablet 2   • LORazepam (ATIVAN) 1 mg tablet Take 1 mg by mouth 6 (six) times a day  1   • naproxen (EC NAPROSYN) 500 MG EC tablet Take 1 tablet (500 mg total) by mouth 2 (two) times a day as needed for mild pain 30 tablet 0   • naproxen (NAPROSYN) 500 mg tablet TAKE 1 TABLET BY MOUTH TWICE A DAY WITH MEALS 60 tablet 5   • omeprazole (PriLOSEC) 20 mg delayed release capsule TAKE 1 CAPSULE (20 MG TOTAL) BY MOUTH TWICE DAILY 60 capsule 0   • ondansetron (ZOFRAN-ODT) 8 mg disintegrating tablet TAKE 1 TABLET BY MOUTH EVERY 8 HOURS AS NEEDED FOR NAUSEA OR VOMITING  20 tablet 2   • thiamine (VITAMIN B1) 100 mg tablet Take 100 mg by mouth daily     • tiZANidine (ZANAFLEX) 2 mg tablet Take 1 tablet (2 mg total) by mouth every 8 (eight) hours as needed for muscle spasms 90 tablet 2     No current facility-administered medications for this visit  Allergies   Allergen Reactions   • Prednisone Anxiety   • Gabapentin Hives   • Meloxicam GI Intolerance   • Tramadol Hives     SEIZURES   • Vistaril [Hydroxyzine]    • Latex Other (See Comments)     C/o itching        Review of Systems   Constitutional: Positive for appetite change, fatigue and fever  Negative for chills and diaphoresis  HENT: Negative for congestion, drooling and sinus pain  Eyes: Negative for discharge and itching  Respiratory: Positive for cough and wheezing  Negative for chest tightness and shortness of breath  Cardiovascular: Negative for chest pain, palpitations and leg swelling  Gastrointestinal: Negative  Endocrine: Negative for polyphagia and polyuria  Genitourinary: Negative for difficulty urinating, dysuria, frequency and urgency  Skin: Negative for pallor and rash  Allergic/Immunologic: Negative for food allergies  Neurological: Negative for dizziness, seizures, speech difficulty, light-headedness, numbness and headaches  Hematological: Negative for adenopathy  Does not bruise/bleed easily     Psychiatric/Behavioral: Negative for agitation, confusion and decreased concentration  Video Exam    There were no vitals filed for this visit  Physical Exam  Constitutional:       General: She is not in acute distress  Appearance: She is not toxic-appearing  HENT:      Head: Atraumatic  Pulmonary:      Effort: Pulmonary effort is normal    Neurological:      Mental Status: She is alert and oriented to person, place, and time            I spent 22 minutes directly with the patient during this visit

## 2023-01-10 ENCOUNTER — TELEPHONE (OUTPATIENT)
Dept: FAMILY MEDICINE CLINIC | Facility: CLINIC | Age: 43
End: 2023-01-10

## 2023-01-10 RX ORDER — COVID-19 ANTIGEN TEST
KIT MISCELLANEOUS
COMMUNITY
Start: 2022-12-11

## 2023-01-10 NOTE — PROGRESS NOTES
BMI Counseling: Body mass index is 17 01 kg/m²  The BMI is below normal  Patient advised to gain weight  Rationale for BMI follow-up plan is due to patient being underweight  Depression Screening and Follow-up Plan: Their PHQ-9 score was 6  Patient assessed for underlying major depression  Brief counseling provided and recommend additional follow-up/re-evaluation next office visit  Tobacco Cessation Counseling: Tobacco cessation counseling was provided  The patient is sincerely urged to quit consumption of tobacco  She is not ready to quit tobacco  Medication options and side effects of medication discussed  Patient refused medication  Assessment/Plan:         Problem List Items Addressed This Visit        Respiratory    URI with cough and congestion - Primary     Patient just finished antibiotics and currently is taking a prednisone  Has taken 2 out of 6 days worth of prednisone  Continues with cough and shortness of breath symptoms  Nebulizer provided for dyspnea in office, symptoms imporved upon completion of nebulizer treatment  Continue with supportive care  Increase fluids and rest   Follow-up in office in 1 week or if symptoms worsen  Relevant Medications    albuterol inhalation solution 2 5 mg (Start on 1/11/2023  3:15 PM)    Moderate asthma with exacerbation     Patient currently taking albuterol inhaler and Flovent inhaler  Dyspnea noted during office visit nebulizer treatment provided  She is short of breath and cough is diminished  Lungs sound clear bilaterally  Relevant Medications    albuterol inhalation solution 2 5 mg (Start on 1/11/2023  3:15 PM)       Other    Depression     Has longstanding history of pression currently on BuSpar 15 mg p o  daily  PHQ-9 score 6 patient has mild depression  We will reevaluate next office visit  Seasonal allergies     Seasonal allergies controlled with Flonase and Claritin, continue current regimen           Tobacco abuse counseling     Patient currently smoking but has decreased the amount of cigarettes  Smokes less than half pack a day  Not currently ready to quit smoking  Declines number for smoking cessation  Underweight     Patient BMI 17 01 kg/M2  Patient has been sick recently her weight has decreased last visit  Patient has been consulted on proper nutrition, diet and exercise  Weight gain encouraged at this time  Patient has noted history of eating disorder  High-calorie high-protein encouraged  Anxiety     Patient currently taking Ativan 6 times daily, to new current regimen  Patient states that symptoms of anxiety are well controlled  KOMAL score of 6  COVID-19     Diagnosed with COVID on January 1, 2023  Continues with cough and residual symptoms  Consider pulmonology consult for long-term effects of COVID  Subjective:      Patient ID: Katy Klein is a 43 y o  female  Patient is a 49-year-old female present for follow-up evaluation after diagnosis positive Covid on 1/1/23  Recently was in the hospital 1/5/2023  Patient seen by Dr Ha Arias for tele visit, and treated with prednisone Medrol Dosepak and azithromycin  Antibiotic completed at this time, she is day 2/6 of prednisone  She indicates she is taking dayquil, which has not been effective  Temp 97 9, Oxygen saturation 98%  Intermittent cough noted with shortness of breath  Patient provided albuterol treatment in the office with improvement  Instructed to use her albuterol inhaler routinely  Patient to follow-up if symptoms worsen  Appointment scheduled for next Tuesday        The following portions of the patient's history were reviewed and updated as appropriate:   Past Medical History:  She has a past medical history of Allergies, Anxiety, Asthma, Chronic pain, Contusion of head, subsequent encounter (5/2/2022), Depression, Eating disorder, Fibrocystic breast changes of both breasts, and Seizures [facility]) ,  _______________________________________________________________________  Medical Problems:  does not have any pertinent problems on file ,  _______________________________________________________________________  Past Surgical History:   has a past surgical history that includes Induced  () and Mammo stereotactic breast biopsy left (all inc) ()  ,  _______________________________________________________________________  Family History:  family history includes Alcohol abuse in her sister; Cancer in her father; Diabetes in her family and sister; Hypertension in her family; Psychiatric Illness in her mother ,  _______________________________________________________________________  Social History:   reports that she has been smoking cigarettes  She has a 14 00 pack-year smoking history  She has never used smokeless tobacco  She reports that she does not drink alcohol and does not use drugs  ,  _______________________________________________________________________  Allergies:  is allergic to prednisone, gabapentin, meloxicam, tramadol, vistaril [hydroxyzine], and latex     _______________________________________________________________________  Current Outpatient Medications   Medication Sig Dispense Refill   • acetaminophen (TYLENOL) 500 mg tablet Take 500 mg by mouth every 6 (six) hours as needed for mild pain     • albuterol (PROVENTIL HFA,VENTOLIN HFA) 90 mcg/act inhaler TAKE 2 PUFFS BY MOUTH EVERY 6 HOURS AS NEEDED FOR WHEEZE OR FOR SHORTNESS OF BREATH 18 g 1   • Ascorbic Acid (VITAMIN C) 100 MG tablet Take 100 mg by mouth daily     • azithromycin (ZITHROMAX) 500 MG tablet Take 1 tablet (500 mg total) by mouth daily for 3 days 3 tablet 0   • Biotin w/ Vitamins C & E (HAIR/SKIN/NAILS PO) Take by mouth     • busPIRone (BUSPAR) 15 mg tablet Take by mouth 3 (three) times a day       • CALCIUM-MAGNESIUM-ZINC PO Take by mouth     • Flovent  MCG/ACT inhaler INHALE 2 PUFFS BY MOUTH 2 TIMES A DAY RINSE MOUTH AFTER USE  12 g 1   • fluticasone (FLONASE) 50 mcg/act nasal spray 1 spray into each nostril daily 16 g 2   • fluticasone (FLOVENT HFA) 110 MCG/ACT inhaler 110 puffs     • ibuprofen (MOTRIN) 600 mg tablet Take by mouth every 6 (six) hours as needed for mild pain     • loratadine (CLARITIN) 10 mg tablet Take 1 tablet (10 mg total) by mouth daily 30 tablet 2   • LORazepam (ATIVAN) 1 mg tablet Take 1 mg by mouth 6 (six) times a day  1   • methylPREDNISolone 4 MG tablet therapy pack Use as directed on package 21 each 0   • naproxen (EC NAPROSYN) 500 MG EC tablet Take 1 tablet (500 mg total) by mouth 2 (two) times a day as needed for mild pain 30 tablet 0   • naproxen (NAPROSYN) 500 mg tablet TAKE 1 TABLET BY MOUTH TWICE A DAY WITH MEALS 60 tablet 5   • omeprazole (PriLOSEC) 20 mg delayed release capsule TAKE 1 CAPSULE (20 MG TOTAL) BY MOUTH TWICE DAILY 60 capsule 0   • ondansetron (ZOFRAN-ODT) 8 mg disintegrating tablet TAKE 1 TABLET BY MOUTH EVERY 8 HOURS AS NEEDED FOR NAUSEA OR VOMITING   20 tablet 2   • thiamine (VITAMIN B1) 100 mg tablet Take 100 mg by mouth daily     • tiZANidine (ZANAFLEX) 2 mg tablet Take 1 tablet (2 mg total) by mouth every 8 (eight) hours as needed for muscle spasms 90 tablet 2   • benzonatate (TESSALON) 200 MG capsule Take 1 capsule (200 mg total) by mouth 3 (three) times a day as needed for cough (Patient not taking: Reported on 1/11/2023) 20 capsule 0   • Flowflex COVID-19 Ag Home Test KIT Use as directed (Patient not taking: Reported on 1/11/2023)     • hydrocortisone 2 5 % cream hydrocortisone 2 5 % topical cream with perineal applicator (Patient not taking: Reported on 6/1/2022)     • ipratropium (ATROVENT) 0 06 % nasal spray 2 SPRAYS INTO EACH NOSTRIL 4 (FOUR) TIMES A DAY (Patient not taking: Reported on 6/1/2022) 15 mL 1     Current Facility-Administered Medications   Medication Dose Route Frequency Provider Last Rate Last Admin   • albuterol inhalation solution 2 5 mg 2 5 mg Nebulization Once Karen Keyshawn, SOLO         _______________________________________________________________________  Review of Systems   Constitutional: Positive for appetite change and fatigue  Negative for activity change, chills, fever and unexpected weight change  HENT: Negative for congestion, ear discharge, ear pain, nosebleeds, postnasal drip, rhinorrhea, sinus pressure, sinus pain, sneezing, sore throat and voice change  Eyes: Negative for pain, redness and visual disturbance  Respiratory: Positive for cough, chest tightness and shortness of breath  Negative for wheezing  Cardiovascular: Negative for chest pain and palpitations  Gastrointestinal: Negative for abdominal distention, abdominal pain, constipation, diarrhea, nausea and vomiting  Endocrine: Negative  Genitourinary: Negative for difficulty urinating, dysuria, flank pain, frequency, hematuria and urgency  Musculoskeletal: Negative for arthralgias and myalgias  Skin: Negative  Allergic/Immunologic: Negative  Neurological: Negative  Hematological: Negative  Psychiatric/Behavioral: Negative  Objective:  Vitals:    01/11/23 1404   BP: 108/66   BP Location: Left arm   Patient Position: Sitting   Cuff Size: Standard   Pulse: 105   Resp: 16   Temp: (!) 97 4 °F (36 3 °C)   TempSrc: Temporal   SpO2: 99%   Weight: 40 8 kg (90 lb)   Height: 5' 1" (1 549 m)     Body mass index is 17 01 kg/m²  Physical Exam  Vitals and nursing note reviewed  Constitutional:       Appearance: Normal appearance  She is well-developed  She is obese  HENT:      Head: Normocephalic and atraumatic  Right Ear: Tympanic membrane, ear canal and external ear normal       Left Ear: Tympanic membrane, ear canal and external ear normal       Nose: Nose normal  No congestion or rhinorrhea  Mouth/Throat:      Mouth: Mucous membranes are moist       Pharynx: No oropharyngeal exudate or posterior oropharyngeal erythema  Eyes:      Extraocular Movements: Extraocular movements intact  Conjunctiva/sclera: Conjunctivae normal       Pupils: Pupils are equal, round, and reactive to light  Cardiovascular:      Rate and Rhythm: Normal rate and regular rhythm  Pulses: Normal pulses  Heart sounds: Normal heart sounds  No murmur heard  Pulmonary:      Effort: Pulmonary effort is normal       Breath sounds: Normal breath sounds  Comments: Decreased breath sounds bilaterally  Abdominal:      General: Bowel sounds are normal       Palpations: Abdomen is soft  Musculoskeletal:         General: Normal range of motion  Cervical back: Normal range of motion  Skin:     General: Skin is warm  Capillary Refill: Capillary refill takes less than 2 seconds  Neurological:      General: No focal deficit present  Mental Status: She is alert and oriented to person, place, and time     Psychiatric:         Mood and Affect: Mood normal          Behavior: Behavior normal

## 2023-01-10 NOTE — PATIENT INSTRUCTIONS
Allergies   AMBULATORY CARE:   Allergies  are an immune system reaction to a substance called an allergen  Your immune system sees the allergen as harmful and attacks it  Common signs and symptoms include the following:   Mild symptoms  include sneezing and a runny, itchy, or stuffy nose  You may also have swollen, watery, or itchy eyes, or skin itching  You may have swelling or pain where an insect bit or stung you  Anaphylaxis symptoms  include trouble breathing or swallowing, a rash or hives, or severe swelling  You may also have a cough, wheezing, or feel lightheaded or dizzy  Anaphylaxis is a sudden, life-threatening reaction that needs immediate treatment  Call 911 for signs or symptoms of anaphylaxis,  such as trouble breathing, swelling in your mouth or throat, or wheezing  You may also have itching, a rash, hives, or feel like you are going to faint  Seek care immediately if:   You have tingling in your hands or feet  Your skin is red or flushed  Contact your healthcare provider if:   You have questions or concerns about your condition or care  Steps to take for signs or symptoms of anaphylaxis:   Immediately  give 1 shot of epinephrine only into the outer thigh muscle  Leave the shot in place  as directed  Your healthcare provider may recommend you leave it in place for up to 10 seconds before you remove it  This helps make sure all of the epinephrine is delivered  Call 911 and go to the emergency department,  even if the shot improved symptoms  Do not drive yourself  Bring the used epinephrine shot with you  Treatment for allergies  may include any of the following:  Antihistamines  help decrease itching, sneezing, and swelling  You may take them as a pill or use drops in your nose or eyes  Decongestants  help your nose feel less stuffy  Steroids  decrease swelling and redness  Topical treatments  help decrease itching or swelling   You also may be given nasal sprays or eyedrops  Epinephrine  is medicine used to treat severe allergic reactions such as anaphylaxis  Desensitization  gets your body used to allergens you cannot avoid  Your healthcare provider will give you a shot that contains a small amount of an allergen  He or she will treat any allergic reaction you have  Your provider will give you more of the allergen a little at a time until your body gets used to it  Your reaction to the allergen may be less serious after this treatment  Your healthcare provider will tell you how long to get the shots  Safety precautions to take if you are at risk for anaphylaxis:   Keep 2 shots of epinephrine with you at all times  You may need a second shot, because epinephrine only works for about 20 minutes and symptoms may return  Your healthcare provider can show you and family members how to give the shot  Check the expiration date every month and replace it before it expires  Create an action plan  Your healthcare provider can help you create a written plan that explains the allergy and an emergency plan to treat a reaction  The plan explains when to give a second epinephrine shot if symptoms return or do not improve after the first  Give copies of the action plan and emergency instructions to family members and work staff  Show them how to give a shot of epinephrine  Be careful when you exercise  If you have had exercise-induced anaphylaxis, do not exercise right after you eat  Stop exercising right away if you start to develop any signs or symptoms of anaphylaxis  You may first feel tired, warm, or have itchy skin  Hives, swelling, and severe breathing problems may develop if you continue to exercise  Carry medical alert identification  Wear medical alert jewelry or carry a card that explains the allergy  Ask your healthcare provider where to get these items  Inform all healthcare providers of the allergy    This includes dentists, nurses, doctors, and surgeons  Manage allergies:   Use nasal rinses as directed  Rinse with a saline solution daily  This will help clear allergens out of your nose  Use distilled water if possible  You can also boil tap water and let it cool before you use it  Do not use tap water that has not been boiled  Do not smoke  Allergy symptoms may decrease if you are not around smoke  Nicotine and other chemicals in cigarettes and cigars can cause lung damage  Ask your healthcare provider for information if you currently smoke and need help to quit  E-cigarettes or smokeless tobacco still contain nicotine  Talk to your healthcare provider before you use these products  Prevent an allergic reaction:   Do not go outside when pollen counts are high if you have seasonal allergies  Your symptoms may be better if you go outside only in the morning or evening  Use your air conditioner, and change air filters often  Avoid dust, fur, and mold  Dust and vacuum your home often  You may want to wear a mask when you vacuum  Keep pets in certain rooms, and bathe them often  Use a dehumidifier (machine that decreases moisture) to help prevent mold  Do not use products that contain latex if you have a latex allergy  Use nonlatex gloves if you work in healthcare or in food preparation  Always tell healthcare providers about a latex allergy  Avoid areas that attract insects if you have an insect bite or sting allergy  Areas include trash cans, gardens, and picnics  Do not wear bright clothing or strong scents when you will be outside  Prevent an allergic reaction caused by food  You may have a reaction if your food is not prepared safely  For example, you could be served food that touched your trigger food during preparation  This is called cross-contamination  Kitchen tools can also cause cross-contamination  You may also eat baked foods that contain a trigger food you do not know about   Ask if the food contains your trigger food before you handle or eat it  Follow up with your healthcare provider as directed:  Write down your questions so you remember to ask them during your visits  When you have an allergic reaction, write down everything you were exposed to in the 2 hours before the reaction  Take that information to your next visit  © Copyright [employer] 2022 Information is for End User's use only and may not be sold, redistributed or otherwise used for commercial purposes  All illustrations and images included in CareNotes® are the copyrighted property of [employer]  or Jenn Akins   The above information is an  only  It is not intended as medical advice for individual conditions or treatments  Talk to your doctor, nurse or pharmacist before following any medical regimen to see if it is safe and effective for you  Acute Bronchitis   WHAT YOU NEED TO KNOW:   Acute bronchitis is swelling and irritation in your lungs  It is usually caused by a virus and most often happens in the winter  Bronchitis may also be caused by bacteria or by a chemical irritant, such as smoke  DISCHARGE INSTRUCTIONS:   Return to the emergency department if:   You cough up blood  Your lips or fingernails turn blue  You feel like you are not getting enough air when you breathe  Call your doctor if:   Your symptoms do not go away or get worse, even after treatment  Your cough does not get better within 4 weeks  You have questions or concerns about your condition or care  Medicines: You may  need any of the following:  Cough suppressants  decrease your urge to cough  Decongestants  help loosen mucus in your lungs and make it easier to cough up  This can help you breathe easier  Inhalers  may be given  Your healthcare provider may give you one or more inhalers to help you breathe easier and cough less  An inhaler gives your medicine to open your airways   Ask your healthcare provider to show you how to use your inhaler correctly  Antibiotics  may be given for up to 5 days if your bronchitis is caused by bacteria  Acetaminophen  decreases pain and fever  It is available without a doctor's order  Ask how much to take and how often to take it  Follow directions  Read the labels of all other medicines you are using to see if they also contain acetaminophen, or ask your doctor or pharmacist  Acetaminophen can cause liver damage if not taken correctly  Do not use more than 4 grams (4,000 milligrams) total of acetaminophen in one day  NSAIDs  help decrease swelling and pain or fever  This medicine is available with or without a doctor's order  NSAIDs can cause stomach bleeding or kidney problems in certain people  If you take blood thinner medicine, always ask your healthcare provider if NSAIDs are safe for you  Always read the medicine label and follow directions  Take your medicine as directed  Contact your healthcare provider if you think your medicine is not helping or if you have side effects  Tell him of her if you are allergic to any medicine  Keep a list of the medicines, vitamins, and herbs you take  Include the amounts, and when and why you take them  Bring the list or the pill bottles to follow-up visits  Carry your medicine list with you in case of an emergency  Self-care:   Drink liquids as directed  You may need to drink more liquids than usual to stay hydrated  Ask how much liquid to drink each day and which liquids are best for you  Use a cool mist humidifier  to increase air moisture in your home  This may make it easier for you to breathe and help decrease your cough  Get more rest   Rest helps your body to heal  Slowly start to do more each day  Rest when you feel it is needed  Avoid irritants in the air  Avoid chemicals, fumes, and dust  Wear a face mask if you must work around dust or fumes  Stay inside on days when air pollution levels are high   If you have allergies, stay inside when pollen counts are high  Do not use aerosol products, such as spray-on deodorant, bug spray, and hair spray  Do not smoke or be around others who are smoking  Nicotine and other chemicals in cigarettes and cigars can cause lung damage  Ask your healthcare provider for information if you currently smoke and need help to quit  E-cigarettes or smokeless tobacco still contain nicotine  Talk to your healthcare provider before you use these products  Prevent acute bronchitis:       Ask about vaccines you may need  Get a flu vaccine each year as soon as recommended, usually in September or October  Ask your healthcare provider if you should also get a pneumonia or COVID-19 vaccine  Your healthcare provider can tell you if you should also get other vaccines, and when to get them  Prevent the spread of germs  You can decrease your risk for acute bronchitis and other illnesses by doing the following:     Wash your hands often with soap and water  Carry germ-killing hand lotion or gel with you  You can use the lotion or gel to clean your hands when soap and water are not available  Do not touch your eyes, nose, or mouth unless you have washed your hands first     Always cover your mouth when you cough to prevent the spread of germs  It is best to cough into a tissue or your shirt sleeve instead of into your hand  Ask those around you to cover their mouths when they cough  Try to avoid people who have a cold or the flu  If you are sick, stay away from others as much as possible  Follow up with your doctor as directed:  Write down questions you have so you will remember to ask them during your follow-up visits  © Copyright [employer] 2022 Information is for End User's use only and may not be sold, redistributed or otherwise used for commercial purposes   All illustrations and images included in CareNotes® are the copyrighted property of [employer]  or [employer]  The above information is an  only  It is not intended as medical advice for individual conditions or treatments  Talk to your doctor, nurse or pharmacist before following any medical regimen to see if it is safe and effective for you

## 2023-01-10 NOTE — TELEPHONE ENCOUNTER
Spoke with patient about lab results and is aware  Patient had a concerned and stated had a virtual visit and was prescribed anabiotics but only got two of them which is Methylprednisolone and Zithromax but Benzonatate wasn't covered so pt couldn't get the anabiotic        Patient stated if she can get something else called in?

## 2023-01-11 ENCOUNTER — OFFICE VISIT (OUTPATIENT)
Dept: FAMILY MEDICINE CLINIC | Facility: CLINIC | Age: 43
End: 2023-01-11

## 2023-01-11 ENCOUNTER — RA CDI HCC (OUTPATIENT)
Dept: OTHER | Facility: HOSPITAL | Age: 43
End: 2023-01-11

## 2023-01-11 VITALS
BODY MASS INDEX: 16.99 KG/M2 | DIASTOLIC BLOOD PRESSURE: 66 MMHG | SYSTOLIC BLOOD PRESSURE: 108 MMHG | TEMPERATURE: 97.4 F | HEIGHT: 61 IN | OXYGEN SATURATION: 99 % | HEART RATE: 105 BPM | RESPIRATION RATE: 16 BRPM | WEIGHT: 90 LBS

## 2023-01-11 DIAGNOSIS — J45.901 MODERATE ASTHMA WITH EXACERBATION, UNSPECIFIED WHETHER PERSISTENT: ICD-10-CM

## 2023-01-11 DIAGNOSIS — J30.2 SEASONAL ALLERGIES: ICD-10-CM

## 2023-01-11 DIAGNOSIS — R63.6 UNDERWEIGHT: ICD-10-CM

## 2023-01-11 DIAGNOSIS — F33.1 MODERATE EPISODE OF RECURRENT MAJOR DEPRESSIVE DISORDER (HCC): ICD-10-CM

## 2023-01-11 DIAGNOSIS — F41.9 ANXIETY: ICD-10-CM

## 2023-01-11 DIAGNOSIS — Z71.6 TOBACCO ABUSE COUNSELING: ICD-10-CM

## 2023-01-11 DIAGNOSIS — U07.1 COVID-19: ICD-10-CM

## 2023-01-11 DIAGNOSIS — J06.9 URI WITH COUGH AND CONGESTION: Primary | ICD-10-CM

## 2023-01-11 RX ORDER — ALBUTEROL SULFATE 2.5 MG/3ML
2.5 SOLUTION RESPIRATORY (INHALATION) ONCE
Status: COMPLETED | OUTPATIENT
Start: 2023-01-11 | End: 2023-01-11

## 2023-01-11 RX ADMIN — ALBUTEROL SULFATE 2.5 MG: 2.5 SOLUTION RESPIRATORY (INHALATION) at 15:31

## 2023-01-11 NOTE — PROGRESS NOTES
[facility]  coding opportunities       Chart reviewed, no opportunity found:   [address]        Patients Insurance     Medicare Insurance: [employer]

## 2023-01-11 NOTE — LETTER
January 11, 2023     Patient: Mauri Collins  YOB: 1980  Date of Visit: 1/11/2023      To Whom it May Concern: Francovannessa Deaconshalini is under my professional care  Savannah Freitas was seen in my office on 1/11/2023  Savannah Care may return to work on 1/21/2023  If you have any questions or concerns, please don't hesitate to call           Sincerely,          SOLO Stephen        CC: No Recipients

## 2023-01-11 NOTE — ASSESSMENT & PLAN NOTE
Patient currently taking albuterol inhaler and Flovent inhaler  Dyspnea noted during office visit nebulizer treatment provided  She is short of breath and cough is diminished  Lungs sound clear bilaterally

## 2023-01-11 NOTE — ASSESSMENT & PLAN NOTE
Patient BMI 17 01 kg/M2  Patient has been sick recently her weight has decreased last visit  Patient has been consulted on proper nutrition, diet and exercise  Weight gain encouraged at this time  Patient has noted history of eating disorder  High-calorie high-protein encouraged

## 2023-01-11 NOTE — ASSESSMENT & PLAN NOTE
Has longstanding history of pression currently on BuSpar 15 mg p o  daily  PHQ-9 score 6 patient has mild depression  We will reevaluate next office visit

## 2023-01-11 NOTE — ASSESSMENT & PLAN NOTE
Sodium level 131 mmol/L on 1/923 up from 125 mmol/L on 1/5/23  Encouraged to increase fluid and sodium intake

## 2023-01-11 NOTE — ASSESSMENT & PLAN NOTE
Diagnosed with COVID on January 1, 2023  Continues with cough and residual symptoms  Consider pulmonology consult for long-term effects of COVID

## 2023-01-11 NOTE — ASSESSMENT & PLAN NOTE
Patient currently taking Ativan 6 times daily, to new current regimen  Patient states that symptoms of anxiety are well controlled  KOMAL score of 6

## 2023-01-11 NOTE — ASSESSMENT & PLAN NOTE
Patient just finished antibiotics and currently is taking a prednisone  Has taken 2 out of 6 days worth of prednisone  Continues with cough and shortness of breath symptoms  Nebulizer provided for dyspnea in office, symptoms imporved upon completion of nebulizer treatment  Continue with supportive care  Increase fluids and rest   Follow-up in office in 1 week or if symptoms worsen

## 2023-01-11 NOTE — ASSESSMENT & PLAN NOTE
Patient currently taking albuterol inhaler and Flovent inhaler  Dyspnea noted during office visit  Nebulizer treatment provided

## 2023-01-11 NOTE — ASSESSMENT & PLAN NOTE
Patient currently smoking but has decreased the amount of cigarettes  Smokes less than half pack a day  Not currently ready to quit smoking  Declines number for smoking cessation

## 2023-01-13 ENCOUNTER — TELEPHONE (OUTPATIENT)
Dept: FAMILY MEDICINE CLINIC | Facility: CLINIC | Age: 43
End: 2023-01-13

## 2023-01-13 NOTE — PATIENT INSTRUCTIONS
Weight Loss Tips for Athletes   WHAT YOU NEED TO KNOW:   Why do some athletes want to lose weight? Athletes are often under a lot of pressure to be at a certain weight for some sports  These sports include dance, gymnastics, diving, or figure skating  This often causes athletes to lose weight in unhealthy ways (skipping meals, using diet pills or laxatives, or vomiting)  Unhealthy dieting can lead to unhealthy eating habits or eating disorders (anorexia or bulimia)  Unhealthy dieting and heavy exercise may also affect menstrual cycles and bone health in women  It may also affect the growth and development of young athletes  What is a healthy weight loss goal?  A safe weight loss goal is 1 to 2 pounds per week  Allow plenty of time before an event to lose weight safely  Losing too much weight too quickly can hurt your performance and cause health problems  It can also cause you to lose muscle along with fat  The best time to try to lose weight is during the off season or at the start of the season, before a competition  Resistance training may limit loss of muscle as you lose fat  Ask your dietitian or healthcare provider to help you choose a weight loss goal that is right for your height, age, and activity level  What is a healthy meal plan for an athlete? Eat a variety of healthy foods that are low in calories during regular meals and snacks  Do not skip meals  The following are suggested amounts of the carbohydrate, protein, and fat you may need each day  Your dietitian can tell you how many calories and nutrients you need each day  · Fat  is important because it provides energy and vitamins  You need 20% to 35% of your total daily calories to come from fat  For example, a man who needs about 2900 calories per day would need 725 fat calories each day  There are both healthy fats and unhealthy fats in foods   Ask your healthcare provider for more information about different types of fat and the total amount of fat you should have  · Carbohydrate  is the main source of energy your body uses during exercise  The amount you need depends on your daily calorie needs and the sport that you do  It also depends on whether you are male or female  Athletes need 6 to 10 grams of carbohydrates for each kilogram of body weight  To find your weight in kilograms, divide your weight in pounds by 2 2  Then multiply this number by your carbohydrate needs  For example, if you weigh 70 kilograms, you would need 420 to 700 grams of carbohydrate each day  · Protein  helps to build and repair muscle, produce hormones, boost your immune system, and replace blood cells  The amount of protein you need is only slightly higher than the amount suggested for people who do not exercise  Endurance athletes need 1 2 to 1 4 grams for each kilogram of body weight per day  Athletes who do strength training (such as lifting weights) need 1 2 to 1 7 grams for each kilogram of body weight per day  People can usually meet their needs for protein by following a balanced meal plan  Good sources of protein are lean meats, poultry, eggs, milk, cheese, peanut butter, and beans  Protein or amino acid supplements are not needed if you follow a healthy and balanced meal plan  How can I safely decrease calories? To lose 1 to 2 pounds per week, eat about 500 calories less than you normally do each day  Below are some ideas on how to decrease calories  · Eat smaller portions  You can do this by using a smaller plate  You can also use the plate rule for portion sizes  Place vegetables on ½ of your plate  Put a protein food on ¼ of your plate  Place a carbohydrate on the other ¼ of your plate  · Avoid second helpings  At restaurants, split your meal with someone else, or take half of the meal home with you  · Cut down on liquids and foods that are high in sugar and fat  Examples of high-sugar liquids and foods are soda, sweetened drinks, and candy  High-fat foods and liquids include whole milk, potato chips, fried foods, mayonnaise, and pastries (cookies and cake)  · Eat healthy snacks between meals to avoid getting very hungry  This will help you avoid overeating during meals  Eat a carbohydrate snack about 1 hour before exercise to give you energy and keep you from getting hungry  Some examples of snacks that have carbohydrates include a low-fat bran muffin, banana, apple, or yogurt  What are some healthy foods I can include in my meal plan? · Eat low-fat dairy and lean protein foods  Drink 1% or skim milk  Eat low-fat cottage cheese, yogurt, and cheese  Eat fish, chicken or turkey without skin, and lean cuts of meat  Trim all visible fat from meat before you cook it  Choose eggs and egg whites  · Eat foods that are high in fiber, such as whole-wheat foods (bread, tortillas, crackers), beans, and legumes  Choose high-fiber cereals that are low in calories (less than 150 calories per serving), such as oatmeal  Add high-fiber foods to your diet slowly if you do not normally eat them  If you add high-fiber foods to your diet too quickly, you may have bloating, gas, and stomach discomfort  · Include whole-grain carbohydrates and a lean protein food in each of your meals and snacks  These foods may help you stay full longer  · Choose low-calorie healthy snacks, such as fruits, vegetables, low-fat dairy foods, and grains (such as a granola bar)  What liquids should I drink? · Drink calorie-free or low-calorie drinks, such as water and low-calorie sports drinks  Drink plenty of water to prevent dehydration  Dehydration can cause serious health problems  Athletes have higher liquid needs because they lose water through sweat  · Always carry water with you during long exercise sessions   You can wear a special bag or belt made to carry water on your back or around your waist  Drink sports drinks during exercise sessions that last longer than 1 hour  The best way to check if you are drinking enough liquids is to check the color of your urine  Urine should be clear or very light yellow, with little or no smell  If your urine is dark or smells strong, you may not be drinking enough  When should I call my doctor? · You feel weak or dizzy  · You lose a lot of weight in a short amount of time  · You have questions or concerns about your weight loss plan  CARE AGREEMENT:   You have the right to help plan your care  Discuss treatment options with your healthcare provider to decide what care you want to receive  You always have the right to refuse treatment  The above information is an  only  It is not intended as medical advice for individual conditions or treatments  Talk to your doctor, nurse or pharmacist before following any medical regimen to see if it is safe and effective for you  © Copyright [employer] 2022 Information is for End User's use only and may not be sold, redistributed or otherwise used for commercial purposes  All illustrations and images included in CareNotes® are the copyrighted property of A D A M , Inc  or [facility] Joni Akins   Back Pain   WHAT YOU NEED TO KNOW:   What do I need to know about back pain? Back pain is common  You may have back pain and muscle spasms  You may feel sore or stiff on one or both sides of your back  The pain may spread to your lower body  Conditions that affect the spine, joints, or muscles can cause back pain  These may include arthritis, spinal stenosis (narrowing of the spinal column), muscle tension, or breakdown of the spinal discs  What increases my risk for back pain?    · Repeated bending, lifting, or twisting, or lifting heavy items    · Injury from a fall or accident    · Lack of regular physical activity     · Obesity or pregnancy     · Smoking    · Aging    · Driving, sitting, or standing for long periods    · Bad posture while sitting or standing    How is back pain diagnosed? Your healthcare provider will ask if you have any medical conditions  He or she may ask if you have a history of back pain and how it started  He or she may watch you stand and walk, and check your range of motion  Show him or her where you feel pain and what makes it better or worse  Describe the pain, how bad it is, and how long it lasts  Tell your provider if your pain worsens at night or when you lie on your back  How is back pain treated? 1  Medicines:      ? NSAIDs , such as ibuprofen, help decrease swelling, pain, and fever  This medicine is available with or without a doctor's order  NSAIDs may be given as a pill or as a cream that is applied to your back  NSAIDs can cause stomach bleeding or kidney problems in certain people  If you take blood thinner medicine, always ask your healthcare provider if NSAIDs are safe for you  Always read the medicine label and follow directions  ? Acetaminophen  decreases pain and fever  It is available without a doctor's order  Ask how much to take and how often to take it  Follow directions  Read the labels of all other medicines you are using to see if they also contain acetaminophen, or ask your doctor or pharmacist  Acetaminophen can cause liver damage if not taken correctly  Do not use more than 4 grams (4,000 milligrams) total of acetaminophen in one day  ? Muscle relaxers  help decrease muscle spasms and back pain  2  Acupressure  may be recommended to decrease pain and improve movement  Acupressure is pressure or localized massage to the area of your back pain  3  A transcutaneous electrical nerve stimulation (TENS) unit  is a portable, pocket-sized, battery-powered device that attaches to your skin  It is usually placed over the area of pain  It uses mild, safe electrical signals to help control pain  How do I manage my back pain? · Apply ice  on your back for 15 to 20 minutes every hour or as directed   Use an ice pack, or put crushed ice in a plastic bag  Cover it with a towel before you apply it to your skin  Ice helps prevent tissue damage and decreases pain  · Apply heat  on your back for 20 to 30 minutes every 2 hours for as many days as directed  Heat helps decrease pain and muscle spasms  · Stay active  as much as you can without causing more pain  Bed rest could make your back pain worse  Avoid heavy lifting until your pain is gone  · Go to physical therapy as directed  A physical therapist can teach you exercises to help improve movement and strength, and to decrease pain  Call your local emergency number (911 in the [address]) if:   · You have severe back pain with chest pain  · You cannot control your urine or bowel movements  · Your pain becomes so severe that you cannot walk  When should I seek immediate care? · You have pain, numbness, or weakness in one or both legs  · You have severe back pain, nausea, and vomiting  · You have severe back pain that spreads to your side or genital area  When should I call my doctor? · You have back pain that does not get better with rest and pain medicine  · You have a fever  · You have pain that worsens when you are on your back or when you rest     · You have pain that worsens when you cough or sneeze  · You lose weight without trying  · You have questions or concerns about your condition or care  CARE AGREEMENT:   You have the right to help plan your care  Learn about your health condition and how it may be treated  Discuss treatment options with your healthcare providers to decide what care you want to receive  You always have the right to refuse treatment  The above information is an  only  It is not intended as medical advice for individual conditions or treatments  Talk to your doctor, nurse or pharmacist before following any medical regimen to see if it is safe and effective for you    © Copyright [employer] 2022 Information is for End User's use only and may not be sold, redistributed or otherwise used for commercial purposes  All illustrations and images included in CareNotes® are the copyrighted property of A D A M , Inc  or Jenn Tracy  Anxiety   WHAT YOU NEED TO KNOW:   Anxiety is a condition that causes you to feel extremely worried or nervous  The feelings are so strong that they can cause problems with your daily activities or sleep  Anxiety may be triggered by something you fear, or it may happen without a cause  Family or work stress, smoking, caffeine, and alcohol can increase your risk for anxiety  Certain medicines or health conditions can also increase your risk  Anxiety can become a long-term condition if it is not managed or treated  DISCHARGE INSTRUCTIONS:   Call your local emergency number (911 in the [address]) if:   · You have chest pain, tightness, or heaviness that may spread to your shoulders, arms, jaw, neck, or back  · You feel like hurting yourself or someone else  Call your doctor if:   · Your symptoms get worse or do not get better with treatment  · Your anxiety keeps you from doing your regular daily activities  · You have new symptoms since your last visit  · You have questions or concerns about your condition or care  Medicines:   · Medicines  may be given to help you feel more calm and relaxed, and decrease your symptoms  · Take your medicine as directed  Contact your healthcare provider if you think your medicine is not helping or if you have side effects  Tell him of her if you are allergic to any medicine  Keep a list of the medicines, vitamins, and herbs you take  Include the amounts, and when and why you take them  Bring the list or the pill bottles to follow-up visits  Carry your medicine list with you in case of an emergency  Manage anxiety:   · Talk to someone about your anxiety  Your healthcare provider may suggest counseling   Cognitive behavioral therapy can help you understand and change how you react to events that trigger your symptoms  You might feel more comfortable talking with a friend or family member about your anxiety  Choose someone you know will be supportive and encouraging  · Find ways to relax  Activities such as exercise, meditation, or listening to music can help you relax  Spend time with friends, or do things you enjoy  · Practice deep breathing  Deep breathing can help you relax when you feel anxious  Focus on taking slow, deep breaths several times a day, or during an anxiety attack  Breathe in through your nose and out through your mouth  · Create a regular sleep routine  Regular sleep can help you feel calmer during the day  Go to sleep and wake up at the same times every day  Do not watch television or use the computer right before bed  Your room should be comfortable, dark, and quiet  · Eat a variety of healthy foods  Healthy foods include fruits, vegetables, low-fat dairy products, lean meats, fish, whole-grain breads, and cooked beans  Healthy foods can help you feel less anxious and have more energy  · Exercise regularly  Exercise can increase your energy level  Exercise may also lift your mood and help you sleep better  Your healthcare provider can help you create an exercise plan  · Do not smoke  Nicotine and other chemicals in cigarettes and cigars can increase anxiety  Ask your healthcare provider for information if you currently smoke and need help to quit  E-cigarettes or smokeless tobacco still contain nicotine  Talk to your healthcare provider before you use these products  · Do not have caffeine  Caffeine can make your symptoms worse  Do not have foods or drinks that are meant to increase your energy level  · Limit or do not drink alcohol  Ask your healthcare provider if alcohol is safe for you  You may not be able to drink alcohol if you take certain anxiety or depression medicines   Limit alcohol to 1 drink per day if you are a woman  Limit alcohol to 2 drinks per day if you are a man  A drink of alcohol is 12 ounces of beer, 5 ounces of wine, or 1½ ounces of liquor  · Do not use drugs  Drugs can make your anxiety worse  It can also make anxiety hard to manage  Talk to your healthcare provider if you use drugs and want help to quit  Follow up with your doctor within 2 weeks or as directed:  Write down your questions so you remember to ask them during your visits  © Copyright [employer] 2022 Information is for End User's use only and may not be sold, redistributed or otherwise used for commercial purposes  All illustrations and images included in CareNotes® are the copyrighted property of [employer] M [employer]  or Jenn Akins   The above information is an  only  It is not intended as medical advice for individual conditions or treatments  Talk to your doctor, nurse or pharmacist before following any medical regimen to see if it is safe and effective for you  Allergies   WHAT YOU NEED TO KNOW:   What are allergies? Allergies are an immune system reaction to a substance called an allergen  Your immune system sees the allergen as harmful and attacks it  What causes allergies? You may have allergies at certain times of the year or all year  The following are common allergies:  · Seasonal airborne allergies  happen during certain times of the year  This is also called hay fever  Tree, weed, or grass pollen are examples of allergens that you breathe in  · Environmental airborne allergy  triggers you may breathe in year-round include dust, mold, and pet hair  · Contact allergies  include latex, found in items such as condoms and medical gloves  Latex allergies can be very serious  · Insect sting allergies  may be caused by bees, hornets, fire ants, or other insects that sting or bite you  Insect allergies can be very serious       · Food allergies  commonly include shellfish, wheat, and eggs  Some foods must be eaten to produce an allergic reaction  Other foods can trigger a reaction if they touch your skin or are breathed in  What increases my risk for allergies? Allergic reactions can happen at any time, even if you have not had allergies before  You may develop an allergy after you have been exposed to an allergen more than once  Allergies are most common in children and elderly people, but anyone can have an allergic reaction  Your risk is also increased if you have a family history of allergies or a medical condition such as asthma  What are the signs and symptoms of allergies? · Mild symptoms  include sneezing and a runny, itchy, or stuffy nose  You may also have swollen, watery, or itchy eyes, or skin itching  You may have swelling or pain where an insect bit or stung you  · Anaphylaxis symptoms  include trouble breathing or swallowing, a rash or hives, or severe swelling  You may also have a cough, wheezing, or feel lightheaded or dizzy  Anaphylaxis is a sudden, life-threatening reaction that needs immediate treatment  How are allergies diagnosed? Your healthcare provider will ask about your signs and symptoms  He or she will ask what allergens you have been exposed to and if you have ever had other allergic reactions  He or she may look in your nose, ears, or throat  You may need additional testing if you developed anaphylaxis after you were exposed to a trigger and then exercised  This is called exercise-induced anaphylaxis  You may also need the following tests:  · Blood tests  are used to check for signs of a reaction to allergens  · Nasal tests  are used to see how your nasal passages react to allergens  A sample of your nasal fluid may also be tested  · Skin tests  can help your healthcare provider find what you are allergic to  He will place a small amount of allergen on your arm or back and then prick your skin with a needle   He will watch how your skin reacts to the allergen  How are allergies treated? · Antihistamines  help decrease itching, sneezing, and swelling  You may take them as a pill or use drops in your nose or eyes  · Decongestants  help your nose feel less stuffy  · Steroids  decrease swelling and redness  · Topical treatments  help decrease itching or swelling  You also may be given nasal sprays or eyedrops  · Epinephrine  is medicine used to treat severe allergic reactions such as anaphylaxis  · Desensitization  gets your body used to allergens you cannot avoid  Your healthcare provider will give you a shot that contains a small amount of an allergen  He or she will treat any allergic reaction you have  Your provider will give you more of the allergen a little at a time until your body gets used to it  Your reaction to the allergen may be less serious after this treatment  Your healthcare provider will tell you how long to get the shots  What steps do I need to take for signs or symptoms of anaphylaxis? · Immediately  give 1 shot of epinephrine only into the outer thigh muscle  · Leave the shot in place  as directed  Your healthcare provider may recommend you leave it in place for up to 10 seconds before you remove it  This helps make sure all of the epinephrine is delivered  · Call 911 and go to the emergency department,  even if the shot improved symptoms  Do not drive yourself  Bring the used epinephrine shot with you  What safety precautions do I need to take if I am at risk for anaphylaxis? · Keep 2 shots of epinephrine with you at all times  You may need a second shot, because epinephrine only works for about 20 minutes and symptoms may return  Your healthcare provider can show you and family members how to give the shot  Check the expiration date every month and replace it before it expires  · Create an action plan    Your healthcare provider can help you create a written plan that explains the allergy and an emergency plan to treat a reaction  The plan explains when to give a second epinephrine shot if symptoms return or do not improve after the first  Give copies of the action plan and emergency instructions to family members and work staff  Show them how to give a shot of epinephrine  · Be careful when you exercise  If you have had exercise-induced anaphylaxis, do not exercise right after you eat  Stop exercising right away if you start to develop any signs or symptoms of anaphylaxis  You may first feel tired, warm, or have itchy skin  Hives, swelling, and severe breathing problems may develop if you continue to exercise  · Carry medical alert identification  Wear medical alert jewelry or carry a card that explains the allergy  Ask your healthcare provider where to get these items  · Inform all healthcare providers of the allergy  This includes dentists, nurses, doctors, and surgeons  How can I manage allergies? · Use nasal rinses as directed  Rinse with a saline solution daily  This will help clear allergens out of your nose  Use distilled water if possible  You can also boil tap water and let it cool before you use it  Do not use tap water that has not been boiled  · Do not smoke  Allergy symptoms may decrease if you are not around smoke  Nicotine and other chemicals in cigarettes and cigars can cause lung damage  Ask your healthcare provider for information if you currently smoke and need help to quit  E-cigarettes or smokeless tobacco still contain nicotine  Talk to your healthcare provider before you use these products  How can I prevent an allergic reaction? · Do not go outside when pollen counts are high if you have seasonal allergies  Your symptoms may be better if you go outside only in the morning or evening  Use your air conditioner, and change air filters often  · Avoid dust, fur, and mold  Dust and vacuum your home often   You may want to wear a mask when you vacuum  Keep pets in certain rooms, and bathe them often  Use a dehumidifier (machine that decreases moisture) to help prevent mold  · Do not use products that contain latex if you have a latex allergy  Use nonlatex gloves if you work in healthcare or in food preparation  Always tell healthcare providers about a latex allergy  · Avoid areas that attract insects if you have an insect bite or sting allergy  Areas include trash cans, gardens, and picnics  Do not wear bright clothing or strong scents when you will be outside  · Prevent an allergic reaction caused by food  You may have a reaction if your food is not prepared safely  For example, you could be served food that touched your trigger food during preparation  This is called cross-contamination  Kitchen tools can also cause cross-contamination  You may also eat baked foods that contain a trigger food you do not know about  Ask if the food contains your trigger food before you handle or eat it  Call 911 for signs or symptoms of anaphylaxis,  such as trouble breathing, swelling in your mouth or throat, or wheezing  You may also have itching, a rash, hives, or feel like you are going to faint  When should I seek immediate care? · You have tingling in your hands or feet  · Your skin is red or flushed  When should I contact my healthcare provider? · You have questions or concerns about your condition or care  CARE AGREEMENT:   You have the right to help plan your care  Learn about your health condition and how it may be treated  Discuss treatment options with your healthcare providers to decide what care you want to receive  You always have the right to refuse treatment  The above information is an  only  It is not intended as medical advice for individual conditions or treatments   Talk to your doctor, nurse or pharmacist before following any medical regimen to see if it is safe and effective for you   © Copyright [employer] 2022 Information is for End User's use only and may not be sold, redistributed or otherwise used for commercial purposes  All illustrations and images included in CareNotes® are the copyrighted property of Peggy SPIVEY  or [address]:   What is shortness of breath? Shortness of breath is a feeling that you cannot get enough air when you breathe in  You may have this feeling only during activity, or all the time  Your symptoms can range from mild to severe  Shortness of breath may be a sign of a serious health condition that needs immediate care  What causes or increases my risk for shortness of breath? · A lung condition, such as pneumonia, asthma, or a blood clot in your lung    · Heart, kidney, or liver disease    · Lung cancer or lung damage, such as from asbestos    · Smoking cigarettes    · Anxiety or a panic attack    · Physical activity such as running or climbing stairs, especially if you are out of shape    · Being overweight    · Travel to high altitude    · Anemia (low red blood cell count)    How is the cause of shortness of breath diagnosed? Your healthcare provider will listen to your breathing and check for signs of a serious health condition  Signs include blue lips or fingernails or chest pain that happens with shortness of breath  Tell your provider if shortness of breath keeps you from walking or talking easily  Your provider will also check your memory and alertness  Tell your provider when your shortness of breath started and how severe it is  Describe anything that starts your symptoms, such as physical activity  Also tell your provider anything you do to make breathing easier  You may also need any of the following:  · Blood tests  may be used to check your oxygen level or to find health conditions such as anemia  Anemia is too few red blood cells (RBCs)  RBCs carry oxygen throughout your body   Blood tests may also be used to check for signs of infection or organ failure  · A pulse oximeter  is a device that measures the amount of oxygen in your blood  A cord with a clip or sticky strip is placed on your finger, ear, or toe  The other end of the cord is hooked to a machine  · Spirometry  is a test to measure how strong your breathing is  You will breathe out as hard as you can into a plastic tube called a spirometer  · X-ray  pictures may show signs of infection or fluid around your heart or lungs  · Exercise tests  help your healthcare provider learn if you have symptoms that limit activity  Symptoms include leg pain, fatigue, and weakness  Exercise tests can also show if your symptoms are caused by heart problems  · CT scan  pictures may show blood clots or an area of disease in your lungs  You may be given contrast liquid to help your lungs show up better in the pictures  Tell the healthcare provider if you have ever had an allergic reaction to contrast liquid  · An echocardiogram  is a type of ultrasound  Sound waves are used to show the structure and function of your heart  · An EKG  is a test that measures the electrical activity of your heart  How is shortness of breath treated? · Medicines  may be used to treat the cause of your symptoms  You may need medicine to treat a bacterial infection or reduce anxiety  Other medicines may be used to open your airway, reduce swelling, or remove extra fluid  If you have a heart condition, you may need medicine to help your heart beat more strongly or regularly  · Oxygen  may be given to help you breathe more easily  What can I do to manage shortness of breath? · Create an action plan  You and your healthcare provider can work together to create a plan for how to handle shortness of breath  The plan can include daily activities, treatment changes, and what to do if you have severe breathing problems      · Lean forward on your elbows when you sit  This helps your lungs expand and may make it easier to breathe  · Use pursed-lip breathing any time you feel short of breath  Breathe in through your nose and then slowly breathe out through your mouth with your lips slightly puckered  It should take you twice as long to breathe out as it did to breathe in  · Do not smoke  Nicotine and other chemicals in cigarettes and cigars can cause lung damage and make shortness of breath worse  Ask your healthcare provider for information if you currently smoke and need help to quit  E-cigarettes or smokeless tobacco still contain nicotine  Talk to your healthcare provider before you use these products  · Reach or maintain a healthy weight  Your healthcare provider can help you create a safe weight loss plan if you are overweight  · Exercise as directed  Exercise can help your lungs work more easily  Exercise can also help you lose weight if needed  Try to get at least 30 minutes of exercise most days of the week  When should I seek immediate care? · Your signs and symptoms are the same or worse within 24 hours of treatment  · The skin over your ribs or on your neck sinks in when you breathe  · You feel confused or dizzy  When should I contact my healthcare provider? · You have new or worsening symptoms  · You have questions or concerns about your condition or care  CARE AGREEMENT:   You have the right to help plan your care  Learn about your health condition and how it may be treated  Discuss treatment options with your healthcare providers to decide what care you want to receive  You always have the right to refuse treatment  The above information is an  only  It is not intended as medical advice for individual conditions or treatments  Talk to your doctor, nurse or pharmacist before following any medical regimen to see if it is safe and effective for you    © Copyright [employer] 2022 Information is for End User's use only and may not be sold, redistributed or otherwise used for commercial purposes  All illustrations and images included in CareNotes® are the copyrighted property of [employer]  or Jenn Tracy  Asthma   AMBULATORY CARE:   Asthma  is a lung disease that makes breathing difficult  Chronic inflammation and reactions to triggers narrow the airways in your lungs  Asthma can become life-threatening if it is not managed  Cough-variant asthma  is a type of asthma that causes a dry cough that keeps coming back  A dry cough may be your only symptom, or you may also have chest tightness  These symptoms may be caused by exercise or exposure to odors, allergens, or respiratory tract infections  Cough-variant asthma is treated the same way as typical asthma  Common symptoms include the following:   · Coughing    · Wheezing    · Shortness of breath    · Chest tightness    Call your local emergency number (911 in the [address] [facility] [address]) if:   · You have severe shortness of breath  · The skin around your neck and ribs pulls in with each breath  · Your peak flow numbers are in the red zone of your AAP  Seek care immediately if:   · You have shortness of breath, even after you take your short-term medicine as directed  · Your lips or nails turn blue or gray  Call your doctor or asthma specialist if:   · You run out of medicine before your next refill is due  · Your symptoms get worse  · You need to take more medicine than usual to control your symptoms  · You have questions or concerns about your condition or care  Treatment for asthma  will depend on how severe your asthma is  Medicine may be used to decrease inflammation, open airways, and make it easier to breathe  Medicines may be inhaled, taken as a pill, or injected  Short-term medicines relieve your symptoms quickly  Long-term medicines are used to prevent future attacks   Other medicines may be needed if your regular medicines are not able to prevent attacks  You may also need medicine to help control your allergies  Manage and prevent future asthma attacks:   · Follow your asthma action plan  This is a written plan that you and your healthcare provider create  It explains which medicine you need and when to change doses if necessary  It also explains how you can monitor symptoms and use a peak flow meter  The meter measures how well your lungs are working  · Manage other health conditions , such as allergies, acid reflux, and sleep apnea  · Identify and avoid triggers  These may include pets, dust mites, mold, and cockroaches  · Do not smoke or be around others who smoke  Nicotine and other chemicals in cigarettes and cigars can cause lung damage  Ask your healthcare provider for information if you currently smoke and need help to quit  E-cigarettes or smokeless tobacco still contain nicotine  Talk to your healthcare provider before you use these products  · Ask about the flu vaccine  The flu can make your asthma worse  You may need a yearly flu shot  Follow up with your healthcare provider as directed: You will need to return to make sure your medicine is working and your symptoms are controlled  You may be referred to an asthma or allergy specialist  Ramírez Sherman may be asked to keep a record of your peak flow values and bring it with you to your appointments  Write down your questions so you remember to ask them during your visits  © Copyright [employer] 2022 Information is for End User's use only and may not be sold, redistributed or otherwise used for commercial purposes  All illustrations and images included in CareNotes® are the copyrighted property of [employer]  or [facility] Joni Akins   The above information is an  only  It is not intended as medical advice for individual conditions or treatments   Talk to your doctor, nurse or pharmacist before following any medical regimen to see if it is safe and effective for you  Acute Bronchitis   WHAT YOU NEED TO KNOW:   Acute bronchitis is swelling and irritation in your lungs  It is usually caused by a virus and most often happens in the winter  Bronchitis may also be caused by bacteria or by a chemical irritant, such as smoke  DISCHARGE INSTRUCTIONS:   Return to the emergency department if:   · You cough up blood  · Your lips or fingernails turn blue  · You feel like you are not getting enough air when you breathe  Call your doctor if:   · Your symptoms do not go away or get worse, even after treatment  · Your cough does not get better within 4 weeks  · You have questions or concerns about your condition or care  Medicines: You may  need any of the following:  · Cough suppressants  decrease your urge to cough  · Decongestants  help loosen mucus in your lungs and make it easier to cough up  This can help you breathe easier  · Inhalers  may be given  Your healthcare provider may give you one or more inhalers to help you breathe easier and cough less  An inhaler gives your medicine to open your airways  Ask your healthcare provider to show you how to use your inhaler correctly  · Antibiotics  may be given for up to 5 days if your bronchitis is caused by bacteria  · Acetaminophen  decreases pain and fever  It is available without a doctor's order  Ask how much to take and how often to take it  Follow directions  Read the labels of all other medicines you are using to see if they also contain acetaminophen, or ask your doctor or pharmacist  Acetaminophen can cause liver damage if not taken correctly  Do not use more than 4 grams (4,000 milligrams) total of acetaminophen in one day  · NSAIDs  help decrease swelling and pain or fever  This medicine is available with or without a doctor's order  NSAIDs can cause stomach bleeding or kidney problems in certain people   If you take blood thinner medicine, always ask your healthcare provider if NSAIDs are safe for you  Always read the medicine label and follow directions  · Take your medicine as directed  Contact your healthcare provider if you think your medicine is not helping or if you have side effects  Tell him of her if you are allergic to any medicine  Keep a list of the medicines, vitamins, and herbs you take  Include the amounts, and when and why you take them  Bring the list or the pill bottles to follow-up visits  Carry your medicine list with you in case of an emergency  Self-care:   · Drink liquids as directed  You may need to drink more liquids than usual to stay hydrated  Ask how much liquid to drink each day and which liquids are best for you  · Use a cool mist humidifier  to increase air moisture in your home  This may make it easier for you to breathe and help decrease your cough  · Get more rest   Rest helps your body to heal  Slowly start to do more each day  Rest when you feel it is needed  · Avoid irritants in the air  Avoid chemicals, fumes, and dust  Wear a face mask if you must work around dust or fumes  Stay inside on days when air pollution levels are high  If you have allergies, stay inside when pollen counts are high  Do not use aerosol products, such as spray-on deodorant, bug spray, and hair spray  · Do not smoke or be around others who are smoking  Nicotine and other chemicals in cigarettes and cigars can cause lung damage  Ask your healthcare provider for information if you currently smoke and need help to quit  E-cigarettes or smokeless tobacco still contain nicotine  Talk to your healthcare provider before you use these products  Prevent acute bronchitis:       1  Ask about vaccines you may need  Get a flu vaccine each year as soon as recommended, usually in September or October  Ask your healthcare provider if you should also get a pneumonia or COVID-19 vaccine   Your healthcare provider can tell you if you should also get other vaccines, and when to get them  2  Prevent the spread of germs  You can decrease your risk for acute bronchitis and other illnesses by doing the following:     ? Wash your hands often with soap and water  Carry germ-killing hand lotion or gel with you  You can use the lotion or gel to clean your hands when soap and water are not available  ? Do not touch your eyes, nose, or mouth unless you have washed your hands first     ? Always cover your mouth when you cough to prevent the spread of germs  It is best to cough into a tissue or your shirt sleeve instead of into your hand  Ask those around you to cover their mouths when they cough  ? Try to avoid people who have a cold or the flu  If you are sick, stay away from others as much as possible  Follow up with your doctor as directed:  Write down questions you have so you will remember to ask them during your follow-up visits  © Copyright [employer] 2022 Information is for End User's use only and may not be sold, redistributed or otherwise used for commercial purposes  All illustrations and images included in CareNotes® are the copyrighted property of [employer]  or [facility] Joni Akins   The above information is an  only  It is not intended as medical advice for individual conditions or treatments  Talk to your doctor, nurse or pharmacist before following any medical regimen to see if it is safe and effective for you  Medicare Preventive Visit Patient Instructions  Thank you for completing your Welcome to Medicare Visit or Medicare Annual Wellness Visit today  Your next wellness visit will be due in one year (1/18/2024)  The screening/preventive services that you may require over the next 5-10 years are detailed below  Some tests may not apply to you based off risk factors and/or age  Screening tests ordered at today's visit but not completed yet may show as past due   Also, please note that scanned in results may not display below  Preventive Screenings:  Service Recommendations Previous Testing/Comments   Colorectal Cancer Screening  * Colonoscopy    * Fecal Occult Blood Test (FOBT)/Fecal Immunochemical Test (FIT)  * Fecal DNA/Cologuard Test  * Flexible Sigmoidoscopy Age: 39-70 years old   Colonoscopy: every 10 years (may be performed more frequently if at higher risk)  OR  FOBT/FIT: every 1 year  OR  Cologuard: every 3 years  OR  Sigmoidoscopy: every 5 years  Screening may be recommended earlier than age 39 if at higher risk for colorectal cancer  Also, an individualized decision between you and your healthcare provider will decide whether screening between the ages of 74-80 would be appropriate  Colonoscopy: 09/02/2022  FOBT/FIT: Not on file  Cologuard: Not on file  Sigmoidoscopy: Not on file          Breast Cancer Screening Age: 36 years old  Frequency: every 1-2 years  Not required if history of left and right mastectomy Mammogram: 02/20/2020        Cervical Cancer Screening Between the ages of 21-29, pap smear recommended once every 3 years  Between the ages of 33-67, can perform pap smear with HPV co-testing every 5 years  Recommendations may differ for women with a history of total hysterectomy, cervical cancer, or abnormal pap smears in past  Pap Smear: 10/04/2018        Hepatitis C Screening Once for adults born between 1945 and 1965  More frequently in patients at high risk for Hepatitis C Hep C Antibody: 10/04/2018        Diabetes Screening 1-2 times per year if you're at risk for diabetes or have pre-diabetes Fasting glucose: 129 mg/dL (8/21/2022)  A1C: 6 0 % (3/14/2022)      Cholesterol Screening Once every 5 years if you don't have a lipid disorder  May order more often based on risk factors  Lipid panel: 09/20/2021          Other Preventive Screenings Covered by Medicare:  1  Abdominal Aortic Aneurysm (AAA) Screening: covered once if your at risk   You're considered to be at risk if you have a family history of AAA  2  Lung Cancer Screening: covers low dose CT scan once per year if you meet all of the following conditions: (1) Age 50-69; (2) No signs or symptoms of lung cancer; (3) Current smoker or have quit smoking within the last 15 years; (4) You have a tobacco smoking history of at least 20 pack years (packs per day multiplied by number of years you smoked); (5) You get a written order from a healthcare provider  3  Glaucoma Screening: covered annually if you're considered high risk: (1) You have diabetes OR (2) Family history of glaucoma OR (3)  aged 48 and older OR (3)  American aged 72 and older  3  Osteoporosis Screening: covered every 2 years if you meet one of the following conditions: (1) You're estrogen deficient and at risk for osteoporosis based off medical history and other findings; (2) Have a vertebral abnormality; (3) On glucocorticoid therapy for more than 3 months; (4) Have primary hyperparathyroidism; (5) On osteoporosis medications and need to assess response to drug therapy  · Last bone density test (DXA Scan): Not on file  5  HIV Screening: covered annually if you're between the age of 12-76  Also covered annually if you are younger than 13 and older than 72 with risk factors for HIV infection  For pregnant patients, it is covered up to 3 times per pregnancy      Immunizations:  Immunization Recommendations   Influenza Vaccine Annual influenza vaccination during flu season is recommended for all persons aged >= 6 months who do not have contraindications   Pneumococcal Vaccine   * Pneumococcal conjugate vaccine = PCV13 (Prevnar 13), PCV15 (Vaxneuvance), PCV20 (Prevnar 20)  * Pneumococcal polysaccharide vaccine = PPSV23 (Pneumovax) Adults 2364 years old: 1-3 doses may be recommended based on certain risk factors  Adults 72 years old: 1-2 doses may be recommended based off what pneumonia vaccine you previously received   Hepatitis B Vaccine 3 dose series if at intermediate or high risk (ex: diabetes, end stage renal disease, liver disease)   Tetanus (Td) Vaccine - COST NOT COVERED BY MEDICARE PART B Following completion of primary series, a booster dose should be given every 10 years to maintain immunity against tetanus  Td may also be given as tetanus wound prophylaxis  Tdap Vaccine - COST NOT COVERED BY MEDICARE PART B Recommended at least once for all adults  For pregnant patients, recommended with each pregnancy  Shingles Vaccine (Shingrix) - COST NOT COVERED BY MEDICARE PART B  2 shot series recommended in those aged 48 and above     Health Maintenance Due:      Topic Date Due   • Breast Cancer Screening: Mammogram  02/20/2021   • Cervical Cancer Screening  10/04/2023   • HIV Screening  Completed   • Hepatitis C Screening  Completed     Immunizations Due:      Topic Date Due   • Hepatitis A Vaccine (1 of 2 - Risk 2-dose series) Never done   • Pneumococcal Vaccine: Pediatrics (0 to 5 Years) and At-Risk Patients (6 to 59 Years) (1 - PCV) Never done   • COVID-19 Vaccine (2 - Mixed Product series) 06/11/2022     Advance Directives   What are advance directives? Advance directives are legal documents that state your wishes and plans for medical care  These plans are made ahead of time in case you lose your ability to make decisions for yourself  Advance directives can apply to any medical decision, such as the treatments you want, and if you want to donate organs  What are the types of advance directives? There are many types of advance directives, and each state has rules about how to use them  You may choose a combination of any of the following:  · Living will: This is a written record of the treatment you want  You can also choose which treatments you do not want, which to limit, and which to stop at a certain time  This includes surgery, medicine, IV fluid, and tube feedings  · Durable power of  for healthcare [facility]):   This is a written record that states who you want to make healthcare choices for you when you are unable to make them for yourself  This person, called a proxy, is usually a family member or a friend  You may choose more than 1 proxy  · Do not resuscitate (DNR) order:  A DNR order is used in case your heart stops beating or you stop breathing  It is a request not to have certain forms of treatment, such as CPR  A DNR order may be included in other types of advance directives  · Medical directive: This covers the care that you want if you are in a coma, near death, or unable to make decisions for yourself  You can list the treatments you want for each condition  Treatment may include pain medicine, surgery, blood transfusions, dialysis, IV or tube feedings, and a ventilator (breathing machine)  · Values history: This document has questions about your views, beliefs, and how you feel and think about life  This information can help others choose the care that you would choose  Why are advance directives important? An advance directive helps you control your care  Although spoken wishes may be used, it is better to have your wishes written down  Spoken wishes can be misunderstood, or not followed  Treatments may be given even if you do not want them  An advance directive may make it easier for your family to make difficult choices about your care  Cigarette Smoking and Your Health   Risks to your health if you smoke:  Nicotine and other chemicals found in tobacco damage every cell in your body  Even if you are a light smoker, you have an increased risk for cancer, heart disease, and lung disease  If you are pregnant or have diabetes, smoking increases your risk for complications  Benefits to your health if you stop smoking:   · You decrease respiratory symptoms such as coughing, wheezing, and shortness of breath  · You reduce your risk for cancers of the lung, mouth, throat, kidney, bladder, pancreas, stomach, and cervix   If you already have cancer, you increase the benefits of chemotherapy  You also reduce your risk for cancer returning or a second cancer from developing  · You reduce your risk for heart disease, blood clots, heart attack, and stroke  · You reduce your risk for lung infections, and diseases such as pneumonia, asthma, chronic bronchitis, and emphysema  · Your circulation improves  More oxygen can be delivered to your body  If you have diabetes, you lower your risk for complications, such as kidney, artery, and eye diseases  You also lower your risk for nerve damage  Nerve damage can lead to amputations, poor vision, and blindness  · You improve your body's ability to heal and to fight infections  For more information and support to stop smoking:   · [employer]  Phone: 8- [phone] - [phone]  Web Address: [place]  Underweight  Underweight is defined as having a body mass index (BMI) of less than 18 5 kg/m2   Anorexia  is a loss of appetite, decreased food intake, or both  Your appetite naturally decreases as you get older  You also get full faster than you used to  This occurs because your body needs less energy  Other body changes can also lead to a decreased appetite  Even though some appetite loss is normal, you still need to get enough calories and nutrients to keep you healthy  You can start to lose too much weight if you do not eat as much food as your body needs  Unwanted weight loss can cause health problems, or worsen health problems you already have  You can also become dehydrated if you do not drink enough liquid  How to eat healthy and get enough nutrients:   · Choose healthy foods  Eat a variety of fruits, vegetables, whole grains, low-fat dairy foods, lean meats, and other protein foods  Limit foods high in fat, sugar, and salt  Limit or avoid alcohol as directed  Work with a dietitian to help you plan your meals if you need to follow a special diet   A dietitian can also teach you how to modify foods if you have trouble chewing or swallowing  · Snack on healthy foods between meals  if you only eat a small amount during meals  Snacks provide extra healthy nutrients and calories between meals  Examples include fruit, cheese, and whole grain crackers  · Drink liquids as directed  to avoid dehydration  Drink liquids between meals if they cause you to get full too quickly during meals  Ask how much liquid to drink each day and which liquids are best for you  · Use herbs, spices, and flavor enhancers to add flavor to foods  Avoid using herbs and spice blends that also contain sodium  Ask your healthcare provider or dietitian about flavor enhancers  Flavor enhancers with ham, natural saleh, and roast beef flavors can also be sprinkled on food to add flavor  · Share meals with others as often as you can  Eating with others may help you to eat better during meal time  Ask family members, neighbors, or friends to join you for lunch  There are also senior centers where you can meet people, and share meals with them  · Ask family and friends for help  with shopping or preparing foods  Ask for a ride to the grocery store, if needed  © Copyright [employer] 2018 Information is for End User's use only and may not be sold, redistributed or otherwise used for commercial purposes   All illustrations and images included in CareNotes® are the copyrighted property of A D A M , Inc  or [address] [place] [address] [employer]

## 2023-01-13 NOTE — PROGRESS NOTES
Assessment and Plan:     Problem List Items Addressed This Visit        Respiratory    Asthma in adult, mild persistent, with acute exacerbation     Patient has history of asthma  Currently maintained on albuterol 90 joy inhaler every 4-6 hours as needed  Flovent  mcg inhaler 2 puffs twice daily  Patient instructed on tobacco cessation  Pulmonary consult ordered at this time for further evaluation  Patient have history of COVID-19  Currently completed steroid therapy of methylprednisolone 4 mg p o  daily  Relevant Orders    Ambulatory Referral to Pulmonology    URI with cough and congestion     Patient ordered for chest x-ray for evaluation right anterior chest lower rib pain and pulmonary consultation  If pneumonia present will consider antibiotic therapy  Patient has been on antibiotics multiple times in the past          Relevant Orders    XR chest pa & lateral    Ambulatory Referral to Pulmonology       Other    Depression     PHQ-9 score 4 for mild depression  Continue on current regimen of Buspar 15 mgs daily  Short of breath on exertion     She has intermittent episodes of shortness of breath  To continue using her inhalers as directed  Chest x-ray ordered for follow-up evaluation  Patient with history of COVID pulmonary consult ordered  Medicare annual wellness visit, initial - Primary    Seasonal allergies     Patient taking loratadine 10 mg p o  nightly  Use Flonase nasal spray as directed  Allergy triggers to be avoided  Tobacco abuse counseling     Patient smokes a 5 cigarettes/day  Declines nicotine patch and gum  Continues to have cough, post Covid  Encouraged to stop smoking as this can worsen cough  Severe protein-calorie malnutrition ([address] )     Malnutrition Findings:       Patient counseled proper diet nutrition and exercise  Weight gain encouraged  BMI Findings:            Body mass index is 17 01 kg/m²  Nasal congestion     Nasal congestion, patient to use flonase as directed  Anxiety     KOMAL-7 score 4  Minimal anxiety  Instructed on reductions of ativan to improve respiratory function  Patient on buspar 15mg p o daily  BMI Counseling: Body mass index is 17 01 kg/m²  The BMI is below normal  Patient advised to gain weight and dietary education for weight gain was provided  Rationale for BMI follow-up plan is due to patient being underweight  Depression Screening and Follow-up Plan: Their PHQ-9 score was 4  Patient assessed for underlying major depression  Brief counseling provided and recommend additional follow-up/re-evaluation next office visit  Tobacco Cessation Counseling: Tobacco cessation counseling was provided  The patient is sincerely urged to quit consumption of tobacco  She is not ready to quit tobacco  Medication options and side effects of medication discussed  Patient refused medication  Some 4 cigarettes a day  Preventive health issues were discussed with patient, and age appropriate screening tests were ordered as noted in patient's After Visit Summary  Personalized health advice and appropriate referrals for health education or preventive services given if needed, as noted in patient's After Visit Summary  History of Present Illness:     Patient presents for a Medicare Wellness Visit    Patient is a 43year old female present for evaluation for re-evaluation of post covid symptoms  Patient continues to have a productive cough  Continues     Patient Care Team:  Sarah Tolbert as PCP - General (Nurse Practitioner)  Brianna Soriano MD as PCP - [address] [place] [address] (RTE)  [facility]     Review of Systems:     Review of Systems   Constitutional: Negative for activity change, appetite change, chills, fatigue, fever and unexpected weight change     HENT: Negative for congestion, ear discharge, ear pain, nosebleeds, postnasal drip, rhinorrhea, sinus pressure, sinus pain, sneezing, sore throat and voice change  Eyes: Negative for pain, redness and visual disturbance  Respiratory: Positive for cough and chest tightness  Negative for shortness of breath and wheezing  Cardiovascular: Negative for chest pain and palpitations  Gastrointestinal: Negative for abdominal distention, abdominal pain, constipation, diarrhea, nausea and vomiting  Endocrine: Negative  Genitourinary: Negative for difficulty urinating, dysuria, flank pain, frequency, hematuria and urgency  Musculoskeletal: Positive for myalgias  Negative for arthralgias  Skin: Negative  Allergic/Immunologic: Negative  Neurological: Negative  Hematological: Negative  Psychiatric/Behavioral: Negative           Problem List:     Patient Active Problem List   Diagnosis   • Encounter for gynecological examination (general) (routine) without abnormal findings   • Asthma   • Fibrocystic breast disease   • Depression   • Fibromyalgia   • HLA B27 (HLA B27 positive)   • Memory loss   • Tremor   • Paresthesia   • Binge eating disorder   • Exposure to COVID-19 virus   • Short of breath on exertion   • Acute frontal sinusitis   • Medicare annual wellness visit, initial   • Gastroesophageal reflux disease without esophagitis   • Seasonal allergies   • Tobacco abuse counseling   • Alcohol use disorder, severe, in early remission ([facility] [address] )   • Major depressive disorder, recurrent, mild (HCC)   • Severe protein-calorie malnutrition (HCC)   • Acute nonintractable headache   • Nasal congestion   • Iron deficiency anemia secondary to inadequate dietary iron intake   • Chronic bilateral low back pain   • Neck pain   • Cervical radiculopathy   • Mid back pain   • Contusion of head, subsequent encounter   • Follow-up exam   • Underweight   • Speech disturbance   • Myofascial pain syndrome   • Anxiety   • Asthma in adult, mild persistent, with acute exacerbation   • URI with cough and congestion   • Acute recurrent ethmoidal sinusitis   • Mild intermittent asthma, uncomplicated   • [id number]   • Flu-like symptoms   • Moderate asthma with exacerbation   • Hyponatremia      Past Medical and Surgical History:     Past Medical History:   Diagnosis Date   • Allergies    • Anxiety    • Asthma    • Chronic pain    • Contusion of head, subsequent encounter 2022   • Depression    • Eating disorder    • Fibrocystic breast changes of both breasts    • Seizures ([address] )      Past Surgical History:   Procedure Laterality Date   • INDUCED      • MAMMO STEREOTACTIC BREAST BIOPSY LEFT (ALL INC)      Benign      Family History:     Family History   Problem Relation Age of Onset   • Diabetes Sister    • Alcohol abuse Sister    • Cancer Father         Diagnosed stage 4 - metastazied   • Psychiatric Illness Mother    • Hypertension Family    • Diabetes Family       Social History:     Social History     Socioeconomic History   • Marital status: /Civil Union     Spouse name: None   • Number of children: None   • Years of education: None   • Highest education level: High school graduate   Occupational History   • Occupation: part time    Tobacco Use   • Smoking status: Every Day     Packs/day: 0 50     Years: 28 00     Pack years: 14 00     Types: Cigarettes   • Smokeless tobacco: Never   • Tobacco comments:     10-15 cig/day   Vaping Use   • Vaping Use: Never used   Substance and Sexual Activity   • 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   • Drug use: No   • Sexual activity: Yes     Partners: Male   Other Topics Concern   • None   Social History Narrative    · Most recent tobacco use screenin-      · Do you currently or have you served in the [facility] [employer]:   No      · Were you activated, into active duty, as a member of the [employer] or as a Reservist:   No      · Tobacco cessation counseling provided date:   03-      Social Determinants of Health     Financial Resource Strain: Not on file   Food Insecurity: Not on file   Transportation Needs: Not on file   Physical Activity: Not on file   Stress: Not on file   Social Connections: Not on file   Intimate Partner Violence: Not on file   Housing Stability: Not on file      Medications and Allergies:     Current Outpatient Medications   Medication Sig Dispense Refill   • acetaminophen (TYLENOL) 500 mg tablet Take 500 mg by mouth every 6 (six) hours as needed for mild pain     • albuterol (PROVENTIL HFA,VENTOLIN HFA) 90 mcg/act inhaler TAKE 2 PUFFS BY MOUTH EVERY 6 HOURS AS NEEDED FOR WHEEZE OR FOR SHORTNESS OF BREATH 18 g 1   • Ascorbic Acid (VITAMIN C) 100 MG tablet Take 100 mg by mouth daily     • Biotin w/ Vitamins C & E (HAIR/SKIN/NAILS PO) Take by mouth     • busPIRone (BUSPAR) 15 mg tablet Take by mouth 3 (three) times a day       • CALCIUM-MAGNESIUM-ZINC PO Take by mouth     • Flovent  MCG/ACT inhaler INHALE 2 PUFFS BY MOUTH 2 TIMES A DAY RINSE MOUTH AFTER USE  12 g 1   • fluticasone (FLONASE) 50 mcg/act nasal spray 1 spray into each nostril daily 16 g 2   • fluticasone (FLOVENT HFA) 110 MCG/ACT inhaler 110 puffs     • ibuprofen (MOTRIN) 600 mg tablet Take by mouth every 6 (six) hours as needed for mild pain     • loratadine (CLARITIN) 10 mg tablet Take 1 tablet (10 mg total) by mouth daily 30 tablet 2   • LORazepam (ATIVAN) 1 mg tablet Take 1 mg by mouth 6 (six) times a day  1   • naproxen (EC NAPROSYN) 500 MG EC tablet Take 1 tablet (500 mg total) by mouth 2 (two) times a day as needed for mild pain 30 tablet 0   • naproxen (NAPROSYN) 500 mg tablet TAKE 1 TABLET BY MOUTH TWICE A DAY WITH MEALS 60 tablet 5   • omeprazole (PriLOSEC) 20 mg delayed release capsule TAKE 1 CAPSULE (20 MG TOTAL) BY MOUTH TWICE DAILY 60 capsule 0   • ondansetron (ZOFRAN-ODT) 8 mg disintegrating tablet TAKE 1 TABLET BY MOUTH EVERY 8 HOURS AS NEEDED FOR NAUSEA OR VOMITING   20 tablet 2   • thiamine (VITAMIN B1) 100 mg tablet Take 100 mg by mouth daily     • tiZANidine (ZANAFLEX) 2 mg tablet Take 1 tablet (2 mg total) by mouth every 8 (eight) hours as needed for muscle spasms 90 tablet 2   • benzonatate (TESSALON) 200 MG capsule Take 1 capsule (200 mg total) by mouth 3 (three) times a day as needed for cough (Patient not taking: Reported on 1/11/2023) 20 capsule 0   • Flowflex COVID-19 Ag Home Test KIT Use as directed (Patient not taking: Reported on 1/11/2023)     • hydrocortisone 2 5 % cream hydrocortisone 2 5 % topical cream with perineal applicator (Patient not taking: Reported on 6/1/2022)     • ipratropium (ATROVENT) 0 06 % nasal spray 2 SPRAYS INTO EACH NOSTRIL 4 (FOUR) TIMES A DAY (Patient not taking: Reported on 6/1/2022) 15 mL 1   • methylPREDNISolone 4 MG tablet therapy pack Use as directed on package (Patient not taking: Reported on 1/17/2023) 21 each 0     No current facility-administered medications for this visit  Allergies   Allergen Reactions   • Prednisone Anxiety   • Gabapentin Hives   • Meloxicam GI Intolerance   • Tramadol Hives     SEIZURES   • Vistaril [Hydroxyzine]    • Latex Other (See Comments)     C/o itching       Immunizations:     Immunization History   Administered Date(s) Administered   • COVID-19, unspecified 05/14/2022   • Tdap 08/13/2017      Health Maintenance:         Topic Date Due   • Breast Cancer Screening: Mammogram  02/20/2021   • Cervical Cancer Screening  10/04/2023   • HIV Screening  Completed   • Hepatitis C Screening  Completed         Topic Date Due   • Hepatitis A Vaccine (1 of 2 - Risk 2-dose series) Never done   • Pneumococcal Vaccine: Pediatrics (0 to 5 Years) and At-Risk Patients (6 to 59 Years) (1 - PCV) Never done   • COVID-19 Vaccine (2 - Mixed Product series) 06/11/2022      Medicare Screening Tests and Risk Assessments: Rosalva Hamilton is here for her Initial Wellness visit   Last Medicare Wellness visit information reviewed, patient interviewed and updates made to the record today  Health Risk Assessment:   Patient rates overall health as fair  Patient feels that their physical health rating is much worse  Patient is dissatisfied with their life  Eyesight was rated as same  Hearing was rated as same  Patient feels that their emotional and mental health rating is same  Patients states they are never, rarely angry  Patient states they are often unusually tired/fatigued  Pain experienced in the last 7 days has been some  Patient's pain rating has been 7/10  Patient states that she has experienced no weight loss or gain in last 6 months  Depression Screening:   PHQ-9 Score: 4      Fall Risk Screening: In the past year, patient has experienced: no history of falling in past year      Urinary Incontinence Screening:   Patient has not leaked urine accidently in the last six months  Home Safety:  Patient has trouble with stairs inside or outside of their home  Patient has working smoke alarms and has working carbon monoxide detector  Home safety hazards include: not having non-slip bath and/or shower mats and poor household lighting  Nutrition:   Current diet is Frequent junk food  Medications:   Patient is currently taking over-the-counter supplements  OTC medications include: Vitamins  Patient is able to manage medications  Activities of Daily Living (ADLs)/Instrumental Activities of Daily Living (IADLs):   Walk and transfer into and out of bed and chair?: Yes  Dress and groom yourself?: Yes    Bathe or shower yourself?: Yes    Feed yourself?  Yes  Do your laundry/housekeeping?: Yes  Manage your money, pay your bills and track your expenses?: Yes  Make your own meals?: Yes    Do your own shopping?: Yes    Previous Hospitalizations:   Any hospitalizations or ED visits within the last 12 months?: Yes    How many hospitalizations have you had in the last year?: 3-4    Hospitalization Comments: Concussion, Reaction to medication and COVID    Advance Care Planning:   Living will: No    Durable POA for healthcare: No    Advanced directive: No    Advanced directive counseling given: Yes    Five wishes given: Yes    Patient declined ACP directive: No    End of Life Decisions reviewed with patient: No    Provider agrees with end of life decisions: Yes      Cognitive Screening:   Provider or family/friend/caregiver concerned regarding cognition?: No    PREVENTIVE SCREENINGS      Cardiovascular Screening:    General: Screening Current      Diabetes Screening:     General: Screening Current      Breast Cancer Screening:       Due for: Mammogram        Cervical Cancer Screening:      Due for: Cervical Pap Smear      Lung Cancer Screening:     General: Screening Not Indicated      Hepatitis C Screening:    General: Screening Current    Screening, Brief Intervention, and Referral to Treatment (SBIRT)    Screening  Typical number of drinks in a day: 0  Typical number of drinks in a week: 0  Interpretation: Low risk drinking behavior  Single Item Drug Screening:  How often have you used an illegal drug (including marijuana) or a prescription medication for non-medical reasons in the past year? never    Single Item Drug Screen Score: 0  Interpretation: Negative screen for possible drug use disorder    Brief Intervention  Alcohol & drug use screenings were reviewed  No concerns regarding substance use disorder identified  Other Counseling Topics:   Car/seat belt/driving safety, skin self-exam, sunscreen and calcium and vitamin D intake and regular weightbearing exercise  No results found  Physical Exam:     /70 (BP Location: Left arm, Patient Position: Sitting, Cuff Size: Standard)   Pulse 100   Temp (!) 97 1 °F (36 2 °C) (Temporal)   Resp 18   Ht 5' 1" (1 549 m)   Wt 40 8 kg (90 lb)   SpO2 99%   BMI 17 01 kg/m²     Physical Exam  Vitals and nursing note reviewed  Constitutional:       General: She is not in acute distress       Appearance: She is well-developed  HENT:      Head: Normocephalic and atraumatic  Eyes:      Conjunctiva/sclera: Conjunctivae normal    Cardiovascular:      Rate and Rhythm: Normal rate and regular rhythm  Heart sounds: No murmur heard  Pulmonary:      Effort: Pulmonary effort is normal  No respiratory distress  Breath sounds: Normal breath sounds  Comments: Diminished Right breath sounds  Abdominal:      Palpations: Abdomen is soft  Tenderness: There is no abdominal tenderness  Musculoskeletal:         General: Tenderness present  No swelling  Cervical back: Neck supple  Skin:     General: Skin is warm and dry  Capillary Refill: Capillary refill takes less than 2 seconds  Neurological:      Mental Status: She is alert     Psychiatric:         Mood and Affect: Mood normal           SOLO Monterroso

## 2023-01-13 NOTE — PROGRESS NOTES
Assessment/Plan:         Problem List Items Addressed This Visit    None  Visit Diagnoses     Encounter for immunization    -  Primary            Subjective:      Patient ID: Idania Khan is a 43 y o  female  HPI    The following portions of the patient's history were reviewed and updated as appropriate:   Past Medical History:  She has a past medical history of Allergies, Anxiety, Asthma, Chronic pain, Contusion of head, subsequent encounter (2022), Depression, Eating disorder, Fibrocystic breast changes of both breasts, and Seizures ([address] )  ,  _______________________________________________________________________  Medical Problems:  does not have any pertinent problems on file ,  _______________________________________________________________________  Past Surgical History:   has a past surgical history that includes Induced  () and Mammo stereotactic breast biopsy left (all inc) ()  ,  _______________________________________________________________________  Family History:  family history includes Alcohol abuse in her sister; Cancer in her father; Diabetes in her family and sister; Hypertension in her family; Psychiatric Illness in her mother ,  _______________________________________________________________________  Social History:   reports that she has been smoking cigarettes  She has a 14 00 pack-year smoking history  She has never used smokeless tobacco  She reports that she does not drink alcohol and does not use drugs  ,  _______________________________________________________________________  Allergies:  is allergic to prednisone, gabapentin, meloxicam, tramadol, vistaril [hydroxyzine], and latex     _______________________________________________________________________  Current Outpatient Medications   Medication Sig Dispense Refill   • acetaminophen (TYLENOL) 500 mg tablet Take 500 mg by mouth every 6 (six) hours as needed for mild pain     • albuterol (PROVENTIL HFA,VENTOLIN HFA) 90 mcg/act inhaler TAKE 2 PUFFS BY MOUTH EVERY 6 HOURS AS NEEDED FOR WHEEZE OR FOR SHORTNESS OF BREATH 18 g 1   • Ascorbic Acid (VITAMIN C) 100 MG tablet Take 100 mg by mouth daily     • benzonatate (TESSALON) 200 MG capsule Take 1 capsule (200 mg total) by mouth 3 (three) times a day as needed for cough (Patient not taking: Reported on 1/11/2023) 20 capsule 0   • Biotin w/ Vitamins C & E (HAIR/SKIN/NAILS PO) Take by mouth     • busPIRone (BUSPAR) 15 mg tablet Take by mouth 3 (three) times a day       • CALCIUM-MAGNESIUM-ZINC PO Take by mouth     • Flovent  MCG/ACT inhaler INHALE 2 PUFFS BY MOUTH 2 TIMES A DAY RINSE MOUTH AFTER USE  12 g 1   • Flowflex COVID-19 Ag Home Test KIT Use as directed (Patient not taking: Reported on 1/11/2023)     • fluticasone (FLONASE) 50 mcg/act nasal spray 1 spray into each nostril daily 16 g 2   • fluticasone (FLOVENT HFA) 110 MCG/ACT inhaler 110 puffs     • hydrocortisone 2 5 % cream hydrocortisone 2 5 % topical cream with perineal applicator (Patient not taking: Reported on 6/1/2022)     • ibuprofen (MOTRIN) 600 mg tablet Take by mouth every 6 (six) hours as needed for mild pain     • ipratropium (ATROVENT) 0 06 % nasal spray 2 SPRAYS INTO EACH NOSTRIL 4 (FOUR) TIMES A DAY (Patient not taking: Reported on 6/1/2022) 15 mL 1   • loratadine (CLARITIN) 10 mg tablet Take 1 tablet (10 mg total) by mouth daily 30 tablet 2   • LORazepam (ATIVAN) 1 mg tablet Take 1 mg by mouth 6 (six) times a day  1   • methylPREDNISolone 4 MG tablet therapy pack Use as directed on package 21 each 0   • naproxen (EC NAPROSYN) 500 MG EC tablet Take 1 tablet (500 mg total) by mouth 2 (two) times a day as needed for mild pain 30 tablet 0   • naproxen (NAPROSYN) 500 mg tablet TAKE 1 TABLET BY MOUTH TWICE A DAY WITH MEALS 60 tablet 5   • omeprazole (PriLOSEC) 20 mg delayed release capsule TAKE 1 CAPSULE (20 MG TOTAL) BY MOUTH TWICE DAILY 60 capsule 0   • ondansetron (ZOFRAN-ODT) 8 mg disintegrating tablet TAKE 1 TABLET BY MOUTH EVERY 8 HOURS AS NEEDED FOR NAUSEA OR VOMITING  20 tablet 2   • thiamine (VITAMIN B1) 100 mg tablet Take 100 mg by mouth daily     • tiZANidine (ZANAFLEX) 2 mg tablet Take 1 tablet (2 mg total) by mouth every 8 (eight) hours as needed for muscle spasms 90 tablet 2     No current facility-administered medications for this visit      _______________________________________________________________________  Review of Systems      Objective: There were no vitals filed for this visit  There is no height or weight on file to calculate BMI       Physical Exam

## 2023-01-13 NOTE — TELEPHONE ENCOUNTER
----- Message from Nir Ruiz, [address] sent at 1/12/2023  3:56 PM EST -----  Please call patient and notify test results are normal

## 2023-01-17 ENCOUNTER — OFFICE VISIT (OUTPATIENT)
Dept: FAMILY MEDICINE CLINIC | Facility: CLINIC | Age: 43
End: 2023-01-17

## 2023-01-17 VITALS
SYSTOLIC BLOOD PRESSURE: 112 MMHG | TEMPERATURE: 97.1 F | HEART RATE: 100 BPM | DIASTOLIC BLOOD PRESSURE: 70 MMHG | BODY MASS INDEX: 16.99 KG/M2 | WEIGHT: 90 LBS | RESPIRATION RATE: 18 BRPM | OXYGEN SATURATION: 99 % | HEIGHT: 61 IN

## 2023-01-17 DIAGNOSIS — J45.31 ASTHMA IN ADULT, MILD PERSISTENT, WITH ACUTE EXACERBATION: Primary | ICD-10-CM

## 2023-01-17 DIAGNOSIS — R06.02 SHORT OF BREATH ON EXERTION: ICD-10-CM

## 2023-01-17 DIAGNOSIS — J06.9 URI WITH COUGH AND CONGESTION: ICD-10-CM

## 2023-01-17 DIAGNOSIS — F33.1 MODERATE EPISODE OF RECURRENT MAJOR DEPRESSIVE DISORDER (HCC): ICD-10-CM

## 2023-01-17 DIAGNOSIS — F41.9 ANXIETY: ICD-10-CM

## 2023-01-17 DIAGNOSIS — E43 SEVERE PROTEIN-CALORIE MALNUTRITION (HCC): ICD-10-CM

## 2023-01-17 DIAGNOSIS — R09.81 NASAL CONGESTION: ICD-10-CM

## 2023-01-17 DIAGNOSIS — Z00.00 MEDICARE ANNUAL WELLNESS VISIT, SUBSEQUENT: ICD-10-CM

## 2023-01-17 DIAGNOSIS — Z71.6 TOBACCO ABUSE COUNSELING: ICD-10-CM

## 2023-01-17 DIAGNOSIS — J30.2 SEASONAL ALLERGIES: ICD-10-CM

## 2023-01-17 RX ORDER — ALBUTEROL SULFATE 2.5 MG/3ML
2.5 SOLUTION RESPIRATORY (INHALATION) ONCE
Status: COMPLETED | OUTPATIENT
Start: 2023-01-17 | End: 2023-01-17

## 2023-01-17 RX ADMIN — ALBUTEROL SULFATE 2.5 MG: 2.5 SOLUTION RESPIRATORY (INHALATION) at 16:59

## 2023-01-17 NOTE — ASSESSMENT & PLAN NOTE
Covid + on 1/3/2023  Continues to have cough, fatigue and SOB  Increased use of inhalers  Lungs sounded decreased on right  Nebulizer treatment provided with minimal relief  Chest Xray PA and Lateral ordered  Consult to pulmonology placed

## 2023-01-17 NOTE — ASSESSMENT & PLAN NOTE
She has intermittent episodes of shortness of breath  To continue using her inhalers as directed  Chest x-ray ordered for follow-up evaluation  Patient with history of COVID pulmonary consult ordered

## 2023-01-17 NOTE — ASSESSMENT & PLAN NOTE
Patient smokes a 5 cigarettes/day  Declines nicotine patch and gum  Continues to have cough, post Covid  Encouraged to stop smoking as this can worsen cough

## 2023-01-17 NOTE — ASSESSMENT & PLAN NOTE
Currently taking albuterol inhaler 5 times daily and Flovent inhaler  Decreased lung sounds noted in Right  Nebulizer provided

## 2023-01-17 NOTE — ASSESSMENT & PLAN NOTE
Patient taking loratadine 10 mg p o  nightly  Use Flonase nasal spray as directed  Allergy triggers to be avoided

## 2023-01-17 NOTE — ASSESSMENT & PLAN NOTE
Malnutrition Findings:       Patient counseled proper diet nutrition and exercise  Weight gain encouraged  BMI Findings: Body mass index is 17 01 kg/m²

## 2023-01-17 NOTE — ASSESSMENT & PLAN NOTE
Patient has history of asthma  Currently maintained on albuterol 90 joy inhaler every 4-6 hours as needed  Flovent  mcg inhaler 2 puffs twice daily  Patient instructed on tobacco cessation  Pulmonary consult ordered at this time for further evaluation  Patient have history of COVID-19  Currently completed steroid therapy of methylprednisolone 4 mg p o  daily

## 2023-01-17 NOTE — ASSESSMENT & PLAN NOTE
KOMAL-7 score 4  Minimal anxiety  Instructed on reductions of ativan to improve respiratory function  Patient on buspar 15mg p o daily

## 2023-01-17 NOTE — ASSESSMENT & PLAN NOTE
Patient ordered for chest x-ray for evaluation right anterior chest lower rib pain and pulmonary consultation  If pneumonia present will consider antibiotic therapy    Patient has been on antibiotics multiple times in the past

## 2023-01-22 DIAGNOSIS — K21.9 GASTROESOPHAGEAL REFLUX DISEASE WITHOUT ESOPHAGITIS: ICD-10-CM

## 2023-01-22 DIAGNOSIS — R11.0 NAUSEA: ICD-10-CM

## 2023-01-23 ENCOUNTER — TELEPHONE (OUTPATIENT)
Dept: PULMONOLOGY | Facility: CLINIC | Age: 43
End: 2023-01-23

## 2023-01-23 RX ORDER — ONDANSETRON 8 MG/1
TABLET, ORALLY DISINTEGRATING ORAL
Qty: 20 TABLET | Refills: 2 | Status: SHIPPED | OUTPATIENT
Start: 2023-01-23

## 2023-01-23 RX ORDER — OMEPRAZOLE 20 MG/1
CAPSULE, DELAYED RELEASE ORAL
Qty: 60 CAPSULE | Refills: 0 | Status: SHIPPED | OUTPATIENT
Start: 2023-01-23

## 2023-01-29 DIAGNOSIS — J45.20 MILD INTERMITTENT ASTHMA, UNCOMPLICATED: ICD-10-CM

## 2023-01-30 RX ORDER — ALBUTEROL SULFATE 90 UG/1
AEROSOL, METERED RESPIRATORY (INHALATION)
Qty: 18 G | Refills: 1 | Status: SHIPPED | OUTPATIENT
Start: 2023-01-30

## 2023-02-03 DIAGNOSIS — J45.20 MILD INTERMITTENT ASTHMA WITHOUT COMPLICATION: ICD-10-CM

## 2023-02-03 RX ORDER — DEXAMETHASONE 4 MG/1
TABLET ORAL
Qty: 12 G | Refills: 1 | Status: SHIPPED | OUTPATIENT
Start: 2023-02-03

## 2023-02-07 ENCOUNTER — DOCUMENTATION (OUTPATIENT)
Dept: PULMONOLOGY | Facility: CLINIC | Age: 43
End: 2023-02-07

## 2023-02-07 ENCOUNTER — CONSULT (OUTPATIENT)
Dept: PULMONOLOGY | Facility: CLINIC | Age: 43
End: 2023-02-07

## 2023-02-07 VITALS
SYSTOLIC BLOOD PRESSURE: 90 MMHG | WEIGHT: 86 LBS | BODY MASS INDEX: 16.25 KG/M2 | DIASTOLIC BLOOD PRESSURE: 76 MMHG | OXYGEN SATURATION: 99 % | HEART RATE: 107 BPM

## 2023-02-07 DIAGNOSIS — J30.2 SEASONAL ALLERGIES: ICD-10-CM

## 2023-02-07 DIAGNOSIS — J06.9 URI WITH COUGH AND CONGESTION: ICD-10-CM

## 2023-02-07 DIAGNOSIS — J45.31 ASTHMA IN ADULT, MILD PERSISTENT, WITH ACUTE EXACERBATION: Primary | ICD-10-CM

## 2023-02-07 DIAGNOSIS — Z71.6 TOBACCO ABUSE COUNSELING: ICD-10-CM

## 2023-02-07 RX ORDER — BUDESONIDE AND FORMOTEROL FUMARATE DIHYDRATE 160; 4.5 UG/1; UG/1
2 AEROSOL RESPIRATORY (INHALATION) 2 TIMES DAILY
Qty: 10.2 G | Refills: 3 | Status: SHIPPED | OUTPATIENT
Start: 2023-02-07 | End: 2023-02-08 | Stop reason: CLARIF

## 2023-02-07 RX ORDER — ALBUTEROL SULFATE 2.5 MG/3ML
2.5 SOLUTION RESPIRATORY (INHALATION) EVERY 6 HOURS PRN
Qty: 360 ML | Refills: 6 | Status: SHIPPED | OUTPATIENT
Start: 2023-02-07

## 2023-02-07 NOTE — PROGRESS NOTES
Pulmonary Consultation   Angela Allred 43 y o  female MRN: [id number]  2023    Referring Physician or Provider:  Parag Hoffman, [address]        Chief Complaint:  Asthma    HPI:    77-year-old female with past medical history of moderate persistent asthma, tobacco abuse, presents for initial visit for uncontrolled asthma since being infected with SARS-CoV-2  She states that she frequently feels significantly short of breath with dry cough  She states that albuterol inhaled and nebulized help  She is not taking Flovent as prescribed  She takes albuterol several times a day with modest relief      Past Medical Hx  Past Medical History:   Diagnosis Date   • Allergies    • Anxiety    • Asthma    • Chronic pain    • Contusion of head, subsequent encounter 2022   • Depression    • Eating disorder    • Fibrocystic breast changes of both breasts    • Seizures ([address] )            Past Surgical Hx  Past Surgical History:   Procedure Laterality Date   • INDUCED      • MAMMO STEREOTACTIC BREAST BIOPSY LEFT (ALL INC)      Benign           Family Hx  Family History   Problem Relation Age of Onset   • Diabetes Sister    • Alcohol abuse Sister    • Cancer Father         Diagnosed stage 4 - metastazied   • Psychiatric Illness Mother    • Hypertension Family    • Diabetes Family            Occupational History:   Patient works as a medical assistant  No known previous inhalation injuries      Social History:   Social History     Socioeconomic History   • Marital status: /Civil Union     Spouse name: Not on file   • Number of children: Not on file   • Years of education: Not on file   • Highest education level: High school graduate   Occupational History   • Occupation: part time    Tobacco Use   • Smoking status: Every Day     Packs/day: 0 50     Years: 28 00     Pack years: 14 00     Types: Cigarettes   • Smokeless tobacco: Never   • Tobacco comments: 2023-10-15 cig/day   Vaping Use   • Vaping Use: Never used   Substance and Sexual Activity   • 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   • Drug use: No   • Sexual activity: Yes     Partners: Male   Other Topics Concern   • Not on file   Social History Narrative    · Most recent tobacco use screenin-      · Do you currently or have you served in the [employer]:   No      · Were you activated, into active duty, as a member of the [employer] or as a Reservist:   No      · Tobacco cessation counseling provided date:   03-      Social Determinants of Health     Financial Resource Strain: Not on file   Food Insecurity: Not on file   Transportation Needs: Not on file   Physical Activity: Not on file   Stress: Not on file   Social Connections: Not on file   Intimate Partner Violence: Not on file   Housing Stability: Not on file            Pertinent Meds  Albuterol as needed  Flovent as needed      ROS:  Constitutional: - Fatigue, - chills, - fever, - weight change  HEENT: - rhinorrhea, - sneezing, - sore throat  Respiratory: - cough, - sputum production, + intermittent shortness of breath, - wheezing  Cardiovascular: - chest pain,  -palpitations, - leg swelling  Gastrointestinal: - abdominal pain, - constipation, - diarrhea, - nausea, - vomiting  Endocrine: - cold intolerance, - heat intolerance  Genitourinary: - dysuria  Musculoskeletal: - arthralgias  Skin:- rash, - wound  Allergic/Immunologic: - allergies  Neurological: - dizziness, - numbness      Vitals: Blood pressure 90/76, pulse (!) 107, weight 39 kg (86 lb), SpO2 99 %  , Body mass index is 16 25 kg/m²      Physical Exam  GEN  NAD, very thin appearing  HEENT  ncat, non icteric, MM moist  NECK  supple, no JVD, no LAD  CV  +s1s2, no mrg, RRR  PULM diminished breath sounds diffusely, otherwise CTA BL, no wrr  ABD  soft, ntnd, + BS  EXT  no edema, no cyanosis, no clubbing  NEURO  Aox3, no focal weakness    Imaging and other studies     I have personally viewed and interpreted the following studies:  Chest x-ray 1/9/2023 shows no gross intraparenchymal or pleural abnormalities      Pulmonary function testing:   None    Assessment:  1  Asthma, moderate persistent  2  Tobacco abuse    Plan:  • Discontinue Flovent  • Start Symbicort twice a day  • Continue albuterol as needed  • Recommend nebulizer for nebulized albuterol  • Check CBC with differential  • Check [place] allergy panel  • Check PFTs  • Discussed tobacco cessation for at least 4 minutes  Patient states that she is not mentally ready to quit and does not wish to address this any further  Return visit in 6 to 8 weeks  On next visit we will evaluate symptoms, compliance with Symbicort, albuterol appointment, CBC results, [place] allergy panel results, PFT results, rediscuss tobacco cessation      [facility]

## 2023-02-07 NOTE — PATIENT INSTRUCTIONS
Stop Flovent inhaler and start Symbicort inhaler twice a day, please rinse mouth out after  Use one puff of albuterol inhaler every 4-6hrs as needed for shortness of breath, wait about 10 mins prior to using again to allow medication to work   If using albuterol inhaler and still feeling short of breath then you can use albuterol nebulizer as needed for shortness of breath every 6 hrs  Stop smoking  Please get labwork done  Please get breathing studies done   Follow back in our office in 6-8 weeks

## 2023-02-08 ENCOUNTER — TELEPHONE (OUTPATIENT)
Dept: PULMONOLOGY | Facility: CLINIC | Age: 43
End: 2023-02-08

## 2023-02-08 DIAGNOSIS — J45.20 MILD INTERMITTENT ASTHMA WITHOUT COMPLICATION: Primary | ICD-10-CM

## 2023-02-08 RX ORDER — FLUTICASONE FUROATE AND VILANTEROL 200; 25 UG/1; UG/1
1 POWDER RESPIRATORY (INHALATION) DAILY
Qty: 60 BLISTER | Refills: 3 | Status: SHIPPED | OUTPATIENT
Start: 2023-02-08 | End: 2023-03-10

## 2023-02-08 NOTE — TELEPHONE ENCOUNTER
Spoke with pharmacy albuterol albuterol solution is being filled today  Symbicort and generic not covered, formualry alternatives Breo and Wixela  Please advise

## 2023-02-08 NOTE — TELEPHONE ENCOUNTER
Patient called in regards to prescription given by Dr Lenard Nava yesterday  Patient stated nebulizer treatment is out of stock at her [facility] pharmacy listed & the new inhaler ordered is not covered by insurance  Please advise

## 2023-02-11 PROBLEM — J06.9 URI WITH COUGH AND CONGESTION: Status: RESOLVED | Noted: 2022-12-13 | Resolved: 2023-02-11

## 2023-02-12 PROBLEM — J01.10 ACUTE FRONTAL SINUSITIS: Status: RESOLVED | Noted: 2020-05-15 | Resolved: 2023-02-12

## 2023-02-12 PROBLEM — J01.21 ACUTE RECURRENT ETHMOIDAL SINUSITIS: Status: RESOLVED | Noted: 2022-12-14 | Resolved: 2023-02-12

## 2023-02-15 LAB
ATRIAL RATE: 73 BPM
P AXIS: 76 DEGREES
PR INTERVAL: 140 MS
QRS AXIS: 92 DEGREES
QRSD INTERVAL: 80 MS
QT INTERVAL: 440 MS
QTC INTERVAL: 484 MS
T WAVE AXIS: 76 DEGREES
VENTRICULAR RATE: 73 BPM

## 2023-02-20 DIAGNOSIS — K21.9 GASTROESOPHAGEAL REFLUX DISEASE WITHOUT ESOPHAGITIS: ICD-10-CM

## 2023-02-20 RX ORDER — OMEPRAZOLE 20 MG/1
CAPSULE, DELAYED RELEASE ORAL
Qty: 60 CAPSULE | Refills: 0 | Status: SHIPPED | OUTPATIENT
Start: 2023-02-20

## 2023-02-21 ENCOUNTER — TELEPHONE (OUTPATIENT)
Dept: PULMONOLOGY | Facility: CLINIC | Age: 43
End: 2023-02-21

## 2023-02-21 NOTE — TELEPHONE ENCOUNTER
LM for patient to give a call back to set up a follow up Appt in [place] with Lianna Trevino next available

## 2023-03-16 DIAGNOSIS — J30.2 SEASONAL ALLERGIES: ICD-10-CM

## 2023-03-17 RX ORDER — LORATADINE 10 MG/1
TABLET ORAL
Qty: 30 TABLET | Refills: 2 | Status: SHIPPED | OUTPATIENT
Start: 2023-03-17

## 2023-03-18 PROBLEM — Z00.00 MEDICARE ANNUAL WELLNESS VISIT, INITIAL: Status: RESOLVED | Noted: 2020-05-18 | Resolved: 2023-03-18

## 2023-03-19 DIAGNOSIS — K21.9 GASTROESOPHAGEAL REFLUX DISEASE WITHOUT ESOPHAGITIS: ICD-10-CM

## 2023-03-20 RX ORDER — OMEPRAZOLE 20 MG/1
CAPSULE, DELAYED RELEASE ORAL
Qty: 60 CAPSULE | Refills: 0 | Status: SHIPPED | OUTPATIENT
Start: 2023-03-20

## 2023-04-11 NOTE — PROGRESS NOTES
Daily Note     Today's date: 2022  Patient name: Ej Clifford  : 1980  MRN: [id number]  Referring provider: SOLO Montaño  Dx:   Encounter Diagnosis     ICD-10-CM    1  Cervical radiculopathy  M54 12                   Subjective: Pt reported increased pain and symptoms after getting injections  She feels like her pain and tightness is getting worse  She is also having pain when reaching up in her shoulder  Objective: See treatment diary below      Assessment: Tolerated treatment poor  Patient demonstrated fatigue post treatment  Discomfort noted throughout session with exercises  Pt did perform to her tolerance  Severe restrictions along b/l UT, cervical, and rhomboids  Pt continued to have symptoms but did have a little relief post manuals  Monitor symptoms NV  Plan: Continue per plan of care  Precautions: multiple allergies(gabapentin),       Manuals          traction  manual NB Manual KK TC 12'         STM UT, cervical spine  NB  TC 12'                                   Neuro Re-Ed             Chin tucks  5"x15 5"x15 5"x15         scap retract  5"x15  5"x15         TB Rows  3x10 OTB 10x          Cervical Retraction   10x 10                                                Ther Ex             Cerv Rotation AROM  3"x10 ea 3"x10 ea 3"x10 ea  Cerv Flex/Ext AROM  3"x10 ea 3"x10 ea 3"x10 ea  UT St    30"x3 30"x3 30"x3 ea                                                                            Ther Activity                                       Gait Training                                       Modalities             HP no gum bleeding/no nose bleeding

## 2023-05-20 DIAGNOSIS — J45.20 MILD INTERMITTENT ASTHMA WITHOUT COMPLICATION: ICD-10-CM

## 2023-05-20 DIAGNOSIS — J30.2 SEASONAL ALLERGIES: ICD-10-CM

## 2023-05-20 DIAGNOSIS — J45.20 MILD INTERMITTENT ASTHMA, UNCOMPLICATED: ICD-10-CM

## 2023-05-20 DIAGNOSIS — M79.7 FIBROMYALGIA: ICD-10-CM

## 2023-05-22 RX ORDER — ALBUTEROL SULFATE 90 UG/1
AEROSOL, METERED RESPIRATORY (INHALATION)
Qty: 18 G | Refills: 3 | Status: SHIPPED | OUTPATIENT
Start: 2023-05-22 | End: 2023-08-20

## 2023-05-22 RX ORDER — LORATADINE 10 MG/1
TABLET ORAL
Qty: 90 TABLET | Refills: 3 | Status: SHIPPED | OUTPATIENT
Start: 2023-05-22

## 2023-05-22 RX ORDER — NAPROXEN 500 MG/1
TABLET ORAL
Qty: 60 TABLET | Refills: 5 | Status: SHIPPED | OUTPATIENT
Start: 2023-05-22

## 2023-05-22 RX ORDER — FLUTICASONE FUROATE AND VILANTEROL TRIFENATATE 200; 25 UG/1; UG/1
POWDER RESPIRATORY (INHALATION)
Qty: 60 EACH | Refills: 3 | Status: SHIPPED | OUTPATIENT
Start: 2023-05-22

## 2023-05-23 ENCOUNTER — APPOINTMENT (OUTPATIENT)
Dept: LAB | Facility: CLINIC | Age: 43
End: 2023-05-23

## 2023-05-23 ENCOUNTER — OFFICE VISIT (OUTPATIENT)
Dept: PAIN MEDICINE | Facility: CLINIC | Age: 43
End: 2023-05-23

## 2023-05-23 ENCOUNTER — TELEPHONE (OUTPATIENT)
Dept: RADIOLOGY | Facility: CLINIC | Age: 43
End: 2023-05-23

## 2023-05-23 VITALS
WEIGHT: 96 LBS | SYSTOLIC BLOOD PRESSURE: 102 MMHG | HEART RATE: 99 BPM | DIASTOLIC BLOOD PRESSURE: 70 MMHG | HEIGHT: 61 IN | BODY MASS INDEX: 18.12 KG/M2

## 2023-05-23 DIAGNOSIS — J30.2 SEASONAL ALLERGIES: ICD-10-CM

## 2023-05-23 DIAGNOSIS — M47.812 CERVICAL SPONDYLOSIS: Primary | ICD-10-CM

## 2023-05-23 DIAGNOSIS — J45.31 ASTHMA IN ADULT, MILD PERSISTENT, WITH ACUTE EXACERBATION: ICD-10-CM

## 2023-05-23 DIAGNOSIS — M79.18 MYOFASCIAL PAIN SYNDROME: ICD-10-CM

## 2023-05-23 LAB
BASOPHILS # BLD AUTO: 0.07 THOUSANDS/ÂΜL (ref 0–0.1)
BASOPHILS NFR BLD AUTO: 1 % (ref 0–1)
EOSINOPHIL # BLD AUTO: 0.12 THOUSAND/ÂΜL (ref 0–0.61)
EOSINOPHIL NFR BLD AUTO: 2 % (ref 0–6)
ERYTHROCYTE [DISTWIDTH] IN BLOOD BY AUTOMATED COUNT: 13.9 % (ref 11.6–15.1)
HCT VFR BLD AUTO: 36.7 % (ref 34.8–46.1)
HGB BLD-MCNC: 12.1 G/DL (ref 11.5–15.4)
IMM GRANULOCYTES # BLD AUTO: 0.01 THOUSAND/UL (ref 0–0.2)
IMM GRANULOCYTES NFR BLD AUTO: 0 % (ref 0–2)
LYMPHOCYTES # BLD AUTO: 3.09 THOUSANDS/ÂΜL (ref 0.6–4.47)
LYMPHOCYTES NFR BLD AUTO: 38 % (ref 14–44)
MCH RBC QN AUTO: 31.5 PG (ref 26.8–34.3)
MCHC RBC AUTO-ENTMCNC: 33 G/DL (ref 31.4–37.4)
MCV RBC AUTO: 96 FL (ref 82–98)
MONOCYTES # BLD AUTO: 0.67 THOUSAND/ÂΜL (ref 0.17–1.22)
MONOCYTES NFR BLD AUTO: 8 % (ref 4–12)
NEUTROPHILS # BLD AUTO: 4.18 THOUSANDS/ÂΜL (ref 1.85–7.62)
NEUTS SEG NFR BLD AUTO: 51 % (ref 43–75)
NRBC BLD AUTO-RTO: 0 /100 WBCS
PLATELET # BLD AUTO: 343 THOUSANDS/UL (ref 149–390)
PMV BLD AUTO: 8.8 FL (ref 8.9–12.7)
RBC # BLD AUTO: 3.84 MILLION/UL (ref 3.81–5.12)
WBC # BLD AUTO: 8.14 THOUSAND/UL (ref 4.31–10.16)

## 2023-05-23 NOTE — PROGRESS NOTES
Assessment:  1  Cervical spondylosis    2  Myofascial pain syndrome        Plan:  1  We will schedule the patient for left C4-6 medial branch blocks with intention of moving forward towards radiofrequency ablation if there is an appropriate diagnostic response  The initial blocks will be performed with 2% lidocaine and if an appropriate response is obtained upon review of the patient's pain diary, a confirmatory block will be scheduled  Complete risks and benefits including bleeding, infection, tissue reaction, nerve injury and allergic reaction were discussed  The patient was agreeable and verbalized an understanding  2    patient may continue naproxen as prescribed  She was counseled to only use 1 NSAID at a time as she states she also uses ibuprofen  3  Continue with chiropractic therapy as scheduled  4  Patient is nonopioid therapy from our practice secondary to EtOH abuse in the past  5  Follow-up pending results of blocks or sooner if needed    History of Present Illness: The patient is a 43 y o  female last seen on 09/27/2022 who presents for a follow up office visit in regards to chronic left-sided neck pain  She denies bowel or bladder incontinence or balance issues  She has had cervical epidural steroid injection and trigger point injections in the past without much relief  She has allergies to gabapentin and has been unable to tolerate side effects of tramadol  She does use ibuprofen and naproxen as needed with some relief  EMG of the upper extremities was unremarkable  Explained the cervical spine from March 2022 reveals a moderate to severe disc space narrowing at C5-6 and retrolisthesis of C5 on C6  She is currently involved in chiropractic therapy and has only attended 1 session so far    The patient rates her pain an 8 out of 10 on the numeric pain rating scale    She currently has pain throughout the day which is described as burning, sharp and pressure-like    I have personally reviewed and/or updated the patient's past medical history, past surgical history, family history, social history, current medications, allergies, and vital signs today  Review of Systems:    Review of Systems   Respiratory: Negative for shortness of breath  Cardiovascular: Negative for chest pain  Gastrointestinal: Negative for constipation, diarrhea, nausea and vomiting  Musculoskeletal: Negative for arthralgias, gait problem, joint swelling and myalgias  Skin: Negative for rash  Neurological: Negative for dizziness, seizures and weakness  All other systems reviewed and are negative          Past Medical History:   Diagnosis Date   • Allergies    • Anxiety    • Asthma    • Chronic pain    • Contusion of head, subsequent encounter 2022   • Depression    • Eating disorder    • Fibrocystic breast changes of both breasts    • Seizures ([facility] [address] )        Past Surgical History:   Procedure Laterality Date   • INDUCED      • MAMMO STEREOTACTIC BREAST BIOPSY LEFT (ALL INC)  2015    Benign       Family History   Problem Relation Age of Onset   • Diabetes Sister    • Alcohol abuse Sister    • Cancer Father         Diagnosed stage 4 - metastazied   • Psychiatric Illness Mother    • Hypertension Family    • Diabetes Family        Social History     Occupational History   • Occupation: part time    Tobacco Use   • Smoking status: Every Day     Packs/day: 0 50     Years: 28 00     Pack years: 14 00     Types: Cigarettes   • Smokeless tobacco: Never   • Tobacco comments:     2023-10-15 cig/day   Vaping Use   • Vaping Use: Never used   Substance and Sexual Activity   • 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   • Drug use: No   • Sexual activity: Yes     Partners: Male         Current Outpatient Medications:   •  acetaminophen (TYLENOL) 500 mg tablet, Take 500 mg by mouth every 6 (six) hours as needed for mild pain, Disp: , Rfl:   •  Ascorbic Acid (VITAMIN C) 100 MG tablet, Take 100 mg by mouth daily, Disp: , Rfl:   •  Biotin w/ Vitamins C & E (HAIR/SKIN/NAILS PO), Take by mouth, Disp: , Rfl:   •  Breo Ellipta 200-25 MCG/ACT inhaler, INHALE 1 PUFF DAILY RINSE MOUTH AFTER USE , Disp: 60 each, Rfl: 3  •  busPIRone (BUSPAR) 15 mg tablet, Take by mouth 3 (three) times a day  , Disp: , Rfl:   •  CALCIUM-MAGNESIUM-ZINC PO, Take by mouth, Disp: , Rfl:   •  ibuprofen (MOTRIN) 600 mg tablet, Take by mouth every 6 (six) hours as needed for mild pain, Disp: , Rfl:   •  loratadine (CLARITIN) 10 mg tablet, TAKE 1 TABLET BY MOUTH EVERY DAY, Disp: 90 tablet, Rfl: 3  •  LORazepam (ATIVAN) 1 mg tablet, Take 1 mg by mouth 6 (six) times a day, Disp: , Rfl: 1  •  naproxen (NAPROSYN) 500 mg tablet, TAKE 1 TABLET BY MOUTH TWICE A DAY WITH MEALS, Disp: 60 tablet, Rfl: 5  •  omeprazole (PriLOSEC) 20 mg delayed release capsule, TAKE 1 CAPSULE (20 MG TOTAL) BY MOUTH TWICE DAILY, Disp: 60 capsule, Rfl: 2  •  ondansetron (ZOFRAN-ODT) 8 mg disintegrating tablet, TAKE 1 TABLET BY MOUTH EVERY 8 HOURS AS NEEDED FOR NAUSEA AND VOMITING, Disp: 20 tablet, Rfl: 2  •  albuterol (2 5 mg/3 mL) 0 083 % nebulizer solution, Take 3 mL (2 5 mg total) by nebulization every 6 (six) hours as needed for wheezing or shortness of breath, Disp: 360 mL, Rfl: 6  •  albuterol (PROVENTIL HFA,VENTOLIN HFA) 90 mcg/act inhaler, TAKE 2 PUFFS BY MOUTH EVERY 6 HOURS AS NEEDED FOR WHEEZE OR FOR SHORTNESS OF BREATH, Disp: 18 g, Rfl: 3  •  benzonatate (TESSALON) 200 MG capsule, Take 1 capsule (200 mg total) by mouth 3 (three) times a day as needed for cough (Patient not taking: Reported on 1/11/2023), Disp: 20 capsule, Rfl: 0  •  Flowflex COVID-19 Ag Home Test KIT, Use as directed (Patient not taking: Reported on 1/11/2023), Disp: , Rfl:   •  fluticasone (FLONASE) 50 mcg/act nasal spray, 1 spray into each nostril daily, Disp: 16 g, Rfl: 2  •  hydrocortisone 2 5 % cream, hydrocortisone 2 5 % topical cream with perineal applicator (Patient not taking: Reported on "6/1/2022), Disp: , Rfl:   •  ipratropium (ATROVENT) 0 06 % nasal spray, 2 SPRAYS INTO EACH NOSTRIL 4 (FOUR) TIMES A DAY (Patient not taking: Reported on 6/1/2022), Disp: 15 mL, Rfl: 1  •  methylPREDNISolone 4 MG tablet therapy pack, Use as directed on package (Patient not taking: Reported on 1/17/2023), Disp: 21 each, Rfl: 0  •  naproxen (EC NAPROSYN) 500 MG EC tablet, Take 1 tablet (500 mg total) by mouth 2 (two) times a day as needed for mild pain, Disp: 30 tablet, Rfl: 0  •  thiamine (VITAMIN B1) 100 mg tablet, Take 100 mg by mouth daily, Disp: , Rfl:   •  tiZANidine (ZANAFLEX) 2 mg tablet, Take 1 tablet (2 mg total) by mouth every 8 (eight) hours as needed for muscle spasms (Patient not taking: Reported on 2/7/2023), Disp: 90 tablet, Rfl: 2    Allergies   Allergen Reactions   • Prednisone Anxiety   • Gabapentin Hives   • Meloxicam GI Intolerance   • Tramadol Hives     SEIZURES   • Vistaril [Hydroxyzine]    • Latex Other (See Comments)     C/o itching        Physical Exam:    /70   Pulse 99   Ht 5' 1\" (1 549 m)   Wt 43 5 kg (96 lb)   BMI 18 14 kg/m²     Constitutional:normal, well developed, well nourished, alert, in no distress and non-toxic and no overt pain behavior  Eyes:anicteric  HEENT:grossly intact  Neck:supple, symmetric, trachea midline and no masses   Pulmonary:even and unlabored  Cardiovascular:No edema or pitting edema present  Skin:Normal without rashes or lesions and well hydrated  Psychiatric:Mood and affect appropriate  Neurologic:Cranial Nerves II-XII grossly intact  Musculoskeletal:normal gait  Left cervical paraspinal and trapezial musculature tender to palpation    Positive Cervical facet loading      Imaging  FL spine and pain procedure    (Results Pending)         Orders Placed This Encounter   Procedures   • FL spine and pain procedure     "

## 2023-05-24 NOTE — TELEPHONE ENCOUNTER
Called patient scheduled appt  Reviewed instructions by phone upon scheduling  Emailed copy to [email or web address] per patient request      Patient will need a LYFT ride- will have that scheduled 1 week prior to procedure

## 2023-05-26 LAB
A ALTERNATA IGE QN: <0.1 KUA/I
A FUMIGATUS IGE QN: <0.1 KUA/I
BERMUDA GRASS IGE QN: <0.1 KUA/I
BOXELDER IGE QN: <0.1 KUA/I
C HERBARUM IGE QN: <0.1 KUA/I
CAT DANDER IGE QN: 0.43 KUA/I
CMN PIGWEED IGE QN: <0.1 KUA/I
COMMON RAGWEED IGE QN: 0.12 KUA/I
COTTONWOOD IGE QN: <0.1 KUA/I
D FARINAE IGE QN: 2.35 KUA/I
D PTERONYSS IGE QN: 2.29 KUA/I
DOG DANDER IGE QN: <0.1 KUA/I
LONDON PLANE IGE QN: <0.1 KUA/I
MOUSE URINE PROT IGE QN: <0.1 KUA/I
MT JUNIPER IGE QN: <0.1 KUA/I
MUGWORT IGE QN: <0.1 KUA/I
P NOTATUM IGE QN: <0.1 KUA/I
ROACH IGE QN: <0.1 KUA/I
SHEEP SORREL IGE QN: <0.1 KUA/I
SILVER BIRCH IGE QN: <0.1 KUA/I
TIMOTHY IGE QN: <0.1 KUA/I
TOTAL IGE SMQN RAST: 22.4 KU/L (ref 0–113)
WALNUT IGE QN: <0.1 KUA/I
WHITE ASH IGE QN: <0.1 KUA/I
WHITE ELM IGE QN: <0.1 KUA/I
WHITE MULBERRY IGE QN: <0.1 KUA/I
WHITE OAK IGE QN: <0.1 KUA/I

## 2023-06-15 ENCOUNTER — TELEPHONE (OUTPATIENT)
Dept: PAIN MEDICINE | Facility: CLINIC | Age: 43
End: 2023-06-15

## 2023-06-15 NOTE — TELEPHONE ENCOUNTER
Caller:  Modesta Ram PT    Doctor: Dr Brando Oreilly     Reason for call: Reschedule the procedure     Call back#: [phone]

## 2023-06-20 DIAGNOSIS — K21.9 GASTROESOPHAGEAL REFLUX DISEASE WITHOUT ESOPHAGITIS: ICD-10-CM

## 2023-06-20 RX ORDER — OMEPRAZOLE 20 MG/1
CAPSULE, DELAYED RELEASE ORAL
Qty: 60 CAPSULE | Refills: 2 | Status: SHIPPED | OUTPATIENT
Start: 2023-06-20

## 2023-07-05 ENCOUNTER — HOSPITAL ENCOUNTER (OUTPATIENT)
Dept: RADIOLOGY | Facility: CLINIC | Age: 43
Discharge: HOME | End: 2023-07-05
Admitting: ANESTHESIOLOGY
Payer: COMMERCIAL

## 2023-07-05 VITALS
OXYGEN SATURATION: 100 % | RESPIRATION RATE: 18 BRPM | DIASTOLIC BLOOD PRESSURE: 78 MMHG | HEART RATE: 85 BPM | SYSTOLIC BLOOD PRESSURE: 111 MMHG | TEMPERATURE: 97.3 F

## 2023-07-05 DIAGNOSIS — M47.812 CERVICAL SPONDYLOSIS: ICD-10-CM

## 2023-07-05 PROCEDURE — 64490 INJ PARAVERT F JNT C/T 1 LEV: CPT | Performed by: ANESTHESIOLOGY

## 2023-07-05 PROCEDURE — 64491 INJ PARAVERT F JNT C/T 2 LEV: CPT | Performed by: ANESTHESIOLOGY

## 2023-07-05 RX ORDER — LIDOCAINE HYDROCHLORIDE 10 MG/ML
5 INJECTION, SOLUTION EPIDURAL; INFILTRATION; INTRACAUDAL; PERINEURAL ONCE
Status: COMPLETED | OUTPATIENT
Start: 2023-07-05 | End: 2023-07-05

## 2023-07-05 RX ADMIN — LIDOCAINE HYDROCHLORIDE 2 ML: 20 INJECTION, SOLUTION EPIDURAL; INFILTRATION; INTRACAUDAL; PERINEURAL at 16:53

## 2023-07-05 RX ADMIN — LIDOCAINE HYDROCHLORIDE 3 ML: 10 INJECTION, SOLUTION EPIDURAL; INFILTRATION; INTRACAUDAL; PERINEURAL at 16:52

## 2023-07-05 NOTE — H&P
History of Present Illness: The patient is a 43 y.o. female who presents with complaints of neck pain.     Past Medical History:   Diagnosis Date   • Allergies    • Anxiety    • Asthma    • Chronic pain    • Contusion of head, subsequent encounter 2022   • Depression    • Eating disorder    • Fibrocystic breast changes of both breasts    • Seizures ([address])        Past Surgical History:   Procedure Laterality Date   • INDUCED      • MAMMO STEREOTACTIC BREAST BIOPSY LEFT (ALL INC)      Benign         Current Outpatient Medications:   •  acetaminophen (TYLENOL) 500 mg tablet, Take 500 mg by mouth every 6 (six) hours as needed for mild pain, Disp: , Rfl:   •  albuterol (2.5 mg/3 mL) 0.083 % nebulizer solution, Take 3 mL (2.5 mg total) by nebulization every 6 (six) hours as needed for wheezing or shortness of breath, Disp: 360 mL, Rfl: 6  •  albuterol (PROVENTIL HFA,VENTOLIN HFA) 90 mcg/act inhaler, TAKE 2 PUFFS BY MOUTH EVERY 6 HOURS AS NEEDED FOR WHEEZE OR FOR SHORTNESS OF BREATH, Disp: 18 g, Rfl: 3  •  Ascorbic Acid (VITAMIN C) 100 MG tablet, Take 100 mg by mouth daily, Disp: , Rfl:   •  benzonatate (TESSALON) 200 MG capsule, Take 1 capsule (200 mg total) by mouth 3 (three) times a day as needed for cough (Patient not taking: Reported on 2023), Disp: 20 capsule, Rfl: 0  •  Biotin w/ Vitamins C & E (HAIR/SKIN/NAILS PO), Take by mouth, Disp: , Rfl:   •  Breo Ellipta 200-25 MCG/ACT inhaler, INHALE 1 PUFF DAILY RINSE MOUTH AFTER USE., Disp: 60 each, Rfl: 3  •  busPIRone (BUSPAR) 15 mg tablet, Take by mouth 3 (three) times a day  , Disp: , Rfl:   •  CALCIUM-MAGNESIUM-ZINC PO, Take by mouth, Disp: , Rfl:   •  Flowflex COVID-19 Ag Home Test KIT, Use as directed (Patient not taking: Reported on 2023), Disp: , Rfl:   •  fluticasone (FLONASE) 50 mcg/act nasal spray, 1 spray into each nostril daily, Disp: 16 g, Rfl: 2  •  hydrocortisone 2.5 % cream, hydrocortisone 2.5 % topical cream with perineal applicator (Patient not taking: Reported on 6/1/2022), Disp: , Rfl:   •  ibuprofen (MOTRIN) 600 mg tablet, Take by mouth every 6 (six) hours as needed for mild pain, Disp: , Rfl:   •  ipratropium (ATROVENT) 0.06 % nasal spray, 2 SPRAYS INTO EACH NOSTRIL 4 (FOUR) TIMES A DAY (Patient not taking: Reported on 6/1/2022), Disp: 15 mL, Rfl: 1  •  loratadine (CLARITIN) 10 mg tablet, TAKE 1 TABLET BY MOUTH EVERY DAY, Disp: 90 tablet, Rfl: 3  •  LORazepam (ATIVAN) 1 mg tablet, Take 1 mg by mouth 6 (six) times a day, Disp: , Rfl: 1  •  methylPREDNISolone 4 MG tablet therapy pack, Use as directed on package (Patient not taking: Reported on 1/17/2023), Disp: 21 each, Rfl: 0  •  naproxen (EC NAPROSYN) 500 MG EC tablet, Take 1 tablet (500 mg total) by mouth 2 (two) times a day as needed for mild pain, Disp: 30 tablet, Rfl: 0  •  naproxen (NAPROSYN) 500 mg tablet, TAKE 1 TABLET BY MOUTH TWICE A DAY WITH MEALS, Disp: 60 tablet, Rfl: 5  •  omeprazole (PriLOSEC) 20 mg delayed release capsule, TAKE 1 CAPSULE (20 MG TOTAL) BY MOUTH TWICE DAILY, Disp: 60 capsule, Rfl: 2  •  ondansetron (ZOFRAN-ODT) 8 mg disintegrating tablet, TAKE 1 TABLET BY MOUTH EVERY 8 HOURS AS NEEDED FOR NAUSEA AND VOMITING, Disp: 20 tablet, Rfl: 2  •  thiamine (VITAMIN B1) 100 mg tablet, Take 100 mg by mouth daily, Disp: , Rfl:   •  tiZANidine (ZANAFLEX) 2 mg tablet, Take 1 tablet (2 mg total) by mouth every 8 (eight) hours as needed for muscle spasms (Patient not taking: Reported on 2/7/2023), Disp: 90 tablet, Rfl: 2    Allergies   Allergen Reactions   • Prednisone Anxiety   • Gabapentin Hives   • Meloxicam GI Intolerance   • Tramadol Hives     SEIZURES   • Vistaril [Hydroxyzine]    • Latex Other (See Comments)     C/o itching        Physical Exam:   Vitals:    07/05/23 1633   BP: 101/71   Pulse: 93   Resp: 18   Temp: (!) 97.3 °F (36.3 °C)   SpO2: 100%     General: Awake, Alert, Oriented x 3, Mood and affect appropriate  Respiratory: Respirations even and unlabored  Cardiovascular: Peripheral pulses intact; no edema  Musculoskeletal Exam: Left cervical paraspinals tender to palpation    ASA Score: 2    Patient/Chart Verification  Patient ID Verified: Verbal  ID Band Applied: No  Consents Confirmed: Procedural, To be obtained in the Pre-Procedure area  Interval H&P(within 24 hr) Complete (required for Outpatients and Surgery Admit only): To be obtained in the Pre-Procedure area  Allergies Reviewed: Yes  Anticoag/NSAID held?: NA  Currently on antibiotics?: No  Pregnancy denied?: Yes    Assessment:   1.  Cervical spondylosis        Plan: Left C4-6 MBB

## 2023-07-11 ENCOUNTER — TELEPHONE (OUTPATIENT)
Dept: RADIOLOGY | Facility: CLINIC | Age: 43
End: 2023-07-11

## 2023-07-19 NOTE — TELEPHONE ENCOUNTER
Caller:  Fatimah     Doctor: Aster Gastelum    Reason for call: returning  phone call     Transferred to

## 2023-07-22 ENCOUNTER — OFFICE VISIT (OUTPATIENT)
Dept: URGENT CARE | Facility: MEDICAL CENTER | Age: 43
End: 2023-07-22
Payer: COMMERCIAL

## 2023-07-22 VITALS
OXYGEN SATURATION: 99 % | SYSTOLIC BLOOD PRESSURE: 101 MMHG | WEIGHT: 96.6 LBS | HEART RATE: 90 BPM | HEIGHT: 61 IN | TEMPERATURE: 98.3 F | BODY MASS INDEX: 18.24 KG/M2 | DIASTOLIC BLOOD PRESSURE: 68 MMHG | RESPIRATION RATE: 18 BRPM

## 2023-07-22 DIAGNOSIS — S05.02XA ABRASION OF LEFT CORNEA, INITIAL ENCOUNTER: Primary | ICD-10-CM

## 2023-07-22 PROCEDURE — S9088 SERVICES PROVIDED IN URGENT: HCPCS | Performed by: PHYSICIAN ASSISTANT

## 2023-07-22 PROCEDURE — 99213 OFFICE O/P EST LOW 20 MIN: CPT | Performed by: PHYSICIAN ASSISTANT

## 2023-07-22 RX ORDER — OFLOXACIN 3 MG/ML
1 SOLUTION/ DROPS OPHTHALMIC 4 TIMES DAILY
Qty: 5 ML | Refills: 0 | Status: SHIPPED | OUTPATIENT
Start: 2023-07-22 | End: 2023-07-29

## 2023-07-22 NOTE — PROGRESS NOTES
[facility]        NAME: Eveline Keen is a 43 y.o. female  : 1980    MRN: [id number]  DATE: 2023  TIME: 4:03 PM    Assessment and Plan   Abrasion of left cornea, initial encounter [S05. 02XA]  1. Abrasion of left cornea, initial encounter  ofloxacin (OCUFLOX) 0.3 % ophthalmic solution            Patient Instructions     Corneal abrasion left eye  Ocuflox as directed  Follow up with PCP in 3-5 days. Proceed to  ER if symptoms worsen. Chief Complaint     Chief Complaint   Patient presents with   • Eye Pain     Started yesterday, left eye pain; changed contacts with no relief         History of Present Illness       80-year-old female who presents complaining of left eye photophobia, being injected. Patient states she thinks that she may have scratched it while putting her contacts on. Denies visual disturbances, discharge from the eye. Patient declined tetanus injection. Review of Systems   Review of Systems   Constitutional: Negative. HENT: Negative. Eyes: Positive for pain and redness. Negative for photophobia, discharge, itching and visual disturbance. Respiratory: Negative. Negative for cough, chest tightness, shortness of breath, wheezing and stridor. Cardiovascular: Negative. Negative for chest pain, palpitations and leg swelling.          Current Medications       Current Outpatient Medications:   •  Ascorbic Acid (VITAMIN C) 100 MG tablet, Take 100 mg by mouth daily, Disp: , Rfl:   •  Biotin w/ Vitamins C & E (HAIR/SKIN/NAILS PO), Take by mouth, Disp: , Rfl:   •  busPIRone (BUSPAR) 15 mg tablet, Take by mouth 3 (three) times a day  , Disp: , Rfl:   •  CALCIUM-MAGNESIUM-ZINC PO, Take by mouth, Disp: , Rfl:   •  ibuprofen (MOTRIN) 600 mg tablet, Take by mouth every 6 (six) hours as needed for mild pain, Disp: , Rfl:   •  loratadine (CLARITIN) 10 mg tablet, TAKE 1 TABLET BY MOUTH EVERY DAY, Disp: 90 tablet, Rfl: 3  •  LORazepam (ATIVAN) 1 mg tablet, Take 1 mg by mouth 6 (six) times a day, Disp: , Rfl: 1  •  naproxen (NAPROSYN) 500 mg tablet, TAKE 1 TABLET BY MOUTH TWICE A DAY WITH MEALS, Disp: 60 tablet, Rfl: 5  •  ofloxacin (OCUFLOX) 0.3 % ophthalmic solution, Administer 1 drop into the left eye 4 (four) times a day for 7 days, Disp: 5 mL, Rfl: 0  •  omeprazole (PriLOSEC) 20 mg delayed release capsule, TAKE 1 CAPSULE (20 MG TOTAL) BY MOUTH TWICE DAILY, Disp: 60 capsule, Rfl: 2  •  thiamine (VITAMIN B1) 100 mg tablet, Take 100 mg by mouth daily, Disp: , Rfl:   •  acetaminophen (TYLENOL) 500 mg tablet, Take 500 mg by mouth every 6 (six) hours as needed for mild pain, Disp: , Rfl:   •  albuterol (2.5 mg/3 mL) 0.083 % nebulizer solution, Take 3 mL (2.5 mg total) by nebulization every 6 (six) hours as needed for wheezing or shortness of breath, Disp: 360 mL, Rfl: 6  •  albuterol (PROVENTIL HFA,VENTOLIN HFA) 90 mcg/act inhaler, TAKE 2 PUFFS BY MOUTH EVERY 6 HOURS AS NEEDED FOR WHEEZE OR FOR SHORTNESS OF BREATH, Disp: 18 g, Rfl: 3  •  benzonatate (TESSALON) 200 MG capsule, Take 1 capsule (200 mg total) by mouth 3 (three) times a day as needed for cough (Patient not taking: Reported on 1/11/2023), Disp: 20 capsule, Rfl: 0  •  Breo Ellipta 200-25 MCG/ACT inhaler, INHALE 1 PUFF DAILY RINSE MOUTH AFTER USE. (Patient not taking: Reported on 7/22/2023), Disp: 60 each, Rfl: 3  •  Flowflex COVID-19 Ag Home Test KIT, Use as directed (Patient not taking: Reported on 1/11/2023), Disp: , Rfl:   •  fluticasone (FLONASE) 50 mcg/act nasal spray, 1 spray into each nostril daily, Disp: 16 g, Rfl: 2  •  hydrocortisone 2.5 % cream, hydrocortisone 2.5 % topical cream with perineal applicator (Patient not taking: Reported on 6/1/2022), Disp: , Rfl:   •  ipratropium (ATROVENT) 0.06 % nasal spray, 2 SPRAYS INTO EACH NOSTRIL 4 (FOUR) TIMES A DAY (Patient not taking: Reported on 6/1/2022), Disp: 15 mL, Rfl: 1  •  methylPREDNISolone 4 MG tablet therapy pack, Use as directed on package (Patient not taking: Reported on 2023), Disp: 21 each, Rfl: 0  •  naproxen (EC NAPROSYN) 500 MG EC tablet, Take 1 tablet (500 mg total) by mouth 2 (two) times a day as needed for mild pain, Disp: 30 tablet, Rfl: 0  •  ondansetron (ZOFRAN-ODT) 8 mg disintegrating tablet, TAKE 1 TABLET BY MOUTH EVERY 8 HOURS AS NEEDED FOR NAUSEA AND VOMITING, Disp: 20 tablet, Rfl: 2  •  tiZANidine (ZANAFLEX) 2 mg tablet, Take 1 tablet (2 mg total) by mouth every 8 (eight) hours as needed for muscle spasms (Patient not taking: Reported on 2023), Disp: 90 tablet, Rfl: 2    Current Allergies     Allergies as of 2023 - Reviewed 2023   Allergen Reaction Noted   • Prednisone Anxiety 12/15/2022   • Gabapentin Hives 2016   • Meloxicam GI Intolerance 2015   • Tramadol Hives 2016   • Vistaril [hydroxyzine]  10/04/2018   • Latex Other (See Comments) 10/25/2022            The following portions of the patient's history were reviewed and updated as appropriate: allergies, current medications, past family history, past medical history, past social history, past surgical history and problem list.     Past Medical History:   Diagnosis Date   • Allergies    • Anxiety    • Asthma    • Chronic pain    • Contusion of head, subsequent encounter 2022   • Depression    • Eating disorder    • Fibrocystic breast changes of both breasts    • Seizures ([address])        Past Surgical History:   Procedure Laterality Date   • INDUCED      • MAMMO STEREOTACTIC BREAST BIOPSY LEFT (ALL INC)      Benign       Family History   Problem Relation Age of Onset   • Diabetes Sister    • Alcohol abuse Sister    • Cancer Father         Diagnosed stage 4 - metastazied   • Psychiatric Illness Mother    • Hypertension Family    • Diabetes Family          Medications have been verified.         Objective   /68   Pulse 90   Temp 98.3 °F (36.8 °C) (Temporal)   Resp 18   Ht 5' 1" (1.549 m)   Wt 43.8 kg (96 lb 9.6 oz)   LMP 07/10/2023 (Approximate)   SpO2 99%   BMI 18.25 kg/m²        Physical Exam     Physical Exam  Constitutional:       Appearance: She is well-developed. HENT:      Head: Normocephalic and atraumatic. Right Ear: External ear normal.      Left Ear: External ear normal.      Nose: Nose normal.      Mouth/Throat:      Pharynx: No oropharyngeal exudate. Eyes:      General: Lids are normal. Lids are everted, no foreign bodies appreciated. Vision grossly intact. Gaze aligned appropriately. Right eye: No foreign body, discharge or hordeolum. Left eye: No foreign body, discharge or hordeolum. Conjunctiva/sclera:      Right eye: Right conjunctiva is not injected. No chemosis, exudate or hemorrhage. Left eye: Left conjunctiva is injected. No chemosis, exudate or hemorrhage. Cardiovascular:      Rate and Rhythm: Normal rate and regular rhythm. Heart sounds: Normal heart sounds. Pulmonary:      Effort: Pulmonary effort is normal. No respiratory distress. Breath sounds: Normal breath sounds. No wheezing or rales. Chest:      Chest wall: No tenderness. Musculoskeletal:      Cervical back: Normal range of motion and neck supple. Lymphadenopathy:      Cervical: No cervical adenopathy.

## 2023-07-22 NOTE — PATIENT INSTRUCTIONS
Corneal abrasion left eye  Ocuflox as directed  Follow up with PCP in 3-5 days. Proceed to  ER if symptoms worsen.

## 2023-08-10 ENCOUNTER — OFFICE VISIT (OUTPATIENT)
Dept: FAMILY MEDICINE CLINIC | Facility: CLINIC | Age: 43
End: 2023-08-10
Payer: COMMERCIAL

## 2023-08-10 VITALS
HEIGHT: 61 IN | HEART RATE: 94 BPM | WEIGHT: 99.2 LBS | OXYGEN SATURATION: 99 % | SYSTOLIC BLOOD PRESSURE: 98 MMHG | TEMPERATURE: 99 F | RESPIRATION RATE: 18 BRPM | BODY MASS INDEX: 18.73 KG/M2 | DIASTOLIC BLOOD PRESSURE: 80 MMHG

## 2023-08-10 DIAGNOSIS — N18.31 STAGE 3A CHRONIC KIDNEY DISEASE (HCC): ICD-10-CM

## 2023-08-10 DIAGNOSIS — F33.1 MODERATE EPISODE OF RECURRENT MAJOR DEPRESSIVE DISORDER (HCC): ICD-10-CM

## 2023-08-10 DIAGNOSIS — R11.0 NAUSEA: ICD-10-CM

## 2023-08-10 DIAGNOSIS — L29.9 ITCHING: ICD-10-CM

## 2023-08-10 DIAGNOSIS — J45.901 MODERATE ASTHMA WITH EXACERBATION, UNSPECIFIED WHETHER PERSISTENT: ICD-10-CM

## 2023-08-10 DIAGNOSIS — B86 SCABIES: Primary | ICD-10-CM

## 2023-08-10 DIAGNOSIS — Z71.6 TOBACCO ABUSE COUNSELING: ICD-10-CM

## 2023-08-10 DIAGNOSIS — J45.20 MILD INTERMITTENT ASTHMA, UNCOMPLICATED: ICD-10-CM

## 2023-08-10 DIAGNOSIS — F10.21 ALCOHOL USE DISORDER, SEVERE, IN EARLY REMISSION (HCC): ICD-10-CM

## 2023-08-10 DIAGNOSIS — Z12.31 ENCOUNTER FOR SCREENING MAMMOGRAM FOR BREAST CANCER: ICD-10-CM

## 2023-08-10 DIAGNOSIS — R63.6 UNDERWEIGHT: ICD-10-CM

## 2023-08-10 PROCEDURE — 99214 OFFICE O/P EST MOD 30 MIN: CPT | Performed by: NURSE PRACTITIONER

## 2023-08-10 RX ORDER — PERMETHRIN 50 MG/G
CREAM TOPICAL ONCE
Qty: 60 G | Refills: 1 | Status: SHIPPED | OUTPATIENT
Start: 2023-08-10 | End: 2023-08-10

## 2023-08-10 RX ORDER — PREDNISONE 20 MG/1
40 TABLET ORAL DAILY
Qty: 10 TABLET | Refills: 0 | Status: SHIPPED | OUTPATIENT
Start: 2023-08-10 | End: 2023-08-15

## 2023-08-10 RX ORDER — ONDANSETRON 8 MG/1
8 TABLET, ORALLY DISINTEGRATING ORAL EVERY 8 HOURS PRN
Qty: 10 TABLET | Refills: 0 | Status: SHIPPED | OUTPATIENT
Start: 2023-08-10

## 2023-08-10 NOTE — PROGRESS NOTES
BMI Counseling: Body mass index is 18.74 kg/m². The BMI is above normal. Nutrition recommendations include decreasing portion sizes, encouraging healthy choices of fruits and vegetables, decreasing fast food intake, consuming healthier snacks, limiting drinks that contain sugar, moderation in carbohydrate intake, increasing intake of lean protein, reducing intake of saturated and trans fat and reducing intake of cholesterol. Exercise recommendations include vigorous physical activity 75 minutes/week, exercising 3-5 times per week, obtaining a gym membership and strength training exercises. No pharmacotherapy was ordered. Rationale for BMI follow-up plan is due to patient being overweight or obese. Depression Screening and Follow-up Plan: Clincally patient does not have depression. No treatment is required. Tobacco Cessation Counseling: Tobacco cessation counseling was provided. The patient is sincerely urged to quit consumption of tobacco. She is not ready to quit tobacco. Medication options and side effects of medication discussed. Patient refused medication. Assessment/Plan:         Problem List Items Addressed This Visit        Respiratory    Mild intermittent asthma, uncomplicated     Patient to follow-up with pulmonary and have pulmonary function test completed. Reports she is not currently using the Breo Ellipta 200-25 mcg ACT inhaler. She has been instructed to use her albuterol inhaler every 4-6 hours as needed and use her nebulizer if her shortness of breath or wheezing gets really severe. Moderate asthma with exacerbation     Patient maintained on albuterol 90mcg 2 puffs every 6 hours as needed for shortness of breath and wheezing. May use her albuterol 0.083% for severe symptoms of asthma every 6 hours. Musculoskeletal and Integument    Scabies - Primary     Patient ordered for permethrin cream to be applied as directed. Also for steroids to help with the itching. Patient cannot tolerate Atarax or Benadryl for itching symptoms. Relevant Medications    permethrin (ELIMITE) 5 % cream       Genitourinary    Stage 3a chronic kidney disease [facility])     Lab Results   Component Value Date    EGFR 57 01/09/2023    EGFR 65 01/05/2023    EGFR 50 08/21/2022    CREATININE 1.18 01/09/2023    CREATININE 1.05 01/05/2023    CREATININE 1.31 (H) 85/03/7073   Basic metabolic panel  Order: [id number]  Status: Final result     Visible to patient: Yes (seen)     Next appt: 08/23/2023 at 03:20 PM in Radiology ([facility])     Dx: Hyponatremia     2 Result Notes            Component Ref Range & Units 1/9/23  4:07 PM 1/5/23  8:28 PM 8/21/22 11:48 AM 11/9/21  3:58 PM 9/20/21  3:51 PM 9/19/18 11:45 AM   Sodium 135 - 147 mmol/L 131 Low   125 Low   130 Low   136 R  139 R  132 Low  R    Potassium 3.5 - 5.3 mmol/L 4.1  3.9  3.7  3.9  4.7  3.5    Chloride 96 - 108 mmol/L 97  90 Low   93 Low   107 R  104 R  97 Low  R    CO2 21 - 32 mmol/L 26  29  29  23  27  27    ANION GAP 4 - 13 mmol/L 8  6  8  6  8  8    BUN 5 - 25 mg/dL 9  8  8  6  8  6    Creatinine 0.60 - 1.30 mg/dL 1.18  1.05 CM  1.31 High  CM  1.06 CM  1.12 CM  0.91 CM    Comment: Standardized to IDMS reference method   Glucose 65 - 140 mg/dL 81  136 CM   86 CM   101 CM    Comment: If the patient is fasting, the ADA then defines impaired fasting glucose as > 100 mg/dL and diabetes as > or equal to 123 mg/dL. Specimen collection should occur prior to Sulfasalazine administration due to the potential for falsely depressed results. Specimen collection should occur prior to Sulfapyridine administration due to the potential for falsely elevated results. Calcium 8.3 - 10.1 mg/dL 9.5  8.9  8.6  9.4  9.2  9.2    eGFR ml/min/1.73sq m 57  65  50  65  61          Patient's EGFR 57. Other    Encounter for screening mammogram for breast cancer     Mammogram ordered for follow-up evaluation.          Relevant Orders    Mammo screening bilateral w 3d & cad    Depression     PHQ-2 scale reviewed. Patient score is 0 at this time negative for depression. Patient maintained on medications of BuSpar 15 mg 3 times daily. Tobacco abuse counseling     Tobacco abuse counseling provided. Patient refuses medication aids at this time. Alcohol use disorder, severe, in early remission [facility])     Patient has been in remission for the last 5 years. She does not consume alcohol at this time. Underweight     Patient's BMI 18.74 kg/M2. Weight gain encouraged with increased protein in her diet as well as Ensure shakes. Itching     Patient to use permethrin cream as directed start steroid therapy. Relevant Medications    predniSONE 20 mg tablet    Nausea     Patient has chronic intermittent nausea currently ordered for Zofran to be used as directed. Relevant Medications    ondansetron (ZOFRAN-ODT) 8 mg disintegrating tablet         Subjective:      Patient ID: Anai Brito is a 37 y.o. female. Patient is a 37year old female that reports she has allergies to dust mites and reports recent episodes of itching without any noted rash. Indicates symptom started last Tuesday. She works in a long term care facility and has reported that one of the residence had an issue with itching and rash that was believed to be scabies related. The following portions of the patient's history were reviewed and updated as appropriate:   Past Medical History:  She has a past medical history of Allergies, Anxiety, Asthma, Chronic pain, Contusion of head, subsequent encounter (5/2/2022), Depression, Eating disorder, Fibrocystic breast changes of both breasts, and Seizures ([address]). ,  _______________________________________________________________________  Medical Problems:  does not have any pertinent problems on file.,  _______________________________________________________________________  Past Surgical History:   has a past surgical history that includes Induced  () and Mammo stereotactic breast biopsy left ([facility]) (). ,  _______________________________________________________________________  Family History:  family history includes Alcohol abuse in her sister; Cancer in her father; Diabetes in her family and sister; Hypertension in her family; Psychiatric Illness in her mother.,  _______________________________________________________________________  Social History:   reports that she has been smoking cigarettes. She has a 14.00 pack-year smoking history. She has never used smokeless tobacco. She reports that she does not drink alcohol and does not use drugs. ,  _______________________________________________________________________  Allergies:  is allergic to prednisone, gabapentin, meloxicam, tramadol, vistaril [hydroxyzine], and latex. .  _______________________________________________________________________  Current Outpatient Medications   Medication Sig Dispense Refill   • albuterol (2.5 mg/3 mL) 0.083 % nebulizer solution Take 3 mL (2.5 mg total) by nebulization every 6 (six) hours as needed for wheezing or shortness of breath 360 mL 6   • albuterol (PROVENTIL HFA,VENTOLIN HFA) 90 mcg/act inhaler TAKE 2 PUFFS BY MOUTH EVERY 6 HOURS AS NEEDED FOR WHEEZE OR FOR SHORTNESS OF BREATH 18 g 3   • Ascorbic Acid (VITAMIN C) 100 MG tablet Take 100 mg by mouth daily     • Biotin w/ Vitamins C & E (HAIR/SKIN/NAILS PO) Take by mouth     • busPIRone (BUSPAR) 15 mg tablet Take by mouth 3 (three) times a day       • CALCIUM-MAGNESIUM-ZINC PO Take by mouth     • ibuprofen (MOTRIN) 600 mg tablet Take by mouth every 6 (six) hours as needed for mild pain     • loratadine (CLARITIN) 10 mg tablet TAKE 1 TABLET BY MOUTH EVERY DAY 90 tablet 3   • LORazepam (ATIVAN) 1 mg tablet Take 1 mg by mouth 6 (six) times a day  1   • naproxen (EC NAPROSYN) 500 MG EC tablet Take 1 tablet (500 mg total) by mouth 2 (two) times a day as needed for mild pain 30 tablet 0   • omeprazole (PriLOSEC) 20 mg delayed release capsule TAKE 1 CAPSULE (20 MG TOTAL) BY MOUTH TWICE DAILY 60 capsule 2   • ondansetron (ZOFRAN-ODT) 8 mg disintegrating tablet Take 1 tablet (8 mg total) by mouth every 8 (eight) hours as needed for nausea or vomiting 10 tablet 0   • permethrin (ELIMITE) 5 % cream Apply topically once for 1 dose 60 g 1   • predniSONE 20 mg tablet Take 2 tablets (40 mg total) by mouth daily for 5 days 10 tablet 0   • thiamine (VITAMIN B1) 100 mg tablet Take 100 mg by mouth daily     • acetaminophen (TYLENOL) 500 mg tablet Take 500 mg by mouth every 6 (six) hours as needed for mild pain     • benzonatate (TESSALON) 200 MG capsule Take 1 capsule (200 mg total) by mouth 3 (three) times a day as needed for cough (Patient not taking: Reported on 1/11/2023) 20 capsule 0   • Breo Ellipta 200-25 MCG/ACT inhaler INHALE 1 PUFF DAILY RINSE MOUTH AFTER USE. (Patient not taking: Reported on 7/22/2023) 60 each 3   • Flowflex COVID-19 Ag Home Test KIT Use as directed (Patient not taking: Reported on 1/11/2023)     • fluticasone (FLONASE) 50 mcg/act nasal spray 1 spray into each nostril daily (Patient not taking: Reported on 8/10/2023) 16 g 2   • hydrocortisone 2.5 % cream hydrocortisone 2.5 % topical cream with perineal applicator (Patient not taking: Reported on 6/1/2022)     • ipratropium (ATROVENT) 0.06 % nasal spray 2 SPRAYS INTO EACH NOSTRIL 4 (FOUR) TIMES A DAY (Patient not taking: Reported on 6/1/2022) 15 mL 1   • naproxen (NAPROSYN) 500 mg tablet TAKE 1 TABLET BY MOUTH TWICE A DAY WITH MEALS (Patient not taking: Reported on 8/10/2023) 60 tablet 5   • tiZANidine (ZANAFLEX) 2 mg tablet Take 1 tablet (2 mg total) by mouth every 8 (eight) hours as needed for muscle spasms (Patient not taking: Reported on 2/7/2023) 90 tablet 2     No current facility-administered medications for this visit.     _______________________________________________________________________  Review of Systems Constitutional: Negative for activity change, appetite change, chills, fatigue, fever and unexpected weight change. HENT: Negative for congestion, ear discharge, ear pain, nosebleeds, postnasal drip, rhinorrhea, sinus pressure, sinus pain, sneezing, sore throat and voice change. Eyes: Negative for pain, redness and visual disturbance. Respiratory: Negative for cough, chest tightness, shortness of breath and wheezing. Cardiovascular: Negative for chest pain and palpitations. Gastrointestinal: Negative for abdominal distention, abdominal pain, constipation, diarrhea, nausea and vomiting. Endocrine: Negative. Genitourinary: Negative for difficulty urinating, dysuria, flank pain, frequency, hematuria and urgency. Musculoskeletal: Negative for arthralgias and myalgias. Skin: Positive for rash. Itching skin, feelings of rash and irritation. Patient reports longstanding history of allergy to dust mites. Allergic/Immunologic: Negative. Neurological: Negative. Hematological: Negative. Psychiatric/Behavioral: Negative. Objective:  Vitals:    08/10/23 1528   BP: 98/80   BP Location: Left arm   Patient Position: Sitting   Cuff Size: Standard   Pulse: 94   Resp: 18   Temp: 99 °F (37.2 °C)   TempSrc: Temporal   SpO2: 99%   Weight: 45 kg (99 lb 3.2 oz)   Height: 5' 1" (1.549 m)     Body mass index is 18.74 kg/m². Physical Exam  Vitals and nursing note reviewed. Constitutional:       Appearance: Normal appearance. She is well-developed and normal weight. HENT:      Head: Normocephalic and atraumatic. Right Ear: Tympanic membrane, ear canal and external ear normal.      Left Ear: Tympanic membrane, ear canal and external ear normal.      Nose: Nose normal. No congestion or rhinorrhea. Mouth/Throat:      Mouth: Mucous membranes are moist.      Pharynx: No oropharyngeal exudate or posterior oropharyngeal erythema.    Eyes:      Extraocular Movements: Extraocular movements intact. Conjunctiva/sclera: Conjunctivae normal.      Pupils: Pupils are equal, round, and reactive to light. Cardiovascular:      Rate and Rhythm: Normal rate and regular rhythm. Pulses: Normal pulses. Heart sounds: Normal heart sounds. No murmur heard. Pulmonary:      Effort: Pulmonary effort is normal.      Breath sounds: Normal breath sounds. Abdominal:      General: Bowel sounds are normal.      Palpations: Abdomen is soft. Musculoskeletal:         General: Normal range of motion. Cervical back: Normal range of motion. Skin:     General: Skin is warm. Capillary Refill: Capillary refill takes less than 2 seconds. Findings: No erythema. Comments: Scratches to upper chest from itching. Neurological:      General: No focal deficit present. Mental Status: She is alert and oriented to person, place, and time.    Psychiatric:         Mood and Affect: Mood normal.         Behavior: Behavior normal. detailed exam

## 2023-08-10 NOTE — ASSESSMENT & PLAN NOTE
Patient ordered for permethrin cream to be applied as directed. Also for steroids to help with the itching. Patient cannot tolerate Atarax or Benadryl for itching symptoms.

## 2023-08-10 NOTE — ASSESSMENT & PLAN NOTE
Lab Results   Component Value Date    EGFR 57 01/09/2023    EGFR 65 01/05/2023    EGFR 50 08/21/2022    CREATININE 1.18 01/09/2023    CREATININE 1.05 01/05/2023    CREATININE 1.31 (H) 41/77/2001   Basic metabolic panel  Order: [id number]   Status: Final result      Visible to patient: Yes (seen)      Next appt: 08/23/2023 at 03:20 PM in Radiology ([facility])      Dx: Hyponatremia      2 Result Notes            Component Ref Range & Units 1/9/23  4:07 PM 1/5/23  8:28 PM 8/21/22 11:48 AM 11/9/21  3:58 PM 9/20/21  3:51 PM 9/19/18 11:45 AM   Sodium 135 - 147 mmol/L 131 Low   125 Low   130 Low   136 R  139 R  132 Low  R    Potassium 3.5 - 5.3 mmol/L 4.1  3.9  3.7  3.9  4.7  3.5    Chloride 96 - 108 mmol/L 97  90 Low   93 Low   107 R  104 R  97 Low  R    CO2 21 - 32 mmol/L 26  29  29  23  27  27    ANION GAP 4 - 13 mmol/L 8  6  8  6  8  8    BUN 5 - 25 mg/dL 9  8  8  6  8  6    Creatinine 0.60 - 1.30 mg/dL 1.18  1.05 CM  1.31 High  CM  1.06 CM  1.12 CM  0.91 CM    Comment: Standardized to IDMS reference method   Glucose 65 - 140 mg/dL 81  136 CM   86 CM   101 CM    Comment: If the patient is fasting, the ADA then defines impaired fasting glucose as > 100 mg/dL and diabetes as > or equal to 123 mg/dL. Specimen collection should occur prior to Sulfasalazine administration due to the potential for falsely depressed results. Specimen collection should occur prior to Sulfapyridine administration due to the potential for falsely elevated results. Calcium 8.3 - 10.1 mg/dL 9.5  8.9  8.6  9.4  9.2  9.2    eGFR ml/min/1.73sq m 57  65  50  65  61          Patient's EGFR 57.

## 2023-08-10 NOTE — ASSESSMENT & PLAN NOTE
Patient's BMI 18.74 kg/M2. Weight gain encouraged with increased protein in her diet as well as Ensure shakes.

## 2023-08-10 NOTE — ASSESSMENT & PLAN NOTE
Patient to follow-up with pulmonary and have pulmonary function test completed. Reports she is not currently using the Breo Ellipta 200-25 mcg ACT inhaler. She has been instructed to use her albuterol inhaler every 4-6 hours as needed and use her nebulizer if her shortness of breath or wheezing gets really severe.

## 2023-08-10 NOTE — PATIENT INSTRUCTIONS
Acute Rash   WHAT YOU NEED TO KNOW:   What is an acute rash? A rash is irritated, red, or itchy skin or mucus membranes, such as the lining of your nose or throat. Acute means the rash starts suddenly, worsens quickly, and lasts a short time. What are some common types of rashes? Eczema  causes inflamed, itchy areas. Your skin may be dry, scaly, and thick. The outer layer may be damaged. Irritants, stress, or a family history of eczema make you more likely to get it. Contact dermatitis  causes a small, itchy growth that may be flat or raised. It appears after you touch something that damages your skin or causes an allergic reaction. Examples include chemicals, metals, dye, soaps or detergents, and latex. Atopic dermatitis  causes small, itchy, blister-like growths along skin lines and folds. The growths may ooze fluid and become scaly, crusted, or hard. You may have sore, dry skin or swollen eyes. This rash usually forms after you are around an allergen, are overheated, or wear rough clothing. Urticaria (hives)  appears suddenly as patches and raised areas of swollen skin or mucus membrane. The area may itch or burn. Common causes include allergens, latex, certain foods, a bee sting, smoke, or a blood transfusion. Pityriasis rosea  may appear before you get a disease caused by bacteria or a virus. The rash may look like a patch on your chest, back, or abdomen. The rash may spread to become small, red, cone-shaped bumps that usually grow in groups. How is an acute rash diagnosed? Your healthcare provider may know what kind of rash you have by looking at it. Tell him or her when and where the rash first appeared. Describe how often you get the rash and if anything causes it, such as food, activity, or stress. Give your provider a list of your medicines, allergies, and health conditions. Include any family history of rashes.  A dermatologist (skin specialist) may help find the cause of your rash.  How is an acute rash treated? Treatment will depend on the condition causing your acute rash. You may need any of the following:  Medicines  may be used to decrease itching or inflammation, or prevent or treat a bacterial infection. Medicines may also help your immune system fight infection or stop it from attacking your skin. Ultraviolet phototherapy  means the rash is put under light. Light therapy helps treats atopic dermatitis or eczema that does not get better with steroids. It can help pityriasis rosea heal faster and decrease itching. What can I do to help prevent a rash or care for my skin when I have a rash? Dry skin can lead to more problems. Do not scratch your skin if it itches. You may cause a skin infection by scratching. The following may prevent dry skin, and help your skin look better:  Help soothe your rash. Apply thick cream lotions or petroleum jelly. Cool compresses may also soothe your skin. Apply a cool compress or a cool, wet towel, and then cover it with a dry towel. Use lukewarm water when you bathe. Hot water may damage your skin more. Pat your skin dry. Do not rub your skin with a towel. Use detergents, soaps, shampoos, and bubble baths  made for sensitive skin. Wear clothes made of cotton instead of nylon or wool. Cotton is softer, so it will not hurt your skin as much. When should I seek immediate care? You have sudden trouble breathing or chest pain. You are vomiting, have a headache, your throat hurts, or your muscles are painful. When should I call my doctor? You have a fever. You get a cough or cold, or your eyes are red and swollen. You get open wounds from scratching your skin, or you have a wound that is red, swollen, or painful. You get sores or blisters in your mouth or genital area, or the skin in those areas is peeling off. You have new signs or symptoms while being treated with medicines.     You have swelling or pain in your joints. Your rash lasts longer than 3 months. CARE AGREEMENT:   You have the right to help plan your care. Learn about your health condition and how it may be treated. Discuss treatment options with your healthcare providers to decide what care you want to receive. You always have the right to refuse treatment. The above information is an  only. It is not intended as medical advice for individual conditions or treatments. Talk to your doctor, nurse or pharmacist before following any medical regimen to see if it is safe and effective for you. © Copyright [facility] 2022 Information is for End User's use only and may not be sold, redistributed or otherwise used for commercial purposes. Itchy Skin   WHAT YOU NEED TO KNOW:   What do I need to know about itchy skin? Itchy skin may interfere with your daily tasks and sleep. Treatment is important because constant scratching can damage your skin and increase your risk of infection. What causes itchy skin? Dry skin, or skin conditions such as dermatitis and psoriasis    A reaction to a new medicine or food    Medical conditions such as kidney disease, liver disease, thyroid disease, multiple sclerosis, and diabetes    Pregnancy    Viral infections such as mumps or varicella    Cancer or cancer treatments such as chemotherapy and radiation therapy    Bug bites or other problems with your skin    How is itchy skin diagnosed? Your healthcare provider will ask about your symptoms. Tell your provider if you have recently tried any new foods, creams, or cosmetics. Your provider may ask if you have traveled, or been around people or animals that are sick or scratching. Your provider will examine your skin, abdomen, and lymph nodes. You may also need blood tests or skin samples to check for an infection or other possible causes of itchy skin. How is itchy skin treated? Treatment depends on the cause of your itchy skin.  If you have a medical condition that is causing itchy skin, treatment of the condition will be needed. Treatment may include the following:  Medicines  may help decrease itching or inflammation. Skin creams, such as steroid creams or anti-itch creams may also help. Phototherapy , or light therapy, may help decrease itching. How can I manage itchy skin? Take short showers in warm water. Avoid using hot water for your showers. Use only a small amount of mild skin cleanser. Apply moisturizer or cooling creams  after you bathe and throughout the day. Use a cool mist humidifier  to moisten the air in your home and maintain a cool temperature. Cool, humid air can decrease skin dryness and itching. Avoid allergens and skin irritants. Do not use perfume, fabric softener, or makeup that irritates your skin. Use a mild detergent to wash your clothes. Wear loose cotton clothes and use cotton sheets. Avoid wool. When should I contact my healthcare provider? Your itching does not improve or gets worse. Scratching has caused your skin to be red or swollen. You have new symptoms such as weight loss, fatigue, changes in urination, or fever. You have questions or concerns about your condition or care. CARE AGREEMENT:   You have the right to help plan your care. Learn about your health condition and how it may be treated. Discuss treatment options with your healthcare providers to decide what care you want to receive. You always have the right to refuse treatment. The above information is an  only. It is not intended as medical advice for individual conditions or treatments. Talk to your doctor, nurse or pharmacist before following any medical regimen to see if it is safe and effective for you. © Copyright Jennifer Heath 2022 Information is for End User's use only and may not be sold, redistributed or otherwise used for commercial purposes. Scabies   WHAT YOU NEED TO KNOW:   What is scabies?   Scabies is a skin condition that is caused by scabies mites. Scabies mites are tiny bugs that burrow, lay eggs, and live underneath the skin. Scabies is spread through close contact with a person who has scabies. This includes having sex, sleeping in the same bed, or sharing towels or clothing. Scabies can spread quickly and must be treated as soon as it is found. What are the signs and symptoms of scabies? You may not know you have scabies until a few weeks after mites are under your skin. Scabies mites are too small to be seen on your body. You may have any of the following:  Red, raised bumps on your skin    Bad itching that is usually worse at night    Burrow marks (short wavy lines) between your fingers, or on your ankles, elbows, groin, armpits, or breasts    How is scabies diagnosed and treated? Your healthcare provider will examine your skin. He or she will put mineral oil on your skin and scrape it across with a small blade. The skin scraping will be checked under a microscope for eggs, mites, or their droppings. Your provider may want to treat scabies even if signs of mites are not found. Several kinds of medicine may be used to treat scabies. The medicine may be a cream or pill. Always follow the directions for the scabies medicine you are told to use. Apply a thin layer of cream  onto your entire body from the neck down. Leave the cream on  for the amount of time that is required for the medicine you are using. This may be between 8 to 14 hours. Take a bath or shower  to wash all medicine from your skin after the scabies treatment is done. Put on clean clothes  after you have rinsed the medicine off. You may need another scabies treatment in 7 to 10 days if you continue to have symptoms. What can I do to help the itching? Your skin may continue to itch for 2 or 3 weeks, even after the scabies mites are gone. Over-the-counter antihistamines or cortisone cream may help relieve itching.  Trim your fingernails so you do not spread any mites that are still alive after treatment. Do not scratch your skin. Scratches may cause a skin infection. A cool bath may also help relieve the itching. How do I prevent the spread of scabies? Have all family members use scabies medicine. Tell all sex partners and anyone who has shared your clothing or bed for the past month about the scabies. Tell them to ask their healthcare provider for scabies medicine even if they have no itching, rash, or burrow marks. Wash all items that you have used  starting 3 days before you learned about your scabies. Use hot water to wash all clothing, bedding, and towels. Dry them for at least 20 minutes on the hot cycle of a dryer. Take items to be dry cleaned that cannot be washed in a washing machine. Place any clothing or bedding that cannot be washed or dry cleaned in a closed plastic bag for 1 week. Do not have close body contact with anyone  until the scabies mites are gone. Talk to your provider about how long you need to wait. Ask about public places you should avoid, such as the gym. Return to work or school  24 hours after using scabies medicine, or as directed. When should I seek immediate care? You develop a fever and red, swollen, painful areas on your skin. When should I call my doctor? The bites become crusty or filled with pus. You have worsening itching after scabies treatment. You have new bite or burrow marks after treatment. You have questions or concerns about your condition or care. CARE AGREEMENT:   You have the right to help plan your care. Learn about your health condition and how it may be treated. Discuss treatment options with your healthcare providers to decide what care you want to receive. You always have the right to refuse treatment. The above information is an  only. It is not intended as medical advice for individual conditions or treatments.  Talk to your doctor, nurse or pharmacist before following any medical regimen to see if it is safe and effective for you. © Copyright Rogelio Monsalve 2022 Information is for End User's use only and may not be sold, redistributed or otherwise used for commercial purposes.

## 2023-08-10 NOTE — ASSESSMENT & PLAN NOTE
PHQ-2 scale reviewed. Patient score is 0 at this time negative for depression. Patient maintained on medications of BuSpar 15 mg 3 times daily.

## 2023-08-10 NOTE — ASSESSMENT & PLAN NOTE
Patient maintained on albuterol 90mcg 2 puffs every 6 hours as needed for shortness of breath and wheezing. May use her albuterol 0.083% for severe symptoms of asthma every 6 hours.

## 2023-08-15 ENCOUNTER — TELEPHONE (OUTPATIENT)
Dept: FAMILY MEDICINE CLINIC | Facility: CLINIC | Age: 43
End: 2023-08-15

## 2023-08-15 NOTE — TELEPHONE ENCOUNTER
Patient called she would like to be seen again   ---he skin has improved but her head and ears and hairlline itch   ---he legs look swelled and bruised  She is very upset   She has been cleaning and washing linens   She doesn't know if she has bug bites    ---patient is very upset she would like to be seen  Please advise    She is seeing little bugs   Not sure what they are  She does not know what to do does she call her landlord for an  ? She is freaking out?

## 2023-08-15 NOTE — TELEPHONE ENCOUNTER
Spoke with patient she is waiting for the land lord to return her calls   What can she do for the itching in the mean time

## 2023-08-15 NOTE — TELEPHONE ENCOUNTER
I spoke with the patient again I told her to use the hydrocortozone  And have the apartment exterminated   She checked with work  No one has anything?   If symptoms still persist after the apartment is treated she will call back

## 2023-08-17 ENCOUNTER — OFFICE VISIT (OUTPATIENT)
Dept: FAMILY MEDICINE CLINIC | Facility: CLINIC | Age: 43
End: 2023-08-17
Payer: COMMERCIAL

## 2023-08-17 VITALS
TEMPERATURE: 97.5 F | DIASTOLIC BLOOD PRESSURE: 68 MMHG | OXYGEN SATURATION: 99 % | WEIGHT: 96 LBS | RESPIRATION RATE: 17 BRPM | SYSTOLIC BLOOD PRESSURE: 98 MMHG | HEIGHT: 61 IN | BODY MASS INDEX: 18.12 KG/M2 | HEART RATE: 89 BPM

## 2023-08-17 DIAGNOSIS — Z13.89 SCREENING FOR BLOOD OR PROTEIN IN URINE: ICD-10-CM

## 2023-08-17 DIAGNOSIS — L29.9 ITCHY SKIN: ICD-10-CM

## 2023-08-17 DIAGNOSIS — Z00.8 EXAM FOR POPULATION SURVEY: ICD-10-CM

## 2023-08-17 DIAGNOSIS — Z71.6 TOBACCO ABUSE COUNSELING: ICD-10-CM
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
## 2023-08-17 DIAGNOSIS — J30.2 SEASONAL ALLERGIES: ICD-10-CM
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
## 2023-08-17 PROCEDURE — 99214 OFFICE O/P EST MOD 30 MIN: CPT | Performed by: NURSE PRACTITIONER

## 2023-08-17 RX ORDER — PERMETHRIN 50 MG/G
CREAM TOPICAL ONCE
Qty: 60 G | Refills: 1 | Status: SHIPPED | OUTPATIENT
Start: 2023-08-17 | End: 2023-08-17

## 2023-08-17 RX ORDER — LORATADINE 10 MG/1
10 TABLET ORAL DAILY
Qty: 30 TABLET | Refills: 2 | Status: SHIPPED | OUTPATIENT
Start: 2023-08-17

## 2023-08-17 NOTE — PATIENT INSTRUCTIONS
Bed Bugs   WHAT YOU NEED TO KNOW:   What are bed bugs? Bed bugs are small, flat insects that bite your exposed skin and feed on your blood while you sleep. They can spread from person to person. They hide in the folds and seams of bed linens, furniture cracks, and electrical outlets. They are common in areas of frequent travel or buildings with shared walls, such as hotels or apartments. What are the signs and symptoms of bed bug bites? You may have swollen areas that are irritated or itch. These areas may appear right away or several days after you were bitten. The bite marks may be in a straight line or in random areas. They look like mosquito or flea bites. You may also have swelling, fluid-filled blisters, or open sores from scratching the bites. How are bed bug bites treated? Bites usually go away on their own. You may need medicines to help decrease itching and inflammation. These may be given as a pill, cream, or ointment. Do not scratch the bite marks. Scratching may cause a skin infection. What can I do to prevent bed bugs? Protect your clothes and luggage when you travel. Inspect them often for bed bugs. Keep your luggage closed tightly when you are not using it. Keep your luggage in the bathroom, or place contents in sealed plastic bags. Inspect any used items you bring into your home. You may need to fumigate used furniture. Examine cardboard boxes or other items with small cracks where bed bugs could hide. Remove clutter from the area where you sleep. Place your mattress or box spring in a sealed bag. Seal any cracks or molding in the walls or furniture. Wash bed linens and clothes in hot, soapy water. Wash the items in water that is at least 130°F (50°C) for 2 hours, or 20°F (-5°C) or colder for 5 days. Dry them in a dryer on the hot setting for at least 20 minutes. Dry cleaning is also effective to get rid of bed bugs. Tell someone about the bed bugs.   This [facility] include a [facility], , or pest control company. Insecticide sprays are used to get rid of bed bugs. Call 911 for any of the following: You have trouble breathing. You have swelling in your lips, tongue, or throat. When should I seek immediate care? You feel lightheaded, dizzy, or faint. Your heart is beating faster than usual.    You are vomiting and cannot stop. When should I contact my healthcare provider? You have very swollen, painful bite marks. You have a fever. You have open sores that are draining pus. You have questions or concerns about your condition or care. CARE AGREEMENT:   You have the right to help plan your care. Learn about your health condition and how it may be treated. Discuss treatment options with your healthcare providers to decide what care you want to receive. You always have the right to refuse treatment. The above information is an  only. It is not intended as medical advice for individual conditions or treatments. Talk to your doctor, nurse or pharmacist before following any medical regimen to see if it is safe and effective for you. © Copyright [facility] 2022 Information is for End User's use only and may not be sold, redistributed or otherwise used for commercial purposes. Scabies   WHAT YOU NEED TO KNOW:   What is scabies? Scabies is a skin condition that is caused by scabies mites. Scabies mites are tiny bugs that burrow, lay eggs, and live underneath the skin. Scabies is spread through close contact with a person who has scabies. This includes having sex, sleeping in the same bed, or sharing towels or clothing. Scabies can spread quickly and must be treated as soon as it is found. What are the signs and symptoms of scabies? You may not know you have scabies until a few weeks after mites are under your skin. Scabies mites are too small to be seen on your body.  You may have any of the following:  Red, raised bumps on your skin    Bad itching that is usually worse at night    Burrow marks (short wavy lines) between your fingers, or on your ankles, elbows, groin, armpits, or breasts    How is scabies diagnosed and treated? Your healthcare provider will examine your skin. He or she will put mineral oil on your skin and scrape it across with a small blade. The skin scraping will be checked under a microscope for eggs, mites, or their droppings. Your provider may want to treat scabies even if signs of mites are not found. Several kinds of medicine may be used to treat scabies. The medicine may be a cream or pill. Always follow the directions for the scabies medicine you are told to use. Apply a thin layer of cream  onto your entire body from the neck down. Leave the cream on  for the amount of time that is required for the medicine you are using. This may be between 8 to 14 hours. Take a bath or shower  to wash all medicine from your skin after the scabies treatment is done. Put on clean clothes  after you have rinsed the medicine off. You may need another scabies treatment in 7 to 10 days if you continue to have symptoms. What can I do to help the itching? Your skin may continue to itch for 2 or 3 weeks, even after the scabies mites are gone. Over-the-counter antihistamines or cortisone cream may help relieve itching. Trim your fingernails so you do not spread any mites that are still alive after treatment. Do not scratch your skin. Scratches may cause a skin infection. A cool bath may also help relieve the itching. How do I prevent the spread of scabies? Have all family members use scabies medicine. Tell all sex partners and anyone who has shared your clothing or bed for the past month about the scabies. Tell them to ask their healthcare provider for scabies medicine even if they have no itching, rash, or burrow marks. Wash all items that you have used  starting 3 days before you learned about your scabies.  Use hot water to wash all clothing, bedding, and towels. Dry them for at least 20 minutes on the hot cycle of a dryer. Take items to be dry cleaned that cannot be washed in a washing machine. Place any clothing or bedding that cannot be washed or dry cleaned in a closed plastic bag for 1 week. Do not have close body contact with anyone  until the scabies mites are gone. Talk to your provider about how long you need to wait. Ask about public places you should avoid, such as the gym. Return to work or school  24 hours after using scabies medicine, or as directed. When should I seek immediate care? You develop a fever and red, swollen, painful areas on your skin. When should I call my doctor? The bites become crusty or filled with pus. You have worsening itching after scabies treatment. You have new bite or burrow marks after treatment. You have questions or concerns about your condition or care. CARE AGREEMENT:   You have the right to help plan your care. Learn about your health condition and how it may be treated. Discuss treatment options with your healthcare providers to decide what care you want to receive. You always have the right to refuse treatment. The above information is an  only. It is not intended as medical advice for individual conditions or treatments. Talk to your doctor, nurse or pharmacist before following any medical regimen to see if it is safe and effective for you. © Copyright Arlene Nelson 2022 Information is for End User's use only and may not be sold, redistributed or otherwise used for commercial purposes.

## 2023-08-17 NOTE — PROGRESS NOTES
Assessment/Plan:         Problem List Items Addressed This Visit        Other    Screening for blood or protein in urine    Relevant Orders    UA w Reflex to Microscopic w Reflex to Culture    Seasonal allergies     She may use Claritin 10 mg. Relevant Medications    loratadine (CLARITIN) 10 mg tablet    Tobacco abuse counseling     Tobacco cessation encouraged. Itchy skin     Patient use 2.5% hydrocortisone cream to the affected areas as needed. Relevant Medications    hydrocortisone 2.5 % cream    permethrin (ELIMITE) 5 % cream    Bug bite - Primary     Patient has possible carpet beetles, marked larvae possible bedbugs. Relevant Medications    permethrin (ELIMITE) 5 % cream    Exam for population survey     CBC with differential and CMP ordered for reevaluation. Relevant Orders    CBC and differential    Comprehensive metabolic panel         Subjective:      Patient ID: Surinder Faria is a 37 y.o. female. 41-year-old female seen last week for bedbug bites. Treated with permethrin cream however indicates that she has persistent bug bites to her lower extremities. Patient indicates that she has contacted her landlord to evaluate for bedbugs as well as extermination. Possible reinfestation is the concern at this time. Patient for follow-up continuation of symptoms. Patient showed pictures possibly moth larva, carpet  beetles or termites. The following portions of the patient's history were reviewed and updated as appropriate:   Past Medical History:  She has a past medical history of Allergies, Anxiety, Asthma, Chronic pain, Contusion of head, subsequent encounter (5/2/2022), Depression, Eating disorder, Fibrocystic breast changes of both breasts, and Seizures ([address]). ,  _______________________________________________________________________  Medical Problems:  does not have any pertinent problems on file.,  _______________________________________________________________________  Past Surgical History:   has a past surgical history that includes Induced  () and Mammo stereotactic breast biopsy left (all inc) (). ,  _______________________________________________________________________  Family History:  family history includes Alcohol abuse in her sister; Cancer in her father; Diabetes in her family and sister; Hypertension in her family; Psychiatric Illness in her mother.,  _______________________________________________________________________  Social History:   reports that she has been smoking cigarettes. She has a 14.00 pack-year smoking history. She has never used smokeless tobacco. She reports that she does not drink alcohol and does not use drugs. ,  _______________________________________________________________________  Allergies:  is allergic to prednisone, gabapentin, meloxicam, tramadol, vistaril [hydroxyzine], and latex. .  _______________________________________________________________________  Current Outpatient Medications   Medication Sig Dispense Refill   • albuterol (2.5 mg/3 mL) 0.083 % nebulizer solution Take 3 mL (2.5 mg total) by nebulization every 6 (six) hours as needed for wheezing or shortness of breath 360 mL 6   • albuterol (PROVENTIL HFA,VENTOLIN HFA) 90 mcg/act inhaler TAKE 2 PUFFS BY MOUTH EVERY 6 HOURS AS NEEDED FOR WHEEZE OR FOR SHORTNESS OF BREATH 18 g 3   • Ascorbic Acid (VITAMIN C) 100 MG tablet Take 100 mg by mouth daily     • Biotin w/ Vitamins C & E (HAIR/SKIN/NAILS PO) Take by mouth     • busPIRone (BUSPAR) 15 mg tablet Take by mouth 3 (three) times a day       • CALCIUM-MAGNESIUM-ZINC PO Take by mouth     • hydrocortisone 2.5 % cream Apply topically 4 (four) times a day as needed for irritation or rash 20 g 2   • ibuprofen (MOTRIN) 600 mg tablet Take by mouth every 6 (six) hours as needed for mild pain     • loratadine (CLARITIN) 10 mg tablet Take 1 tablet (10 mg total) by mouth daily 30 tablet 2   • LORazepam (ATIVAN) 1 mg tablet Take 1 mg by mouth 6 (six) times a day  1   • naproxen (EC NAPROSYN) 500 MG EC tablet Take 1 tablet (500 mg total) by mouth 2 (two) times a day as needed for mild pain 30 tablet 0   • omeprazole (PriLOSEC) 20 mg delayed release capsule TAKE 1 CAPSULE (20 MG TOTAL) BY MOUTH TWICE DAILY 60 capsule 2   • ondansetron (ZOFRAN-ODT) 8 mg disintegrating tablet Take 1 tablet (8 mg total) by mouth every 8 (eight) hours as needed for nausea or vomiting 10 tablet 0   • permethrin (ELIMITE) 5 % cream Apply topically once for 1 dose 60 g 1   • thiamine (VITAMIN B1) 100 mg tablet Take 100 mg by mouth daily     • Breo Ellipta 200-25 MCG/ACT inhaler INHALE 1 PUFF DAILY RINSE MOUTH AFTER USE. (Patient not taking: Reported on 7/22/2023) 60 each 3   • Flowflex COVID-19 Ag Home Test KIT Use as directed (Patient not taking: Reported on 1/11/2023)     • fluticasone (FLONASE) 50 mcg/act nasal spray 1 spray into each nostril daily (Patient not taking: Reported on 8/10/2023) 16 g 2   • naproxen (NAPROSYN) 500 mg tablet TAKE 1 TABLET BY MOUTH TWICE A DAY WITH MEALS (Patient not taking: Reported on 8/10/2023) 60 tablet 5   • tiZANidine (ZANAFLEX) 2 mg tablet Take 1 tablet (2 mg total) by mouth every 8 (eight) hours as needed for muscle spasms (Patient not taking: Reported on 2/7/2023) 90 tablet 2     No current facility-administered medications for this visit.     _______________________________________________________________________  Review of Systems   Constitutional: Negative for activity change, appetite change, chills, fatigue, fever and unexpected weight change. HENT: Negative for congestion, ear discharge, ear pain, nosebleeds, postnasal drip, rhinorrhea, sinus pressure, sinus pain, sneezing, sore throat and voice change. Eyes: Negative for pain, redness and visual disturbance.    Respiratory: Negative for cough, chest tightness, shortness of breath and wheezing. Cardiovascular: Negative for chest pain and palpitations. Gastrointestinal: Negative for abdominal distention, abdominal pain, constipation, diarrhea, nausea and vomiting. Endocrine: Negative. Genitourinary: Negative for difficulty urinating, dysuria, flank pain, frequency, hematuria and urgency. Musculoskeletal: Negative for arthralgias and myalgias. Skin: Negative. Bug bites to lower extremities. Allergic/Immunologic: Negative. Neurological: Negative. Hematological: Negative. Psychiatric/Behavioral: Negative. Objective:  Vitals:    08/17/23 1531   BP: 98/68   BP Location: Left arm   Patient Position: Sitting   Cuff Size: Standard   Pulse: 89   Resp: 17   Temp: 97.5 °F (36.4 °C)   TempSrc: Temporal   SpO2: 99%   Weight: 43.5 kg (96 lb)   Height: 5' 1" (1.549 m)     Body mass index is 18.14 kg/m². Physical Exam  Vitals and nursing note reviewed. Constitutional:       Appearance: Normal appearance. She is well-developed. HENT:      Head: Normocephalic and atraumatic. Right Ear: Tympanic membrane, ear canal and external ear normal.      Left Ear: Tympanic membrane, ear canal and external ear normal.      Nose: Nose normal. No congestion or rhinorrhea. Mouth/Throat:      Mouth: Mucous membranes are moist.      Pharynx: No oropharyngeal exudate or posterior oropharyngeal erythema. Eyes:      Extraocular Movements: Extraocular movements intact. Conjunctiva/sclera: Conjunctivae normal.      Pupils: Pupils are equal, round, and reactive to light. Cardiovascular:      Rate and Rhythm: Normal rate and regular rhythm. Pulses: Normal pulses. Heart sounds: Normal heart sounds. No murmur heard. Pulmonary:      Effort: Pulmonary effort is normal.      Breath sounds: Normal breath sounds. Abdominal:      General: Bowel sounds are normal.      Palpations: Abdomen is soft. Musculoskeletal:         General: Normal range of motion.

## 2023-08-23 ENCOUNTER — HOSPITAL ENCOUNTER (OUTPATIENT)
Dept: RADIOLOGY | Facility: CLINIC | Age: 43
Discharge: HOME | End: 2023-08-23
Payer: COMMERCIAL

## 2023-08-23 ENCOUNTER — TELEPHONE (OUTPATIENT)
Dept: PAIN MEDICINE | Facility: CLINIC | Age: 43
End: 2023-08-23

## 2023-08-23 VITALS
OXYGEN SATURATION: 100 % | RESPIRATION RATE: 16 BRPM | TEMPERATURE: 97.3 F | SYSTOLIC BLOOD PRESSURE: 110 MMHG | HEART RATE: 66 BPM | DIASTOLIC BLOOD PRESSURE: 76 MMHG

## 2023-08-23 DIAGNOSIS — M47.812 CERVICAL SPONDYLOSIS: ICD-10-CM

## 2023-08-23 PROCEDURE — 64491 INJ PARAVERT F JNT C/T 2 LEV: CPT | Performed by: ANESTHESIOLOGY

## 2023-08-23 PROCEDURE — 64490 INJ PARAVERT F JNT C/T 1 LEV: CPT | Performed by: ANESTHESIOLOGY

## 2023-08-23 RX ORDER — LIDOCAINE HYDROCHLORIDE 10 MG/ML
3 INJECTION, SOLUTION EPIDURAL; INFILTRATION; INTRACAUDAL; PERINEURAL ONCE
Status: COMPLETED | OUTPATIENT
Start: 2023-08-23 | End: 2023-08-23

## 2023-08-23 RX ORDER — BUPIVACAINE HYDROCHLORIDE 5 MG/ML
1.5 INJECTION, SOLUTION EPIDURAL; INTRACAUDAL ONCE
Status: COMPLETED | OUTPATIENT
Start: 2023-08-23 | End: 2023-08-23

## 2023-08-23 RX ADMIN — LIDOCAINE HYDROCHLORIDE 3 ML: 10 INJECTION, SOLUTION EPIDURAL; INFILTRATION; INTRACAUDAL; PERINEURAL at 14:59

## 2023-08-23 RX ADMIN — BUPIVACAINE HYDROCHLORIDE 1.5 ML: 5 INJECTION, SOLUTION EPIDURAL; INTRACAUDAL; PERINEURAL at 14:59

## 2023-08-23 NOTE — DISCHARGE INSTRUCTIONS

## 2023-08-23 NOTE — TELEPHONE ENCOUNTER
Pt called office to make sure her pain level was high enough to have MBB#2, pt with pain level 4-5. Nurse advised pt to do things that irritate her neck and a pain level of 5 is needed to proceed. Pt verbalized understanding and will be coming for MBB with JW.

## 2023-08-23 NOTE — H&P
History of Present Illness: The patient is a 37 y.o. female who presents with complaints of neck pain.       Past Medical History:   Diagnosis Date   • Allergies    • Anxiety    • Asthma    • Chronic pain    • Contusion of head, subsequent encounter 2022   • Depression    • Eating disorder    • Fibrocystic breast changes of both breasts    • Seizures ([address])        Past Surgical History:   Procedure Laterality Date   • INDUCED      • MAMMO STEREOTACTIC BREAST BIOPSY LEFT (ALL INC)      Benign         Current Outpatient Medications:   •  albuterol (2.5 mg/3 mL) 0.083 % nebulizer solution, Take 3 mL (2.5 mg total) by nebulization every 6 (six) hours as needed for wheezing or shortness of breath, Disp: 360 mL, Rfl: 6  •  Ascorbic Acid (VITAMIN C) 100 MG tablet, Take 100 mg by mouth daily, Disp: , Rfl:   •  Biotin w/ Vitamins C & E (HAIR/SKIN/NAILS PO), Take by mouth, Disp: , Rfl:   •  Breo Ellipta 200-25 MCG/ACT inhaler, INHALE 1 PUFF DAILY RINSE MOUTH AFTER USE. (Patient not taking: Reported on 2023), Disp: 60 each, Rfl: 3  •  busPIRone (BUSPAR) 15 mg tablet, Take by mouth 3 (three) times a day  , Disp: , Rfl:   •  CALCIUM-MAGNESIUM-ZINC PO, Take by mouth, Disp: , Rfl:   •  Flowflex COVID-19 Ag Home Test KIT, Use as directed (Patient not taking: Reported on 2023), Disp: , Rfl:   •  fluticasone (FLONASE) 50 mcg/act nasal spray, 1 spray into each nostril daily (Patient not taking: Reported on 8/10/2023), Disp: 16 g, Rfl: 2  •  hydrocortisone 2.5 % cream, Apply topically 4 (four) times a day as needed for irritation or rash, Disp: 20 g, Rfl: 2  •  ibuprofen (MOTRIN) 600 mg tablet, Take by mouth every 6 (six) hours as needed for mild pain, Disp: , Rfl:   •  loratadine (CLARITIN) 10 mg tablet, Take 1 tablet (10 mg total) by mouth daily, Disp: 30 tablet, Rfl: 2  •  LORazepam (ATIVAN) 1 mg tablet, Take 1 mg by mouth 6 (six) times a day, Disp: , Rfl: 1  •  naproxen (EC NAPROSYN) 500 MG EC tablet, Take 1 tablet (500 mg total) by mouth 2 (two) times a day as needed for mild pain, Disp: 30 tablet, Rfl: 0  •  naproxen (NAPROSYN) 500 mg tablet, TAKE 1 TABLET BY MOUTH TWICE A DAY WITH MEALS (Patient not taking: Reported on 8/10/2023), Disp: 60 tablet, Rfl: 5  •  omeprazole (PriLOSEC) 20 mg delayed release capsule, TAKE 1 CAPSULE (20 MG TOTAL) BY MOUTH TWICE DAILY, Disp: 60 capsule, Rfl: 2  •  ondansetron (ZOFRAN-ODT) 8 mg disintegrating tablet, Take 1 tablet (8 mg total) by mouth every 8 (eight) hours as needed for nausea or vomiting, Disp: 10 tablet, Rfl: 0  •  thiamine (VITAMIN B1) 100 mg tablet, Take 100 mg by mouth daily, Disp: , Rfl:   •  tiZANidine (ZANAFLEX) 2 mg tablet, Take 1 tablet (2 mg total) by mouth every 8 (eight) hours as needed for muscle spasms (Patient not taking: Reported on 2/7/2023), Disp: 90 tablet, Rfl: 2    Allergies   Allergen Reactions   • Prednisone Anxiety   • Gabapentin Hives   • Meloxicam GI Intolerance   • Tramadol Hives     SEIZURES   • Vistaril [Hydroxyzine]    • Latex Other (See Comments)     C/o itching        Physical Exam:   Vitals:    08/23/23 1436   BP: 110/76   Pulse: 66   Resp: 16   Temp: (!) 97.3 °F (36.3 °C)   SpO2: 100%     General: Awake, Alert, Oriented x 3, Mood and affect appropriate  Respiratory: Respirations even and unlabored  Cardiovascular: Peripheral pulses intact; no edema  Musculoskeletal Exam: Left cervical paraspinals tender to palpation    ASA Score: 2    Patient/Chart Verification  Patient ID Verified: Verbal  ID Band Applied: No  Consents Confirmed: Procedural  H&P( within 30 days) Verified: To be obtained in the Pre-Procedure area  Interval H&P(within 24 hr) Complete (required for Outpatients and Surgery Admit only): To be obtained in the Pre-Procedure area  Allergies Reviewed: Yes  Anticoag/NSAID held?: No  Currently on antibiotics?: No  Pre-op Lab/Test Results Available: In chart    Assessment:   1.  Cervical spondylosis        Plan: LEFT C4-6 MBB#2

## 2023-08-28 ENCOUNTER — TELEPHONE (OUTPATIENT)
Dept: FAMILY MEDICINE CLINIC | Facility: CLINIC | Age: 43
End: 2023-08-28

## 2023-08-28 NOTE — TELEPHONE ENCOUNTER
Patient called re: recent lab results and was unaware Jatin left.   She wanted to know if her labs were normal.

## 2023-08-30 DIAGNOSIS — M47.812 CERVICAL SPONDYLOSIS: Primary | ICD-10-CM

## 2023-08-30 NOTE — TELEPHONE ENCOUNTER
Please advise levels look to be at baseline. Platelet count elevated. Smoking can cause this. I recommend smoking cessation. Schedule establish care in 3 months.  Will order lab recheck at that visit

## 2023-08-31 NOTE — TELEPHONE ENCOUNTER
I called the patient to schedule an appointment she wants to check her schedule and call back   But she does want someone to call her about her lab results   ---please call her thank you

## 2023-09-01 NOTE — TELEPHONE ENCOUNTER
Called pt --no answer so lvm informing of elevated platelet count most likely due to smoking and advised cessation -- as well as 3mo f/u appt to [facility]. Also advised pt of f/u w ordering provider of labwork as well as completing those not yet done.

## 2023-09-06 ENCOUNTER — TELEPHONE (OUTPATIENT)
Dept: FAMILY MEDICINE CLINIC | Facility: CLINIC | Age: 43
End: 2023-09-06

## 2023-09-06 NOTE — TELEPHONE ENCOUNTER
Patient can try over the counter tea tree oil shampoo and dove bar soap for skin.  Next step is derm

## 2023-09-06 NOTE — TELEPHONE ENCOUNTER
Ezequielvahe Baugh called - was seen by [facility] before he left - she is still very itchy (scalp and face)  did 2 courses of the hydrocortisone 2.5% - got better at first now it is back again. She is not seeing Apolinar Light until 9/13/23 - is there anything else that we can give her?

## 2023-09-06 NOTE — TELEPHONE ENCOUNTER
Called pt and notified of OTC tea tree oil shampoo as well as dove soap. Pt acknowledged and stated she just bought head and shoulders w Eucalyptus and will try that first. She states she is already using dove bar soap.

## 2023-09-12 DIAGNOSIS — K21.9 GASTROESOPHAGEAL REFLUX DISEASE WITHOUT ESOPHAGITIS: ICD-10-CM

## 2023-09-12 RX ORDER — OMEPRAZOLE 20 MG/1
CAPSULE, DELAYED RELEASE ORAL
Qty: 60 CAPSULE | Refills: 0 | Status: SHIPPED | OUTPATIENT
Start: 2023-09-12

## 2023-09-12 RX ORDER — PERMETHRIN 50 MG/G
CREAM TOPICAL
COMMUNITY
Start: 2023-09-04

## 2023-09-12 NOTE — TELEPHONE ENCOUNTER
Pt pcp is no longer associated with the practice. Pt has an upcoming appt with Sol Baptiste. Will forward to clinical staff to address refill.

## 2023-09-13 ENCOUNTER — TELEPHONE (OUTPATIENT)
Dept: FAMILY MEDICINE CLINIC | Facility: CLINIC | Age: 43
End: 2023-09-13

## 2023-09-13 ENCOUNTER — OFFICE VISIT (OUTPATIENT)
Dept: FAMILY MEDICINE CLINIC | Facility: CLINIC | Age: 43
End: 2023-09-13
Payer: COMMERCIAL

## 2023-09-13 VITALS
RESPIRATION RATE: 16 BRPM | SYSTOLIC BLOOD PRESSURE: 112 MMHG | DIASTOLIC BLOOD PRESSURE: 70 MMHG | WEIGHT: 92.8 LBS | HEIGHT: 61 IN | HEART RATE: 112 BPM | BODY MASS INDEX: 17.52 KG/M2 | TEMPERATURE: 97.8 F | OXYGEN SATURATION: 98 %

## 2023-09-13 DIAGNOSIS — L29.9 PRURITUS: Primary | ICD-10-CM

## 2023-09-13 DIAGNOSIS — R21 RASH: ICD-10-CM

## 2023-09-13 DIAGNOSIS — F45.8 NEUROGENIC PRURITUS: ICD-10-CM

## 2023-09-13 PROCEDURE — 99213 OFFICE O/P EST LOW 20 MIN: CPT | Performed by: NURSE PRACTITIONER

## 2023-09-13 RX ORDER — CETIRIZINE HYDROCHLORIDE 10 MG/1
10 TABLET ORAL DAILY
Qty: 10 TABLET | Refills: 0 | Status: SHIPPED | OUTPATIENT
Start: 2023-09-13

## 2023-09-13 RX ORDER — FAMOTIDINE 20 MG/1
20 TABLET, FILM COATED ORAL 2 TIMES DAILY
Qty: 60 TABLET | Refills: 1 | Status: SHIPPED | OUTPATIENT
Start: 2023-09-13 | End: 2024-09-07

## 2023-09-13 NOTE — TELEPHONE ENCOUNTER
When pt checked out from appt, she asked about Rx, Zyrtec being prescribed for her in today's visit.  Thank you    [facility] in [place]

## 2023-09-14 NOTE — PROGRESS NOTES
Assessment/Plan:    1. Pruritus  -     famotidine (PEPCID) 20 mg tablet; Take 1 tablet (20 mg total) by mouth 2 (two) times a day    2. Rash  -     Ambulatory Referral to Dermatology; Future    3. Neurogenic pruritus    The case discussed with patient using patient centered shared decision making. The patient was counseled regarding instructions for management,-- risk factor reductions,-- prognosis,-- impressions,-- risks and benefits of treatment options,-- importance of compliance with treatment. I have reviewed the instructions with the patient, answering all questions to her satisfaction. Exam unrevealing  No evidence of rash, scabies, bites  Query repeat exposure to household/cleaning agent, etc-pt adamant-NO  Query Psychogenic vs nutritional deficiency in nature-will review chart in greater detail  Pt reassured  ? ?derm opinion        There are no Patient Instructions on file for this visit. No follow-ups on file. Subjective:      Patient ID: Michael Richard is a 37 y.o. female. Chief Complaint   Patient presents with   • Follow-up     Scalp itching       36 yo female seen for same day  Follow up from treatment from previous pcp    Pt reports ongoing rash, itchiness of unclear cause  Not going away  Not improved with steroids, permethrin, antihistamines  Denies triggers  No exposures  Allergic "to everything"  Works in nursing home  Lives alone  Has no pets  Start one month ago      The following portions of the patient's history were reviewed and updated as appropriate: allergies, current medications, past family history, past medical history, past social history, past surgical history and problem list.    Review of Systems   Constitutional: Negative for fatigue and fever.    Skin:        Per hpi         Current Outpatient Medications   Medication Sig Dispense Refill   • albuterol (2.5 mg/3 mL) 0.083 % nebulizer solution Take 3 mL (2.5 mg total) by nebulization every 6 (six) hours as needed for wheezing or shortness of breath 360 mL 6   • Ascorbic Acid (VITAMIN C) 100 MG tablet Take 100 mg by mouth daily     • Biotin w/ Vitamins C & E (HAIR/SKIN/NAILS PO) Take by mouth     • busPIRone (BUSPAR) 15 mg tablet Take by mouth 3 (three) times a day       • CALCIUM-MAGNESIUM-ZINC PO Take by mouth     • famotidine (PEPCID) 20 mg tablet Take 1 tablet (20 mg total) by mouth 2 (two) times a day 60 tablet 1   • hydrocortisone 2.5 % cream Apply topically 4 (four) times a day as needed for irritation or rash 20 g 2   • ibuprofen (MOTRIN) 600 mg tablet Take by mouth every 6 (six) hours as needed for mild pain     • loratadine (CLARITIN) 10 mg tablet Take 1 tablet (10 mg total) by mouth daily 30 tablet 2   • LORazepam (ATIVAN) 1 mg tablet Take 1 mg by mouth 6 (six) times a day  1   • naproxen (EC NAPROSYN) 500 MG EC tablet Take 1 tablet (500 mg total) by mouth 2 (two) times a day as needed for mild pain 30 tablet 0   • omeprazole (PriLOSEC) 20 mg delayed release capsule TAKE 1 CAPSULE (20 MG TOTAL) BY MOUTH TWICE DAILY 60 capsule 0   • ondansetron (ZOFRAN-ODT) 8 mg disintegrating tablet Take 1 tablet (8 mg total) by mouth every 8 (eight) hours as needed for nausea or vomiting 10 tablet 0   • permethrin (ELIMITE) 5 % cream APPLY TOPICALLY ONCE FOR 1 DOSE.      • thiamine (VITAMIN B1) 100 mg tablet Take 100 mg by mouth daily     • Breo Ellipta 200-25 MCG/ACT inhaler INHALE 1 PUFF DAILY RINSE MOUTH AFTER USE. (Patient not taking: Reported on 7/22/2023) 60 each 3   • cetirizine (ZyrTEC) 10 mg tablet Take 1 tablet (10 mg total) by mouth daily 10 tablet 0   • Flowflex COVID-19 Ag Home Test KIT Use as directed (Patient not taking: Reported on 1/11/2023)     • fluticasone (FLONASE) 50 mcg/act nasal spray 1 spray into each nostril daily (Patient not taking: Reported on 8/10/2023) 16 g 2   • naproxen (NAPROSYN) 500 mg tablet TAKE 1 TABLET BY MOUTH TWICE A DAY WITH MEALS (Patient not taking: Reported on 8/10/2023) 60 tablet 5   • tiZANidine (ZANAFLEX) 2 mg tablet Take 1 tablet (2 mg total) by mouth every 8 (eight) hours as needed for muscle spasms (Patient not taking: Reported on 2/7/2023) 90 tablet 2     No current facility-administered medications for this visit. Objective:    /70 (BP Location: Left arm, Patient Position: Sitting, Cuff Size: Standard)   Pulse (!) 112   Temp 97.8 °F (36.6 °C) (Temporal)   Resp 16   Ht 5' 1" (1.549 m)   Wt 42.1 kg (92 lb 12.8 oz)   SpO2 98%   BMI 17.53 kg/m²        Physical Exam  Constitutional:       Appearance: Normal appearance. She is underweight. Skin:     General: Skin is warm and dry. Coloration: Skin is not pale. Findings: No erythema, lesion or rash. Neurological:      Mental Status: She is alert. Psychiatric:         Mood and Affect: Mood is anxious. Affect is tearful. Behavior: Behavior is hyperactive. Thought Content: Thought content is paranoid.                 Stefani Adkins, [address] [place] [address]

## 2023-09-15 DIAGNOSIS — L29.9 PRURITUS: ICD-10-CM

## 2023-09-18 RX ORDER — CETIRIZINE HYDROCHLORIDE 10 MG/1
10 TABLET ORAL DAILY
Qty: 10 TABLET | Refills: 0 | OUTPATIENT
Start: 2023-09-18

## 2023-09-20 ENCOUNTER — TELEPHONE (OUTPATIENT)
Age: 43
End: 2023-09-20

## 2023-09-20 NOTE — TELEPHONE ENCOUNTER
Spoke with pt, advised jemal diggs was called 3xs. She said she hasn't received any notifcation. I did called the [facility] back to confirm # on file and it was incorrect.  I did update it however patient will have to r/s

## 2023-09-20 NOTE — TELEPHONE ENCOUNTER
Caller:  Renan Clemente     Doctor: Dr Judah Tipton     Reason for call: Patient calling stating Roseanne Dow has not picked her up please advise     Call back#: [phone]

## 2023-10-02 ENCOUNTER — TELEPHONE (OUTPATIENT)
Age: 43
End: 2023-10-02

## 2023-10-02 NOTE — TELEPHONE ENCOUNTER
Caller: Augusto    Doctor: Sammie Cardenas    Reason for call: Pt needs to cancel procedure she is sick and not feeling well will call back to reschedule.         Call back#: [phone]

## 2023-10-09 PROBLEM — B86 SCABIES: Status: RESOLVED | Noted: 2023-08-10 | Resolved: 2023-10-09

## 2023-10-10 DIAGNOSIS — K21.9 GASTROESOPHAGEAL REFLUX DISEASE WITHOUT ESOPHAGITIS: ICD-10-CM

## 2023-10-10 RX ORDER — OMEPRAZOLE 20 MG/1
CAPSULE, DELAYED RELEASE ORAL
Qty: 60 CAPSULE | Refills: 0 | Status: SHIPPED | OUTPATIENT
Start: 2023-10-10

## 2023-10-12 ENCOUNTER — TELEPHONE (OUTPATIENT)
Age: 43
End: 2023-10-12

## 2023-10-12 DIAGNOSIS — R21 RASH: Primary | ICD-10-CM

## 2023-10-12 DIAGNOSIS — L29.9 PRURITUS: ICD-10-CM

## 2023-10-12 NOTE — TELEPHONE ENCOUNTER
Patient is requesting a referral for an allergist. She is still having symptoms of  itching/crawling sensation on her head, face and neck, which she reported in August at her visit with Dr. Edenilson Luke.   She has also seen dermatology which did not result in resolving her symptoms either

## 2023-10-16 PROBLEM — Z00.8 EXAM FOR POPULATION SURVEY: Status: RESOLVED | Noted: 2023-08-17 | Resolved: 2023-10-16

## 2023-11-05 DIAGNOSIS — K21.9 GASTROESOPHAGEAL REFLUX DISEASE WITHOUT ESOPHAGITIS: ICD-10-CM

## 2023-11-06 RX ORDER — OMEPRAZOLE 20 MG/1
CAPSULE, DELAYED RELEASE ORAL
Qty: 180 CAPSULE | Refills: 1 | Status: SHIPPED | OUTPATIENT
Start: 2023-11-06

## 2023-11-07 DIAGNOSIS — L29.9 PRURITUS: ICD-10-CM

## 2023-11-07 RX ORDER — FAMOTIDINE 20 MG/1
20 TABLET, FILM COATED ORAL 2 TIMES DAILY
Qty: 60 TABLET | Refills: 1 | Status: SHIPPED | OUTPATIENT
Start: 2023-11-07

## 2023-11-28 ENCOUNTER — TELEPHONE (OUTPATIENT)
Age: 43
End: 2023-11-28

## 2023-11-28 NOTE — TELEPHONE ENCOUNTER
Patient has an appointment scheduled with Allergist and can not access the Ambulatory order on her Mychart. She is requesting the order be faxed over to   [facility]   [facility]    Fax# [phone]    Please note when faxed. Thank you!

## 2023-11-29 ENCOUNTER — HOSPITAL ENCOUNTER (OUTPATIENT)
Dept: RADIOLOGY | Facility: CLINIC | Age: 43
Discharge: HOME | End: 2023-11-29

## 2023-11-29 ENCOUNTER — TELEPHONE (OUTPATIENT)
Age: 43
End: 2023-11-29

## 2023-11-29 NOTE — TELEPHONE ENCOUNTER
Caller:  Marily Moulton     Doctor: Dr Praneeth Le     Reason for call: Patient calling stating she needs to reschedule procedure [employer] services never pick her up     Call back#: [phone]

## 2023-11-29 NOTE — TELEPHONE ENCOUNTER
Caller: pt    Doctor: violeta    Reason for call: was on the phone with pt for 26 mins trying to get her lyft ride for her with the help of the [employer]. Pt couldn't find the  and was not receiving the lyft texts.  Modesta Reina was going to call the pt directly b/c there was so much confusion,    Call back#: [phone]

## 2023-11-29 NOTE — TELEPHONE ENCOUNTER
Caller:  Johny Whelan     Doctor: Dr Bam Lee     Reason for call: Patient calling stating has not received notification from [facility] services regarding  please advise     Call back#: [phone]

## 2023-11-29 NOTE — TELEPHONE ENCOUNTER
Caller: Fatimah    Doctor: Cyndi Ford    Reason for call: Pt is upset because she was told a [employer] would pick her up this is the second time this has happened where the [employer] has never showed up and she has missed her appt she needs to be rescheduled and would like to be rescheduled for the soonest available.      Please advise    Call back#: [phone]

## 2023-11-29 NOTE — TELEPHONE ENCOUNTER
Caller: Jean-Pierre Leyva, pt    Doctor: Cyndi Ford    Reason for call: pt called stating her lyft wasn't there yet.   Carlitos Franklin at the office verified that the  was on his way    Call back#: n/a

## 2023-12-06 ENCOUNTER — TELEPHONE (OUTPATIENT)
Age: 43
End: 2023-12-06

## 2023-12-06 NOTE — TELEPHONE ENCOUNTER
Caller: patient    Doctor: Adan Jackman    Reason for call: would like to cancel Ly[employer], will be getting a ride    Call back#:

## 2023-12-07 ENCOUNTER — TELEPHONE (OUTPATIENT)
Age: 43
End: 2023-12-07

## 2023-12-07 DIAGNOSIS — Z20.822 CLOSE EXPOSURE TO COVID-19 VIRUS: Primary | ICD-10-CM

## 2023-12-07 NOTE — TELEPHONE ENCOUNTER
Pt called to say she went to [facility] in [address]. She was told that she can't get any free Covid home tests unless she has a script for them. Please advise pt.

## 2023-12-08 RX ORDER — COVID-19 ANTIGEN TEST
1 KIT MISCELLANEOUS ONCE
Qty: 1 KIT | Refills: 0 | Status: SHIPPED | OUTPATIENT
Start: 2023-12-08 | End: 2023-12-08

## 2023-12-12 ENCOUNTER — TELEPHONE (OUTPATIENT)
Dept: PAIN MEDICINE | Facility: CLINIC | Age: 43
End: 2023-12-12

## 2023-12-12 ENCOUNTER — HOSPITAL ENCOUNTER (OUTPATIENT)
Dept: RADIOLOGY | Facility: CLINIC | Age: 43
Discharge: HOME | End: 2023-12-12
Payer: COMMERCIAL

## 2023-12-12 VITALS
TEMPERATURE: 98.3 F | SYSTOLIC BLOOD PRESSURE: 108 MMHG | DIASTOLIC BLOOD PRESSURE: 73 MMHG | OXYGEN SATURATION: 100 % | HEART RATE: 85 BPM | RESPIRATION RATE: 18 BRPM

## 2023-12-12 DIAGNOSIS — M47.812 CERVICAL SPONDYLOSIS: ICD-10-CM

## 2023-12-12 PROCEDURE — 64634 DESTROY C/TH FACET JNT ADDL: CPT | Performed by: ANESTHESIOLOGY

## 2023-12-12 PROCEDURE — 64633 DESTROY CERV/THOR FACET JNT: CPT | Performed by: ANESTHESIOLOGY

## 2023-12-12 RX ORDER — LIDOCAINE HYDROCHLORIDE 10 MG/ML
6 INJECTION, SOLUTION EPIDURAL; INFILTRATION; INTRACAUDAL; PERINEURAL ONCE
Status: COMPLETED | OUTPATIENT
Start: 2023-12-12 | End: 2023-12-12

## 2023-12-12 RX ORDER — BUPIVACAINE HYDROCHLORIDE 5 MG/ML
3 INJECTION, SOLUTION EPIDURAL; INTRACAUDAL ONCE
Status: COMPLETED | OUTPATIENT
Start: 2023-12-12 | End: 2023-12-12

## 2023-12-12 RX ADMIN — LIDOCAINE HYDROCHLORIDE 3 ML: 20 INJECTION, SOLUTION EPIDURAL; INFILTRATION; INTRACAUDAL; PERINEURAL at 14:15

## 2023-12-12 RX ADMIN — LIDOCAINE HYDROCHLORIDE 6 ML: 10 INJECTION, SOLUTION EPIDURAL; INFILTRATION; INTRACAUDAL; PERINEURAL at 14:09

## 2023-12-12 RX ADMIN — BUPIVACAINE HYDROCHLORIDE 3 ML: 5 INJECTION, SOLUTION EPIDURAL; INTRACAUDAL; PERINEURAL at 14:16

## 2023-12-12 NOTE — H&P
History of Present Illness: The patient is a 37 y.o. female who presents with complaints of neck pain.     Past Medical History:   Diagnosis Date    Allergies     Anxiety     Asthma     Chronic pain     Contusion of head, subsequent encounter 2022    Depression     Eating disorder     Fibrocystic breast changes of both breasts     Seizures ([address])        Past Surgical History:   Procedure Laterality Date    INDUCED       MAMMO STEREOTACTIC BREAST BIOPSY LEFT (ALL INC)      Benign         Current Outpatient Medications:     albuterol (2.5 mg/3 mL) 0.083 % nebulizer solution, Take 3 mL (2.5 mg total) by nebulization every 6 (six) hours as needed for wheezing or shortness of breath, Disp: 360 mL, Rfl: 6    Ascorbic Acid (VITAMIN C) 100 MG tablet, Take 100 mg by mouth daily, Disp: , Rfl:     Biotin w/ Vitamins C & E (HAIR/SKIN/NAILS PO), Take by mouth, Disp: , Rfl:     Breo Ellipta 200-25 MCG/ACT inhaler, INHALE 1 PUFF DAILY RINSE MOUTH AFTER USE. (Patient not taking: Reported on 2023), Disp: 60 each, Rfl: 3    busPIRone (BUSPAR) 15 mg tablet, Take by mouth 3 (three) times a day  , Disp: , Rfl:     CALCIUM-MAGNESIUM-ZINC PO, Take by mouth, Disp: , Rfl:     cetirizine (ZyrTEC) 10 mg tablet, Take 1 tablet (10 mg total) by mouth daily, Disp: 10 tablet, Rfl: 0    famotidine (PEPCID) 20 mg tablet, TAKE 1 TABLET BY MOUTH TWICE A DAY, Disp: 60 tablet, Rfl: 1    fluticasone (FLONASE) 50 mcg/act nasal spray, 1 spray into each nostril daily (Patient not taking: Reported on 8/10/2023), Disp: 16 g, Rfl: 2    hydrocortisone 2.5 % cream, Apply topically 4 (four) times a day as needed for irritation or rash, Disp: 20 g, Rfl: 2    ibuprofen (MOTRIN) 600 mg tablet, Take by mouth every 6 (six) hours as needed for mild pain, Disp: , Rfl:     loratadine (CLARITIN) 10 mg tablet, Take 1 tablet (10 mg total) by mouth daily, Disp: 30 tablet, Rfl: 2    LORazepam (ATIVAN) 1 mg tablet, Take 1 mg by mouth 6 (six) times a day, Disp: , Rfl: 1    naproxen (EC NAPROSYN) 500 MG EC tablet, Take 1 tablet (500 mg total) by mouth 2 (two) times a day as needed for mild pain, Disp: 30 tablet, Rfl: 0    naproxen (NAPROSYN) 500 mg tablet, TAKE 1 TABLET BY MOUTH TWICE A DAY WITH MEALS, Disp: 60 tablet, Rfl: 5    omeprazole (PriLOSEC) 20 mg delayed release capsule, TAKE 1 CAPSULE (20 MG TOTAL) BY MOUTH TWICE DAILY, Disp: 180 capsule, Rfl: 1    ondansetron (ZOFRAN-ODT) 8 mg disintegrating tablet, Take 1 tablet (8 mg total) by mouth every 8 (eight) hours as needed for nausea or vomiting, Disp: 10 tablet, Rfl: 0    permethrin (ELIMITE) 5 % cream, APPLY TOPICALLY ONCE FOR 1 DOSE., Disp: , Rfl:     thiamine (VITAMIN B1) 100 mg tablet, Take 100 mg by mouth daily, Disp: , Rfl:     tiZANidine (ZANAFLEX) 2 mg tablet, Take 1 tablet (2 mg total) by mouth every 8 (eight) hours as needed for muscle spasms (Patient not taking: Reported on 2/7/2023), Disp: 90 tablet, Rfl: 2    Allergies   Allergen Reactions    Prednisone Anxiety    Gabapentin Hives    Meloxicam GI Intolerance    Tramadol Hives     SEIZURES    Vistaril [Hydroxyzine]     Latex Other (See Comments)     C/o itching        Physical Exam:   Vitals:    12/12/23 1338   BP: 115/78   Pulse: 87   Resp: 18   Temp: 98.3 °F (36.8 °C)   SpO2: 100%     General: Awake, Alert, Oriented x 3, Mood and affect appropriate  Respiratory: Respirations even and unlabored  Cardiovascular: Peripheral pulses intact; no edema  Musculoskeletal Exam: Left cervical paraspinals tender to palpation    ASA Score: 2    Patient/Chart Verification  Patient ID Verified: Verbal  ID Band Applied: No  Consents Confirmed: Procedural, To be obtained in the Pre-Procedure area  Interval H&P(within 24 hr) Complete (required for Outpatients and Surgery Admit only): To be obtained in the Pre-Procedure area  Allergies Reviewed: Yes  Anticoag/NSAID held?: NA  Currently on antibiotics?: No  Pregnancy denied?: Yes    Assessment:   1.  Cervical spondylosis        Plan: LEFT C4-6 RFA

## 2023-12-12 NOTE — DISCHARGE INSTRUCTIONS

## 2023-12-12 NOTE — TELEPHONE ENCOUNTER
Patient is S/P a Left C4-C6 RFA c/ JW on 12/12/23. She will need a 6 week F/U to be scheduled. Please call on 12/13/23 post RFA.  Thanks

## 2023-12-15 DIAGNOSIS — L29.9 PRURITUS: ICD-10-CM

## 2023-12-15 RX ORDER — FAMOTIDINE 20 MG/1
20 TABLET, FILM COATED ORAL 2 TIMES DAILY
Qty: 60 TABLET | Refills: 1 | Status: SHIPPED | OUTPATIENT
Start: 2023-12-15

## 2023-12-15 NOTE — TELEPHONE ENCOUNTER
S/w Pt. Pt stated current pain level is 5-6/10. Pt reported redness at injection site, "like blood under the skin"- Pt was having a hard time describing over the phone. Pt also reported feeling like she had a head cold (runny nose), unable to check temperature, but was unconcerned with injection site at this time. Pt reports the area is very sore, advised to take prescribed or OTC pain medications and/or use ice/heat and that it takes 4 to 6 weeks to see the full effect. RN reviewed S/s of infection, Pt advised to monitor injection sites and call [facility] or go to Urgent Care for evaluation is symptoms persist. Pt scheduled for F/u appt. With [facility] on 01/18/24. Pt verbalized understanding. Full Thickness Lip Wedge Repair (Flap) Text: Given the location of the defect and the proximity to free margins a full thickness wedge repair was deemed most appropriate.  Using a sterile surgical marker, the appropriate repair was drawn incorporating the defect and placing the expected incisions perpendicular to the vermilion border.  The vermilion border was also meticulously outlined to ensure appropriate reapproximation during the repair.  The area thus outlined was incised through and through with a #15 scalpel blade.  The muscularis and dermis were reaproximated with deep sutures following hemostasis. Care was taken to realign the vermilion border before proceeding with the superficial closure.  Once the vermilion was realigned the superfical and mucosal closure was finished.

## 2023-12-27 DIAGNOSIS — J45.31 ASTHMA IN ADULT, MILD PERSISTENT, WITH ACUTE EXACERBATION: ICD-10-CM

## 2023-12-27 DIAGNOSIS — F33.1 MODERATE EPISODE OF RECURRENT MAJOR DEPRESSIVE DISORDER (HCC): Primary | ICD-10-CM

## 2023-12-27 RX ORDER — ALBUTEROL SULFATE 90 UG/1
AEROSOL, METERED RESPIRATORY (INHALATION)
Qty: 8.5 G | Refills: 3 | Status: SHIPPED | OUTPATIENT
Start: 2023-12-27

## 2024-02-01 NOTE — PROGRESS NOTES
Assessment:  1. Neck pain    2. Cervical radiculopathy    3. Myofascial pain syndrome    4. Cervical spondylosis        Plan:  I will order an updated MRI of the cervical spine without contrast  I will send in a Medrol Dosepak to address the inflammatory component of the patient's pain.  Patient was advised to avoid NSAIDs until after completion  Advised patient against NSAID use secondary to CKD  Patient defers on physical therapy or chiropractic therapy at this time secondary to increased pain  Patient may continue tizanidine as prescribed  Patient may continue Tylenol as needed and should not exceed more than 3000 mg in 24 hours  Patient will follow-up after imaging or sooner if needed    History of Present Illness:    The patient is a 43 y.o. female last seen on 5/23/2023 who presents for a follow up office visit in regards to chronic neck pain patient also complains of numbness in her hands.  She denies any radicular symptoms into the upper extremities.  She is status post left C4-6 RFA December 12, 2023.  Patient states this procedure worsened her pain and has now also constant right-sided neck pain.  She has also had trigger point injections and cervical epidural steroid injections in the past.  She states that she has done physical therapy and chiropractic therapy without any significant benefit.  She uses both naproxen and Aleve with minimal relief.  She does find some relief with tizanidine at nighttime but she is unable to tolerate the sedation during the day.  She finds minimal relief with Tylenol.  She has tried duloxetine in the past which she states caused side effects. patient has had an EMG of the upper extremities in the past which was unremarkable.     The patient rates her pain a 5-6 out of 10 on the numeric pain rating scale.  Her pain is intermittent throughout the day and it is described as burning, sharp, pressure-like and pins-and-needles    I have personally reviewed and/or updated the  patient's past medical history, past surgical history, family history, social history, current medications, allergies, and vital signs today.       Review of Systems:    Review of Systems   Respiratory:  Negative for shortness of breath.    Cardiovascular:  Negative for chest pain.   Gastrointestinal:  Negative for constipation, diarrhea, nausea and vomiting.   Musculoskeletal:  Positive for back pain and gait problem. Negative for arthralgias, joint swelling and myalgias.   Skin:  Negative for rash.   Neurological:  Negative for dizziness, seizures and weakness.   All other systems reviewed and are negative.        Past Medical History:   Diagnosis Date    Allergies     Anxiety     Asthma     Chronic pain     Contusion of head, subsequent encounter 2022    Depression     Eating disorder     Fibrocystic breast changes of both breasts     Seizures ([facility])        Past Surgical History:   Procedure Laterality Date    INDUCED       MAMMO STEREOTACTIC BREAST BIOPSY LEFT (ALL INC)      Benign       Family History   Problem Relation Age of Onset    Diabetes Sister     Alcohol abuse Sister     Cancer Father         Diagnosed stage 4 - metastazied    Psychiatric Illness Mother     Hypertension Family     Diabetes Family        Social History     Occupational History    Occupation: part time    Tobacco Use    Smoking status: Every Day     Current packs/day: 0.50     Average packs/day: 0.5 packs/day for 28.0 years (14.0 ttl pk-yrs)     Types: Cigarettes    Smokeless tobacco: Never    Tobacco comments:     2023-10-15 cig/day   Vaping Use    Vaping status: Never Used   Substance and Sexual Activity    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    Drug use: No    Sexual activity: Yes     Partners: Male         Current Outpatient Medications:     albuterol (2.5 mg/3 mL) 0.083 % nebulizer solution, Take 3 mL (2.5 mg total) by nebulization every 6 (six) hours as needed for wheezing or shortness of breath, Disp:  360 mL, Rfl: 6    albuterol (PROVENTIL HFA,VENTOLIN HFA) 90 mcg/act inhaler, TAKE 2 PUFFS BY MOUTH EVERY 6 HOURS AS NEEDED FOR WHEEZE OR FOR SHORTNESS OF BREATH, Disp: 8.5 g, Rfl: 3    Ascorbic Acid (VITAMIN C) 100 MG tablet, Take 100 mg by mouth daily, Disp: , Rfl:     Biotin w/ Vitamins C & E (HAIR/SKIN/NAILS PO), Take by mouth, Disp: , Rfl:     busPIRone (BUSPAR) 15 mg tablet, Take by mouth 3 (three) times a day  , Disp: , Rfl:     CALCIUM-MAGNESIUM-ZINC PO, Take by mouth, Disp: , Rfl:     famotidine (PEPCID) 20 mg tablet, TAKE 1 TABLET BY MOUTH TWICE A DAY, Disp: 60 tablet, Rfl: 1    ibuprofen (MOTRIN) 600 mg tablet, Take by mouth every 6 (six) hours as needed for mild pain, Disp: , Rfl:     loratadine (CLARITIN) 10 mg tablet, Take 1 tablet (10 mg total) by mouth daily, Disp: 30 tablet, Rfl: 2    LORazepam (ATIVAN) 1 mg tablet, Take 1 mg by mouth 6 (six) times a day, Disp: , Rfl: 1    methylPREDNISolone 4 MG tablet therapy pack, Use as directed on package, Disp: 1 each, Rfl: 0    naproxen (NAPROSYN) 500 mg tablet, TAKE 1 TABLET BY MOUTH TWICE A DAY WITH MEALS, Disp: 60 tablet, Rfl: 5    omeprazole (PriLOSEC) 20 mg delayed release capsule, TAKE 1 CAPSULE (20 MG TOTAL) BY MOUTH TWICE DAILY, Disp: 180 capsule, Rfl: 1    permethrin (ELIMITE) 5 % cream, APPLY TOPICALLY ONCE FOR 1 DOSE., Disp: , Rfl:     thiamine (VITAMIN B1) 100 mg tablet, Take 100 mg by mouth daily, Disp: , Rfl:     tiZANidine (ZANAFLEX) 2 mg tablet, Take 1 tablet (2 mg total) by mouth every 8 (eight) hours as needed for muscle spasms, Disp: 90 tablet, Rfl: 2    Breo Ellipta 200-25 MCG/ACT inhaler, INHALE 1 PUFF DAILY RINSE MOUTH AFTER USE. (Patient not taking: Reported on 7/22/2023), Disp: 60 each, Rfl: 3    cetirizine (ZyrTEC) 10 mg tablet, Take 1 tablet (10 mg total) by mouth daily, Disp: 10 tablet, Rfl: 0    fluticasone (FLONASE) 50 mcg/act nasal spray, 1 spray into each nostril daily (Patient not taking: Reported on 8/10/2023), Disp: 16 g, Rfl:  "2    hydrocortisone 2.5 % cream, Apply topically 4 (four) times a day as needed for irritation or rash, Disp: 20 g, Rfl: 2    naproxen (EC NAPROSYN) 500 MG EC tablet, Take 1 tablet (500 mg total) by mouth 2 (two) times a day as needed for mild pain, Disp: 30 tablet, Rfl: 0    ondansetron (ZOFRAN-ODT) 8 mg disintegrating tablet, Take 1 tablet (8 mg total) by mouth every 8 (eight) hours as needed for nausea or vomiting (Patient not taking: Reported on 2/2/2024), Disp: 10 tablet, Rfl: 0    Allergies   Allergen Reactions    Prednisone Anxiety    Gabapentin Hives    Meloxicam GI Intolerance    Tramadol Hives     SEIZURES    Vistaril [Hydroxyzine]     Latex Other (See Comments)     C/o itching        Physical Exam:    BP 90/60   Pulse 87   Ht 5' 1\" (1.549 m)   Wt 43.1 kg (95 lb)   BMI 17.95 kg/m²     Constitutional:normal, well developed, well nourished, alert, in no distress and non-toxic and no overt pain behavior.  Eyes:anicteric  HEENT:grossly intact  Neck:supple, symmetric, trachea midline and no masses   Pulmonary:even and unlabored  Cardiovascular:No edema or pitting edema present  Skin:Normal without rashes or lesions and well hydrated  Psychiatric:Mood and affect appropriate  Neurologic:Cranial Nerves II-XII grossly intact  Musculoskeletal:normal gait.  Limited range of motion of the cervical spine in all planes.  Bilateral cervical paraspinal and trapezii musculature tender to palpation      Imaging  MRI cervical spine without contrast    (Results Pending)         Orders Placed This Encounter   Procedures    MRI cervical spine without contrast       "

## 2024-02-02 ENCOUNTER — OFFICE VISIT (OUTPATIENT)
Dept: PAIN MEDICINE | Facility: CLINIC | Age: 44
End: 2024-02-02
Payer: COMMERCIAL

## 2024-02-02 VITALS
BODY MASS INDEX: 17.94 KG/M2 | HEIGHT: 61 IN | DIASTOLIC BLOOD PRESSURE: 60 MMHG | SYSTOLIC BLOOD PRESSURE: 90 MMHG | HEART RATE: 87 BPM | WEIGHT: 95 LBS

## 2024-02-02 DIAGNOSIS — M54.12 CERVICAL RADICULOPATHY: ICD-10-CM

## 2024-02-02 DIAGNOSIS — M79.18 MYOFASCIAL PAIN SYNDROME: ICD-10-CM

## 2024-02-02 DIAGNOSIS — M54.2 NECK PAIN: Primary | ICD-10-CM

## 2024-02-02 DIAGNOSIS — M47.812 CERVICAL SPONDYLOSIS: ICD-10-CM

## 2024-02-02 PROCEDURE — 99214 OFFICE O/P EST MOD 30 MIN: CPT | Performed by: NURSE PRACTITIONER

## 2024-02-02 RX ORDER — METHYLPREDNISOLONE 4 MG/1
TABLET ORAL
Qty: 1 EACH | Refills: 0 | Status: SHIPPED | OUTPATIENT
Start: 2024-02-02

## 2024-02-02 NOTE — PATIENT INSTRUCTIONS
Avoid NSAIDs as much as you can - definitely do not take NSAIDs with steroid pack    Methylprednisolone (By mouth)   Methylprednisolone (meth-il-pred-NIS-oh-lone)  Treats inflammation, severe allergies, flare-ups of ongoing illnesses, and many other medical problems. May also be used to decrease some symptoms of cancer. This medicine is a corticosteroid.   Brand Name(s): Medrol, Medrol Dosepak, Methylpred-DP   There may be other brand names for this medicine.  When This Medicine Should Not Be Used:   This medicine is not right for everyone. Do not use it if you had an allergic reaction to methylprednisolone, or if you have a fungus infection.  How to Use This Medicine:   Tablet  Take your medicine as directed. Your dose may need to be changed several times to find what works best for you.  If you are using this medicine for an ongoing illness, your dose may need to be changed occasionally. Some people take this medicine only every other day, which helps to decrease side effects.  Take your medicine in the morning, unless your doctor tells you otherwise.  It is best to take this medicine with food or milk.  Missed dose: Take a dose as soon as you remember. If it is almost time for your next dose, wait until then and take a regular dose. Do not take extra medicine to make up for a missed dose.  Store the medicine in a closed container at room temperature, away from heat, moisture, and direct light.  Drugs and Foods to Avoid:   Ask your doctor or pharmacist before using any other medicine, including over-the-counter medicines, vitamins, and herbal products.  Some medicines can affect how methylprednisolone works. Tell your doctor if you are using any of the following:  Cyclosporine, ketoconazole, phenobarbital, phenytoin, rifampin, troleandomycin  Aspirin, especially in high doses  Blood thinner (including warfarin)  Diabetes medicine  This medicine may interfere with vaccines. Ask your doctor before you get a flu shot  or any other vaccines.  Warnings While Using This Medicine:   Tell your doctor if you are pregnant or breastfeeding, or if you have kidney disease, liver disease (including cirrhosis), adrenal gland problems, heart failure, high blood pressure, diabetes, osteoporosis, blood clotting problems, thyroid problems, mental health problems (including depression), myasthenia gravis, or stomach or bowel problems (including ulcer or diverticulitis). Tell your doctor if you have an infection (including herpes eye infection, tuberculosis, or threadworm). Also tell your doctor if you have a recent exposure to chickenpox or measles.  This medicine may cause the following problems:  Increased risk of infection  Changes in mood or behavior  High blood pressure  Adrenal gland problems  Eye problems or changes in vision (including cataracts or glaucoma)  Bone problems (including osteoporosis)  Slow growth in children  Increased risk for cancer (including Kaposi's sarcoma)  If you use this medicine for a long time, tell your doctor about any extra stress or anxiety in your life, including other health concerns and emotional stress. Your dose might need to be changed for a short time while you have extra stress.  Do not stop using this medicine suddenly. Your doctor will need to slowly decrease your dose before you stop it completely.  Tell any doctor or dentist who treats you that you are using this medicine. This medicine may affect certain medical test results.  Your doctor will do lab tests at regular visits to check on the effects of this medicine. Keep all appointments.  Keep all medicine out of the reach of children. Never share your medicine with anyone.  Possible Side Effects While Using This Medicine:   Call your doctor right away if you notice any of these side effects:  Allergic reaction: Itching or hives, swelling in your face or hands, swelling or tingling in your mouth or throat, chest tightness, trouble breathing  Bone  pain, decrease in height  Dark freckles, skin color changes, coldness, weakness, tiredness, weight loss  Depression, unusual thoughts, feelings, or behaviors, trouble sleeping  Fever, chills, cough, sore throat, body aches  Severe stomach pain, nausea, vomiting, or red or black stools  Skin changes or growths  Swelling in your hands, ankles, or feet, rapid weight gain  Trouble seeing, blurred vision or other changes in vision, eye pain, headache  Unusual bleeding or bruising  If you notice these less serious side effects, talk with your doctor:   Increased appetite  Round, puffy face  Weight gain around your neck, upper back, breast, face, or waist  If you notice other side effects that you think are caused by this medicine, tell your doctor.   Call your doctor for medical advice about side effects. You may report side effects to FDA at 4-680-FDA-1801  © Copyright [employer] 2023 Information is for End User's use only and may not be sold, redistributed or otherwise used for commercial purposes.  The above information is an  only. It is not intended as medical advice for individual conditions or treatments. Talk to your doctor, nurse or pharmacist before following any medical regimen to see if it is safe and effective for you.

## 2024-02-16 ENCOUNTER — HOSPITAL ENCOUNTER (OUTPATIENT)
Dept: MRI IMAGING | Facility: HOSPITAL | Age: 44
End: 2024-02-16
Payer: COMMERCIAL

## 2024-02-16 DIAGNOSIS — M54.2 NECK PAIN: ICD-10-CM

## 2024-02-16 PROCEDURE — 72141 MRI NECK SPINE W/O DYE: CPT

## 2024-02-22 ENCOUNTER — TELEPHONE (OUTPATIENT)
Age: 44
End: 2024-02-22

## 2024-02-22 NOTE — TELEPHONE ENCOUNTER
Caller: gege Sheridan    Doctor: Flash    Reason for call: pt is having pain in her lower neck and back.  Pt stated it's almost like the procedure flared something in that area.  Pt is still getting pins and needles in her left hand daily and occasionally in her right.    Pt has completed the steroid pack and did have her MRI done.  MRI is not resulted as of yet.    Pt is scheduled for a f/u appt on 3/15, pt doesn't feel that she can wait that long but she needs an afternoon appt and there wasn't anything else available.  Pt has been added to cancellation list as well        Call back#: [phone]

## 2024-02-23 DIAGNOSIS — L29.9 PRURITUS: ICD-10-CM

## 2024-02-23 RX ORDER — FAMOTIDINE 20 MG/1
20 TABLET, FILM COATED ORAL 2 TIMES DAILY
Qty: 60 TABLET | Refills: 1 | Status: SHIPPED | OUTPATIENT
Start: 2024-02-23

## 2024-02-23 NOTE — TELEPHONE ENCOUNTER
Patient will need to be reevaluated to determine next steps in treatment strategy.  Patient is already on cancellation list.  Hopefully she will be able to get in sooner than her next scheduled office visit

## 2024-02-28 NOTE — TELEPHONE ENCOUNTER
Please notify the patient her MRI of the cervical spine demonstrates arthritis and disc protrusion at C5-6 resulting in severe left foraminal stenosis and mild canal stenosis.

## 2024-02-28 NOTE — TELEPHONE ENCOUNTER
Caller: gege    Doctor: violeta    Reason for call: pt would like results of mri.    Call back#: [phone]

## 2024-02-29 NOTE — TELEPHONE ENCOUNTER
S/w pt an advised of MRI results. Pt verbalized understanding.Pt is still having pain worse since procedure, upper back and L>R hand tingling described as like a needle poke and burning. NSAIDS help and pt uses a roll on that helped but not so much lately. Tylenol does not help. Pt to f/u om appt on 3/15.    Pt already on cx list but is requesting again to be called if appt opens

## 2024-02-29 NOTE — TELEPHONE ENCOUNTER
Caller:Fatimah     Doctor: Dr Vidales     Reason for call: Patient returning orxana from nurse please advise     Call back#: [phone]

## 2024-03-04 NOTE — PROGRESS NOTES
Assessment:  1. Cervical radiculopathy        Plan:  Patient was given a referral to neurosurgery for surgical evaluation  Discussed repeating C6-7 cervical epidural steroid injection, however she declines  Patient may continue Tylenol as needed and should not exceed more than 4000 mg in 24 hours  Patient may continue tizanidine as prescribed  Will avoid opioids secondary to history of EtOH abuse  Patient will follow-up after surgical evaluation or sooner if needed    History of Present Illness:    The patient is a 43 y.o. female last seen on 2/2/2024 who presents for a follow up office visit in regards to chronic left-sided neck pain that will radiate toward the shoulder with associated numbness and paresthesias in the left hand she denies bowel or bladder incontinence or balance issues.  Patient did complete left C4-6 RFA in December 2023 which only worsened her complaints.  She did have trigger point injections and a cervical epidural steroid injection in the past without any relief.  She finds minimal relief with Tylenol.  She has tried duloxetine in the past which caused side effects.  She has had an EMG of the upper extremities which was unremarkable.  Updated MRI of the cervical spine reveals a disc osteophyte complex with severe left foraminal narrowing at C5-6.    The patient rates her pain a 7 out of 10 on the numeric pain rating scale.  Pain is constant throughout the day and it is described as burning, dull aching, sharp, throbbing, cramping, pressure-like, shooting, numbness and pins-and-needles    I have personally reviewed and/or updated the patient's past medical history, past surgical history, family history, social history, current medications, allergies, and vital signs today.       Review of Systems:    Review of Systems   Respiratory:  Negative for shortness of breath.    Cardiovascular:  Negative for chest pain.   Gastrointestinal:  Negative for constipation, diarrhea, nausea and vomiting.    Musculoskeletal:  Negative for arthralgias, gait problem, joint swelling and myalgias.   Skin:  Negative for rash.   Neurological:  Negative for dizziness, seizures and weakness.   All other systems reviewed and are negative.        Past Medical History:   Diagnosis Date    Allergies     Anxiety     Asthma     Chronic pain     Contusion of head, subsequent encounter 2022    Depression     Eating disorder     Fibrocystic breast changes of both breasts     Seizures (HCC)        Past Surgical History:   Procedure Laterality Date    INDUCED       MAMMO STEREOTACTIC BREAST BIOPSY LEFT (ALL INC)      Benign       Family History   Problem Relation Age of Onset    Diabetes Sister     Alcohol abuse Sister     Cancer Father         Diagnosed stage 4 - metastazied    Psychiatric Illness Mother     Hypertension Family     Diabetes Family        Social History     Occupational History    Occupation: part time    Tobacco Use    Smoking status: Every Day     Current packs/day: 0.50     Average packs/day: 0.5 packs/day for 28.0 years (14.0 ttl pk-yrs)     Types: Cigarettes    Smokeless tobacco: Never    Tobacco comments:     2023-10-15 cig/day   Vaping Use    Vaping status: Never Used   Substance and Sexual Activity    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    Drug use: No    Sexual activity: Yes     Partners: Male         Current Outpatient Medications:     albuterol (2.5 mg/3 mL) 0.083 % nebulizer solution, Take 3 mL (2.5 mg total) by nebulization every 6 (six) hours as needed for wheezing or shortness of breath, Disp: 360 mL, Rfl: 6    albuterol (PROVENTIL HFA,VENTOLIN HFA) 90 mcg/act inhaler, TAKE 2 PUFFS BY MOUTH EVERY 6 HOURS AS NEEDED FOR WHEEZE OR FOR SHORTNESS OF BREATH, Disp: 8.5 g, Rfl: 3    Ascorbic Acid (VITAMIN C) 100 MG tablet, Take 100 mg by mouth daily, Disp: , Rfl:     Biotin w/ Vitamins C & E (HAIR/SKIN/NAILS PO), Take by mouth, Disp: , Rfl:     Breo Ellipta 200-25 MCG/ACT inhaler,  INHALE 1 PUFF DAILY RINSE MOUTH AFTER USE. (Patient not taking: Reported on 7/22/2023), Disp: 60 each, Rfl: 3    busPIRone (BUSPAR) 15 mg tablet, Take by mouth 3 (three) times a day  , Disp: , Rfl:     CALCIUM-MAGNESIUM-ZINC PO, Take by mouth, Disp: , Rfl:     cetirizine (ZyrTEC) 10 mg tablet, Take 1 tablet (10 mg total) by mouth daily, Disp: 10 tablet, Rfl: 0    famotidine (PEPCID) 20 mg tablet, Take 1 tablet (20 mg total) by mouth 2 (two) times a day, Disp: 60 tablet, Rfl: 1    fluticasone (FLONASE) 50 mcg/act nasal spray, 1 spray into each nostril daily (Patient not taking: Reported on 8/10/2023), Disp: 16 g, Rfl: 2    hydrocortisone 2.5 % cream, Apply topically 4 (four) times a day as needed for irritation or rash, Disp: 20 g, Rfl: 2    ibuprofen (MOTRIN) 600 mg tablet, Take by mouth every 6 (six) hours as needed for mild pain, Disp: , Rfl:     loratadine (CLARITIN) 10 mg tablet, Take 1 tablet (10 mg total) by mouth daily, Disp: 30 tablet, Rfl: 2    LORazepam (ATIVAN) 1 mg tablet, Take 1 mg by mouth 6 (six) times a day, Disp: , Rfl: 1    methylPREDNISolone 4 MG tablet therapy pack, Use as directed on package, Disp: 1 each, Rfl: 0    naproxen (EC NAPROSYN) 500 MG EC tablet, Take 1 tablet (500 mg total) by mouth 2 (two) times a day as needed for mild pain, Disp: 30 tablet, Rfl: 0    naproxen (NAPROSYN) 500 mg tablet, TAKE 1 TABLET BY MOUTH TWICE A DAY WITH MEALS, Disp: 60 tablet, Rfl: 5    omeprazole (PriLOSEC) 20 mg delayed release capsule, TAKE 1 CAPSULE (20 MG TOTAL) BY MOUTH TWICE DAILY, Disp: 180 capsule, Rfl: 1    ondansetron (ZOFRAN-ODT) 8 mg disintegrating tablet, Take 1 tablet (8 mg total) by mouth every 8 (eight) hours as needed for nausea or vomiting (Patient not taking: Reported on 2/2/2024), Disp: 10 tablet, Rfl: 0    permethrin (ELIMITE) 5 % cream, APPLY TOPICALLY ONCE FOR 1 DOSE., Disp: , Rfl:     thiamine (VITAMIN B1) 100 mg tablet, Take 100 mg by mouth daily, Disp: , Rfl:     tiZANidine (ZANAFLEX)  "2 mg tablet, Take 1 tablet (2 mg total) by mouth every 8 (eight) hours as needed for muscle spasms, Disp: 90 tablet, Rfl: 2    Allergies   Allergen Reactions    Prednisone Anxiety    Gabapentin Hives    Meloxicam GI Intolerance    Tramadol Hives     SEIZURES    Vistaril [Hydroxyzine]     Latex Other (See Comments)     C/o itching        Physical Exam:    BP 95/71   Pulse 103   Ht 5' 1\" (1.549 m)   Wt 43.1 kg (95 lb)   BMI 17.95 kg/m²     Constitutional:normal, well developed, well nourished, alert, in no distress and non-toxic and no overt pain behavior.  Eyes:anicteric  HEENT:grossly intact  Neck:supple, symmetric, trachea midline and no masses   Pulmonary:even and unlabored  Cardiovascular:No edema or pitting edema present  Skin:Normal without rashes or lesions and well hydrated  Psychiatric:Mood and affect appropriate  Neurologic:Cranial Nerves II-XII grossly intact  Musculoskeletal:normal gait.  Reduced range of motion of the cervical spine to the left.  Left cervical paraspinal musculature tender to palpation.  Equivocal Spurling's      Imaging  No orders to display       Narrative & Impression  MRI CERVICAL SPINE WITHOUT CONTRAST     INDICATION: M54.2: Cervicalgia.     COMPARISON: 4/28/2022     TECHNIQUE:  Multiplanar, multisequence imaging of the cervical spine was performed. .        IMAGE QUALITY:  Diagnostic     FINDINGS:     ALIGNMENT: There is reversal of the normal cervical lordosis at C5-C6. There is Modic type I endplate generative change at this level.     MARROW SIGNAL: There is no suspicious marrow signal abnormality.     CERVICAL AND VISUALIZED THORACIC CORD:  Normal signal within the visualized cord.     PREVERTEBRAL AND PARASPINAL SOFT TISSUES:  Normal.     VISUALIZED POSTERIOR FOSSA:  The visualized posterior fossa demonstrates no abnormal signal.     CERVICAL DISC SPACES:     C2-C3:  Normal.     C3-C4:  Normal.     C4-C5:  Normal.     C5-C6: There is a disc osteophyte complex with " uncovertebral spurring. There is left-sided facet arthrosis. There is severe left foraminal narrowing. There is mild canal stenosis. There is no significant right foraminal narrowing.     C6-C7:  Normal.     C7-T1: Minimal bulge without significant canal stenosis or foraminal narrowing.     UPPER THORACIC DISC SPACES:  Normal.     OTHER FINDINGS:  None.     IMPRESSION:     Cervical spondylosis, as described above.     Multifactorial disease results in severe left foraminal narrowing at C5-C6. Modic type I endplate degenerative change is present at this level.        Workstation performed: [id number]       Orders Placed This Encounter   Procedures    Ambulatory referral to Neurosurgery

## 2024-03-05 ENCOUNTER — APPOINTMENT (OUTPATIENT)
Dept: LAB | Facility: CLINIC | Age: 44
End: 2024-03-05
Payer: COMMERCIAL

## 2024-03-05 ENCOUNTER — OFFICE VISIT (OUTPATIENT)
Dept: PAIN MEDICINE | Facility: CLINIC | Age: 44
End: 2024-03-05
Payer: COMMERCIAL

## 2024-03-05 VITALS
BODY MASS INDEX: 17.94 KG/M2 | SYSTOLIC BLOOD PRESSURE: 95 MMHG | DIASTOLIC BLOOD PRESSURE: 71 MMHG | WEIGHT: 95 LBS | HEART RATE: 103 BPM | HEIGHT: 61 IN

## 2024-03-05 DIAGNOSIS — Z79.899 ENCOUNTER FOR LONG-TERM (CURRENT) USE OF OTHER MEDICATIONS: ICD-10-CM

## 2024-03-05 DIAGNOSIS — M54.12 CERVICAL RADICULOPATHY: Primary | ICD-10-CM

## 2024-03-05 LAB
ALBUMIN SERPL BCP-MCNC: 4.3 G/DL (ref 3.5–5)
ALP SERPL-CCNC: 46 U/L (ref 34–104)
ALT SERPL W P-5'-P-CCNC: 16 U/L (ref 7–52)
ANION GAP SERPL CALCULATED.3IONS-SCNC: 10 MMOL/L
AST SERPL W P-5'-P-CCNC: 21 U/L (ref 13–39)
BASOPHILS # BLD AUTO: 0.07 THOUSANDS/ÂΜL (ref 0–0.1)
BASOPHILS NFR BLD AUTO: 1 % (ref 0–1)
BILIRUB SERPL-MCNC: 0.29 MG/DL (ref 0.2–1)
BUN SERPL-MCNC: 13 MG/DL (ref 5–25)
CALCIUM SERPL-MCNC: 8.8 MG/DL (ref 8.4–10.2)
CHLORIDE SERPL-SCNC: 105 MMOL/L (ref 96–108)
CO2 SERPL-SCNC: 22 MMOL/L (ref 21–32)
CREAT SERPL-MCNC: 1.22 MG/DL (ref 0.6–1.3)
EOSINOPHIL # BLD AUTO: 0.12 THOUSAND/ÂΜL (ref 0–0.61)
EOSINOPHIL NFR BLD AUTO: 1 % (ref 0–6)
ERYTHROCYTE [DISTWIDTH] IN BLOOD BY AUTOMATED COUNT: 14.6 % (ref 11.6–15.1)
EST. AVERAGE GLUCOSE BLD GHB EST-MCNC: 126 MG/DL
GFR SERPL CREATININE-BSD FRML MDRD: 54 ML/MIN/1.73SQ M
GLUCOSE SERPL-MCNC: 71 MG/DL (ref 65–140)
HBA1C MFR BLD: 6 %
HCT VFR BLD AUTO: 41.1 % (ref 34.8–46.1)
HGB BLD-MCNC: 12.9 G/DL (ref 11.5–15.4)
IMM GRANULOCYTES # BLD AUTO: 0.04 THOUSAND/UL (ref 0–0.2)
IMM GRANULOCYTES NFR BLD AUTO: 0 % (ref 0–2)
LYMPHOCYTES # BLD AUTO: 3.71 THOUSANDS/ÂΜL (ref 0.6–4.47)
LYMPHOCYTES NFR BLD AUTO: 35 % (ref 14–44)
MCH RBC QN AUTO: 30.8 PG (ref 26.8–34.3)
MCHC RBC AUTO-ENTMCNC: 31.4 G/DL (ref 31.4–37.4)
MCV RBC AUTO: 98 FL (ref 82–98)
MONOCYTES # BLD AUTO: 0.74 THOUSAND/ÂΜL (ref 0.17–1.22)
MONOCYTES NFR BLD AUTO: 7 % (ref 4–12)
NEUTROPHILS # BLD AUTO: 5.88 THOUSANDS/ÂΜL (ref 1.85–7.62)
NEUTS SEG NFR BLD AUTO: 56 % (ref 43–75)
NRBC BLD AUTO-RTO: 0 /100 WBCS
PLATELET # BLD AUTO: 426 THOUSANDS/UL (ref 149–390)
PMV BLD AUTO: 9.2 FL (ref 8.9–12.7)
POTASSIUM SERPL-SCNC: 3.6 MMOL/L (ref 3.5–5.3)
PROT SERPL-MCNC: 6.9 G/DL (ref 6.4–8.4)
RBC # BLD AUTO: 4.19 MILLION/UL (ref 3.81–5.12)
SODIUM SERPL-SCNC: 137 MMOL/L (ref 135–147)
TSH SERPL DL<=0.05 MIU/L-ACNC: 1.4 UIU/ML (ref 0.45–4.5)
WBC # BLD AUTO: 10.56 THOUSAND/UL (ref 4.31–10.16)

## 2024-03-05 PROCEDURE — 36415 COLL VENOUS BLD VENIPUNCTURE: CPT

## 2024-03-05 PROCEDURE — 83036 HEMOGLOBIN GLYCOSYLATED A1C: CPT

## 2024-03-05 PROCEDURE — 85025 COMPLETE CBC W/AUTO DIFF WBC: CPT

## 2024-03-05 PROCEDURE — 99214 OFFICE O/P EST MOD 30 MIN: CPT | Performed by: NURSE PRACTITIONER

## 2024-03-05 PROCEDURE — 80053 COMPREHEN METABOLIC PANEL: CPT

## 2024-03-05 PROCEDURE — 84443 ASSAY THYROID STIM HORMONE: CPT

## 2024-03-13 ENCOUNTER — TELEPHONE (OUTPATIENT)
Dept: NEUROSURGERY | Facility: CLINIC | Age: 44
End: 2024-03-13

## 2024-04-02 PROBLEM — G89.4 CHRONIC PAIN DISORDER: Status: ACTIVE | Noted: 2020-12-15

## 2024-04-02 PROBLEM — M99.02 SEGMENTAL DYSFUNCTION OF THORACIC REGION: Status: ACTIVE | Noted: 2023-07-28

## 2024-04-02 PROBLEM — M54.12 CERVICAL NEURITIS: Status: ACTIVE | Noted: 2020-09-02

## 2024-04-02 PROBLEM — Z71.6 TOBACCO ABUSE COUNSELING: Status: RESOLVED | Noted: 2020-12-09 | Resolved: 2024-04-02

## 2024-04-04 PROBLEM — J30.9 ALLERGIC RHINITIS: Status: ACTIVE | Noted: 2024-04-04

## 2024-04-04 PROBLEM — H10.13 ALLERGIC CONJUNCTIVITIS OF BOTH EYES: Status: ACTIVE | Noted: 2024-04-04

## 2024-04-04 PROBLEM — J31.0 CHRONIC RHINITIS: Status: ACTIVE | Noted: 2024-04-04

## 2024-04-04 PROBLEM — F17.210 CIGARETTE SMOKER: Status: ACTIVE | Noted: 2024-04-04

## 2024-04-18 DIAGNOSIS — L29.9 PRURITUS: ICD-10-CM

## 2024-04-19 RX ORDER — FAMOTIDINE 20 MG/1
20 TABLET, FILM COATED ORAL 2 TIMES DAILY
Qty: 60 TABLET | Refills: 5 | Status: SHIPPED | OUTPATIENT
Start: 2024-04-19

## 2024-04-28 ENCOUNTER — OFFICE VISIT (OUTPATIENT)
Dept: URGENT CARE | Facility: MEDICAL CENTER | Age: 44
End: 2024-04-28
Payer: COMMERCIAL

## 2024-04-28 VITALS
BODY MASS INDEX: 18.12 KG/M2 | HEART RATE: 83 BPM | HEIGHT: 61 IN | DIASTOLIC BLOOD PRESSURE: 71 MMHG | TEMPERATURE: 98 F | OXYGEN SATURATION: 99 % | RESPIRATION RATE: 18 BRPM | WEIGHT: 96 LBS | SYSTOLIC BLOOD PRESSURE: 104 MMHG

## 2024-04-28 DIAGNOSIS — H00.012 HORDEOLUM EXTERNUM OF RIGHT LOWER EYELID: Primary | ICD-10-CM

## 2024-04-28 PROCEDURE — S9088 SERVICES PROVIDED IN URGENT: HCPCS | Performed by: PHYSICIAN ASSISTANT

## 2024-04-28 PROCEDURE — 99213 OFFICE O/P EST LOW 20 MIN: CPT | Performed by: PHYSICIAN ASSISTANT

## 2024-04-28 RX ORDER — OFLOXACIN 3 MG/ML
1 SOLUTION/ DROPS OPHTHALMIC 4 TIMES DAILY
Qty: 5 ML | Refills: 0 | Status: SHIPPED | OUTPATIENT
Start: 2024-04-28

## 2024-04-28 NOTE — PATIENT INSTRUCTIONS
Apply warm compresses frequently    Follow-up with your eye doctor if symptoms persist or worsen    Follow up with PCP in 3-5 days.    Proceed to  ER if symptoms worsen.    If tests have been performed at [facility], our office will contact you with results if changes need to be made to the care plan discussed with you at the visit.  You can review your full results on [facility] MyChart.

## 2024-04-29 NOTE — PROGRESS NOTES
[facility]        NAME: Fatimah Da Silva is a 43 y.o. female  : 1980    MRN: [id number]  DATE: 2024  TIME: 8:02 AM    Assessment and Plan   Hordeolum externum of right lower eyelid [H00.012]  1. Hordeolum externum of right lower eyelid  ofloxacin (OCUFLOX) 0.3 % ophthalmic solution            Patient Instructions       Follow up with PCP in 3-5 days.  Proceed to  ER if symptoms worsen.    If tests have been performed at [facility], our office will contact you with results if changes need to be made to the care plan discussed with you at the visit.  [facility]    Chief Complaint     Chief Complaint   Patient presents with    Eye Swelling     Patient states yesterday her right eye was swollen; today she started scratching at it; unsure if it is a stye; has been using ketotifen fumarate ophthalmic solution and ofloxacin ophthalmic solution with little relief; states she thinks she may have scratched it     Stye         History of Present Illness       Patient here for evaluation of swelling under her right eye.  She did use some old eyedrops that she had from last year.  She states it did go down slightly but still present.  She did denies any fevers or chills.  Patient has not applied any warm compresses yet.        Review of Systems   Review of Systems   Constitutional: Negative.    HENT: Negative.     Eyes:  Negative for photophobia, pain, discharge, redness, itching and visual disturbance.   Respiratory: Negative.     Cardiovascular: Negative.    Neurological: Negative.          Current Medications       Current Outpatient Medications:     ofloxacin (OCUFLOX) 0.3 % ophthalmic solution, Administer 1 drop to the right eye 4 (four) times a day, Disp: 5 mL, Rfl: 0    albuterol (2.5 mg/3 mL) 0.083 % nebulizer solution, Take 3 mL (2.5 mg total) by nebulization every 6 (six) hours as needed for wheezing or shortness of breath, Disp: 360 mL, Rfl: 6     albuterol (PROVENTIL HFA,VENTOLIN HFA) 90 mcg/act inhaler, TAKE 2 PUFFS BY MOUTH EVERY 6 HOURS AS NEEDED FOR WHEEZE OR FOR SHORTNESS OF BREATH, Disp: 8.5 g, Rfl: 3    ARIPiprazole (ABILIFY) 2 mg tablet, , Disp: , Rfl:     Ascorbic Acid (VITAMIN C) 100 MG tablet, Take 100 mg by mouth daily, Disp: , Rfl:     Biotin w/ Vitamins C & E (HAIR/SKIN/NAILS PO), Take by mouth, Disp: , Rfl:     Breo Ellipta 200-25 MCG/ACT inhaler, INHALE 1 PUFF DAILY RINSE MOUTH AFTER USE., Disp: 60 each, Rfl: 3    busPIRone (BUSPAR) 15 mg tablet, Take by mouth 3 (three) times a day  , Disp: , Rfl:     CALCIUM-MAGNESIUM-ZINC PO, Take by mouth, Disp: , Rfl:     famotidine (PEPCID) 20 mg tablet, TAKE 1 TABLET BY MOUTH TWICE A DAY, Disp: 60 tablet, Rfl: 5    fexofenadine (ALLEGRA) 180 MG tablet, Take 1 tablet (180 mg total) by mouth daily, Disp: 30 tablet, Rfl: 5    hydrocortisone 2.5 % cream, Apply topically 4 (four) times a day as needed for irritation or rash, Disp: 20 g, Rfl: 2    ibuprofen (MOTRIN) 600 mg tablet, Take by mouth every 6 (six) hours as needed for mild pain, Disp: , Rfl:     ketoconazole (NIZORAL) 2 % cream, , Disp: , Rfl:     ketoconazole (NIZORAL) 2 % shampoo, Apply topically, Disp: , Rfl:     ketotifen (ZADITOR) 0.025 % ophthalmic solution, Administer 1 drop to both eyes 2 (two) times a day, Disp: 5 mL, Rfl: 1    Ketotifen Fumarate (ZADITOR) 0.035 % ophthalmic solution, instill 1 drop into both eyes twice a day, Disp: , Rfl:     loratadine (CLARITIN) 10 mg tablet, Take 1 tablet (10 mg total) by mouth daily, Disp: 30 tablet, Rfl: 2    LORazepam (ATIVAN) 1 mg tablet, Take 1 mg by mouth 6 (six) times a day, Disp: , Rfl: 1    naproxen (EC NAPROSYN) 500 MG EC tablet, Take 1 tablet (500 mg total) by mouth 2 (two) times a day as needed for mild pain, Disp: 30 tablet, Rfl: 0    naproxen (NAPROSYN) 500 mg tablet, TAKE 1 TABLET BY MOUTH TWICE A DAY WITH MEALS, Disp: 60 tablet, Rfl: 5    omeprazole (PriLOSEC) 20 mg delayed release  "capsule, TAKE 1 CAPSULE (20 MG TOTAL) BY MOUTH TWICE DAILY, Disp: 180 capsule, Rfl: 1    ondansetron (ZOFRAN-ODT) 8 mg disintegrating tablet, Take 1 tablet (8 mg total) by mouth every 8 (eight) hours as needed for nausea or vomiting, Disp: 10 tablet, Rfl: 0    thiamine (VITAMIN B1) 100 mg tablet, Take 100 mg by mouth daily, Disp: , Rfl:     tiZANidine (ZANAFLEX) 2 mg tablet, Take 1 tablet (2 mg total) by mouth every 8 (eight) hours as needed for muscle spasms, Disp: 90 tablet, Rfl: 2    Current Allergies     Allergies as of 2024 - Reviewed 2024   Allergen Reaction Noted    Prednisone Anxiety 12/15/2022    Gabapentin Hives 2016    Meloxicam GI Intolerance 2015    Tramadol Hives 2016    Vistaril [hydroxyzine]  10/04/2018    Latex Other (See Comments) 10/25/2022            The following portions of the patient's history were reviewed and updated as appropriate: allergies, current medications, past family history, past medical history, past social history, past surgical history and problem list.     Past Medical History:   Diagnosis Date    Allergies     Anxiety     Asthma     Chronic pain     Contusion of head, subsequent encounter 2022    Depression     Eating disorder     Fibrocystic breast changes of both breasts     Seizures (HCC)        Past Surgical History:   Procedure Laterality Date    INDUCED       MAMMO STEREOTACTIC BREAST BIOPSY LEFT (ALL INC)      Benign       Family History   Problem Relation Age of Onset    Diabetes Sister     Alcohol abuse Sister     Cancer Father         Diagnosed stage 4 - metastazied    Psychiatric Illness Mother     Hypertension Family     Diabetes Family          Medications have been verified.        Objective   /71   Pulse 83   Temp 98 °F (36.7 °C)   Resp 18   Ht 5' 1\" (1.549 m)   Wt 43.5 kg (96 lb)   SpO2 99%   BMI 18.14 kg/m²   No LMP recorded.       Physical Exam     Physical Exam  Vitals and nursing note reviewed. "   Constitutional:       General: She is not in acute distress.     Appearance: Normal appearance. She is well-developed. She is not ill-appearing, toxic-appearing or diaphoretic.   HENT:      Head: Normocephalic and atraumatic.   Eyes:      General:         Right eye: Discharge present.         Left eye: No discharge.      Extraocular Movements: Extraocular movements intact.      Conjunctiva/sclera: Conjunctivae normal.      Pupils: Pupils are equal, round, and reactive to light.      Comments: Small amount of purulent drainage from the hordeolum on the right lower lid.  There is some mild surrounding swelling.  No conjunctivitis.  No foreign bodies noted.   Skin:     General: Skin is warm and dry.      Findings: No rash.   Neurological:      General: No focal deficit present.      Mental Status: She is alert and oriented to person, place, and time.   Psychiatric:         Mood and Affect: Mood normal.         Behavior: Behavior normal.         Thought Content: Thought content normal.         Judgment: Judgment normal.

## 2024-05-02 ENCOUNTER — TELEPHONE (OUTPATIENT)
Dept: NEUROSURGERY | Facility: CLINIC | Age: 44
End: 2024-05-02

## 2024-05-02 ENCOUNTER — CONSULT (OUTPATIENT)
Dept: NEUROSURGERY | Facility: CLINIC | Age: 44
End: 2024-05-02
Payer: COMMERCIAL

## 2024-05-02 VITALS
TEMPERATURE: 97.6 F | OXYGEN SATURATION: 97 % | WEIGHT: 100 LBS | HEART RATE: 99 BPM | HEIGHT: 61 IN | BODY MASS INDEX: 18.88 KG/M2 | DIASTOLIC BLOOD PRESSURE: 62 MMHG | SYSTOLIC BLOOD PRESSURE: 108 MMHG

## 2024-05-02 DIAGNOSIS — K21.9 GASTROESOPHAGEAL REFLUX DISEASE WITHOUT ESOPHAGITIS: ICD-10-CM

## 2024-05-02 DIAGNOSIS — R20.0 LEFT ARM NUMBNESS: ICD-10-CM

## 2024-05-02 DIAGNOSIS — M54.2 NECK PAIN: Primary | ICD-10-CM

## 2024-05-02 DIAGNOSIS — M54.12 CERVICAL RADICULOPATHY: ICD-10-CM

## 2024-05-02 PROCEDURE — 99203 OFFICE O/P NEW LOW 30 MIN: CPT | Performed by: STUDENT IN AN ORGANIZED HEALTH CARE EDUCATION/TRAINING PROGRAM

## 2024-05-02 NOTE — PROGRESS NOTES
Assessment/Plan:  43-year-old female with a history of chronic neck pain who presents to clinic for evaluation.  The pain has been present for over several years and progressively getting worse over the last 6 months to year.  She states that she does not have any significant radiculopathy however she does have numbness in her left arm that involves the entirety of her arm and entirety of the hand and no specific dermatomal distribution. She has an MRI cervical spine that shows degenerative disc disease at C5-6 with left-sided C5-6 foraminal stenosis.  I reviewed the MRI with her.  Overall the symptoms that she has in her left arm is not entirely explained by the MRI findings as the numbness does not fit a C6 dermatomal distribution.  Her neck pain can be explained by the degenerative changes that are seen on the MRI cervical spine but treating this with surgery is much less effective than if she has a radiculopathy that corresponds to it.  I recommended further workup with an EMG of her upper extremities and an x-ray cervical flex ex films to evaluate for instability.  If both these do not show any additional information then I recommended a spinal cord stimulator trial and a referral to pain management was placed for this as well.  She can return to clinic after the EMG and x-rays are completed to review the results but I reiterated that her likely best treatment option would be a spinal cord stimulator trial.    I spent 30 minutes obtaining history and physical exam, reviewing imaging, discussing treatment options, and coordinating care.        Subjective:      Patient ID: Fatimah Da Silva is a 43 y.o. female.    HPI    43-year-old female with a history of chronic neck pain who presents to clinic for evaluation.  The pain has been present for over several years and progressively getting worse over the last 6 months to year.  She states that she does not have any significant radiculopathy however she does have  "numbness in her left arm that involves the entirety of her arm and entirety of the hand and no specific dermatomal distribution.  It comes and goes randomly throughout the day.  Currently she does not have any numbness in the left arm.  She states that the pain in her neck is constant and worse with movement and better with rest.  The pain is 10 out of 10 in severity.  She does not have any significant pain that goes down her left arm but rather just numbness that will develop in her left arm and no specific dermatomal distribution.  She has an MRI cervical spine that shows degenerative disc disease at C5-6 with left-sided C5-6 foraminal stenosis.  She is referred to neurosurgery clinic for further evaluation.    The following portions of the patient's history were reviewed and updated as appropriate: allergies, current medications, past family history, past medical history, past social history, past surgical history, and problem list.    Review of Systems      Objective:      /62 (BP Location: Left arm, Patient Position: Sitting, Cuff Size: Standard)   Pulse 99   Temp 97.6 °F (36.4 °C) (Temporal)   Ht 5' 1\" (1.549 m)   Wt 45.4 kg (100 lb)   SpO2 97%   BMI 18.89 kg/m²          Physical Exam    A&OX3  Gaze conjugate  EOMI  FS  TM  Fcx4  5/5 BUE  5/5 BLE  Moneta  No clonus  No durbin  No babinski    "

## 2024-05-02 NOTE — TELEPHONE ENCOUNTER
5/7/24 Email received from Vero in the EMG dept that Pt's Dec emg appt was moved to 6/19/24 2:30pm at [facility], she's aware. Contacted patient to schedule follow up with Dr. Sepulveda she stated she will call us to schedule when ready as she has to move from her place and has a lot going on at this time.        5/2/24 Patient is scheduled at [facility] on 12/26/24 11:00 am  Vero was emailed for a sooner appointment for emg test  patient was advised to call once Vero moves her appointment to sooner date for a follow up with Dr. Sepulveda.

## 2024-05-03 RX ORDER — OMEPRAZOLE 20 MG/1
CAPSULE, DELAYED RELEASE ORAL
Qty: 180 CAPSULE | Refills: 1 | Status: SHIPPED | OUTPATIENT
Start: 2024-05-03

## 2024-05-04 PROBLEM — H10.13 ALLERGIC CONJUNCTIVITIS OF BOTH EYES: Status: RESOLVED | Noted: 2024-04-04 | Resolved: 2024-05-04

## 2024-05-07 ENCOUNTER — TELEPHONE (OUTPATIENT)
Dept: NEUROSURGERY | Facility: CLINIC | Age: 44
End: 2024-05-07

## 2024-05-23 DIAGNOSIS — M79.7 FIBROMYALGIA: ICD-10-CM

## 2024-05-23 RX ORDER — NAPROXEN 500 MG/1
500 TABLET ORAL 2 TIMES DAILY WITH MEALS
Qty: 60 TABLET | Refills: 3 | Status: SHIPPED | OUTPATIENT
Start: 2024-05-23

## 2024-06-05 NOTE — PSYCH
Subjective:     Patient ID: Denis Calixto is a 44 y o  female  [facility] Clinical Progress Notes      Specialized Services Documentation  Therapist must complete separate progress note for each specific clinical activity in which the individual participated during the day  Group Psychotherapy August Rojas, DANIELE    [id number] Denis Calixto  actively shared in psychotherapy  group focused on decreasing self- stigma and supports  Traci Lopez  attentively listened to Certified Peer Specialist Nel Herrera share his life story  Group encouraged power of learning about self, accepting illness and personal responsibility in recovery  Community resources reviewed in addition to personal resources like the Personal Medicine  Traci Lopez progress toward goal noted  Continue psychotherapy to encourage self -awareness and healthy engagement of supports           Tx Plan Objective: 1 4 (4) no limitations

## 2024-06-20 ENCOUNTER — NURSE TRIAGE (OUTPATIENT)
Age: 44
End: 2024-06-20

## 2024-06-20 NOTE — TELEPHONE ENCOUNTER
"Pt calling in stating that she has notice swelling in b/l feet and ankles. Pt states she is unsure if it is related to the heat or her health. Pt has been trying to stay cool and elevate her legs. Pt denies discoloration, pain, chest pain or SOB. Pt is scheduled for office visit, 6/28. Please advise if sooner appt needed.     Reason for Disposition   MILD swelling of both ankles (i.e., pedal edema) AND new-onset or worsening    Answer Assessment - Initial Assessment Questions  1. ONSET: \"When did the swelling start?\" (e.g., minutes, hours, days)      A few days ago  2. LOCATION: \"What part of the leg is swollen?\"  \"Are both legs swollen or just one leg?\"      Foot and ankle-b/l  3. SEVERITY: \"How bad is the swelling?\" (e.g., localized; mild, moderate, severe)   - Localized - small area of swelling localized to one leg   - MILD pedal edema - swelling limited to foot and ankle, pitting edema < 1/4 inch (6 mm) deep, rest and elevation eliminate most or all swelling   - MODERATE edema - swelling of lower leg to knee, pitting edema > 1/4 inch (6 mm) deep, rest and elevation only partially reduce swelling   - SEVERE edema - swelling extends above knee, facial or hand swelling present       Mild  4. REDNESS: \"Does the swelling look red or infected?\"      Denies  5. PAIN: \"Is the swelling painful to touch?\" If Yes, ask: \"How painful is it?\"   (Scale 1-10; mild, moderate or severe)      Denies  6. FEVER: \"Do you have a fever?\" If Yes, ask: \"What is it, how was it measured, and when did it start?\"       Denies  7. CAUSE: \"What do you think is causing the leg swelling?\"      Heat  8. MEDICAL HISTORY: \"Do you have a history of heart failure, kidney disease, liver failure, or cancer?\"      CKD  9. RECURRENT SYMPTOM: \"Have you had leg swelling before?\" If Yes, ask: \"When was the last time?\" \"What happened that time?\"      Yes-lab work  10. OTHER SYMPTOMS: \"Do you have any other symptoms?\" (e.g., chest pain, difficulty " "breathing)        Denies  11. PREGNANCY: \"Is there any chance you are pregnant?\" \"When was your last menstrual period?\"        NA    Protocols used: Leg Swelling and Edema-ADULT-[place]    "

## 2024-06-24 ENCOUNTER — HOSPITAL ENCOUNTER (EMERGENCY)
Facility: HOSPITAL | Age: 44
Discharge: HOME | End: 2024-06-24
Attending: EMERGENCY MEDICINE | Admitting: EMERGENCY MEDICINE
Payer: COMMERCIAL

## 2024-06-24 ENCOUNTER — APPOINTMENT (EMERGENCY)
Dept: RADIOLOGY | Facility: HOSPITAL | Age: 44
End: 2024-06-24
Payer: COMMERCIAL

## 2024-06-24 VITALS
OXYGEN SATURATION: 100 % | TEMPERATURE: 98 F | DIASTOLIC BLOOD PRESSURE: 78 MMHG | HEART RATE: 71 BPM | RESPIRATION RATE: 16 BRPM | SYSTOLIC BLOOD PRESSURE: 104 MMHG

## 2024-06-24 DIAGNOSIS — R41.0 CONFUSION AND DISORIENTATION: ICD-10-CM
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
## 2024-06-24 DIAGNOSIS — S06.0XAA CONCUSSION: ICD-10-CM

## 2024-06-24 LAB
ALBUMIN SERPL BCG-MCNC: 4 G/DL (ref 3.5–5)
ALP SERPL-CCNC: 40 U/L (ref 34–104)
ALT SERPL W P-5'-P-CCNC: 14 U/L (ref 7–52)
AMMONIA PLAS-SCNC: 26 UMOL/L (ref 18–72)
ANION GAP SERPL CALCULATED.3IONS-SCNC: 10 MMOL/L (ref 4–13)
AST SERPL W P-5'-P-CCNC: 22 U/L (ref 13–39)
ATRIAL RATE: 55 BPM
BASOPHILS # BLD AUTO: 0.04 THOUSANDS/ÂΜL (ref 0–0.1)
BASOPHILS NFR BLD AUTO: 1 % (ref 0–1)
BILIRUB SERPL-MCNC: 0.22 MG/DL (ref 0.2–1)
BUN SERPL-MCNC: 8 MG/DL (ref 5–25)
CALCIUM SERPL-MCNC: 8.3 MG/DL (ref 8.4–10.2)
CARDIAC TROPONIN I PNL SERPL HS: 3 NG/L
CHLORIDE SERPL-SCNC: 102 MMOL/L (ref 96–108)
CO2 SERPL-SCNC: 25 MMOL/L (ref 21–32)
CREAT SERPL-MCNC: 0.87 MG/DL (ref 0.6–1.3)
EOSINOPHIL # BLD AUTO: 0.03 THOUSAND/ÂΜL (ref 0–0.61)
EOSINOPHIL NFR BLD AUTO: 0 % (ref 0–6)
ERYTHROCYTE [DISTWIDTH] IN BLOOD BY AUTOMATED COUNT: 15.5 % (ref 11.6–15.1)
GFR SERPL CREATININE-BSD FRML MDRD: 81 ML/MIN/1.73SQ M
GLUCOSE SERPL-MCNC: 74 MG/DL (ref 65–140)
GLUCOSE SERPL-MCNC: 78 MG/DL (ref 65–140)
HCT VFR BLD AUTO: 34 % (ref 34.8–46.1)
HGB BLD-MCNC: 11.5 G/DL (ref 11.5–15.4)
IMM GRANULOCYTES # BLD AUTO: 0.03 THOUSAND/UL (ref 0–0.2)
IMM GRANULOCYTES NFR BLD AUTO: 0 % (ref 0–2)
LYMPHOCYTES # BLD AUTO: 3.41 THOUSANDS/ÂΜL (ref 0.6–4.47)
LYMPHOCYTES NFR BLD AUTO: 47 % (ref 14–44)
MAGNESIUM SERPL-MCNC: 2 MG/DL (ref 1.9–2.7)
MCH RBC QN AUTO: 30.3 PG (ref 26.8–34.3)
MCHC RBC AUTO-ENTMCNC: 33.8 G/DL (ref 31.4–37.4)
MCV RBC AUTO: 90 FL (ref 82–98)
MONOCYTES # BLD AUTO: 0.52 THOUSAND/ÂΜL (ref 0.17–1.22)
MONOCYTES NFR BLD AUTO: 7 % (ref 4–12)
NEUTROPHILS # BLD AUTO: 3.26 THOUSANDS/ÂΜL (ref 1.85–7.62)
NEUTS SEG NFR BLD AUTO: 45 % (ref 43–75)
NRBC BLD AUTO-RTO: 0 /100 WBCS
P AXIS: 76 DEGREES
PHOSPHATE SERPL-MCNC: 2.7 MG/DL (ref 2.7–4.5)
PLATELET # BLD AUTO: 288 THOUSANDS/UL (ref 149–390)
PMV BLD AUTO: 8.2 FL (ref 8.9–12.7)
POTASSIUM SERPL-SCNC: 3.2 MMOL/L (ref 3.5–5.3)
PR INTERVAL: 162 MS
PROT SERPL-MCNC: 6 G/DL (ref 6.4–8.4)
QRS AXIS: 85 DEGREES
QRSD INTERVAL: 82 MS
QT INTERVAL: 428 MS
QTC INTERVAL: 409 MS
RBC # BLD AUTO: 3.8 MILLION/UL (ref 3.81–5.12)
SODIUM SERPL-SCNC: 137 MMOL/L (ref 135–147)
T WAVE AXIS: 79 DEGREES
VENTRICULAR RATE: 55 BPM
WBC # BLD AUTO: 7.29 THOUSAND/UL (ref 4.31–10.16)

## 2024-06-24 PROCEDURE — 99284 EMERGENCY DEPT VISIT MOD MDM: CPT

## 2024-06-24 PROCEDURE — 82948 REAGENT STRIP/BLOOD GLUCOSE: CPT

## 2024-06-24 PROCEDURE — 85025 COMPLETE CBC W/AUTO DIFF WBC: CPT

## 2024-06-24 PROCEDURE — 36415 COLL VENOUS BLD VENIPUNCTURE: CPT

## 2024-06-24 PROCEDURE — 93005 ELECTROCARDIOGRAM TRACING: CPT

## 2024-06-24 PROCEDURE — 84100 ASSAY OF PHOSPHORUS: CPT

## 2024-06-24 PROCEDURE — 70450 CT HEAD/BRAIN W/O DYE: CPT

## 2024-06-24 PROCEDURE — 72125 CT NECK SPINE W/O DYE: CPT

## 2024-06-24 PROCEDURE — 82140 ASSAY OF AMMONIA: CPT

## 2024-06-24 PROCEDURE — 84484 ASSAY OF TROPONIN QUANT: CPT

## 2024-06-24 PROCEDURE — 80053 COMPREHEN METABOLIC PANEL: CPT

## 2024-06-24 PROCEDURE — 99285 EMERGENCY DEPT VISIT HI MDM: CPT | Performed by: EMERGENCY MEDICINE

## 2024-06-24 PROCEDURE — 83735 ASSAY OF MAGNESIUM: CPT

## 2024-06-24 PROCEDURE — 93010 ELECTROCARDIOGRAM REPORT: CPT | Performed by: INTERNAL MEDICINE

## 2024-06-24 RX ORDER — ACETAMINOPHEN 325 MG/1
975 TABLET ORAL ONCE
Status: COMPLETED | OUTPATIENT
Start: 2024-06-24 | End: 2024-06-24

## 2024-06-24 RX ADMIN — ACETAMINOPHEN 975 MG: 325 TABLET, FILM COATED ORAL at 20:50

## 2024-06-24 RX ADMIN — FLUORESCEIN SODIUM 1 STRIP: 1 STRIP OPHTHALMIC at 20:34

## 2024-06-25 NOTE — DISCHARGE INSTRUCTIONS
Please follow-up with your primary care doctor this week for reevaluation of your symptoms.  Return immediately for worsening headaches, increased confusion focal weakness or any other concerns.

## 2024-06-25 NOTE — ED NOTES
Pt asking to leave. States no one told her her test results. No one wanted her urine specimen and no on is watching the monitor.      Elizabet Velazco RN  06/24/24 0110
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
## 2024-06-28 ENCOUNTER — OFFICE VISIT (OUTPATIENT)
Dept: FAMILY MEDICINE CLINIC | Facility: CLINIC | Age: 44
End: 2024-06-28
Payer: COMMERCIAL

## 2024-06-28 VITALS
HEART RATE: 90 BPM | BODY MASS INDEX: 16.54 KG/M2 | TEMPERATURE: 97.9 F | SYSTOLIC BLOOD PRESSURE: 94 MMHG | DIASTOLIC BLOOD PRESSURE: 62 MMHG | OXYGEN SATURATION: 98 % | RESPIRATION RATE: 16 BRPM | HEIGHT: 61 IN | WEIGHT: 87.6 LBS

## 2024-06-28 DIAGNOSIS — M79.89 SWELLING OF LOWER LEG: ICD-10-CM

## 2024-06-28 DIAGNOSIS — R60.1 GENERALIZED EDEMA: ICD-10-CM

## 2024-06-28 DIAGNOSIS — S06.0X0D CONCUSSION WITHOUT LOSS OF CONSCIOUSNESS, SUBSEQUENT ENCOUNTER: Primary | ICD-10-CM

## 2024-06-28 DIAGNOSIS — F10.21 ALCOHOL USE DISORDER, SEVERE, IN EARLY REMISSION (HCC): ICD-10-CM

## 2024-06-28 DIAGNOSIS — R10.84 GENERALIZED ABDOMINAL PAIN: ICD-10-CM

## 2024-06-28 DIAGNOSIS — F50.2 BULIMIA NERVOSA, IN PARTIAL REMISSION, MODERATE: ICD-10-CM

## 2024-06-28 DIAGNOSIS — M79.7 FIBROMYALGIA: ICD-10-CM

## 2024-06-28 DIAGNOSIS — N18.31 STAGE 3A CHRONIC KIDNEY DISEASE (HCC): ICD-10-CM

## 2024-06-28 DIAGNOSIS — R41.3 MEMORY LOSS: ICD-10-CM

## 2024-06-28 DIAGNOSIS — R63.4 UNEXPLAINED WEIGHT LOSS: ICD-10-CM

## 2024-06-28 DIAGNOSIS — E43 SEVERE PROTEIN-CALORIE MALNUTRITION (HCC): ICD-10-CM

## 2024-06-28 DIAGNOSIS — F33.1 MODERATE EPISODE OF RECURRENT MAJOR DEPRESSIVE DISORDER (HCC): ICD-10-CM
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
## 2024-06-28 PROBLEM — J30.2 SEASONAL ALLERGIES: Status: RESOLVED | Noted: 2020-12-09 | Resolved: 2024-06-28

## 2024-06-28 PROBLEM — Z20.822 EXPOSURE TO COVID-19 VIRUS: Status: RESOLVED | Noted: 2020-05-15 | Resolved: 2024-06-28

## 2024-06-28 PROBLEM — R06.02 SHORT OF BREATH ON EXERTION: Status: RESOLVED | Noted: 2020-05-15 | Resolved: 2024-06-28
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
## 2024-06-28 PROBLEM — S00.93XD: Status: RESOLVED | Noted: 2022-05-02 | Resolved: 2024-06-28

## 2024-06-28 PROBLEM — J45.901 MODERATE ASTHMA WITH EXACERBATION: Status: RESOLVED | Noted: 2023-01-09 | Resolved: 2024-06-28

## 2024-06-28 PROBLEM — R11.0 NAUSEA: Status: RESOLVED | Noted: 2023-08-10 | Resolved: 2024-06-28

## 2024-06-28 PROBLEM — R68.89 FLU-LIKE SYMPTOMS: Status: RESOLVED | Noted: 2023-01-03 | Resolved: 2024-06-28

## 2024-06-28 PROBLEM — R09.81 NASAL CONGESTION: Status: RESOLVED | Noted: 2021-12-30 | Resolved: 2024-06-28

## 2024-06-28 PROBLEM — Z13.89 SCREENING FOR BLOOD OR PROTEIN IN URINE: Status: RESOLVED | Noted: 2018-10-04 | Resolved: 2024-06-28

## 2024-06-28 PROBLEM — Z09 FOLLOW-UP EXAM: Status: RESOLVED | Noted: 2022-05-02 | Resolved: 2024-06-28

## 2024-06-28 PROBLEM — U07.1 COVID-19: Status: RESOLVED | Noted: 2023-01-03 | Resolved: 2024-06-28

## 2024-06-28 PROBLEM — J45.31 ASTHMA IN ADULT, MILD PERSISTENT, WITH ACUTE EXACERBATION: Status: RESOLVED | Noted: 2022-09-20 | Resolved: 2024-06-28

## 2024-06-28 PROBLEM — F50.25: Status: ACTIVE | Noted: 2024-06-28

## 2024-06-28 PROBLEM — E87.1 HYPONATREMIA: Status: RESOLVED | Noted: 2023-01-09 | Resolved: 2024-06-28

## 2024-06-28 PROCEDURE — G2211 COMPLEX E/M VISIT ADD ON: HCPCS | Performed by: NURSE PRACTITIONER

## 2024-06-28 PROCEDURE — 99214 OFFICE O/P EST MOD 30 MIN: CPT | Performed by: NURSE PRACTITIONER
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
## 2024-07-08 ENCOUNTER — RA CDI HCC (OUTPATIENT)
Dept: OTHER | Facility: HOSPITAL | Age: 44
End: 2024-07-08

## 2024-07-23 ENCOUNTER — HOSPITAL ENCOUNTER (OUTPATIENT)
Dept: RADIOLOGY | Facility: HOSPITAL | Age: 44
Discharge: HOME | End: 2024-07-23
Payer: COMMERCIAL

## 2024-07-23 ENCOUNTER — TELEPHONE (OUTPATIENT)
Dept: FAMILY MEDICINE CLINIC | Facility: CLINIC | Age: 44
End: 2024-07-23

## 2024-07-23 DIAGNOSIS — M79.89 SWELLING OF LOWER LEG: ICD-10-CM

## 2024-07-23 DIAGNOSIS — R63.4 UNEXPLAINED WEIGHT LOSS: ICD-10-CM

## 2024-07-23 DIAGNOSIS — R10.84 GENERALIZED ABDOMINAL PAIN: ICD-10-CM

## 2024-07-23 PROCEDURE — 74176 CT ABD & PELVIS W/O CONTRAST: CPT

## 2024-07-30 ENCOUNTER — TELEPHONE (OUTPATIENT)
Age: 44
End: 2024-07-30

## 2024-07-30 DIAGNOSIS — R63.4 UNEXPLAINED WEIGHT LOSS: ICD-10-CM

## 2024-07-30 DIAGNOSIS — K63.9 COLON WALL THICKENING: Primary | ICD-10-CM

## 2024-07-30 DIAGNOSIS — K31.89 GASTRIC WALL THICKENING: ICD-10-CM

## 2024-07-30 DIAGNOSIS — R93.89 ABNORMAL CT SCAN: ICD-10-CM

## 2024-07-30 NOTE — TELEPHONE ENCOUNTER
Yes she has a kidney stone but it is not obstructive so the plan is to monitor.     For the thickening of the colon, cecum and stomach, she will need further testing with gastro to determine further significance.     There is a referral for neurology from ED for neurology for disorientation and confusion, I agree. I think she should schedule

## 2024-07-30 NOTE — TELEPHONE ENCOUNTER
"Pt returned our call. Providers result note given.   Pt is still feeling very fatigued, confused and has questions. She wants to know if \"KIDNEYS/URETERS: Nonobstructive 1.6 mm calculus in the left kidney upper pole\" means she has a kidney stone? Is the thickening of the colon, stomach and cecum walls serious? She rescheduled her appt with Christine for 9/6/24. Her VAS venous duplex appt is 8/13/24. And her GI appt is 9/11/24. Is the GI appt soon enough? Please advise.       "

## 2024-07-30 NOTE — TELEPHONE ENCOUNTER
Called pt -- lvm reiterating PCP message and advised she call back to confirm understanding of vm/receipt of vm.

## 2024-08-13 ENCOUNTER — HOSPITAL ENCOUNTER (OUTPATIENT)
Dept: RADIOLOGY | Facility: HOSPITAL | Age: 44
Discharge: HOME | End: 2024-08-13
Payer: COMMERCIAL

## 2024-08-13 DIAGNOSIS — M54.2 NECK PAIN: ICD-10-CM

## 2024-08-13 PROCEDURE — 72052 X-RAY EXAM NECK SPINE 6/>VWS: CPT

## 2024-08-19 DIAGNOSIS — J30.2 SEASONAL ALLERGIES: ICD-10-CM

## 2024-08-19 RX ORDER — LORATADINE 10 MG/1
10 TABLET ORAL DAILY
Qty: 30 TABLET | Refills: 2 | Status: SHIPPED | OUTPATIENT
Start: 2024-08-19

## 2024-08-19 NOTE — TELEPHONE ENCOUNTER
Medication: loratadine (CLARITIN) 10 mg tablet     Dose/Frequency: Take 1 tablet (10 mg total) by mouth daily     Quantity: 30    Pharmacy: [facility]/pharmacy #[id number] - [place], [place] - [address] [place] [address]     Office:   [x] PCP/Provider -   [] Speciality/Provider -     Does the patient have enough for 3 days?   [] Yes   [x] No - Send as HP to POD

## 2024-09-03 ENCOUNTER — RA CDI HCC (OUTPATIENT)
Dept: OTHER | Facility: HOSPITAL | Age: 44
End: 2024-09-03

## 2024-09-04 NOTE — PATIENT INSTRUCTIONS
As tolerated.   Cervical Radiculopathy   WHAT YOU NEED TO KNOW:   What is cervical radiculopathy? Cervical radiculopathy is a painful condition that happens when a spinal nerve in your neck is pinched or irritated  What causes cervical radiculopathy? Changes in the vertebrae (bones) and discs in your neck can put pressure on a spinal nerve  Discs are natural, spongy cushions between your vertebrae that allow your spine to move  The following can cause a pinched nerve:  · Disc damage  may occur if a disc flattens, bulges, or moves over time  An injury can also cause disc damage  · Cervical spondylosis  is when the vertebrae in your neck break down  This normally occurs as you age  · Growths , such as tumors or cysts (fluid-filled lumps), may grow and press on the nerve  What are the signs and symptoms of cervical radiculopathy? The most common symptom is sharp pain that travels from your neck all the way down your arm  You may have pain in your shoulder, chest, and hand  The pain may get worse with movement or when you cough or sneeze  You may also have any of the following:  · Burning or tingling sensations in your neck or arm     · Numbness or weakness in your arm or hand that makes it hard for you to  objects    · Headaches    How is cervical radiculopathy diagnosed? Your healthcare provider will ask when and how your symptoms began  He will gently press on your neck to check for tenderness and areas that are not shaped correctly  He will also check your arms and hands for numbness or weakness  You may have any of the following:  · Provocative tests  are done to check your response to certain movements  He will ask you to move your neck, shoulder, and arm in different ways  Some movements will increase your symptoms, while others will make you feel better  · An x-ray  is a picture of the bones and tissues in your neck  · An MRI or a CT scan  may be used to take pictures of your neck   The pictures can show problems and changes in your nerves, discs, and vertebrae  You may be given dye to help the pictures show up better  Tell the healthcare provider if you have ever had an allergic reaction to contrast dye  Do not enter the MRI room with anything metal  Metal can cause serious injury  Tell the healthcare provider if you have any metal in or on your body  · An electromyography  is also called an EMG  An EMG is done to test the function of your muscles and the nerves that control them  Electrodes (wires) are placed on the muscle being tested  Needles may be attached to the electrodes and placed in your skin  The electrical activity of your muscles and nerves is measured by a machine attached to the electrodes  Your muscles are tested at rest and during movement  How is cervical radiculopathy treated? · NSAIDs  decrease swelling and pain  This medicine can be bought without a doctor's order  This medicine can cause stomach bleeding or kidney problems in certain people  If you take blood thinner medicine, always ask your healthcare provider if NSAIDs are safe for you  Always read the medicine label and follow the directions on it before using this medicine  · Prescription pain medicine  may be given to decrease pain  Do not wait until the pain is severe before you take this medicine  · Steroids  help decrease pain and swelling  These may be given as a pill or as an injection in your neck  You may need more than 1 injection if your symptoms do not improve after the first treatment  · Physical therapy  helps stretch and strengthen your muscles  Your physical therapist can teach you how to improve your posture and the way you hold your neck  He may also teach you how to be safely active and avoid further injury  He can also help you develop an exercise program that is safe for your back and neck       · Surgery  may be used to treat a pinched nerve if other treatments have not helped after 6 to 12 weeks  How can I manage my symptoms? · Ice  helps decrease swelling and pain  Ice may also help prevent tissue damage  Use an ice pack, or put crushed ice in a plastic bag  Cover it with a towel and place it on your neck for 15 to 20 minutes every hour or as directed  · Rest  when you feel it is needed  Slowly start to do more each day  Return to your daily activities as directed  · Wear a soft collar  You may be given a soft collar to support your neck while you sleep  Wear the soft collar only as directed  · Do light stretches and regular exercise  Your healthcare provider may suggest light stretches to help decrease stiffness in your neck and arm as you recover  After your pain is controlled, you may benefit from regular exercise  Ask what type of exercise is safe for your back and neck  · Review your work area  A comfortable work area can help prevent neck strain  Ask your employer for an ergonomic review to check the position of your desk, chair, phone, and computer  Make any necessary adjustments for your comfort  When should I contact my healthcare provider? · You are losing weight without trying  · Your pain is worse, even with medicine  · One or both hands feel more numb than before, or you cannot move your fingers well  · You have questions or concerns about your condition or care  CARE AGREEMENT:   You have the right to help plan your care  Learn about your health condition and how it may be treated  Discuss treatment options with your healthcare providers to decide what care you want to receive  You always have the right to refuse treatment  The above information is an  only  It is not intended as medical advice for individual conditions or treatments  Talk to your doctor, nurse or pharmacist before following any medical regimen to see if it is safe and effective for you    © Copyright [employer] 2022 Information is for End User's use only and may not be sold, redistributed or otherwise used for commercial purposes  All illustrations and images included in CareNotes® are the copyrighted property of A D A M , Inc  or Jenn Akins   Migraine Headache   WHAT YOU NEED TO KNOW:   What is a migraine headache? A migraine is a severe headache  The pain can be so severe that it interferes with your daily activities  A migraine can last a few hours up to several days  The exact cause of migraines is not known  A family history of migraines increases your risk  Your risk is also higher if you are a woman or take medicines such as estrogen or a vasodilator  What are the warning signs that a migraine headache is about to start? Warning signs usually start 15 to 60 minutes before the headache:  · Visual changes (auras), such as blurred vision, temporary blind or bright spots, lines, or hallucinations    · Unusual tiredness or frequent yawning    · Tingling in an arm or leg    What are the signs and symptoms of a migraine headache? A migraine headache usually begins as a dull ache around the eye or temple  The pain may get worse with movement  You may also have the following:  · Pain in your head that may increase to the point that you cannot do everyday activities    · Pain on one or both sides of your head    · Throbbing, pulsing, or pounding pain in your head    · Nausea and vomiting    · Sensitivity to light, noise, or smells    What can trigger a migraine headache?    · Stress, eye strain, oversleeping, or not getting enough sleep    · Hormone changes in women from birth control pills, pregnancy, menopause, or during a monthly period    · Skipping meals, going too long without eating, or not drinking enough liquids    · Certain foods or drinks such as chocolate, hard cheese, alcohol, or drinks that contain caffeine    · Foods that contain gluten, nitrates, MSG, or artificial sweeteners    · Sunlight, bright or flashing lights, loud noises, smoke, or strong smells    · Heat, humidity, or changes in the weather    How is a migraine headache diagnosed? Your healthcare provider will ask about your headaches  Describe the pain and any other symptoms, such as nausea  Tell the provider if you think anything triggered the pain  The provider will also want to know what you ate and drank before the pain started  Tell the provider about any medical conditions you have or that run in your family  Include any recent stressors you have had  You may also need any of the following:  · A neurologic exam  is used to check how your pupils react to light  Your healthcare provider may check your memory, hand grasp, and balance  · CT or MRI pictures  may be taken of your brain  You may be given contrast liquid to help your brain show up better in the pictures  Tell the healthcare provider if you have ever had an allergic reaction to contrast liquid  Do not enter the MRI room with anything metal  Metal can cause serious injury  Tell the healthcare provider if you have any metal in or on your body  How is a migraine headache treated? Migraines cannot be cured  The goal of treatment is to reduce your symptoms  · Medicines  may be given to help you manage migraines  Take medicine as soon as you feel a migraine begin, or as directed  Your healthcare provider may recommend any of the following:    ? Migraine medicines  are used to help prevent or stop a migraine  ? NSAIDs  help decrease swelling and pain or fever  This medicine is available with or without a doctor's order  NSAIDs can cause stomach bleeding or kidney problems in certain people  If you take blood thinner medicine, always ask your healthcare provider if NSAIDs are safe for you  Always read the medicine label and follow directions  ? Acetaminophen  decreases pain and fever  It is available without a doctor's order  Ask how much to take and how often to take it  Follow directions   Read the labels of all other medicines you are using to see if they also contain acetaminophen, or ask your doctor or pharmacist  Acetaminophen can cause liver damage if not taken correctly  Do not use more than 4 grams (4,000 milligrams) total of acetaminophen in one day  ? Prescription pain medicine  may be given  Ask your healthcare provider how to take this medicine safely  Some prescription pain medicines contain acetaminophen  Do not take other medicines that contain acetaminophen without talking to your healthcare provider  Too much acetaminophen may cause liver damage  Prescription pain medicine may cause constipation  Ask your healthcare provider how to prevent or treat constipation  ? Antinausea medicine  may be given to calm your stomach and to help prevent vomiting  This medicine can also help relieve pain  · Cognitive behavior therapy (CBT)  can help you learn ways to manage and prevent migraines  A therapist can teach you to relax and to reduce stress  You may learn ways to create healthy nutrition, activity, and sleep habits to prevent migraines  The therapist can also help you manage conditions that can affect migraines, such as anxiety or depression  What can I do to manage my symptoms? · Rest in a dark, quiet room  This will help decrease your pain  Sleep may also help relieve the pain  · Apply ice to decrease pain  Use an ice pack, or put crushed ice in a plastic bag  Cover the ice pack with a towel and place it on your head  Apply ice for 15 to 20 minutes every hour  · Apply heat to decrease pain and muscle spasms  Use a small towel dampened with warm water or a heating pad, or sit in a warm bath  Apply heat on the area for 20 to 30 minutes every 2 hours  You may alternate heat and ice  · Keep a migraine record  Write down when your migraines start and stop  Include your symptoms and what you were doing when a migraine began  Record what you ate or drank for 24 hours before the migraine started  Keep track of what you did to treat your migraine and if it worked  Bring the migraine record with you to visits with your healthcare provider  What can I do to prevent another migraine headache? · Prevent a medicine overuse headache  Take pain medicines only as long as directed  A medicine may be limited to a certain amount each month  Your healthcare provider can help you create a plan so you get a safe amount each month  · Do not smoke  Nicotine and other chemicals in cigarettes and cigars can trigger a migraine or make it worse  Ask your healthcare provider for information if you currently smoke and need help to quit  E-cigarettes or smokeless tobacco still contain nicotine  Talk to your healthcare provider before you use these products  · Do not drink alcohol  Alcohol can trigger a migraine  It can also keep medicines used to treat your migraines from working  · Be physically active  Physical activity, such as exercise, may help prevent migraines  Talk to your healthcare provider about the best activity plan for you  Try to get at least 30 minutes of physical activity on most days  · Manage stress  Stress may trigger a migraine  Learn new ways to relax, such as deep breathing  · Create a sleep schedule  Go to bed and get up at the same times each day  Do not watch television before bed  · Eat a variety of healthy foods  Include healthy foods such as include fruit, vegetables, whole-grain breads, low-fat dairy products, beans, lean meat, and fish  Do not have food or drinks that trigger your migraines  · Drink more liquids to prevent dehydration  Your healthcare provider can tell you how much liquid to drink each day and which liquids are best for you  Call your local emergency number (911 in the [address]) or have someone call if:   · You feel like you are going to faint, you become confused, or you have a seizure  When should I seek immediate care?    · You have a headache that seems different or much worse than your usual migraine headache  · You have a severe headache with a fever or a stiff neck  · You have new problems with speech, vision, balance, or movement  When should I call my doctor? · Your migraines interfere with your daily activities  · Your medicines or treatments stop working  · You have questions or concerns about your condition or care  CARE AGREEMENT:   You have the right to help plan your care  Learn about your health condition and how it may be treated  Discuss treatment options with your healthcare providers to decide what care you want to receive  You always have the right to refuse treatment  The above information is an  only  It is not intended as medical advice for individual conditions or treatments  Talk to your doctor, nurse or pharmacist before following any medical regimen to see if it is safe and effective for you  © Copyright [employer] 2022 Information is for End User's use only and may not be sold, redistributed or otherwise used for commercial purposes  All illustrations and images included in CareNotes® are the copyrighted property of [employer]  or [employer]   WHAT YOU NEED TO KNOW:   What do I need to know about an airbag injury? Airbags must inflate quickly to be effective in an accident  The speed and force of the airbag can cause eye injuries, burns, irritated skin, and open wounds when it inflates  How do I care for an airbag injury? · Keep wounds covered  with a clean, dry bandage as directed  You may be told to apply antibacterial ointment to your wound to prevent infection  You may need stitches  Care for your stitches as directed  · Use ice packs  as directed to treat swelling around your eyes  · Use cool cloths  to soothe red, irritated, or burned skin  What can I do to prevent an airbag injury? · Make sure everyone wears a seatbelt       · Do not allow children younger than 13 years to sit in the front seat  Children who are 8 years or younger should ride in a properly fitting car seat or booster seat  · Do not place a rear-facing infant seat in the front of a vehicle  Babies should ride in a rear-facing infant seat in the back of a vehicle until they are 3year old and weigh 20 pounds  · Do not place the lap belt over your stomach  The lap belt should fit snugly over your hips  This is very important if you are pregnant  Never place a shoulder belt under your arm or behind your back  · Sit at least 10 inches away from the steering wheel when you drive  Recline the seat or tilt the steering wheel down  · Drive with your hands on each side of the steering wheel  Do not drive with your hands on top of the steering wheel  · Sit as far back from the dashboard as you can if you are a passenger  When should I seek immediate care? · You have new or increased chest pain  · You have a bad headache and feel sleepy or confused  · Your pain gets worse, even with medicine  · Your wounds become red, painful, and tender  They may be hot or swollen  When should I contact my healthcare provider? · You have a new cough or you are wheezing  · You have increased tears, redness, or pain in your eyes  · You hear ringing or buzzing, or you lose your hearing  CARE AGREEMENT:   You have the right to help plan your care  Learn about your health condition and how it may be treated  Discuss treatment options with your healthcare providers to decide what care you want to receive  You always have the right to refuse treatment  The above information is an  only  It is not intended as medical advice for individual conditions or treatments  Talk to your doctor, nurse or pharmacist before following any medical regimen to see if it is safe and effective for you    © Copyright [employer] 2022 Information is for End User's use only and may not be sold, redistributed or otherwise used for commercial purposes   All illustrations and images included in CareNotes® are the copyrighted property of A D A M , Inc  or [facility] Joni Tracy

## 2024-09-06 ENCOUNTER — OFFICE VISIT (OUTPATIENT)
Dept: FAMILY MEDICINE CLINIC | Facility: CLINIC | Age: 44
End: 2024-09-06
Payer: COMMERCIAL

## 2024-09-06 VITALS
HEART RATE: 91 BPM | BODY MASS INDEX: 17.37 KG/M2 | OXYGEN SATURATION: 99 % | SYSTOLIC BLOOD PRESSURE: 98 MMHG | WEIGHT: 92 LBS | HEIGHT: 61 IN | DIASTOLIC BLOOD PRESSURE: 68 MMHG | TEMPERATURE: 98.3 F

## 2024-09-06 DIAGNOSIS — R63.4 UNEXPLAINED WEIGHT LOSS: ICD-10-CM

## 2024-09-06 DIAGNOSIS — Z59.9 FINANCIAL DIFFICULTIES: ICD-10-CM

## 2024-09-06 DIAGNOSIS — K63.9 COLON WALL THICKENING: Primary | ICD-10-CM

## 2024-09-06 PROCEDURE — G2211 COMPLEX E/M VISIT ADD ON: HCPCS | Performed by: NURSE PRACTITIONER

## 2024-09-06 PROCEDURE — 99213 OFFICE O/P EST LOW 20 MIN: CPT | Performed by: NURSE PRACTITIONER

## 2024-09-06 SDOH — ECONOMIC STABILITY - INCOME SECURITY: PROBLEM RELATED TO HOUSING AND ECONOMIC CIRCUMSTANCES, UNSPECIFIED: Z59.9

## 2024-09-06 NOTE — PROGRESS NOTES
Assessment/Plan:    1. Colon wall thickening  Comments:  Shown on CAT scan  Has appointment scheduled with gastroenterology 9/11  2. Unexplained weight loss  3. Financial difficulties  -     Ambulatory Referral to Social Work Care Management Program; Future      The case discussed with patient using patient centered shared decision making.The patient was counseled regarding instructions for management,-- risk factor reductions,-- prognosis,-- impressions,-- risks and benefits of treatment options,-- importance of compliance with treatment. I have reviewed the instructions with the patient, answering all questions to her satisfaction.    I strongly encouraged Fatimah to keep her gastro appointment.  She will consider but she said she needs to work for money.  I advised I would put in a referral to our .  There may be assistance that she is not aware of.  She is due for gynecology follow-up as well.  I provided her with the contact number for the provider she used in the past.  I advised to schedule her wellness visit for after she sees gastro so we can discuss treatment plan    There are no Patient Instructions on file for this visit.    No follow-ups on file.    Subjective:      Patient ID: Fatimah Da Silva is a 44 y.o. female.    Chief Complaint   Patient presents with    Follow-up     Resched from 7/15 appt.        Fatimah is a 44-year-old female who presents for follow-up  We were going to discuss meeting with gastroenterologist  She had CAT scan of her abdomen ordered due to abnormal weight loss.  CAT scan revealed colon thickening.  She rescheduled her gastro appointment.  It is not now until next week    She reports that she is overwhelmed.  She is struggling financially.  She is not able to attend to her health  She has an intensive psychiatric social worker but she feels this person is not helpful to her        The following portions of the patient's history were reviewed and updated as appropriate:  allergies, current medications, past family history, past medical history, past social history, past surgical history and problem list.    Review of Systems   Constitutional:  Positive for unexpected weight change.   Respiratory:  Negative for cough and shortness of breath.    Gastrointestinal:  Negative for abdominal pain.   Psychiatric/Behavioral:  Positive for dysphoric mood. Negative for suicidal ideas. The patient is nervous/anxious.          Current Outpatient Medications   Medication Sig Dispense Refill    albuterol (PROVENTIL HFA,VENTOLIN HFA) 90 mcg/act inhaler TAKE 2 PUFFS BY MOUTH EVERY 6 HOURS AS NEEDED FOR WHEEZE OR FOR SHORTNESS OF BREATH 8.5 g 3    Ascorbic Acid (VITAMIN C) 100 MG tablet Take 100 mg by mouth daily      Biotin w/ Vitamins C & E (HAIR/SKIN/NAILS PO) Take by mouth      Breo Ellipta 200-25 MCG/ACT inhaler INHALE 1 PUFF DAILY RINSE MOUTH AFTER USE. 60 each 3    busPIRone (BUSPAR) 15 mg tablet Take by mouth 3 (three) times a day        CALCIUM-MAGNESIUM-ZINC PO Take by mouth      famotidine (PEPCID) 20 mg tablet TAKE 1 TABLET BY MOUTH TWICE A DAY 60 tablet 5    fexofenadine (ALLEGRA) 180 MG tablet Take 1 tablet (180 mg total) by mouth daily 30 tablet 5    hydrocortisone 2.5 % cream Apply topically 4 (four) times a day as needed for irritation or rash 20 g 2    ibuprofen (MOTRIN) 600 mg tablet Take by mouth every 6 (six) hours as needed for mild pain      ketoconazole (NIZORAL) 2 % cream       ketoconazole (NIZORAL) 2 % shampoo Apply topically      ketotifen (ZADITOR) 0.025 % ophthalmic solution Administer 1 drop to both eyes 2 (two) times a day 5 mL 1    Ketotifen Fumarate (ZADITOR) 0.035 % ophthalmic solution instill 1 drop into both eyes twice a day      loratadine (CLARITIN) 10 mg tablet Take 1 tablet (10 mg total) by mouth daily 30 tablet 2    LORazepam (ATIVAN) 1 mg tablet Take 1 mg by mouth 6 (six) times a day  1    naproxen (NAPROSYN) 500 mg tablet TAKE 1 TABLET BY MOUTH TWICE A DAY  "WITH FOOD 60 tablet 3    omeprazole (PriLOSEC) 20 mg delayed release capsule TAKE 1 CAPSULE (20 MG TOTAL) BY MOUTH TWICE DAILY 180 capsule 1    ondansetron (ZOFRAN-ODT) 8 mg disintegrating tablet Take 1 tablet (8 mg total) by mouth every 8 (eight) hours as needed for nausea or vomiting 10 tablet 0    thiamine (VITAMIN B1) 100 mg tablet Take 100 mg by mouth daily      tiZANidine (ZANAFLEX) 2 mg tablet Take 1 tablet (2 mg total) by mouth every 8 (eight) hours as needed for muscle spasms 90 tablet 2    albuterol (2.5 mg/3 mL) 0.083 % nebulizer solution Take 3 mL (2.5 mg total) by nebulization every 6 (six) hours as needed for wheezing or shortness of breath (Patient not taking: Reported on 9/6/2024) 360 mL 6    ARIPiprazole (ABILIFY) 2 mg tablet  (Patient not taking: Reported on 5/2/2024)      naproxen (EC NAPROSYN) 500 MG EC tablet Take 1 tablet (500 mg total) by mouth 2 (two) times a day as needed for mild pain 30 tablet 0    ofloxacin (OCUFLOX) 0.3 % ophthalmic solution Administer 1 drop to the right eye 4 (four) times a day (Patient not taking: Reported on 9/6/2024) 5 mL 0     No current facility-administered medications for this visit.       Objective:    BP 98/68 (BP Location: Left arm, Patient Position: Sitting, Cuff Size: Standard)   Pulse 91   Temp 98.3 °F (36.8 °C) (Temporal)   Ht 5' 1\" (1.549 m)   Wt 41.7 kg (92 lb)   SpO2 99%   BMI 17.38 kg/m²        Physical Exam  Vitals and nursing note reviewed.   Constitutional:       Appearance: Normal appearance. She is underweight.   Neurological:      General: No focal deficit present.      Mental Status: She is alert. Mental status is at baseline.   Psychiatric:         Mood and Affect: Mood is anxious.         Speech: Speech is tangential.                SOLO Doherty  "

## 2024-09-10 ENCOUNTER — PATIENT OUTREACH (OUTPATIENT)
Dept: CASE MANAGEMENT | Facility: OTHER | Age: 44
End: 2024-09-10

## 2024-09-10 NOTE — PROGRESS NOTES
LINDA LI received referral for patient from SOLO Shen for financial difficulties. Per chart review, patient has a gastro appointment she needs to attend, but reported that she needs to work as for money. Referral to LINDA was then made. Patient does not have any prior OP [facility] outreach.    LINDA LI placed initial outreach call to patient and a voicemail was left. LINDA LI to place second outreach call to patient within one week if return call is not received prior.

## 2024-09-11 DIAGNOSIS — L29.9 PRURITUS: ICD-10-CM

## 2024-09-11 RX ORDER — FAMOTIDINE 20 MG/1
20 TABLET, FILM COATED ORAL 2 TIMES DAILY
Qty: 60 TABLET | Refills: 5 | Status: SHIPPED | OUTPATIENT
Start: 2024-09-11

## 2024-09-12 ENCOUNTER — PATIENT OUTREACH (OUTPATIENT)
Dept: CASE MANAGEMENT | Facility: OTHER | Age: 44
End: 2024-09-12

## 2024-09-12 NOTE — PROGRESS NOTES
LINDA LI placed second outreach call to patient and left a voicemail. LINDA LI sent Unable to Reach letter to patient's address listed on chart.    LINDA LI closed referral due to unable to make contact.

## 2024-09-12 NOTE — LETTER
09/12/24    Dear Fatimah Da Silva,    I am a Care Manager with [facility]. We have made several attempts to call you by phone.  It is important that you contact us back at [phone] so that we can assist with your care needs.     Sincerely,         Michelle WEAVER  Outpatient Social Work Care Manager

## 2024-10-03 ENCOUNTER — HOSPITAL ENCOUNTER (OUTPATIENT)
Dept: NON INVASIVE DIAGNOSTICS | Facility: HOSPITAL | Age: 44
Discharge: HOME | End: 2024-10-03
Payer: COMMERCIAL

## 2024-10-03 DIAGNOSIS — M79.89 SWELLING OF LOWER LEG: ICD-10-CM

## 2024-10-03 DIAGNOSIS — R60.1 GENERALIZED EDEMA: ICD-10-CM

## 2024-10-03 PROCEDURE — 93970 EXTREMITY STUDY: CPT

## 2024-10-03 PROCEDURE — 93970 EXTREMITY STUDY: CPT | Performed by: SURGERY

## 2024-10-07 DIAGNOSIS — M79.7 FIBROMYALGIA: ICD-10-CM

## 2024-10-08 RX ORDER — NAPROXEN 500 MG/1
500 TABLET ORAL 2 TIMES DAILY WITH MEALS
Qty: 60 TABLET | Refills: 3 | Status: SHIPPED | OUTPATIENT
Start: 2024-10-08

## 2024-10-10 ENCOUNTER — HOSPITAL ENCOUNTER (OUTPATIENT)
Dept: RADIOLOGY | Facility: HOSPITAL | Age: 44
Discharge: HOME | End: 2024-10-10
Payer: COMMERCIAL

## 2024-10-10 ENCOUNTER — OFFICE VISIT (OUTPATIENT)
Dept: FAMILY MEDICINE CLINIC | Facility: CLINIC | Age: 44
End: 2024-10-10
Payer: COMMERCIAL

## 2024-10-10 ENCOUNTER — APPOINTMENT (OUTPATIENT)
Dept: LAB | Facility: CLINIC | Age: 44
End: 2024-10-10
Payer: COMMERCIAL

## 2024-10-10 VITALS
DIASTOLIC BLOOD PRESSURE: 84 MMHG | TEMPERATURE: 98.5 F | HEART RATE: 85 BPM | HEIGHT: 61 IN | WEIGHT: 91.4 LBS | OXYGEN SATURATION: 99 % | SYSTOLIC BLOOD PRESSURE: 110 MMHG | BODY MASS INDEX: 17.26 KG/M2

## 2024-10-10 DIAGNOSIS — Z79.899 ENCOUNTER FOR LONG-TERM (CURRENT) USE OF MEDICATIONS: ICD-10-CM

## 2024-10-10 DIAGNOSIS — R63.6 UNDERWEIGHT: ICD-10-CM

## 2024-10-10 DIAGNOSIS — F17.210 CIGARETTE SMOKER: ICD-10-CM

## 2024-10-10 DIAGNOSIS — D50.8 IRON DEFICIENCY ANEMIA SECONDARY TO INADEQUATE DIETARY IRON INTAKE: ICD-10-CM

## 2024-10-10 DIAGNOSIS — D64.9 ANEMIA, UNSPECIFIED TYPE: ICD-10-CM

## 2024-10-10 DIAGNOSIS — R11.0 CHRONIC NAUSEA: ICD-10-CM

## 2024-10-10 DIAGNOSIS — J20.9 CHRONIC BRONCHITIS WITH ACUTE EXACERBATION (HCC): Primary | ICD-10-CM

## 2024-10-10 DIAGNOSIS — Z12.31 SCREENING MAMMOGRAM, ENCOUNTER FOR: ICD-10-CM

## 2024-10-10 DIAGNOSIS — Z13.0 SCREENING FOR IRON DEFICIENCY ANEMIA: ICD-10-CM

## 2024-10-10 DIAGNOSIS — J20.9 CHRONIC BRONCHITIS WITH ACUTE EXACERBATION (HCC): ICD-10-CM

## 2024-10-10 DIAGNOSIS — J42 CHRONIC BRONCHITIS WITH ACUTE EXACERBATION (HCC): Primary | ICD-10-CM

## 2024-10-10 DIAGNOSIS — H10.13 ALLERGIC CONJUNCTIVITIS OF BOTH EYES: ICD-10-CM

## 2024-10-10 DIAGNOSIS — J42 CHRONIC BRONCHITIS WITH ACUTE EXACERBATION (HCC): ICD-10-CM

## 2024-10-10 DIAGNOSIS — E43 SEVERE PROTEIN-CALORIE MALNUTRITION (HCC): ICD-10-CM

## 2024-10-10 DIAGNOSIS — J20.9 ACUTE BRONCHITIS, UNSPECIFIED ORGANISM: ICD-10-CM

## 2024-10-10 DIAGNOSIS — F41.9 ANXIETY: ICD-10-CM

## 2024-10-10 DIAGNOSIS — J31.0 CHRONIC RHINITIS: ICD-10-CM

## 2024-10-10 LAB
ALBUMIN SERPL BCG-MCNC: 4.6 G/DL (ref 3.5–5)
ALP SERPL-CCNC: 50 U/L (ref 34–104)
ALT SERPL W P-5'-P-CCNC: 16 U/L (ref 7–52)
ANION GAP SERPL CALCULATED.3IONS-SCNC: 11 MMOL/L (ref 4–13)
AST SERPL W P-5'-P-CCNC: 21 U/L (ref 13–39)
BASOPHILS # BLD AUTO: 0.05 THOUSANDS/ΜL (ref 0–0.1)
BASOPHILS NFR BLD AUTO: 1 % (ref 0–1)
BILIRUB SERPL-MCNC: 0.41 MG/DL (ref 0.2–1)
BUN SERPL-MCNC: 11 MG/DL (ref 5–25)
CALCIUM SERPL-MCNC: 9 MG/DL (ref 8.4–10.2)
CHLORIDE SERPL-SCNC: 99 MMOL/L (ref 96–108)
CO2 SERPL-SCNC: 21 MMOL/L (ref 21–32)
CREAT SERPL-MCNC: 1.3 MG/DL (ref 0.6–1.3)
CRP SERPL QL: 4.7 MG/L
EOSINOPHIL # BLD AUTO: 0.08 THOUSAND/ΜL (ref 0–0.61)
EOSINOPHIL NFR BLD AUTO: 1 % (ref 0–6)
ERYTHROCYTE [DISTWIDTH] IN BLOOD BY AUTOMATED COUNT: 15.7 % (ref 11.6–15.1)
ERYTHROCYTE [SEDIMENTATION RATE] IN BLOOD: 17 MM/HOUR (ref 0–19)
FERRITIN SERPL-MCNC: 11 NG/ML (ref 11–307)
GFR SERPL CREATININE-BSD FRML MDRD: 50 ML/MIN/1.73SQ M
GLUCOSE SERPL-MCNC: 71 MG/DL (ref 65–140)
HCT VFR BLD AUTO: 36.3 % (ref 34.8–46.1)
HGB BLD-MCNC: 12.2 G/DL (ref 11.5–15.4)
IMM GRANULOCYTES # BLD AUTO: 0.02 THOUSAND/UL (ref 0–0.2)
IMM GRANULOCYTES NFR BLD AUTO: 0 % (ref 0–2)
IRON SATN MFR SERPL: 25 % (ref 15–50)
IRON SERPL-MCNC: 97 UG/DL (ref 50–212)
LYMPHOCYTES # BLD AUTO: 2.93 THOUSANDS/ΜL (ref 0.6–4.47)
LYMPHOCYTES NFR BLD AUTO: 37 % (ref 14–44)
MCH RBC QN AUTO: 30.7 PG (ref 26.8–34.3)
MCHC RBC AUTO-ENTMCNC: 33.6 G/DL (ref 31.4–37.4)
MCV RBC AUTO: 91 FL (ref 82–98)
MONOCYTES # BLD AUTO: 0.51 THOUSAND/ΜL (ref 0.17–1.22)
MONOCYTES NFR BLD AUTO: 6 % (ref 4–12)
NEUTROPHILS # BLD AUTO: 4.37 THOUSANDS/ΜL (ref 1.85–7.62)
NEUTS SEG NFR BLD AUTO: 55 % (ref 43–75)
NRBC BLD AUTO-RTO: 0 /100 WBCS
PLATELET # BLD AUTO: 356 THOUSANDS/UL (ref 149–390)
PMV BLD AUTO: 8.4 FL (ref 8.9–12.7)
POTASSIUM SERPL-SCNC: 3.5 MMOL/L (ref 3.5–5.3)
PROT SERPL-MCNC: 7.5 G/DL (ref 6.4–8.4)
RBC # BLD AUTO: 3.98 MILLION/UL (ref 3.81–5.12)
SODIUM SERPL-SCNC: 131 MMOL/L (ref 135–147)
TIBC SERPL-MCNC: 386 UG/DL (ref 250–450)
UIBC SERPL-MCNC: 289 UG/DL (ref 155–355)
WBC # BLD AUTO: 7.96 THOUSAND/UL (ref 4.31–10.16)

## 2024-10-10 PROCEDURE — G2211 COMPLEX E/M VISIT ADD ON: HCPCS | Performed by: FAMILY MEDICINE

## 2024-10-10 PROCEDURE — 99215 OFFICE O/P EST HI 40 MIN: CPT | Performed by: FAMILY MEDICINE

## 2024-10-10 PROCEDURE — 83540 ASSAY OF IRON: CPT

## 2024-10-10 PROCEDURE — 86140 C-REACTIVE PROTEIN: CPT

## 2024-10-10 PROCEDURE — 85652 RBC SED RATE AUTOMATED: CPT

## 2024-10-10 PROCEDURE — 83550 IRON BINDING TEST: CPT

## 2024-10-10 PROCEDURE — 71046 X-RAY EXAM CHEST 2 VIEWS: CPT

## 2024-10-10 PROCEDURE — 82728 ASSAY OF FERRITIN: CPT

## 2024-10-10 PROCEDURE — 80053 COMPREHEN METABOLIC PANEL: CPT

## 2024-10-10 PROCEDURE — 85025 COMPLETE CBC W/AUTO DIFF WBC: CPT

## 2024-10-10 PROCEDURE — 36415 COLL VENOUS BLD VENIPUNCTURE: CPT

## 2024-10-10 RX ORDER — AZITHROMYCIN 250 MG/1
TABLET, FILM COATED ORAL
Qty: 6 TABLET | Refills: 0 | Status: SHIPPED | OUTPATIENT
Start: 2024-10-10 | End: 2024-10-14

## 2024-10-10 RX ORDER — GUAIFENESIN 600 MG/1
1200 TABLET, EXTENDED RELEASE ORAL 2 TIMES DAILY
Qty: 30 TABLET | Refills: 1 | Status: SHIPPED | OUTPATIENT
Start: 2024-10-10

## 2024-10-10 RX ORDER — ONDANSETRON 4 MG/1
4 TABLET, FILM COATED ORAL EVERY 8 HOURS PRN
Qty: 20 TABLET | Refills: 0 | Status: SHIPPED | OUTPATIENT
Start: 2024-10-10
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
## 2024-10-10 NOTE — ASSESSMENT & PLAN NOTE
History of apparently heavy cigarette smoking from very young age until now she states half pack a day for 25 years but it is much more than that she denies marijuana or other recreational drug use.  She has significant COPD significant rales rhonchi and will not get better as long as she continues to smoke

## 2024-10-11 NOTE — ASSESSMENT & PLAN NOTE
Needs CBC, iron studies. Pt pale, ?ch ill  Orders:    Mammo screening bilateral w 3d and cad; Future    XR chest pa and lateral; Future    CBC and differential; Future    Comprehensive metabolic panel; Future    C-reactive protein; Future    Sedimentation rate, automated; Future    Iron; Future    Ferritin; Future    TIBC Panel (incl. Iron, TIBC, % Iron Saturation); Future

## 2024-10-11 NOTE — ASSESSMENT & PLAN NOTE
Cough (25th of Aug she tested positive for COVID --wkend of 9/14 family came over and she shortly after got sick again and received abx from pt first (10d course of Amox).    She is taking Naproxen and Dayquil. )  Sore Throat  Headache (Pt reports this began after stopping abx from pt first -- has had HA from Sept 30th till present time )  Amenorrhea (Since May 2024 -- she wonders if today is appropriate for pregnancy test )  Orders:    XR chest pa and lateral; Future

## 2024-10-11 NOTE — ASSESSMENT & PLAN NOTE
All meds eval and discused  Orders:    Mammo screening bilateral w 3d and cad; Future    XR chest pa and lateral; Future    CBC and differential; Future    Comprehensive metabolic panel; Future    C-reactive protein; Future    Sedimentation rate, automated; Future    Iron; Future    Ferritin; Future    TIBC Panel (incl. Iron, TIBC, % Iron Saturation); Future

## 2024-10-27 DIAGNOSIS — J30.2 SEASONAL ALLERGIES: ICD-10-CM

## 2024-10-28 RX ORDER — LORATADINE 10 MG/1
10 TABLET ORAL DAILY
Qty: 30 TABLET | Refills: 5 | Status: SHIPPED | OUTPATIENT
Start: 2024-10-28 | End: 2024-10-31 | Stop reason: SDUPTHER

## 2024-10-30 DIAGNOSIS — L29.9 PRURITUS: ICD-10-CM

## 2024-10-30 RX ORDER — FAMOTIDINE 20 MG/1
20 TABLET, FILM COATED ORAL 2 TIMES DAILY
Qty: 180 TABLET | Refills: 1 | Status: SHIPPED | OUTPATIENT
Start: 2024-10-30

## 2024-10-31 DIAGNOSIS — J30.2 SEASONAL ALLERGIES: ICD-10-CM

## 2024-10-31 RX ORDER — LORATADINE 10 MG/1
10 TABLET ORAL DAILY
Qty: 30 TABLET | Refills: 3 | Status: SHIPPED | OUTPATIENT
Start: 2024-10-31

## 2024-10-31 NOTE — TELEPHONE ENCOUNTER
Pt called in stating she normally gets 30 pill of loratadine (CLARITIN) 10 mg tablet [[id number]], but lately she's only been getting 10 and she has to go to the pharmacy every 10 days and pay $1 per pill each time. She wants to know why this is happening and if this can be reversed.

## 2024-12-02 DIAGNOSIS — K21.9 GASTROESOPHAGEAL REFLUX DISEASE WITHOUT ESOPHAGITIS: ICD-10-CM

## 2024-12-02 NOTE — TELEPHONE ENCOUNTER
Medication: omeprazole (PriLOSEC) 20 mg delayed release capsule     Dose/Frequency: TAKE 1 CAPSULE (20 MG TOTAL) BY MOUTH TWICE DAILY     Quantity: 180 cap    Pharmacy: [facility]/pharm[facility] #[id number] - [place], [place] - [address] [place] [address]     Office:   [x] PCP/Provider -   [] Speciality/Provider -     Does the patient have enough for 3 days?   [x] Yes   [] No - Send as HP to POD

## 2024-12-03 NOTE — TELEPHONE ENCOUNTER
Patient called to check the status of her refill. I advised it was still pending. She wants to know when it will be sent to the pharmacy because she is out of the medication. Please advise. Thank you.

## 2025-01-07 ENCOUNTER — OFFICE VISIT (OUTPATIENT)
Dept: GASTROENTEROLOGY | Facility: CLINIC | Age: 45
End: 2025-01-07
Payer: COMMERCIAL

## 2025-01-07 VITALS
TEMPERATURE: 97.6 F | WEIGHT: 91.2 LBS | SYSTOLIC BLOOD PRESSURE: 104 MMHG | DIASTOLIC BLOOD PRESSURE: 70 MMHG | HEIGHT: 61 IN | BODY MASS INDEX: 17.22 KG/M2

## 2025-01-07 DIAGNOSIS — R11.0 CHRONIC NAUSEA: ICD-10-CM

## 2025-01-07 DIAGNOSIS — K59.04 CHRONIC IDIOPATHIC CONSTIPATION: ICD-10-CM

## 2025-01-07 DIAGNOSIS — R63.6 UNDERWEIGHT: Primary | ICD-10-CM

## 2025-01-07 DIAGNOSIS — K21.9 GASTROESOPHAGEAL REFLUX DISEASE WITHOUT ESOPHAGITIS: ICD-10-CM

## 2025-01-07 DIAGNOSIS — R93.89 ABNORMAL CT SCAN: ICD-10-CM

## 2025-01-07 DIAGNOSIS — Z80.0 FAMILY HISTORY OF COLON CANCER: ICD-10-CM

## 2025-01-07 PROCEDURE — 99214 OFFICE O/P EST MOD 30 MIN: CPT | Performed by: INTERNAL MEDICINE

## 2025-01-07 RX ORDER — MULTIVITAMIN
1 TABLET ORAL DAILY
COMMUNITY

## 2025-01-07 NOTE — PROGRESS NOTES
Name: Fatimah Da Silva      : 1980      MRN: [id number]  Encounter Provider: Jannet Lo DO  Encounter Date: 2025   Encounter department: [facility] [place]  :  Assessment & Plan  Underweight  Patient with BMI of 17.23. Admits to chronic fluctuation in her weight as a result of eating disorders. Also with severe body dysmorphia as she feels she is heavy/overweight. Never discussed with her therapist but has been looking for additional help and support. Unfortunately, insurance has prevented her from achieving this.     Plan:  Encouraged to discuss her complaints with her psychiatrist/therapist   Ambulatory referral to nutrition placed   Will rediscuss EGD/colonoscopy at next office visit  Return to clinic in 3 months for ongoing management     Orders:    Ambulatory Referral to Nutrition Services; Future    Abnormal CT scan  Patient patient a CT AP without contrast completed on 2024. Ordered by PCP for evaluation of chronic abdominal pain and weight loss. CT revealed wall thickening in the stomach, cecum, and rectosigmoid colon. Differential included non distension vs inflammation vs malignancy. Patient states that abdominal pain and weight loss have been an ongoing issue for her for many years. She has also had EGD/colonoscopy completed recently in  which was non-concerning. Technically not due for colonoscopy at this time. However due to CT findings as well as family history of colon cancer in her sister <61 YO, did recommend that repeat bidirectional endoscopy be performed at this time. Discussed possible differentials including that of cancer along with risk and benefits of completing. Patient however would not like to pursue EGD and/or colonoscopy at this time and would prefer to wait a few months and revisit at her next appointment.        Family history of colon cancer  History of colon cancer diagnosed in her sister at <60 years of age. Will require  colonoscopies every 5 years of sooner. Recommend repeat at this time, as above, due to abnormal CT Imaging.        Gastroesophageal reflux disease without esophagitis  History  of GERD that is currently controlled on Prilosec 20 mg daily. Does admit to moreso nausea which she has chronically. Prior EGD In 2022 with esophagitis LA Class B and gastritis. Recent CT with gastric thickening likely related to inflammation; however, did recommend repeat EGD that patient defers.     Plan:  Continue on Prilosec 20 mg BID advised to take PPI 30 - 60 min prior to meals  Recommend repeat EGD given abnormal CT Imaging but patient defers at this time.   Discussed the importance of lifestyle modifications including:  Recommend small meals and avoidance of ulcerogenic foods   Avoid eating prior to bedtime, elevate head of bed at night  Limit intake of alcohol and caffeine   Okay to utilize Tylenol but avoid use of all NSAIDs  Daily exercise to promote weight loss  Strongly advised smoking cessation        Chronic idiopathic constipation  Patient with a long standing history of constipation. Significant stool burden seen on CT Imaging. Not currently on any laxative or stools softeners and therefore recommend patient begin daily regimen.     Plan:  Continue on Miralax 17g daily; can increase to twice daily if needed if response inadequate  Increase PO intake of water and fiber; okay to use Metamucil or Benefiber to supplement   If symptoms persist despite above, can consider Linzess vs Amitiza in the future   No role for endoscopic evaluation at this time            History of Present Illness   HPI  Fatimah Da Silva is a 44 y.o. female with a PMHx of asthma, GERD, CKD, alcohol use disorder, fibromyalgia, tobacco use, and bulimia.     Patient was previously being seen by her PCP for evaluation of chronic abdominal pain but also recent history of abnormal, unintentional weight loss. She underwent a CT abdomen/pelvis as part as her  evaluation and it demonstrated wall thickening in the stomach as well as apparent wall thickening of the cecum and rectosigmoid colon. Of note: Patient does have a significant history of colon cancer in her sister (unclear age but diagnosed prior to age of 60 years old).  Patient's prior endoscopic history includes: EGD/colonoscopy completed 9/2/22. EGD with LA grade B esophagitis and gastritis. Gastric and duodenal biopsies negative for H. Pylori or celiac. Colonoscopy with hemorrhoids but otherwise normal. Most recent blood work from 10/10/2024 with hemoglobin if 12.2 with MCV 91. Iron panel with iron sat of 25%, iron 97, and ferritin 11.     During today's appointment, patient initially unable to recall reason for appointment. Denies abdominal pain but does admit to fluctuations in her weight. However, patient does state that this mostly is self-induced and admits to longstanding history of eating disorder with bulimia/anorexia and severe body dysmorphia. Patient states that her current problem though is binge-eating. She also admits that despite how low her weight is, she admits to feeling heavy, overweight, and very unhappy about her appearance. She explains that in the past, she had significant alcohol use. She has been sober for the last 6-7 years but explains that she switched one addition for another. She denies suicidal ideations or thoughts of hurting herself. She admits to feeling stressed and unhappy with her current living situation. She has lack of social support. She admits to currently going to counseling, but not specifically for her eating disorder. She has not discussed this with her psychiatrist she says. She explains that she wants help and has been looking for help but has been unsuccessful as most eating disorder resources are not covered by her insurance.     History obtained from: patient    Review of Systems  Medical History Reviewed by provider this encounter:     .  Current Outpatient  Medications on File Prior to Visit   Medication Sig Dispense Refill    albuterol (PROVENTIL HFA,VENTOLIN HFA) 90 mcg/act inhaler TAKE 2 PUFFS BY MOUTH EVERY 6 HOURS AS NEEDED FOR WHEEZE OR FOR SHORTNESS OF BREATH 8.5 g 3    Ascorbic Acid (VITAMIN C) 100 MG tablet Take 100 mg by mouth daily      Biotin w/ Vitamins C & E (HAIR/SKIN/NAILS PO) Take by mouth      Breo Ellipta 200-25 MCG/ACT inhaler INHALE 1 PUFF DAILY RINSE MOUTH AFTER USE. (Patient taking differently: Takes some days) 60 each 3    busPIRone (BUSPAR) 15 mg tablet Take by mouth 3 (three) times a day        CALCIUM-MAGNESIUM-ZINC PO Take by mouth      famotidine (PEPCID) 20 mg tablet TAKE 1 TABLET BY MOUTH TWICE A  tablet 1    guaiFENesin (MUCINEX) 600 mg 12 hr tablet Take 2 tablets (1,200 mg total) by mouth 2 (two) times a day 30 tablet 1    ibuprofen (MOTRIN) 600 mg tablet Take by mouth every 6 (six) hours as needed for mild pain      ketoconazole (NIZORAL) 2 % cream       ketoconazole (NIZORAL) 2 % shampoo Apply topically      ketotifen (ZADITOR) 0.025 % ophthalmic solution Administer 1 drop to both eyes 2 (two) times a day 5 mL 1    Ketotifen Fumarate (ZADITOR) 0.035 % ophthalmic solution instill 1 drop into both eyes twice a day      loratadine (CLARITIN) 10 mg tablet Take 1 tablet (10 mg total) by mouth daily 30 tablet 3    LORazepam (ATIVAN) 1 mg tablet Take 1 mg by mouth 6 (six) times a day  1    Multiple Vitamin (multivitamin) tablet Take 1 tablet by mouth daily      omeprazole (PriLOSEC) 20 mg delayed release capsule Take 1 capsule (20 MG total) by mouth twice daily 180 capsule 1    thiamine (VITAMIN B1) 100 mg tablet Take 100 mg by mouth daily      tiZANidine (ZANAFLEX) 2 mg tablet Take 1 tablet (2 mg total) by mouth every 8 (eight) hours as needed for muscle spasms 90 tablet 2    ARIPiprazole (ABILIFY) 2 mg tablet  (Patient not taking: Reported on 5/2/2024)      naproxen (EC NAPROSYN) 500 MG EC tablet Take 1 tablet (500 mg total) by  "mouth 2 (two) times a day as needed for mild pain 30 tablet 0    naproxen (NAPROSYN) 500 mg tablet TAKE 1 TABLET BY MOUTH TWICE A DAY WITH FOOD (Patient not taking: Reported on 10/10/2024) 60 tablet 3    ofloxacin (OCUFLOX) 0.3 % ophthalmic solution Administer 1 drop to the right eye 4 (four) times a day (Patient not taking: Reported on 9/6/2024) 5 mL 0    [DISCONTINUED] polyethylene glycol (GLYCOLAX) 17 GM/SCOOP powder At 5pm take 5mgx2 dulcolax. At 6pm 32oz miralax in 64oz gatorade. Drink 8oz glass every 5 mins until 32oz finished. Drink remaining as rec. 238 g 0     No current facility-administered medications on file prior to visit.      Social History     Tobacco Use    Smoking status: Every Day     Current packs/day: 0.50     Average packs/day: 0.5 packs/day for 28.0 years (14.0 ttl pk-yrs)     Types: Cigarettes    Smokeless tobacco: Never    Tobacco comments:     2/7/2023-10-15 cig/day   Vaping Use    Vaping status: Never Used   Substance and Sexual Activity    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    Drug use: No    Sexual activity: Yes     Partners: Male        Objective   /70 (BP Location: Left arm, Patient Position: Sitting, Cuff Size: Adult)   Temp 97.6 °F (36.4 °C) (Tympanic)   Ht 5' 1\" (1.549 m)   Wt 41.4 kg (91 lb 3.2 oz)   BMI 17.23 kg/m²      Physical Exam  Constitutional:       General: She is not in acute distress.     Appearance: She is not ill-appearing or toxic-appearing.   HENT:      Head: Normocephalic.      Nose: Nose normal. No congestion.   Eyes:      General: No scleral icterus.  Cardiovascular:      Rate and Rhythm: Normal rate.      Pulses: Normal pulses.   Pulmonary:      Effort: Pulmonary effort is normal. No respiratory distress.   Abdominal:      General: Abdomen is flat. Bowel sounds are normal. There is no distension.      Palpations: Abdomen is soft.      Tenderness: There is no abdominal tenderness. There is no guarding or rebound.   Musculoskeletal:      Cervical " back: Normal range of motion.   Skin:     General: Skin is warm.      Capillary Refill: Capillary refill takes less than 2 seconds.      Coloration: Skin is not pale.   Neurological:      Mental Status: She is alert and oriented to person, place, and time. Mental status is at baseline.   Psychiatric:         Mood and Affect: Mood normal.         Behavior: Behavior normal.         Administrative Statements   I have spent a total time of 30 minutes in caring for this patient on the day of the visit/encounter including Risks and benefits of tx options, Instructions for management, Patient and family education, Importance of tx compliance, Risk factor reductions, Impressions, Counseling / Coordination of care, Documenting in the medical record, Reviewing / ordering tests, medicine, procedures  , and Obtaining or reviewing history  .

## 2025-01-08 RX ORDER — ONDANSETRON 4 MG/1
4 TABLET, FILM COATED ORAL EVERY 8 HOURS PRN
Qty: 20 TABLET | Refills: 0 | Status: SHIPPED | OUTPATIENT
Start: 2025-01-08

## 2025-01-11 NOTE — ASSESSMENT & PLAN NOTE
Patient with BMI of 17.23. Admits to chronic fluctuation in her weight as a result of eating disorders. Also with severe body dysmorphia as she feels she is heavy/overweight. Never discussed with her therapist but has been looking for additional help and support. Unfortunately, insurance has prevented her from achieving this.     Plan:  Encouraged to discuss her complaints with her psychiatrist/therapist   Ambulatory referral to nutrition placed   Will rediscuss EGD/colonoscopy at next office visit  Return to clinic in 3 months for ongoing management     Orders:    Ambulatory Referral to Nutrition Services; Future

## 2025-01-11 NOTE — ASSESSMENT & PLAN NOTE
History  of GERD that is currently controlled on Prilosec 20 mg daily. Does admit to moreso nausea which she has chronically. Prior EGD In 2022 with esophagitis LA Class B and gastritis. Recent CT with gastric thickening likely related to inflammation; however, did recommend repeat EGD that patient defers.     Plan:  Continue on Prilosec 20 mg BID advised to take PPI 30 - 60 min prior to meals  Recommend repeat EGD given abnormal CT Imaging but patient defers at this time.   Discussed the importance of lifestyle modifications including:  Recommend small meals and avoidance of ulcerogenic foods   Avoid eating prior to bedtime, elevate head of bed at night  Limit intake of alcohol and caffeine   Okay to utilize Tylenol but avoid use of all NSAIDs  Daily exercise to promote weight loss  Strongly advised smoking cessation

## 2025-01-28 DIAGNOSIS — J30.2 SEASONAL ALLERGIES: ICD-10-CM

## 2025-01-29 DIAGNOSIS — M79.7 FIBROMYALGIA: ICD-10-CM

## 2025-01-29 RX ORDER — LORATADINE 10 MG/1
10 TABLET ORAL DAILY
Qty: 90 TABLET | Refills: 1 | Status: SHIPPED | OUTPATIENT
Start: 2025-01-29

## 2025-01-29 RX ORDER — NAPROXEN 500 MG/1
500 TABLET ORAL 2 TIMES DAILY WITH MEALS
Qty: 60 TABLET | Refills: 2 | Status: SHIPPED | OUTPATIENT
Start: 2025-01-29

## 2025-02-27 ENCOUNTER — APPOINTMENT (OUTPATIENT)
Dept: LAB | Facility: CLINIC | Age: 45
End: 2025-02-27
Payer: COMMERCIAL

## 2025-02-27 DIAGNOSIS — Z79.899 NEED FOR PROPHYLACTIC CHEMOTHERAPY: ICD-10-CM

## 2025-02-27 LAB
ALBUMIN SERPL BCG-MCNC: 4.2 G/DL (ref 3.5–5)
ALP SERPL-CCNC: 54 U/L (ref 34–104)
ALT SERPL W P-5'-P-CCNC: 33 U/L (ref 7–52)
ANION GAP SERPL CALCULATED.3IONS-SCNC: 12 MMOL/L (ref 4–13)
AST SERPL W P-5'-P-CCNC: 34 U/L (ref 13–39)
BASOPHILS # BLD AUTO: 0.03 THOUSANDS/ÂΜL (ref 0–0.1)
BASOPHILS NFR BLD AUTO: 1 % (ref 0–1)
BILIRUB SERPL-MCNC: 0.28 MG/DL (ref 0.2–1)
BUN SERPL-MCNC: 18 MG/DL (ref 5–25)
CALCIUM SERPL-MCNC: 8.8 MG/DL (ref 8.4–10.2)
CHLORIDE SERPL-SCNC: 95 MMOL/L (ref 96–108)
CO2 SERPL-SCNC: 23 MMOL/L (ref 21–32)
CREAT SERPL-MCNC: 1.4 MG/DL (ref 0.6–1.3)
EOSINOPHIL # BLD AUTO: 0.01 THOUSAND/ÂΜL (ref 0–0.61)
EOSINOPHIL NFR BLD AUTO: 0 % (ref 0–6)
ERYTHROCYTE [DISTWIDTH] IN BLOOD BY AUTOMATED COUNT: 16.6 % (ref 11.6–15.1)
EST. AVERAGE GLUCOSE BLD GHB EST-MCNC: 126 MG/DL
GFR SERPL CREATININE-BSD FRML MDRD: 45 ML/MIN/1.73SQ M
GLUCOSE P FAST SERPL-MCNC: 108 MG/DL (ref 65–99)
HBA1C MFR BLD: 6 %
HCT VFR BLD AUTO: 37.3 % (ref 34.8–46.1)
HGB BLD-MCNC: 12 G/DL (ref 11.5–15.4)
IMM GRANULOCYTES # BLD AUTO: 0.01 THOUSAND/UL (ref 0–0.2)
IMM GRANULOCYTES NFR BLD AUTO: 0 % (ref 0–2)
LYMPHOCYTES # BLD AUTO: 0.76 THOUSANDS/ÂΜL (ref 0.6–4.47)
LYMPHOCYTES NFR BLD AUTO: 15 % (ref 14–44)
MCH RBC QN AUTO: 28.3 PG (ref 26.8–34.3)
MCHC RBC AUTO-ENTMCNC: 32.2 G/DL (ref 31.4–37.4)
MCV RBC AUTO: 88 FL (ref 82–98)
MONOCYTES # BLD AUTO: 0.53 THOUSAND/ÂΜL (ref 0.17–1.22)
MONOCYTES NFR BLD AUTO: 10 % (ref 4–12)
NEUTROPHILS # BLD AUTO: 3.91 THOUSANDS/ÂΜL (ref 1.85–7.62)
NEUTS SEG NFR BLD AUTO: 74 % (ref 43–75)
NRBC BLD AUTO-RTO: 0 /100 WBCS
PLATELET # BLD AUTO: 279 THOUSANDS/UL (ref 149–390)
PMV BLD AUTO: 8.6 FL (ref 8.9–12.7)
POTASSIUM SERPL-SCNC: 3.3 MMOL/L (ref 3.5–5.3)
PROT SERPL-MCNC: 7.1 G/DL (ref 6.4–8.4)
RBC # BLD AUTO: 4.24 MILLION/UL (ref 3.81–5.12)
SODIUM SERPL-SCNC: 130 MMOL/L (ref 135–147)
TSH SERPL DL<=0.05 MIU/L-ACNC: 1.15 UIU/ML (ref 0.45–4.5)
WBC # BLD AUTO: 5.25 THOUSAND/UL (ref 4.31–10.16)

## 2025-02-27 PROCEDURE — 83036 HEMOGLOBIN GLYCOSYLATED A1C: CPT

## 2025-02-27 PROCEDURE — 84443 ASSAY THYROID STIM HORMONE: CPT

## 2025-02-27 PROCEDURE — 85025 COMPLETE CBC W/AUTO DIFF WBC: CPT

## 2025-02-27 PROCEDURE — 80053 COMPREHEN METABOLIC PANEL: CPT

## 2025-02-28 LAB
A ALTERNATA IGE QN: <0.1 KUA/I (ref 0–0.1)
A FUMIGATUS IGE QN: <0.1 KUA/I (ref 0–0.1)
BERMUDA GRASS IGE QN: 0.14 KUA/I (ref 0–0.1)
BOXELDER IGE QN: <0.1 KUA/I (ref 0–0.1)
C HERBARUM IGE QN: <0.1 KUA/I (ref 0–0.1)
CAT DANDER IGE QN: 0.86 KUA/I (ref 0–0.1)
CMN PIGWEED IGE QN: <0.1 KUA/I (ref 0–0.1)
COMMON RAGWEED IGE QN: <0.1 KUA/I (ref 0–0.1)
COTTONWOOD IGE QN: <0.1 KUA/I (ref 0–0.1)
D FARINAE IGE QN: 2.79 KUA/I (ref 0–0.1)
D PTERONYSS IGE QN: 3.37 KUA/I (ref 0–0.1)
DOG DANDER IGE QN: <0.1 KUA/I (ref 0–0.1)
LONDON PLANE IGE QN: <0.1 KUA/I (ref 0–0.1)
MOUSE URINE PROT IGE QN: <0.1 KUA/I (ref 0–0.1)
MT JUNIPER IGE QN: <0.1 KUA/I (ref 0–0.1)
MUGWORT IGE QN: <0.1 KUA/I (ref 0–0.1)
P NOTATUM IGE QN: <0.1 KUA/I (ref 0–0.1)
ROACH IGE QN: 0.28 KUA/I (ref 0–0.1)
SHEEP SORREL IGE QN: <0.1 KUA/I (ref 0–0.1)
SILVER BIRCH IGE QN: <0.1 KUA/I (ref 0–0.1)
TIMOTHY IGE QN: <0.1 KUA/I (ref 0–0.1)
TOTAL IGE SMQN RAST: 34.1 KU/L (ref 0–113)
WALNUT IGE QN: 0.1 KUA/I (ref 0–0.1)
WHITE ASH IGE QN: <0.1 KUA/I (ref 0–0.1)
WHITE ELM IGE QN: <0.1 KUA/I (ref 0–0.1)
WHITE MULBERRY IGE QN: <0.1 KUA/I (ref 0–0.1)
WHITE OAK IGE QN: <0.1 KUA/I (ref 0–0.1)

## 2025-03-01 ENCOUNTER — OFFICE VISIT (OUTPATIENT)
Dept: URGENT CARE | Age: 45
End: 2025-03-01
Payer: COMMERCIAL

## 2025-03-01 ENCOUNTER — APPOINTMENT (OUTPATIENT)
Dept: RADIOLOGY | Age: 45
End: 2025-03-01
Payer: COMMERCIAL

## 2025-03-01 VITALS
BODY MASS INDEX: 17.01 KG/M2 | TEMPERATURE: 97.4 F | HEART RATE: 114 BPM | SYSTOLIC BLOOD PRESSURE: 110 MMHG | DIASTOLIC BLOOD PRESSURE: 74 MMHG | RESPIRATION RATE: 20 BRPM | OXYGEN SATURATION: 98 % | WEIGHT: 90 LBS

## 2025-03-01 DIAGNOSIS — R05.1 ACUTE COUGH: ICD-10-CM

## 2025-03-01 DIAGNOSIS — R05.1 ACUTE COUGH: Primary | ICD-10-CM

## 2025-03-01 PROCEDURE — 71046 X-RAY EXAM CHEST 2 VIEWS: CPT

## 2025-03-01 PROCEDURE — 99214 OFFICE O/P EST MOD 30 MIN: CPT

## 2025-03-01 PROCEDURE — S9088 SERVICES PROVIDED IN URGENT: HCPCS

## 2025-03-01 RX ORDER — AZITHROMYCIN 250 MG/1
TABLET, FILM COATED ORAL
Qty: 6 TABLET | Refills: 0 | Status: SHIPPED | OUTPATIENT
Start: 2025-03-01 | End: 2025-03-05

## 2025-03-01 NOTE — PATIENT INSTRUCTIONS
Stop the amoxicillin today.    Take antibiotic- Zithromax as directed.  Recommend daily probiotic while on antibiotic or eat yogurt with live cultures daily while on antibiotic.       You may take Tylenol or Motrin for pain and fever.    Albuterol inhaler for wheezing and shortness of breath.    Mucinex for cough during the day.    Flonase- one spray each nostril daily for nasal congestions.    Rest, increase fluids, good hand washing.  Please follow up with your family doctor if no improvement in 7-10 days.

## 2025-03-01 NOTE — PROGRESS NOTES
[facility]        NAME: Fatimah Da Silva is a 44 y.o. female  : 1980    MRN: [id number]  DATE: 2025  TIME: 6:43 PM    Assessment and Plan   Acute cough [R05.1]  1. Acute cough  XR chest pa and lateral    azithromycin (ZITHROMAX) 250 mg tablet        CXR: No acute cardiopulmonary findings identified by me; pending final read. Daily smoker.  Stop the amoxicillin today secondary to diarrhea  Take antibiotic- Zithromax as directed.  Recommend daily probiotic while on antibiotic or eat yogurt with live cultures daily while on antibiotic.     You may take Tylenol or Motrin for pain and fever.  Albuterol inhaler for wheezing and shortness of breath.  Mucinex for cough during the day.  Strict ER precaution    Patient Instructions       Follow up with PCP in 3-5 days.  Proceed to  ER if symptoms worsen.    If tests have been performed at [facility], our office will contact you with results if changes need to be made to the care plan discussed with you at the visit.  You can review your full results on [facility].    Chief Complaint     Chief Complaint   Patient presents with   • Sore Throat   • Cough     Pt was seen at [facility] yesterday and started on amox. C/O painful breathing and cough, fatigue, diarrhea and sore throat. Pt states she was not swabbed or cxr performed yday.          History of Present Illness       Patient is a 44-year-old female presenting with 7 days of cough, congestion, runny nose, sore throat, fever, chills.  [facility] and started on a course of amoxicillin for a sinus infection.  Since starting the antibiotics 3 days ago, she reports increase in diarrhea and no change in her symptoms.  She denies chest pain, shortness of breath, difficulty breathing, however has right-sided rib pain with deep inspiration.  She denies exertional shortness of breath.  She reports smoking daily.  She is eating and drinking well, with normal urine  output.    Sore Throat   Associated symptoms include congestion, coughing and diarrhea. Pertinent negatives include no abdominal pain or shortness of breath.   Cough  Associated symptoms include a fever, postnasal drip, rhinorrhea and a sore throat. Pertinent negatives include no chest pain, shortness of breath or wheezing.       Review of Systems   Review of Systems   Constitutional:  Positive for fatigue and fever.   HENT:  Positive for congestion, postnasal drip, rhinorrhea, sinus pressure, sinus pain and sore throat.    Respiratory:  Positive for cough. Negative for chest tightness, shortness of breath and wheezing.    Cardiovascular:  Negative for chest pain.   Gastrointestinal:  Positive for diarrhea. Negative for abdominal pain.         Current Medications       Current Outpatient Medications:   •  albuterol (PROVENTIL HFA,VENTOLIN HFA) 90 mcg/act inhaler, TAKE 2 PUFFS BY MOUTH EVERY 6 HOURS AS NEEDED FOR WHEEZE OR FOR SHORTNESS OF BREATH, Disp: 8.5 g, Rfl: 3  •  Ascorbic Acid (VITAMIN C) 100 MG tablet, Take 100 mg by mouth daily, Disp: , Rfl:   •  azithromycin (ZITHROMAX) 250 mg tablet, Take 2 tablets today then 1 tablet daily x 4 days, Disp: 6 tablet, Rfl: 0  •  Biotin w/ Vitamins C & E (HAIR/SKIN/NAILS PO), Take by mouth, Disp: , Rfl:   •  busPIRone (BUSPAR) 15 mg tablet, Take by mouth 3 (three) times a day  , Disp: , Rfl:   •  CALCIUM-MAGNESIUM-ZINC PO, Take by mouth, Disp: , Rfl:   •  famotidine (PEPCID) 20 mg tablet, TAKE 1 TABLET BY MOUTH TWICE A DAY, Disp: 180 tablet, Rfl: 1  •  loratadine (CLARITIN) 10 mg tablet, TAKE 1 TABLET BY MOUTH EVERY DAY, Disp: 90 tablet, Rfl: 1  •  LORazepam (ATIVAN) 1 mg tablet, Take 1 mg by mouth 6 (six) times a day, Disp: , Rfl: 1  •  Multiple Vitamin (multivitamin) tablet, Take 1 tablet by mouth daily, Disp: , Rfl:   •  naproxen (NAPROSYN) 500 mg tablet, TAKE 1 TABLET BY MOUTH TWICE A DAY WITH FOOD, Disp: 60 tablet, Rfl: 2  •  omeprazole (PriLOSEC) 20 mg delayed release  capsule, Take 1 capsule (20 MG total) by mouth twice daily, Disp: 180 capsule, Rfl: 1  •  ondansetron (ZOFRAN) 4 mg tablet, Take 1 tablet (4 mg total) by mouth every 8 (eight) hours as needed for nausea or vomiting, Disp: 20 tablet, Rfl: 0  •  thiamine (VITAMIN B1) 100 mg tablet, Take 100 mg by mouth daily, Disp: , Rfl:   •  tiZANidine (ZANAFLEX) 2 mg tablet, Take 1 tablet (2 mg total) by mouth every 8 (eight) hours as needed for muscle spasms, Disp: 90 tablet, Rfl: 2  •  ARIPiprazole (ABILIFY) 2 mg tablet, , Disp: , Rfl:   •  Breo Ellipta 200-25 MCG/ACT inhaler, INHALE 1 PUFF DAILY RINSE MOUTH AFTER USE. (Patient taking differently: Takes some days), Disp: 60 each, Rfl: 3  •  guaiFENesin (MUCINEX) 600 mg 12 hr tablet, Take 2 tablets (1,200 mg total) by mouth 2 (two) times a day (Patient not taking: Reported on 3/1/2025), Disp: 30 tablet, Rfl: 1  •  ibuprofen (MOTRIN) 600 mg tablet, Take by mouth every 6 (six) hours as needed for mild pain, Disp: , Rfl:   •  ketoconazole (NIZORAL) 2 % cream, , Disp: , Rfl:   •  ketoconazole (NIZORAL) 2 % shampoo, Apply topically, Disp: , Rfl:   •  ketotifen (ZADITOR) 0.025 % ophthalmic solution, Administer 1 drop to both eyes 2 (two) times a day (Patient not taking: Reported on 3/1/2025), Disp: 5 mL, Rfl: 1  •  Ketotifen Fumarate (ZADITOR) 0.035 % ophthalmic solution, instill 1 drop into both eyes twice a day (Patient not taking: Reported on 3/1/2025), Disp: , Rfl:   •  naproxen (EC NAPROSYN) 500 MG EC tablet, Take 1 tablet (500 mg total) by mouth 2 (two) times a day as needed for mild pain, Disp: 30 tablet, Rfl: 0  •  ofloxacin (OCUFLOX) 0.3 % ophthalmic solution, Administer 1 drop to the right eye 4 (four) times a day (Patient not taking: Reported on 9/6/2024), Disp: 5 mL, Rfl: 0    Current Allergies     Allergies as of 03/01/2025 - Reviewed 03/01/2025   Allergen Reaction Noted   • Prednisone Anxiety 12/15/2022   • Gabapentin Hives 09/29/2016   • Meloxicam GI Intolerance  2015   • Tramadol Hives 2016   • Vistaril [hydroxyzine]  10/04/2018   • Latex Other (See Comments) 10/25/2022            The following portions of the patient's history were reviewed and updated as appropriate: allergies, current medications, past family history, past medical history, past social history, past surgical history and problem list.     Past Medical History:   Diagnosis Date   • Allergies    • Anxiety    • Asthma    • Chronic pain    • Contusion of head, subsequent encounter 2022   • Depression    • Eating disorder    • Fibrocystic breast changes of both breasts    • Seizures (HCC)        Past Surgical History:   Procedure Laterality Date   • INDUCED      • MAMMO STEREOTACTIC BREAST BIOPSY LEFT (ALL INC)      Benign       Family History   Problem Relation Age of Onset   • Diabetes Sister    • Alcohol abuse Sister    • Cancer Father         Diagnosed stage 4 - metastazied   • Psychiatric Illness Mother    • Hypertension Family    • Diabetes Family          Medications have been verified.        Objective   /74   Pulse (!) 114   Temp (!) 97.4 °F (36.3 °C)   Resp 20   Wt 40.8 kg (90 lb)   LMP 2025 (Approximate)   SpO2 98%   BMI 17.01 kg/m²   Patient's last menstrual period was 2025 (approximate).       Physical Exam     Physical Exam  Vitals and nursing note reviewed.   Constitutional:       General: She is not in acute distress.     Appearance: She is well-developed and normal weight. She is ill-appearing.   HENT:      Head: Normocephalic.      Right Ear: Tympanic membrane normal.      Left Ear: Tympanic membrane normal.      Nose: Congestion and rhinorrhea present.      Mouth/Throat:      Pharynx: Posterior oropharyngeal erythema present.      Tonsils: No tonsillar exudate or tonsillar abscesses. 0 on the right. 0 on the left.   Cardiovascular:      Rate and Rhythm: Regular rhythm. Tachycardia present.      Heart sounds: Normal heart sounds.    Pulmonary:      Effort: No respiratory distress.      Breath sounds: No stridor. Rhonchi present. No wheezing or rales.   Chest:      Chest wall: No tenderness.   Lymphadenopathy:      Cervical: No cervical adenopathy.   Skin:     General: Skin is warm.   Neurological:      Mental Status: She is alert.

## 2025-03-01 NOTE — LETTER
March 1, 2025     Patient: Fatimah Da Silva   YOB: 1980   Date of Visit: 3/1/2025       To Whom it May Concern:    Fatimah Da Silva was seen in my clinic on 3/1/2025. She may return to work on 3/3/2025 .    If you have any questions or concerns, please don't hesitate to call.         Sincerely,          SOLO West        CC: No Recipients

## 2025-03-02 ENCOUNTER — HOSPITAL ENCOUNTER (EMERGENCY)
Facility: HOSPITAL | Age: 45
Discharge: HOME | End: 2025-03-02
Attending: EMERGENCY MEDICINE | Admitting: EMERGENCY MEDICINE
Payer: COMMERCIAL

## 2025-03-02 ENCOUNTER — RESULTS FOLLOW-UP (OUTPATIENT)
Dept: URGENT CARE | Age: 45
End: 2025-03-02

## 2025-03-02 ENCOUNTER — APPOINTMENT (EMERGENCY)
Dept: RADIOLOGY | Facility: HOSPITAL | Age: 45
End: 2025-03-02
Payer: COMMERCIAL

## 2025-03-02 VITALS
RESPIRATION RATE: 18 BRPM | HEART RATE: 94 BPM | SYSTOLIC BLOOD PRESSURE: 115 MMHG | TEMPERATURE: 97.1 F | OXYGEN SATURATION: 100 % | DIASTOLIC BLOOD PRESSURE: 77 MMHG

## 2025-03-02 DIAGNOSIS — J18.9 PNEUMONIA: Primary | ICD-10-CM

## 2025-03-02 PROCEDURE — 71250 CT THORAX DX C-: CPT

## 2025-03-02 PROCEDURE — 99284 EMERGENCY DEPT VISIT MOD MDM: CPT

## 2025-03-02 PROCEDURE — 99284 EMERGENCY DEPT VISIT MOD MDM: CPT | Performed by: EMERGENCY MEDICINE

## 2025-03-02 RX ORDER — BENZONATATE 100 MG/1
100 CAPSULE ORAL ONCE
Status: COMPLETED | OUTPATIENT
Start: 2025-03-02 | End: 2025-03-02

## 2025-03-02 RX ORDER — ACETAMINOPHEN 325 MG/1
975 TABLET ORAL ONCE
Status: COMPLETED | OUTPATIENT
Start: 2025-03-02 | End: 2025-03-02

## 2025-03-02 RX ORDER — BENZONATATE 100 MG/1
100 CAPSULE ORAL EVERY 8 HOURS
Qty: 21 CAPSULE | Refills: 0 | Status: SHIPPED | OUTPATIENT
Start: 2025-03-02

## 2025-03-02 RX ORDER — DOXYCYCLINE 100 MG/1
100 CAPSULE ORAL EVERY 12 HOURS SCHEDULED
Qty: 14 CAPSULE | Refills: 0 | Status: SHIPPED | OUTPATIENT
Start: 2025-03-02 | End: 2025-03-09

## 2025-03-02 RX ORDER — DOXYCYCLINE 100 MG/1
100 CAPSULE ORAL ONCE
Status: COMPLETED | OUTPATIENT
Start: 2025-03-02 | End: 2025-03-02

## 2025-03-02 RX ORDER — ONDANSETRON 4 MG/1
4 TABLET, ORALLY DISINTEGRATING ORAL ONCE
Status: COMPLETED | OUTPATIENT
Start: 2025-03-02 | End: 2025-03-02

## 2025-03-02 RX ORDER — ONDANSETRON 4 MG/1
4 TABLET, FILM COATED ORAL EVERY 6 HOURS
Qty: 12 TABLET | Refills: 0 | Status: SHIPPED | OUTPATIENT
Start: 2025-03-02

## 2025-03-02 RX ADMIN — BENZONATATE 100 MG: 100 CAPSULE ORAL at 20:22

## 2025-03-02 RX ADMIN — ONDANSETRON 4 MG: 4 TABLET, ORALLY DISINTEGRATING ORAL at 20:22

## 2025-03-02 RX ADMIN — DOXYCYCLINE 100 MG: 100 CAPSULE ORAL at 20:22

## 2025-03-02 RX ADMIN — ACETAMINOPHEN 975 MG: 325 TABLET, FILM COATED ORAL at 20:22

## 2025-03-02 NOTE — Clinical Note
Fatimah Da Silva was seen and treated in our emergency department on 3/2/2025.                Diagnosis:     Fatimah  .    She may return on this date:          If you have any questions or concerns, please don't hesitate to call.      Karel Wilson, DO    ______________________________           _______________          _______________  Hospital Representative                              Date                                Time

## 2025-03-02 NOTE — TELEPHONE ENCOUNTER
Called pt x2 to review CXR results IMPRESSION: Hyperinflation due to asthma with new right middle lobe collapse. Superimposed right middle lobe not excluded in the appropriate clinical setting.    Left  for return call.

## 2025-03-03 ENCOUNTER — VBI (OUTPATIENT)
Dept: FAMILY MEDICINE CLINIC | Facility: CLINIC | Age: 45
End: 2025-03-03

## 2025-03-03 NOTE — TELEPHONE ENCOUNTER
03/03/25 12:23 PM    Patient contacted post ED visit, first outreach attempt made. Message was left for patient to return a call to the [facility] Department at [facility]: Phone [phone].    Thank you.  [facility]  [facility]

## 2025-03-03 NOTE — ED ATTENDING ATTESTATION
"Final Diagnoses:     1. Pneumonia           I, Roland Borrero MD, saw and evaluated the patient. All available labs and X-rays were ordered by me or the resident / non-physician and have been reviewed by myself. I discussed the patient with the resident / non-physician and agree with the resident's / non-physician practitioner's findings and plan as documented in the resident's / non-physician practicitioner's note, except where noted.   At this point, I agree with the current assessment done in the ED.   I was present during key portions of all procedures performed unless otherwise stated.     HPI:  NURSING TRIAGE:    This is a 44 y.o. female presenting for evaluation of abnormal CXR.  The patient about 2 weeks ago started to have viral symptoms.   It seemed to get better but then Wed/Thurs started to get sick again, feeling much worse. She then went to [facility] where she was started on Amoxicillin for Sinusitis. She went home but had diarrhea so stopped it.  Went to  [facility] yesterday, had an XR which was initially read as normal.  IT was over-read by radiology and reviewed by myself.  Foraml read was \"collapsed RML\" with maybe superinfection of the lung  Patient is worried what this could mean  She was called by e urgent care, azithromycin sent in, and here she is.   Unclear fevers  No vomiting  +nausea  +chest pain on the right side  No dizziness/LH  Eating/drinking less.   Chief Complaint   Patient presents with    Flu Symptoms     PT c/o flu like symptoms since last Wednesday, went to urgent care today XR at urgent care showed R middle lobe collapse. C/O pain when breathing. O2 100% ORA      PHYSICAL: ASSESSMENT + PLAN:   Pertinent: Saturating 100%, HR 90-95  Afebrile.  Normotensive.  No tachypnea but very very frequent coughing in the room  Coughing ? right chest pain     General: VSS, NAD, awake, alert. Well-nourished, well-developed. Appears stated age.   Head: Normocephalic, atraumatic, " nontender.  Eyes: PERRL, EOM-I. No diplopia.   No hyphema.   No subconjunctival hemorrhages.  Symmetrical lids.   ENT: atraumatic external nose and ears.    Pharynx normal.   MMM  No malocclusion. No stridor. Normal phonation. No drooling. Normal swallowing.   Base of mouth is soft. No mastoid tenderness.   Neck: Symmetric, trachea midline. No JVD.  CV: RRR. +S1/S2  No murmurs or gallops  Peripheral pulses +2 throughout. No chest wall tenderness.   Lungs:   Unlabored No retractions  CTAB, lungs sounds equal bilateral.   No crepitus.   No tachypnea.   No paradoxical motion.  Abd: +BS, soft, NT/ND.   MSK:   FROM   No lower extremity edema.   Back:   No CVAT  Skin: Dry, intact.   Neuro: AAOx3, GCS 15, CN II-XII grossly intact.   Motor grossly intact.  Psychiatric/Behavioral: Appropriate mood and affect   Exam: deferred    Vitals:    25 1911   BP: 115/77   BP Location: Right arm   Pulse: 94   Resp: 18   Temp: (!) 97.1 °F (36.2 °C)   SpO2: 100%    Discussed likely pneumonia  Discussed can do CT to verify.     Offered supportive measures    Change abx    Azithro doesn't cover CAP.  Let's do doxy  And meds ot help side effects    RTER precautions    Doubt cardiac pathology    Very URI likel .     There are no obvious limitations to social determinants of care.   Nursing note reviewed.   Vitals reviewed.   Orders placed by myself and/or advanced practitioner / resident.    Previous chart was reviewed  History obtained from: Patient  Language barrier: None  Limitations to the history obtained: None Critical Care Time:      Past Medical: Past Surgical:    has a past medical history of Allergies, Anxiety, Asthma, Chronic pain, Contusion of head, subsequent encounter (2022), Depression, Eating disorder, Fibrocystic breast changes of both breasts, and Seizures (HCC).  has a past surgical history that includes Induced  () and Mammo stereotactic breast biopsy left (all inc) ().   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    No results found for this or any previous visit.    No results found for this or any previous visit.     Labs: Imaging:   Labs Reviewed - No data to display CT chest wo contrast   Final Result      1.  Complete right middle lobe atelectasis is of uncertain etiology and chronicity, and follow-up contrast-enhanced CT is recommended in 3 months.   2.  Few small right-sided groundglass opacities may represent mild pneumonia.      The study was marked in EPIC for immediate notification.         Workstation performed: [id number]            Medications: Code Status:   Medications   acetaminophen (TYLENOL) tablet 975 mg (975 mg Oral Given 3/2/25 2022)   benzonatate (TESSALON PERLES) capsule 100 mg (100 mg Oral Given 3/2/25 2022)   doxycycline hyclate (VIBRAMYCIN) capsule 100 mg (100 mg Oral Given 3/2/25 2022)   ondansetron (ZOFRAN-ODT) dispersible tablet 4 mg (4 mg Oral Given 3/2/25 2022)    Code Status: No Order  Advance Directive and Living Will:      Power of :    POLST:       Orders Placed This Encounter   Procedures    CT chest wo contrast     Time reflects when diagnosis was documented in both MDM as applicable and the Disposition within this note       Time User Action Codes Description Comment    3/2/2025  8:56 PM Karel Wilson Add [J18.9] Pneumonia           ED Disposition       ED Disposition   Discharge    Condition   Stable    Date/Time   Sun Mar 2, 2025  9:12 PM    Comment   Fatimah Da Silva discharge to home/self care.                    Follow-up Information       Follow up With Specialties Details Why Contact Info Additional Information    [facility], Nurse Practitioner   [address]  [address]  [place] [address] [phone]       [facility] Department Emergency Medicine Go to  If symptoms worsen [address] [address] [phone] [facility] Emergency Department, [address], [place], [address] [phone]          Discharge Medication List as of 3/2/2025  9:13 PM        START taking these medications    Details   benzonatate (TESSALON PERLES) 100 mg capsule Take 1 capsule (100 mg total) by mouth every 8 (eight) hours, Starting Sun 3/2/2025, Normal      doxycycline hyclate (VIBRAMYCIN) 100 mg capsule Take 1 capsule (100 mg total) by mouth every 12 (twelve) hours for 7 days, Starting Sun 3/2/2025, Until Sun 3/9/2025, Normal      !! ondansetron (ZOFRAN) 4 mg tablet Take 1 tablet (4 mg total) by mouth every 6 (six) hours, Starting Sun 3/2/2025, Normal       !! - Potential duplicate medications found. Please discuss with provider.        CONTINUE these medications which have NOT CHANGED    Details   albuterol (PROVENTIL HFA,VENTOLIN HFA) 90 mcg/act inhaler TAKE 2 PUFFS BY MOUTH EVERY 6 HOURS AS NEEDED FOR WHEEZE OR FOR SHORTNESS OF BREATH, Normal      ARIPiprazole (ABILIFY) 2 mg tablet Historical Med      Ascorbic Acid (VITAMIN C) 100 MG tablet Take 100 mg by mouth daily, Historical Med      azithromycin (ZITHROMAX) 250 mg tablet Take 2 tablets today then 1 tablet daily x 4 days, Normal      Biotin w/ Vitamins C & E (HAIR/SKIN/NAILS PO) Take by mouth, Historical Med      Breo Ellipta 200-25 MCG/ACT inhaler INHALE 1 PUFF DAILY RINSE MOUTH AFTER USE., Normal      busPIRone (BUSPAR) 15 mg tablet Take by mouth 3 (three) times a day  , Historical Med      CALCIUM-MAGNESIUM-ZINC PO Take by mouth, Historical Med       famotidine (PEPCID) 20 mg tablet TAKE 1 TABLET BY MOUTH TWICE A DAY, Starting Wed 10/30/2024, Normal      guaiFENesin (MUCINEX) 600 mg 12 hr tablet Take 2 tablets (1,200 mg total) by mouth 2 (two) times a day, Starting u 10/10/2024, Normal      ibuprofen (MOTRIN) 600 mg tablet Take by mouth every 6 (six) hours as needed for mild pain, Historical Med      ketoconazole (NIZORAL) 2 % cream Historical Med      ketoconazole (NIZORAL) 2 % shampoo Apply topically, Starting Mon 12/4/2023, Historical Med      ketotifen (ZADITOR) 0.025 % ophthalmic solution Administer 1 drop to both eyes 2 (two) times a day, Starting Wed 3/27/2024, Normal      Ketotifen Fumarate (ZADITOR) 0.035 % ophthalmic solution instill 1 drop into both eyes twice a day, Historical Med      loratadine (CLARITIN) 10 mg tablet TAKE 1 TABLET BY MOUTH EVERY DAY, Starting Wed 1/29/2025, Normal      LORazepam (ATIVAN) 1 mg tablet Take 1 mg by mouth 6 (six) times a day, Starting Thu 9/20/2018, Historical Med      Multiple Vitamin (multivitamin) tablet Take 1 tablet by mouth daily, Historical Med      naproxen (EC NAPROSYN) 500 MG EC tablet Take 1 tablet (500 mg total) by mouth 2 (two) times a day as needed for mild pain, Starting Mon 9/20/2021, Until Thu 10/10/2024 at 2359, Normal      naproxen (NAPROSYN) 500 mg tablet TAKE 1 TABLET BY MOUTH TWICE A DAY WITH FOOD, Starting Wed 1/29/2025, Normal      ofloxacin (OCUFLOX) 0.3 % ophthalmic solution Administer 1 drop to the right eye 4 (four) times a day, Starting Sun 4/28/2024, Normal      omeprazole (PriLOSEC) 20 mg delayed release capsule Take 1 capsule (20 MG total) by mouth twice daily, Normal      !! ondansetron (ZOFRAN) 4 mg tablet Take 1 tablet (4 mg total) by mouth every 8 (eight) hours as needed for nausea or vomiting, Starting Wed 1/8/2025, Normal      thiamine (VITAMIN B1) 100 mg tablet Take 100 mg by mouth daily, Historical Med      tiZANidine (ZANAFLEX) 2 mg tablet Take 1 tablet (2 mg total) by  mouth every 8 (eight) hours as needed for muscle spasms, Starting Wed 10/26/2022, Normal       !! - Potential duplicate medications found. Please discuss with provider.        No discharge procedures on file.  Prior to Admission Medications   Prescriptions Last Dose Informant Patient Reported? Taking?   ARIPiprazole (ABILIFY) 2 mg tablet  Self Yes No   Patient not taking: Reported on 5/2/2024   Ascorbic Acid (VITAMIN C) 100 MG tablet  Self Yes No   Sig: Take 100 mg by mouth daily   Biotin w/ Vitamins C & E (HAIR/SKIN/NAILS PO)  Self Yes No   Sig: Take by mouth   Breo Ellipta 200-25 MCG/ACT inhaler  Self No No   Sig: INHALE 1 PUFF DAILY RINSE MOUTH AFTER USE.   CALCIUM-MAGNESIUM-ZINC PO  Self Yes No   Sig: Take by mouth   Ketotifen Fumarate (ZADITOR) 0.035 % ophthalmic solution  Self Yes No   Sig: instill 1 drop into both eyes twice a day   Patient not taking: Reported on 3/1/2025   LORazepam (ATIVAN) 1 mg tablet  Self Yes No   Sig: Take 1 mg by mouth 6 (six) times a day   Multiple Vitamin (multivitamin) tablet  Self Yes No   Sig: Take 1 tablet by mouth daily   albuterol (PROVENTIL HFA,VENTOLIN HFA) 90 mcg/act inhaler  Self No No   Sig: TAKE 2 PUFFS BY MOUTH EVERY 6 HOURS AS NEEDED FOR WHEEZE OR FOR SHORTNESS OF BREATH   azithromycin (ZITHROMAX) 250 mg tablet   No No   Sig: Take 2 tablets today then 1 tablet daily x 4 days   Patient not taking: Reported on 3/5/2025   busPIRone (BUSPAR) 15 mg tablet  Self Yes No   Sig: Take by mouth 3 (three) times a day     famotidine (PEPCID) 20 mg tablet  Self No No   Sig: TAKE 1 TABLET BY MOUTH TWICE A DAY   guaiFENesin (MUCINEX) 600 mg 12 hr tablet  Self No No   Sig: Take 2 tablets (1,200 mg total) by mouth 2 (two) times a day   Patient not taking: Reported on 3/1/2025   ibuprofen (MOTRIN) 600 mg tablet  Self Yes No   Sig: Take by mouth every 6 (six) hours as needed for mild pain   ketoconazole (NIZORAL) 2 % cream  Self Yes No   ketoconazole (NIZORAL) 2 % shampoo  Self Yes No  "  Sig: Apply topically   ketotifen (ZADITOR) 0.025 % ophthalmic solution  Self No No   Sig: Administer 1 drop to both eyes 2 (two) times a day   Patient not taking: Reported on 3/1/2025   loratadine (CLARITIN) 10 mg tablet   No No   Sig: TAKE 1 TABLET BY MOUTH EVERY DAY   naproxen (EC NAPROSYN) 500 MG EC tablet   No No   Sig: Take 1 tablet (500 mg total) by mouth 2 (two) times a day as needed for mild pain   naproxen (NAPROSYN) 500 mg tablet   No No   Sig: TAKE 1 TABLET BY MOUTH TWICE A DAY WITH FOOD   ofloxacin (OCUFLOX) 0.3 % ophthalmic solution  Self No No   Sig: Administer 1 drop to the right eye 4 (four) times a day   Patient not taking: Reported on 9/6/2024   omeprazole (PriLOSEC) 20 mg delayed release capsule  Self No No   Sig: Take 1 capsule (20 MG total) by mouth twice daily   ondansetron (ZOFRAN) 4 mg tablet   No No   Sig: Take 1 tablet (4 mg total) by mouth every 8 (eight) hours as needed for nausea or vomiting   thiamine (VITAMIN B1) 100 mg tablet  Self Yes No   Sig: Take 100 mg by mouth daily   tiZANidine (ZANAFLEX) 2 mg tablet  Self No No   Sig: Take 1 tablet (2 mg total) by mouth every 8 (eight) hours as needed for muscle spasms      Facility-Administered Medications: None                        Portions of the record may have been created with voice recognition software. Occasional wrong word or \"sound a like\" substitutions may have occurred due to the inherent limitations of voice recognition software. Read the chart carefully and recognize, using context, where substitutions have occurred.    Electronically signed by:  Roland Borrero  "

## 2025-03-03 NOTE — ED PROVIDER NOTES
Time reflects when diagnosis was documented in both MDM as applicable and the Disposition within this note       Time User Action Codes Description Comment    3/2/2025  8:56 PM Karel Wilson Add [J18.9] Pneumonia           ED Disposition       ED Disposition   Discharge    Condition   Stable    Date/Time   Sun Mar 2, 2025  9:12 PM    Comment   Fatimah Da Silva discharge to home/self care.                   Assessment & Plan       Medical Decision Making  Given patient's chest x-ray findings will evaluate with Noncon CT chest.  Overall patient's symptoms appear very viral illness like.  Patient is currently hemodynamically stable with normal respiratory rate and SpO2.  Because of this I feel comfortable that the patient could manage this on an outpatient patient with close follow-up with PCP.  Symptomatic management as well as antibiotics for pneumonia prescribed.  Incentive spirometer was given.  Patient agreeable and stable for discharge.  Good return precautions noted.  Patient agreeable stable for discharge.    Amount and/or Complexity of Data Reviewed  Radiology: ordered.    Risk  OTC drugs.  Prescription drug management.             Medications   acetaminophen (TYLENOL) tablet 975 mg (975 mg Oral Given 3/2/25 2022)   benzonatate (TESSALON PERLES) capsule 100 mg (100 mg Oral Given 3/2/25 2022)   doxycycline hyclate (VIBRAMYCIN) capsule 100 mg (100 mg Oral Given 3/2/25 2022)   ondansetron (ZOFRAN-ODT) dispersible tablet 4 mg (4 mg Oral Given 3/2/25 2022)       ED Risk Strat Scores                            SBIRT 20yo+      Flowsheet Row Most Recent Value   Initial Alcohol Screen: US AUDIT-C     1. How often do you have a drink containing alcohol? 0 Filed at: 03/02/2025 1913   2. How many drinks containing alcohol do you have on a typical day you are drinking?  0 Filed at: 03/02/2025 1913   3a. Male UNDER 65: How often do you have five or more drinks on one occasion? 0 Filed at: 03/02/2025 1913   3b. FEMALE Any  Age, or MALE 65+: How often do you have 4 or more drinks on one occassion? 0 Filed at: 2025   Audit-C Score 0 Filed at: 2025   LUI: How many times in the past year have you...    Used an illegal drug or used a prescription medication for non-medical reasons? Never Filed at: 2025                            History of Present Illness       Chief Complaint   Patient presents with    Flu Symptoms     PT c/o flu like symptoms since last Wednesday, went to urgent care today XR at urgent care showed R middle lobe collapse. C/O pain when breathing. O2 100% ORA       Past Medical History:   Diagnosis Date    Allergies     Anxiety     Asthma     Chronic pain     Contusion of head, subsequent encounter 2022    Depression     Eating disorder     Fibrocystic breast changes of both breasts     Seizures (HCC)       Past Surgical History:   Procedure Laterality Date    INDUCED       MAMMO STEREOTACTIC BREAST BIOPSY LEFT (ALL INC)      Benign      Family History   Problem Relation Age of Onset    Diabetes Sister     Alcohol abuse Sister     Cancer Father         Diagnosed stage 4 - metastazied    Psychiatric Illness Mother     Hypertension Family     Diabetes Family       Social History     Tobacco Use    Smoking status: Every Day     Current packs/day: 0.50     Average packs/day: 0.5 packs/day for 28.0 years (14.0 ttl pk-yrs)     Types: Cigarettes    Smokeless tobacco: Never    Tobacco comments:     2023-10-15 cig/day   Vaping Use    Vaping status: Never Used   Substance Use Topics    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    Drug use: No      E-Cigarette/Vaping    E-Cigarette Use Never User       E-Cigarette/Vaping Substances    Nicotine No     THC No     CBD No     Flavoring No     Other No     Unknown No       I have reviewed and agree with the history as documented.     Patient is a 44-year-old female with a past medical history of asthma coming complaining of flulike  symptoms since Thursday.  Patient states that she has some muscle pains, cough that is productive, congestion, runny nose, sore throat.  Patient was seen at urgent care and was prescribed amoxicillin but patient states that she has had an upset stomach from this and was subsequently prescribed Zofran which she has not taken and Z-Tanner which she is picking up tomorrow.  Patient denies any PE risk factors.  Patient states she is also having some right sided chest/rib pain that sharp in nature and worse with coughing.  Patient is unsure if she has had any fevers.  Denies any shortness of breath or wheezing.  Patient was sent in from urgent care given her x-ray read.      Flu Symptoms  Presenting symptoms: cough and nausea    Presenting symptoms: no fever, no shortness of breath and no vomiting        Review of Systems   Constitutional:  Negative for fever.   Respiratory:  Positive for cough. Negative for shortness of breath and wheezing.    Cardiovascular:  Positive for chest pain. Negative for palpitations.   Gastrointestinal:  Positive for nausea. Negative for abdominal pain and vomiting.   Genitourinary:  Negative for dysuria and hematuria.   Skin:  Negative for rash.   Neurological:  Negative for syncope.   All other systems reviewed and are negative.          Objective       ED Triage Vitals   Temperature Pulse Blood Pressure Respirations SpO2 Patient Position - Orthostatic VS   03/02/25 1911 03/02/25 1911 03/02/25 1911 03/02/25 1911 03/02/25 1911 --   (!) 97.1 °F (36.2 °C) 94 115/77 18 100 %       Temp src Heart Rate Source BP Location FiO2 (%) Pain Score    -- -- 03/02/25 1911 -- 03/02/25 2022      Right arm  5      Vitals      Date and Time Temp Pulse SpO2 Resp BP Pain Score FACES Pain Rating User   03/02/25 2022 -- -- -- -- -- 5 -- CR   03/02/25 1911 97.1 °F (36.2 °C) 94 100 % 18 115/77 -- -- AG            Physical Exam  Vitals and nursing note reviewed.   Constitutional:       General: She is not in acute  distress.     Appearance: She is well-developed.   HENT:      Head: Normocephalic and atraumatic.   Cardiovascular:      Rate and Rhythm: Normal rate and regular rhythm.   Pulmonary:      Effort: Pulmonary effort is normal. No respiratory distress.      Breath sounds: Normal breath sounds.   Abdominal:      Palpations: Abdomen is soft.      Tenderness: There is no abdominal tenderness.   Musculoskeletal:         General: No swelling.      Cervical back: Neck supple.   Skin:     Capillary Refill: Capillary refill takes less than 2 seconds.   Neurological:      Mental Status: She is alert.   Psychiatric:         Mood and Affect: Mood normal.         Results Reviewed       None            CT chest wo contrast   Final Interpretation by Kan Becerra MD (03/02 2103)      1.  Complete right middle lobe atelectasis is of uncertain etiology and chronicity, and follow-up contrast-enhanced CT is recommended in 3 months.   2.  Few small right-sided groundglass opacities may represent mild pneumonia.      The study was marked in EPIC for immediate notification.         Workstation performed: [id number]             Procedures    ED Medication and Procedure Management   Prior to Admission Medications   Prescriptions Last Dose Informant Patient Reported? Taking?   ARIPiprazole (ABILIFY) 2 mg tablet  Self Yes No   Patient not taking: Reported on 5/2/2024   Ascorbic Acid (VITAMIN C) 100 MG tablet  Self Yes No   Sig: Take 100 mg by mouth daily   Biotin w/ Vitamins C & E (HAIR/SKIN/NAILS PO)  Self Yes No   Sig: Take by mouth   Breo Ellipta 200-25 MCG/ACT inhaler  Self No No   Sig: INHALE 1 PUFF DAILY RINSE MOUTH AFTER USE.   Patient taking differently: Takes some days   CALCIUM-MAGNESIUM-ZINC PO  Self Yes No   Sig: Take by mouth   Ketotifen Fumarate (ZADITOR) 0.035 % ophthalmic solution  Self Yes No   Sig: instill 1 drop into both eyes twice a day   Patient not taking: Reported on 3/1/2025   LORazepam (ATIVAN) 1 mg tablet  Self Yes  No   Sig: Take 1 mg by mouth 6 (six) times a day   Multiple Vitamin (multivitamin) tablet  Self Yes No   Sig: Take 1 tablet by mouth daily   albuterol (PROVENTIL HFA,VENTOLIN HFA) 90 mcg/act inhaler  Self No No   Sig: TAKE 2 PUFFS BY MOUTH EVERY 6 HOURS AS NEEDED FOR WHEEZE OR FOR SHORTNESS OF BREATH   azithromycin (ZITHROMAX) 250 mg tablet   No No   Sig: Take 2 tablets today then 1 tablet daily x 4 days   busPIRone (BUSPAR) 15 mg tablet  Self Yes No   Sig: Take by mouth 3 (three) times a day     famotidine (PEPCID) 20 mg tablet  Self No No   Sig: TAKE 1 TABLET BY MOUTH TWICE A DAY   guaiFENesin (MUCINEX) 600 mg 12 hr tablet  Self No No   Sig: Take 2 tablets (1,200 mg total) by mouth 2 (two) times a day   Patient not taking: Reported on 3/1/2025   ibuprofen (MOTRIN) 600 mg tablet  Self Yes No   Sig: Take by mouth every 6 (six) hours as needed for mild pain   ketoconazole (NIZORAL) 2 % cream  Self Yes No   ketoconazole (NIZORAL) 2 % shampoo  Self Yes No   Sig: Apply topically   ketotifen (ZADITOR) 0.025 % ophthalmic solution  Self No No   Sig: Administer 1 drop to both eyes 2 (two) times a day   Patient not taking: Reported on 3/1/2025   loratadine (CLARITIN) 10 mg tablet   No No   Sig: TAKE 1 TABLET BY MOUTH EVERY DAY   naproxen (EC NAPROSYN) 500 MG EC tablet   No No   Sig: Take 1 tablet (500 mg total) by mouth 2 (two) times a day as needed for mild pain   naproxen (NAPROSYN) 500 mg tablet   No No   Sig: TAKE 1 TABLET BY MOUTH TWICE A DAY WITH FOOD   ofloxacin (OCUFLOX) 0.3 % ophthalmic solution  Self No No   Sig: Administer 1 drop to the right eye 4 (four) times a day   Patient not taking: Reported on 9/6/2024   omeprazole (PriLOSEC) 20 mg delayed release capsule  Self No No   Sig: Take 1 capsule (20 MG total) by mouth twice daily   ondansetron (ZOFRAN) 4 mg tablet   No No   Sig: Take 1 tablet (4 mg total) by mouth every 8 (eight) hours as needed for nausea or vomiting   thiamine (VITAMIN B1) 100 mg tablet  Self  Yes No   Sig: Take 100 mg by mouth daily   tiZANidine (ZANAFLEX) 2 mg tablet  Self No No   Sig: Take 1 tablet (2 mg total) by mouth every 8 (eight) hours as needed for muscle spasms      Facility-Administered Medications: None     Current Discharge Medication List        START taking these medications    Details   benzonatate (TESSALON PERLES) 100 mg capsule Take 1 capsule (100 mg total) by mouth every 8 (eight) hours  Qty: 21 capsule, Refills: 0    Associated Diagnoses: Pneumonia      doxycycline hyclate (VIBRAMYCIN) 100 mg capsule Take 1 capsule (100 mg total) by mouth every 12 (twelve) hours for 7 days  Qty: 14 capsule, Refills: 0    Associated Diagnoses: Pneumonia      !! ondansetron (ZOFRAN) 4 mg tablet Take 1 tablet (4 mg total) by mouth every 6 (six) hours  Qty: 12 tablet, Refills: 0    Associated Diagnoses: Pneumonia       !! - Potential duplicate medications found. Please discuss with provider.        CONTINUE these medications which have NOT CHANGED    Details   albuterol (PROVENTIL HFA,VENTOLIN HFA) 90 mcg/act inhaler TAKE 2 PUFFS BY MOUTH EVERY 6 HOURS AS NEEDED FOR WHEEZE OR FOR SHORTNESS OF BREATH  Qty: 8.5 g, Refills: 3    Comments: Substitution to a formulary equivalent within the same pharmaceutical class is authorized.  Associated Diagnoses: Asthma in adult, mild persistent, with acute exacerbation      ARIPiprazole (ABILIFY) 2 mg tablet       Ascorbic Acid (VITAMIN C) 100 MG tablet Take 100 mg by mouth daily      azithromycin (ZITHROMAX) 250 mg tablet Take 2 tablets today then 1 tablet daily x 4 days  Qty: 6 tablet, Refills: 0    Associated Diagnoses: Acute cough      Biotin w/ Vitamins C & E (HAIR/SKIN/NAILS PO) Take by mouth      Breo Ellipta 200-25 MCG/ACT inhaler INHALE 1 PUFF DAILY RINSE MOUTH AFTER USE.  Qty: 60 each, Refills: 3    Comments: DX Code Needed  PT NEEDS REFILLS.  Associated Diagnoses: Mild intermittent asthma without complication      busPIRone (BUSPAR) 15 mg tablet Take by  mouth 3 (three) times a day        CALCIUM-MAGNESIUM-ZINC PO Take by mouth      famotidine (PEPCID) 20 mg tablet TAKE 1 TABLET BY MOUTH TWICE A DAY  Qty: 180 tablet, Refills: 1    Associated Diagnoses: Pruritus      guaiFENesin (MUCINEX) 600 mg 12 hr tablet Take 2 tablets (1,200 mg total) by mouth 2 (two) times a day  Qty: 30 tablet, Refills: 1    Associated Diagnoses: Acute bronchitis, unspecified organism      ibuprofen (MOTRIN) 600 mg tablet Take by mouth every 6 (six) hours as needed for mild pain      ketoconazole (NIZORAL) 2 % cream       ketoconazole (NIZORAL) 2 % shampoo Apply topically      ketotifen (ZADITOR) 0.025 % ophthalmic solution Administer 1 drop to both eyes 2 (two) times a day  Qty: 5 mL, Refills: 1    Associated Diagnoses: Allergic conjunctivitis of both eyes      Ketotifen Fumarate (ZADITOR) 0.035 % ophthalmic solution instill 1 drop into both eyes twice a day      loratadine (CLARITIN) 10 mg tablet TAKE 1 TABLET BY MOUTH EVERY DAY  Qty: 90 tablet, Refills: 1    Associated Diagnoses: Seasonal allergies      LORazepam (ATIVAN) 1 mg tablet Take 1 mg by mouth 6 (six) times a day  Refills: 1      Multiple Vitamin (multivitamin) tablet Take 1 tablet by mouth daily      naproxen (EC NAPROSYN) 500 MG EC tablet Take 1 tablet (500 mg total) by mouth 2 (two) times a day as needed for mild pain  Qty: 30 tablet, Refills: 0    Associated Diagnoses: Fibromyalgia      naproxen (NAPROSYN) 500 mg tablet TAKE 1 TABLET BY MOUTH TWICE A DAY WITH FOOD  Qty: 60 tablet, Refills: 2    Associated Diagnoses: Fibromyalgia      ofloxacin (OCUFLOX) 0.3 % ophthalmic solution Administer 1 drop to the right eye 4 (four) times a day  Qty: 5 mL, Refills: 0    Associated Diagnoses: Hordeolum externum of right lower eyelid      omeprazole (PriLOSEC) 20 mg delayed release capsule Take 1 capsule (20 MG total) by mouth twice daily  Qty: 180 capsule, Refills: 1    Associated Diagnoses: Gastroesophageal reflux disease without  esophagitis      !! ondansetron (ZOFRAN) 4 mg tablet Take 1 tablet (4 mg total) by mouth every 8 (eight) hours as needed for nausea or vomiting  Qty: 20 tablet, Refills: 0    Associated Diagnoses: Chronic nausea      thiamine (VITAMIN B1) 100 mg tablet Take 100 mg by mouth daily      tiZANidine (ZANAFLEX) 2 mg tablet Take 1 tablet (2 mg total) by mouth every 8 (eight) hours as needed for muscle spasms  Qty: 90 tablet, Refills: 2    Associated Diagnoses: Myofascial pain syndrome       !! - Potential duplicate medications found. Please discuss with provider.        No discharge procedures on file.  ED SEPSIS DOCUMENTATION   Time reflects when diagnosis was documented in both MDM as applicable and the Disposition within this note       Time User Action Codes Description Comment    3/2/2025  8:56 PM Karel Wilson Add [J18.9] Pneumonia                  Karel Wilson DO  03/02/25 2114

## 2025-03-03 NOTE — DISCHARGE INSTRUCTIONS
Follow-up with PCP this week to see improvement in your symptoms.  You will need a repeat CT in 3 months.  Return to the ED if you have any other worsening or concerning symptoms.   your antibiotics tomorrow and take them as prescribed.

## 2025-03-03 NOTE — TELEPHONE ENCOUNTER
03/03/25 2:33 PM    Patient contacted post ED visit, [facility] department spoke with patient/caregiver and outreach was successful.    Thank you.  Medina Sanchez  [facility]

## 2025-03-05 ENCOUNTER — OFFICE VISIT (OUTPATIENT)
Dept: FAMILY MEDICINE CLINIC | Facility: CLINIC | Age: 45
End: 2025-03-05
Payer: COMMERCIAL

## 2025-03-05 VITALS
SYSTOLIC BLOOD PRESSURE: 100 MMHG | OXYGEN SATURATION: 99 % | RESPIRATION RATE: 18 BRPM | HEIGHT: 61 IN | TEMPERATURE: 97.4 F | BODY MASS INDEX: 17.01 KG/M2 | DIASTOLIC BLOOD PRESSURE: 68 MMHG | HEART RATE: 105 BPM

## 2025-03-05 DIAGNOSIS — J42 CHRONIC BRONCHITIS WITH ACUTE EXACERBATION (HCC): ICD-10-CM

## 2025-03-05 DIAGNOSIS — J20.9 CHRONIC BRONCHITIS WITH ACUTE EXACERBATION (HCC): ICD-10-CM

## 2025-03-05 DIAGNOSIS — F10.21 ALCOHOL USE DISORDER, SEVERE, IN EARLY REMISSION (HCC): ICD-10-CM

## 2025-03-05 DIAGNOSIS — R05.2 SUBACUTE COUGH: ICD-10-CM

## 2025-03-05 DIAGNOSIS — N18.31 STAGE 3A CHRONIC KIDNEY DISEASE (HCC): ICD-10-CM

## 2025-03-05 DIAGNOSIS — R91.8 ABNORMAL CT SCAN OF LUNG: ICD-10-CM

## 2025-03-05 DIAGNOSIS — J18.9 PNEUMONIA OF RIGHT MIDDLE LOBE DUE TO INFECTIOUS ORGANISM: Primary | ICD-10-CM

## 2025-03-05 LAB
DME PARACHUTE DELIVERY DATE REQUESTED: NORMAL
DME PARACHUTE ITEM DESCRIPTION: NORMAL
DME PARACHUTE ITEM DESCRIPTION: NORMAL
DME PARACHUTE ORDER STATUS: NORMAL
DME PARACHUTE SUPPLIER NAME: NORMAL
DME PARACHUTE SUPPLIER PHONE: NORMAL

## 2025-03-05 PROCEDURE — 99214 OFFICE O/P EST MOD 30 MIN: CPT | Performed by: NURSE PRACTITIONER

## 2025-03-05 PROCEDURE — G2211 COMPLEX E/M VISIT ADD ON: HCPCS | Performed by: NURSE PRACTITIONER

## 2025-03-05 RX ORDER — ONDANSETRON 4 MG/1
TABLET, ORALLY DISINTEGRATING ORAL
COMMUNITY
Start: 2025-02-27

## 2025-03-05 RX ORDER — AMOXICILLIN 875 MG/1
1 TABLET, COATED ORAL 2 TIMES DAILY
COMMUNITY
Start: 2025-02-27

## 2025-03-05 RX ORDER — BROMPHENIRAMINE MALEATE, PSEUDOEPHEDRINE HYDROCHLORIDE, AND DEXTROMETHORPHAN HYDROBROMIDE 2; 10; 30 MG/5ML; MG/5ML; MG/5ML
5 SYRUP ORAL EVERY 12 HOURS PRN
Qty: 473 ML | Refills: 0 | Status: SHIPPED | OUTPATIENT
Start: 2025-03-05 | End: 2025-03-06

## 2025-03-05 NOTE — PROGRESS NOTES
Name: Fatimah Da iSlva      : 1980      MRN: [id number]  Encounter Provider: SOLO Doherty  Encounter Date: 3/5/2025   Encounter department: [facility]  :  Assessment & Plan  Pneumonia of right middle lobe due to infectious organism  Fatimah has only gotten 1-1/2 days of doxycycline  Advised to complete the course  Continue with her treatment  Recheck on Monday  No work until I see her for recheck-work note provided  Advised to call me if symptoms worsen before recheck on Monday  Nebulizer ordered  Orders:    CT chest wo contrast; Future    Pssyznwwg-Lxuhyvlv-HZ (Zwreymuz-Optuotofq-GV) 2-30-10 MG/5ML SYRP; Take 5 mL by mouth every 12 (twelve) hours as needed (cough)    Abnormal CT scan of lung    Orders:    CT chest wo contrast; Future    Chronic bronchitis with acute exacerbation (HCC)         Alcohol use disorder, severe, in early remission (HCC)         Stage 3a chronic kidney disease (HCC)  Lab Results   Component Value Date    EGFR 45 2025    EGFR 50 10/10/2024    EGFR 81 2024    CREATININE 1.40 (H) 2025    CREATININE 1.30 10/10/2024    CREATININE 0.87 2024            Subacute cough    Orders:    Tjtpisscv-Uffhbike-CO (Ggwchfjd-Woawszyub-WG) 2-30-10 MG/5ML SYRP; Take 5 mL by mouth every 12 (twelve) hours as needed (cough)           History of Present Illness   Fatimah is a 44-year-old female with complex medical history presents for recheck after pneumonia diagnosis at [facility] emergency department  she was seen at urgent care diagnosed with sinusitis treated with amoxicillin  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  She was not tested for influenza  Still coughing was seen at urgent care again on  treated for cough   was seen at [facility] emergency department-she was diagnosis with right  "sided pneumonia treated with doxycycline and cough syrup    She presents today with ongoing coughing  She still feels fatigued not ready to go back to work  She is using her inhaler  CT of chest did show significant atelectasis right middle lobe -she was recommended recheck CAT scan in 3 months    History obtained from chart review and the patient        Review of Systems   Constitutional:  Positive for appetite change and fatigue. Negative for chills and fever.   HENT:  Positive for congestion and postnasal drip. Negative for sore throat and trouble swallowing.    Respiratory:  Positive for cough, shortness of breath and wheezing.    Cardiovascular:  Negative for chest pain and palpitations.   Gastrointestinal:  Positive for nausea. Negative for abdominal pain, diarrhea and vomiting.   Musculoskeletal:  Positive for arthralgias, back pain and neck pain.   Skin:  Negative for rash.   Neurological:  Positive for weakness and headaches. Negative for dizziness.       Objective   /68 (BP Location: Left arm, Patient Position: Sitting, Cuff Size: Standard)   Pulse 105   Temp (!) 97.4 °F (36.3 °C) (Temporal)   Resp 18   Ht 5' 1\" (1.549 m)   LMP 02/13/2025 (Approximate)   SpO2 99%   BMI 17.01 kg/m²      Physical Exam  Vitals and nursing note reviewed.   Constitutional:       General: She is not in acute distress.     Appearance: Normal appearance.      Comments: Appears fatigued   HENT:      Nose: Congestion present.      Mouth/Throat:      Mouth: Mucous membranes are moist.      Pharynx: Posterior oropharyngeal erythema present.   Cardiovascular:      Rate and Rhythm: Regular rhythm. Tachycardia present.      Pulses: Normal pulses.   Pulmonary:      Effort: Pulmonary effort is normal.      Breath sounds: Wheezing and rhonchi present.   Skin:     General: Skin is warm and dry.      Coloration: Skin is not pale.   Neurological:      General: No focal deficit present.      Mental Status: She is alert. Mental " status is at baseline.

## 2025-03-05 NOTE — ASSESSMENT & PLAN NOTE
Lab Results   Component Value Date    EGFR 45 02/27/2025    EGFR 50 10/10/2024    EGFR 81 06/24/2024    CREATININE 1.40 (H) 02/27/2025    CREATININE 1.30 10/10/2024    CREATININE 0.87 06/24/2024

## 2025-03-05 NOTE — LETTER
March 5, 2025     Patient: Fatimah Da Silva  YOB: 1980  Date of Visit: 3/5/2025      To Whom it May Concern:    Fatimah Da Silva is under my professional care. Fatimah was seen in my office on 3/5/2025. Fatimah may return to work on 3/12/25 if improving .    If you have any questions or concerns, please don't hesitate to call.         Sincerely,          SOLO Doherty        CC: No Recipients  
Discharge planning

## 2025-03-06 ENCOUNTER — TELEPHONE (OUTPATIENT)
Age: 45
End: 2025-03-06

## 2025-03-06 ENCOUNTER — NURSE TRIAGE (OUTPATIENT)
Dept: OTHER | Facility: OTHER | Age: 45
End: 2025-03-06

## 2025-03-06 ENCOUNTER — DOCUMENTATION (OUTPATIENT)
Dept: FAMILY MEDICINE CLINIC | Facility: CLINIC | Age: 45
End: 2025-03-06

## 2025-03-06 DIAGNOSIS — J40 BRONCHITIS: Primary | ICD-10-CM

## 2025-03-06 DIAGNOSIS — R05.2 SUBACUTE COUGH: Primary | ICD-10-CM

## 2025-03-06 RX ORDER — DEXTROMETHORPHAN HYDROBROMIDE AND PROMETHAZINE HYDROCHLORIDE 15; 6.25 MG/5ML; MG/5ML
5 SYRUP ORAL 4 TIMES DAILY PRN
Qty: 180 ML | Refills: 0 | Status: SHIPPED | OUTPATIENT
Start: 2025-03-06

## 2025-03-06 RX ORDER — DEXTROMETHORPHAN HYDROBROMIDE AND PROMETHAZINE HYDROCHLORIDE 15; 6.25 MG/5ML; MG/5ML
5 SYRUP ORAL 4 TIMES DAILY PRN
Qty: 180 ML | Refills: 0 | OUTPATIENT
Start: 2025-03-06

## 2025-03-06 NOTE — TELEPHONE ENCOUNTER
"Reason for Disposition  • [1] Caller has URGENT medicine question about med that PCP or specialist prescribed AND [2] triager unable to answer question    Answer Assessment - Initial Assessment Questions  1. NAME of MEDICINE: \"What medicine(s) are you calling about?\"      Promethazine dm and dayquil   2. QUESTION: \"What is your question?\" (e.g., double dose of medicine, side effect)      Pt has promethazine Dm pended to be sent to her  pharmacy but due to the medication may cause drowsiness she does not what to take it during the day. Can Dayquil be taken during the day and promethazine DM be taken at night?  3. PRESCRIBER: \"Who prescribed the medicine?\" Reason: if prescribed by specialist, call should be referred to that group.      Arcelia Wagoner  4. SYMPTOMS: \"Do you have any symptoms?\" If Yes, ask: \"What symptoms are you having?\"  \"How bad are the symptoms (e.g., mild, moderate, severe)  Bronchitis cough    Protocols used: Medication Question Call-Adult-    "

## 2025-03-06 NOTE — TELEPHONE ENCOUNTER
"Regarding: Medication question  ----- Message from Kyleigh SIGALA sent at 3/6/2025  6:03 PM EST -----  \"I missed call from Brandon at the office and I would like to speak with someone.\"    "

## 2025-03-06 NOTE — TELEPHONE ENCOUNTER
Successful outreach to Jessie -- she states that [employer] has their own DME supply company -- [employer]. She provided the number [phone]. She states more than likely [employer] will cover a new nebulizer through [facility] but it must first be submitted to [employer].

## 2025-03-06 NOTE — TELEPHONE ENCOUNTER
Patient is calling back because she never received a call back about cough medication .Please provide patient with an alternative plan of care since medication is not covered by insurance. Thank you in advance.

## 2025-03-06 NOTE — TELEPHONE ENCOUNTER
The patient called she was in yesterday and saw Christine   the cough medication that was prescribed is not covered by her insurance is there something else that can be called in thank you

## 2025-03-06 NOTE — TELEPHONE ENCOUNTER
Spoke w representative at [facility] -- they stated best fax number to fax orders to would be:[phone]

## 2025-03-06 NOTE — TELEPHONE ENCOUNTER
Esc to on call Dr Rodriguez: pt has  promethazine DM  pending in epic. Also if you are able to send that for her can pt take it at night only while taking Day Quil daytime medication during the day due to it has sedation warnings?

## 2025-03-06 NOTE — TELEPHONE ENCOUNTER
Patient called back to check on status of medication. Patient was advised per last encounter note that nebulizer is being worked on. Patient expressed understanding. Please follow up with patient when nebulizer is approved. Thank you in advance.

## 2025-03-06 NOTE — TELEPHONE ENCOUNTER
FOLLOW UP: pt made aware that her prescription for Promethazine Dm was sent to the pharmacy by the on call provider and has no further questions.     REASON FOR CONVERSATION: Medication Refill    SYMPTOMS: cough    OTHER: none    DISPOSITION: Call PCP Now

## 2025-03-06 NOTE — TELEPHONE ENCOUNTER
Esc response from on call Dr Rodriguez: I was able to send  it through to [facility] in [place]. She can try taking it during the day as well. It  is not that sedating.

## 2025-03-06 NOTE — TELEPHONE ENCOUNTER
Called Jessie and call went directly to  -- advised call back to further clarify any information she should need.

## 2025-03-06 NOTE — TELEPHONE ENCOUNTER
Jessie from [facility] is requesting a call back.    Nebulizer solution sent in.  Patient needs nebulizer machine.  There are issues with insurance and needs to go through [facility].  Please call Jessie, for details.      Gaudencio:    Jessie  Phone: [phone]     English

## 2025-03-07 ENCOUNTER — RA CDI HCC (OUTPATIENT)
Dept: OTHER | Facility: HOSPITAL | Age: 45
End: 2025-03-07

## 2025-03-10 ENCOUNTER — TELEPHONE (OUTPATIENT)
Age: 45
End: 2025-03-10

## 2025-03-10 ENCOUNTER — OFFICE VISIT (OUTPATIENT)
Dept: FAMILY MEDICINE CLINIC | Facility: CLINIC | Age: 45
End: 2025-03-10
Payer: COMMERCIAL

## 2025-03-10 VITALS
BODY MASS INDEX: 16.65 KG/M2 | SYSTOLIC BLOOD PRESSURE: 112 MMHG | HEART RATE: 75 BPM | RESPIRATION RATE: 16 BRPM | OXYGEN SATURATION: 99 % | HEIGHT: 61 IN | DIASTOLIC BLOOD PRESSURE: 62 MMHG | TEMPERATURE: 97.5 F | WEIGHT: 88.2 LBS

## 2025-03-10 DIAGNOSIS — F33.1 MODERATE EPISODE OF RECURRENT MAJOR DEPRESSIVE DISORDER (HCC): ICD-10-CM

## 2025-03-10 DIAGNOSIS — J40 BRONCHITIS: ICD-10-CM

## 2025-03-10 DIAGNOSIS — J18.9 PNEUMONIA OF RIGHT MIDDLE LOBE DUE TO INFECTIOUS ORGANISM: ICD-10-CM

## 2025-03-10 DIAGNOSIS — Z00.00 MEDICARE ANNUAL WELLNESS VISIT, SUBSEQUENT: Primary | ICD-10-CM

## 2025-03-10 PROCEDURE — 99213 OFFICE O/P EST LOW 20 MIN: CPT | Performed by: NURSE PRACTITIONER

## 2025-03-10 PROCEDURE — G2211 COMPLEX E/M VISIT ADD ON: HCPCS | Performed by: NURSE PRACTITIONER

## 2025-03-10 PROCEDURE — G0439 PPPS, SUBSEQ VISIT: HCPCS | Performed by: NURSE PRACTITIONER

## 2025-03-10 RX ORDER — PREDNISONE 10 MG/1
20 TABLET ORAL DAILY
Qty: 14 TABLET | Refills: 0 | Status: SHIPPED | OUTPATIENT
Start: 2025-03-10 | End: 2025-03-17

## 2025-03-10 NOTE — PROGRESS NOTES
Name: Fatimah Da Silva      : 1980      MRN: [id number]  Encounter Provider: SOLO Doherty  Encounter Date: 3/10/2025   Encounter department: [facility] [place]    Assessment & Plan  Medicare annual wellness visit, subsequent         Bronchitis    Orders:    predniSONE 10 mg tablet; Take 2 tablets (20 mg total) by mouth daily for 7 days    Pneumonia of right middle lobe due to infectious organism  Some improvement but not ready to go back to work   Will continue treatment   Will call with update  Orders:    predniSONE 10 mg tablet; Take 2 tablets (20 mg total) by mouth daily for 7 days    Moderate episode of recurrent major depressive disorder (HCC)  Depression Screening Follow-up Plan: Patient's depression screening was positive with a PHQ-9 score of 7. Patient assessed for underlying major depression. They have no active suicidal ideations. Brief counseling provided and recommend additional follow-up/re-evaluation next office visit.            Preventive health issues were discussed with patient, and age appropriate screening tests were ordered as noted in patient's After Visit Summary. Personalized health advice and appropriate referrals for health education or preventive services given if needed, as noted in patient's After Visit Summary.    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  She was not tested for influenza  Still coughing was seen at urgent care again on  treated for cough   was seen at [facility] emergency department-she was diagnosis  with right sided pneumonia treated with doxycycline and cough syrup    She presents today with ongoing coughing-feels slightly better.   She still feels fatigued not ready to go back to work  She is using her inhaler  CT of chest did show significant atelectasis right middle lobe -she was recommended recheck CAT scan in 3 months    History obtained from chart review and the patient         Patient Care Team:  SOLO Shen as PCP - General (Family Medicine)  Eva Kraft MD as PCP - PCP-[facility] ([facility])  Joel Patel DO as PCP - PCP-[facility] ([facility])    Review of Systems  Medical History Reviewed by provider this encounter:       Annual Wellness Visit Questionnaire   Fatimah is here for her Subsequent Wellness visit. Last Medicare Wellness visit information reviewed, patient interviewed and updates made to the record today.      Health Risk Assessment:   Patient rates overall health as poor. Patient feels that their physical health rating is slightly worse. Patient is very dissatisfied with their life. Eyesight was rated as same. Hearing was rated as same. Patient feels that their emotional and mental health rating is slightly worse. Patients states they are never, rarely angry. Patient states they are often unusually tired/fatigued. Pain experienced in the last 7 days has been none. Patient states that she has experienced no weight loss or gain in last 6 months.     Depression Screening:   PHQ-9 Score: 7      Fall Risk Screening:   In the past year, patient has experienced: no history of falling in past year      Urinary Incontinence Screening:   Patient has not leaked urine accidently in the last six months.     Home Safety:  Patient does not have trouble with stairs inside or outside of their home. Patient has working smoke alarms and has working carbon monoxide detector. Home safety hazards include: none.     Nutrition:   Current diet is Regular.     Medications:   Patient is currently  taking over-the-counter supplements. OTC medications include: see medication list. Patient is able to manage medications.     Activities of Daily Living (ADLs)/Instrumental Activities of Daily Living (IADLs):   Walk and transfer into and out of bed and chair?: Yes  Dress and groom yourself?: Yes    Bathe or shower yourself?: Yes    Feed yourself? Yes  Do your laundry/housekeeping?: Yes  Manage your money, pay your bills and track your expenses?: Yes  Make your own meals?: Yes    Do your own shopping?: Yes    Previous Hospitalizations:   Any hospitalizations or ED visits within the last 12 months?: Yes    How many hospitalizations have you had in the last year?: 1-2    Advance Care Planning:   Living will: No    Durable POA for healthcare: No    Advanced directive: No    Advanced directive counseling given: Yes    ACP document given: Yes      Cognitive Screening:   Provider or family/friend/caregiver concerned regarding cognition?: No    PREVENTIVE SCREENINGS      Cardiovascular Screening:    General: Risks and Benefits Discussed and Patient Declines    Due for: Lipid Panel      Diabetes Screening:     General: Screening Current      Colorectal Cancer Screening:     General: Screening Current      Breast Cancer Screening:     General: Risks and Benefits Discussed and Patient Declines    Due for: Mammogram        Cervical Cancer Screening:    General: Screening Not Indicated      Osteoporosis Screening:    General: Risks and Benefits Discussed and Patient Declines    Due for: DXA Axial      Abdominal Aortic Aneurysm (AAA) Screening:        General: Screening Not Indicated      Lung Cancer Screening:     General: Screening Not Indicated      Hepatitis C Screening:    General: Screening Current    Screening, Brief Intervention, and Referral to Treatment (SBIRT)     Screening  Typical number of drinks in a day: 0  Typical number of drinks in a week: 0  Interpretation: Low risk drinking behavior.    Single Item Drug  "Screening:  How often have you used an illegal drug (including marijuana) or a prescription medication for non-medical reasons in the past year? never    Single Item Drug Screen Score: 0  Interpretation: Negative screen for possible drug use disorder    Brief Intervention  Alcohol & drug use screenings were reviewed. No concerns regarding substance use disorder identified.     Social Drivers of Health     Financial Resource Strain: Medium Risk (6/26/2019)    Overall Financial Resource Strain (CARDIA)     Difficulty of Paying Living Expenses: Somewhat hard   Food Insecurity: No Food Insecurity (6/26/2019)    Hunger Vital Sign     Worried About Running Out of Food in the Last Year: Never true     Ran Out of Food in the Last Year: Never true   Transportation Needs: No Transportation Needs (6/26/2019)    PRAPARE - Transportation     Lack of Transportation (Medical): No     Lack of Transportation (Non-Medical): No    Received from [employer]     No results found.    Objective   /62 (BP Location: Left arm, Patient Position: Sitting, Cuff Size: Standard)   Pulse 75   Temp 97.5 °F (36.4 °C) (Temporal)   Resp 16   Ht 5' 1\" (1.549 m)   LMP 02/13/2025 (Approximate)   SpO2 99%   BMI 17.01 kg/m²     Physical Exam    "

## 2025-03-10 NOTE — TELEPHONE ENCOUNTER
Pt called because she forgot to ask Christine BANDA if she is supposed to continue taking the cough medicine along with the new prednisone and the antibiotic.  Please call pt to advise.

## 2025-03-10 NOTE — TELEPHONE ENCOUNTER
Called pt -- Left voicemail informing of PCP note and advised call back should there be any further questions or concerns.

## 2025-03-18 ENCOUNTER — NURSE TRIAGE (OUTPATIENT)
Age: 45
End: 2025-03-18

## 2025-03-18 NOTE — TELEPHONE ENCOUNTER
Called and spoke w pt -- she was agreeable to PCP recommendations. She will give us a call back w progress/current Sx in near future.

## 2025-03-18 NOTE — TELEPHONE ENCOUNTER
"FOLLOW UP: No    REASON FOR CONVERSATION: Leg Swelling    SYMPTOMS: b/l lower leg and ankle pain and swelling since Sunday. Pt finished antibiotic and prednisone on Sunday. States that this has happened once before but does not remember any details. Denies redness, rash, sob, chest pain, warmth. Please advise.     OTHER: NA    DISPOSITION: Discuss With PCP and Callback by Nurse Today (overriding See Today in Office)    Reason for Disposition   MODERATE swelling of both ankles (e.g., swelling extends up to the knees) AND new-onset or getting worse    Answer Assessment - Initial Assessment Questions  1. ONSET: \"When did the swelling start?\" (e.g., minutes, hours, days)      Sunday  2. LOCATION: \"What part of the leg is swollen?\"  \"Are both legs swollen or just one leg?\"      B/l lower legs and ankle  3. SEVERITY: \"How bad is the swelling?\" (e.g., localized; mild, moderate, severe)      moderate  4. REDNESS: \"Does the swelling look red or infected?\"      no  5. PAIN: \"Is the swelling painful to touch?\" If Yes, ask: \"How painful is it?\"   (Scale 1-10; mild, moderate or severe)      moderate  6. FEVER: \"Do you have a fever?\" If Yes, ask: \"What is it, how was it measured, and when did it start?\"       No  7. CAUSE: \"What do you think is causing the leg swelling?\"      unsure  8. MEDICAL HISTORY: \"Do you have a history of blood clots (e.g., DVT), cancer, heart failure, kidney disease, or liver failure?\"      no  9. RECURRENT SYMPTOM: \"Have you had leg swelling before?\" If Yes, ask: \"When was the last time?\" \"What happened that time?\"      Yes-unsure  10. OTHER SYMPTOMS: \"Do you have any other symptoms?\" (e.g., chest pain, difficulty breathing)        no    Protocols used: Leg Swelling and Edema-Adult-[place]    "

## 2025-03-18 NOTE — TELEPHONE ENCOUNTER
Please advise that steroids can cause swelling  Recommend fluids, elevate legs, compression stockings  If no improvement I would like a phone call

## 2025-04-14 ENCOUNTER — TELEPHONE (OUTPATIENT)
Dept: OTHER | Facility: OTHER | Age: 45
End: 2025-04-14

## 2025-04-14 NOTE — TELEPHONE ENCOUNTER
Patient is calling regarding cancelling an appointment.    Date/Time: 4/15/25 2:30    Patient was rescheduled: YES [] NO [x]    Patient requesting call back to reschedule: YES [] NO [x]    Pt states she is recovering from pneumonia and will call back to reschedule.

## 2025-04-24 DIAGNOSIS — L29.9 PRURITUS: ICD-10-CM

## 2025-04-24 RX ORDER — FAMOTIDINE 20 MG/1
20 TABLET, FILM COATED ORAL 2 TIMES DAILY
Qty: 180 TABLET | Refills: 1 | Status: SHIPPED | OUTPATIENT
Start: 2025-04-24

## 2025-04-26 DIAGNOSIS — M79.7 FIBROMYALGIA: ICD-10-CM

## 2025-04-28 RX ORDER — NAPROXEN 500 MG/1
500 TABLET ORAL 2 TIMES DAILY WITH MEALS
Qty: 60 TABLET | Refills: 2 | Status: SHIPPED | OUTPATIENT
Start: 2025-04-28

## 2025-05-05 ENCOUNTER — TELEPHONE (OUTPATIENT)
Age: 45
End: 2025-05-05
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
## 2025-05-08 ENCOUNTER — APPOINTMENT (OUTPATIENT)
Dept: URGENT CARE | Age: 45
End: 2025-05-08
Payer: OTHER MISCELLANEOUS

## 2025-05-08 PROCEDURE — 99283 EMERGENCY DEPT VISIT LOW MDM: CPT | Performed by: NURSE PRACTITIONER

## 2025-05-08 PROCEDURE — G0382 LEV 3 HOSP TYPE B ED VISIT: HCPCS | Performed by: NURSE PRACTITIONER

## 2025-05-13 ENCOUNTER — APPOINTMENT (OUTPATIENT)
Age: 45
End: 2025-05-13
Payer: OTHER MISCELLANEOUS

## 2025-05-13 PROCEDURE — 99215 OFFICE O/P EST HI 40 MIN: CPT | Performed by: EMERGENCY MEDICINE

## 2025-05-20 ENCOUNTER — APPOINTMENT (OUTPATIENT)
Age: 45
End: 2025-05-20
Payer: OTHER MISCELLANEOUS

## 2025-05-20 PROCEDURE — 99214 OFFICE O/P EST MOD 30 MIN: CPT | Performed by: STUDENT IN AN ORGANIZED HEALTH CARE EDUCATION/TRAINING PROGRAM

## 2025-05-24 DIAGNOSIS — K21.9 GASTROESOPHAGEAL REFLUX DISEASE WITHOUT ESOPHAGITIS: ICD-10-CM

## 2025-05-25 RX ORDER — OMEPRAZOLE 20 MG/1
20 CAPSULE, DELAYED RELEASE ORAL 2 TIMES DAILY
Qty: 180 CAPSULE | Refills: 1 | Status: SHIPPED | OUTPATIENT
Start: 2025-05-25

## 2025-06-04 DIAGNOSIS — M54.2 NECK PAIN: Primary | ICD-10-CM

## 2025-06-04 DIAGNOSIS — R11.0 NAUSEA: ICD-10-CM

## 2025-06-04 RX ORDER — ONDANSETRON 4 MG/1
4 TABLET, ORALLY DISINTEGRATING ORAL EVERY 8 HOURS PRN
Qty: 30 TABLET | Refills: 0 | Status: SHIPPED | OUTPATIENT
Start: 2025-06-04

## 2025-06-05 ENCOUNTER — HOSPITAL ENCOUNTER (OUTPATIENT)
Dept: RADIOLOGY | Facility: HOSPITAL | Age: 45
Discharge: HOME | End: 2025-06-05
Payer: COMMERCIAL

## 2025-06-05 DIAGNOSIS — R91.8 ABNORMAL CT SCAN OF LUNG: ICD-10-CM

## 2025-06-05 DIAGNOSIS — J18.9 PNEUMONIA OF RIGHT MIDDLE LOBE DUE TO INFECTIOUS ORGANISM: ICD-10-CM

## 2025-06-05 PROCEDURE — 71250 CT THORAX DX C-: CPT

## 2025-06-10 ENCOUNTER — RESULTS FOLLOW-UP (OUTPATIENT)
Dept: FAMILY MEDICINE CLINIC | Facility: CLINIC | Age: 45
End: 2025-06-10

## 2025-06-10 ENCOUNTER — TELEPHONE (OUTPATIENT)
Age: 45
End: 2025-06-10

## 2025-06-10 DIAGNOSIS — R91.8 PULMONARY NODULES/LESIONS, MULTIPLE: Primary | ICD-10-CM

## 2025-06-10 DIAGNOSIS — Z09 FOLLOW-UP EXAM, 3-6 MONTHS SINCE PREVIOUS EXAM: ICD-10-CM

## 2025-06-10 DIAGNOSIS — J20.9 CHRONIC BRONCHITIS WITH ACUTE EXACERBATION (HCC): ICD-10-CM

## 2025-06-10 DIAGNOSIS — R05.2 SUBACUTE COUGH: ICD-10-CM

## 2025-06-10 DIAGNOSIS — J42 CHRONIC BRONCHITIS WITH ACUTE EXACERBATION (HCC): ICD-10-CM

## 2025-06-10 DIAGNOSIS — Z13.820 OSTEOPOROSIS SCREENING: ICD-10-CM

## 2025-06-10 DIAGNOSIS — R91.8 ABNORMAL CT SCAN OF LUNG: ICD-10-CM

## 2025-06-10 DIAGNOSIS — S22.000A COMPRESSION FRACTURE OF BODY OF THORACIC VERTEBRA (HCC): ICD-10-CM

## 2025-06-10 NOTE — TELEPHONE ENCOUNTER
Pt called as [facility] Ct [facility] and pt would like to discuss in detail. Please call back to give results

## 2025-06-11 NOTE — TELEPHONE ENCOUNTER
Spoke with patient and made aware of result as well as PCP message. Patient was reading result and has questions about the hernia and the compression fx that was mentioned in result. She also states that she will need a referral for a lung specialist. Patient also asked if she is still infectious. Please advise.

## 2025-06-13 NOTE — TELEPHONE ENCOUNTER
I tried calling Fatimah. I placed referral to pulm.     Hiatal hernia very small.     We will need to do dexa scan in setting of compression fracture

## 2025-07-10 ENCOUNTER — HOSPITAL ENCOUNTER (EMERGENCY)
Facility: HOSPITAL | Age: 45
Discharge: HOME | End: 2025-07-11
Attending: EMERGENCY MEDICINE
Payer: COMMERCIAL

## 2025-07-10 ENCOUNTER — OFFICE VISIT (OUTPATIENT)
Dept: URGENT CARE | Age: 45
End: 2025-07-10
Payer: COMMERCIAL

## 2025-07-10 VITALS
BODY MASS INDEX: 18.52 KG/M2 | TEMPERATURE: 98.2 F | WEIGHT: 98 LBS | RESPIRATION RATE: 16 BRPM | OXYGEN SATURATION: 100 % | HEART RATE: 90 BPM

## 2025-07-10 VITALS
HEART RATE: 89 BPM | SYSTOLIC BLOOD PRESSURE: 118 MMHG | DIASTOLIC BLOOD PRESSURE: 70 MMHG | RESPIRATION RATE: 18 BRPM | OXYGEN SATURATION: 100 % | TEMPERATURE: 98.5 F

## 2025-07-10 DIAGNOSIS — R53.83 FATIGUE, UNSPECIFIED TYPE: ICD-10-CM

## 2025-07-10 DIAGNOSIS — R51.9 HEADACHE: Primary | ICD-10-CM

## 2025-07-10 DIAGNOSIS — R60.9 EDEMA, UNSPECIFIED TYPE: ICD-10-CM

## 2025-07-10 DIAGNOSIS — R51.9 ACUTE NONINTRACTABLE HEADACHE, UNSPECIFIED HEADACHE TYPE: Primary | ICD-10-CM

## 2025-07-10 LAB
ALBUMIN SERPL BCG-MCNC: 4 G/DL (ref 3.5–5)
ALP SERPL-CCNC: 41 U/L (ref 34–104)
ALT SERPL W P-5'-P-CCNC: 22 U/L (ref 7–52)
ANION GAP SERPL CALCULATED.3IONS-SCNC: 10 MMOL/L (ref 4–13)
AST SERPL W P-5'-P-CCNC: 31 U/L (ref 13–39)
BASOPHILS # BLD AUTO: 0.04 THOUSANDS/ÂΜL (ref 0–0.1)
BASOPHILS NFR BLD AUTO: 1 % (ref 0–1)
BILIRUB SERPL-MCNC: 0.33 MG/DL (ref 0.2–1)
BUN SERPL-MCNC: 9 MG/DL (ref 5–25)
CALCIUM SERPL-MCNC: 8.6 MG/DL (ref 8.4–10.2)
CHLORIDE SERPL-SCNC: 103 MMOL/L (ref 96–108)
CO2 SERPL-SCNC: 20 MMOL/L (ref 21–32)
CREAT SERPL-MCNC: 1.07 MG/DL (ref 0.6–1.3)
EOSINOPHIL # BLD AUTO: 0.06 THOUSAND/ÂΜL (ref 0–0.61)
EOSINOPHIL NFR BLD AUTO: 1 % (ref 0–6)
ERYTHROCYTE [DISTWIDTH] IN BLOOD BY AUTOMATED COUNT: 18.1 % (ref 11.6–15.1)
GFR SERPL CREATININE-BSD FRML MDRD: 63 ML/MIN/1.73SQ M
GLUCOSE SERPL-MCNC: 61 MG/DL (ref 65–140)
HCT VFR BLD AUTO: 32.7 % (ref 34.8–46.1)
HGB BLD-MCNC: 10.3 G/DL (ref 11.5–15.4)
IMM GRANULOCYTES # BLD AUTO: 0.01 THOUSAND/UL (ref 0–0.2)
IMM GRANULOCYTES NFR BLD AUTO: 0 % (ref 0–2)
LYMPHOCYTES # BLD AUTO: 2.54 THOUSANDS/ÂΜL (ref 0.6–4.47)
LYMPHOCYTES NFR BLD AUTO: 52 % (ref 14–44)
MAGNESIUM SERPL-MCNC: 1.5 MG/DL (ref 1.9–2.7)
MCH RBC QN AUTO: 27 PG (ref 26.8–34.3)
MCHC RBC AUTO-ENTMCNC: 31.5 G/DL (ref 31.4–37.4)
MCV RBC AUTO: 86 FL (ref 82–98)
MONOCYTES # BLD AUTO: 0.37 THOUSAND/ÂΜL (ref 0.17–1.22)
MONOCYTES NFR BLD AUTO: 8 % (ref 4–12)
NEUTROPHILS # BLD AUTO: 1.87 THOUSANDS/ÂΜL (ref 1.85–7.62)
NEUTS SEG NFR BLD AUTO: 38 % (ref 43–75)
NRBC BLD AUTO-RTO: 0 /100 WBCS
PHOSPHATE SERPL-MCNC: 3.4 MG/DL (ref 2.7–4.5)
PLATELET # BLD AUTO: 325 THOUSANDS/UL (ref 149–390)
PMV BLD AUTO: 8.1 FL (ref 8.9–12.7)
POTASSIUM SERPL-SCNC: 3.8 MMOL/L (ref 3.5–5.3)
PROT SERPL-MCNC: 6.5 G/DL (ref 6.4–8.4)
RBC # BLD AUTO: 3.82 MILLION/UL (ref 3.81–5.12)
SODIUM SERPL-SCNC: 133 MMOL/L (ref 135–147)
WBC # BLD AUTO: 4.89 THOUSAND/UL (ref 4.31–10.16)

## 2025-07-10 PROCEDURE — 96365 THER/PROPH/DIAG IV INF INIT: CPT

## 2025-07-10 PROCEDURE — 85025 COMPLETE CBC W/AUTO DIFF WBC: CPT

## 2025-07-10 PROCEDURE — 96375 TX/PRO/DX INJ NEW DRUG ADDON: CPT

## 2025-07-10 PROCEDURE — 84100 ASSAY OF PHOSPHORUS: CPT

## 2025-07-10 PROCEDURE — S9088 SERVICES PROVIDED IN URGENT: HCPCS | Performed by: PHYSICIAN ASSISTANT

## 2025-07-10 PROCEDURE — 99283 EMERGENCY DEPT VISIT LOW MDM: CPT

## 2025-07-10 PROCEDURE — 36415 COLL VENOUS BLD VENIPUNCTURE: CPT

## 2025-07-10 PROCEDURE — 99284 EMERGENCY DEPT VISIT MOD MDM: CPT | Performed by: EMERGENCY MEDICINE

## 2025-07-10 PROCEDURE — 83735 ASSAY OF MAGNESIUM: CPT

## 2025-07-10 PROCEDURE — 93005 ELECTROCARDIOGRAM TRACING: CPT

## 2025-07-10 PROCEDURE — 99213 OFFICE O/P EST LOW 20 MIN: CPT | Performed by: PHYSICIAN ASSISTANT

## 2025-07-10 PROCEDURE — 80053 COMPREHEN METABOLIC PANEL: CPT

## 2025-07-10 RX ORDER — METOCLOPRAMIDE HYDROCHLORIDE 5 MG/ML
10 INJECTION INTRAMUSCULAR; INTRAVENOUS ONCE
Status: COMPLETED | OUTPATIENT
Start: 2025-07-10 | End: 2025-07-10

## 2025-07-10 RX ORDER — ACETAMINOPHEN 325 MG/1
650 TABLET ORAL ONCE
Status: COMPLETED | OUTPATIENT
Start: 2025-07-10 | End: 2025-07-10

## 2025-07-10 RX ORDER — SODIUM CHLORIDE, SODIUM GLUCONATE, SODIUM ACETATE, POTASSIUM CHLORIDE, MAGNESIUM CHLORIDE, SODIUM PHOSPHATE, DIBASIC, AND POTASSIUM PHOSPHATE .53; .5; .37; .037; .03; .012; .00082 G/100ML; G/100ML; G/100ML; G/100ML; G/100ML; G/100ML; G/100ML
1000 INJECTION, SOLUTION INTRAVENOUS ONCE
Status: COMPLETED | OUTPATIENT
Start: 2025-07-10 | End: 2025-07-11

## 2025-07-10 RX ADMIN — SODIUM CHLORIDE, SODIUM GLUCONATE, SODIUM ACETATE, POTASSIUM CHLORIDE, MAGNESIUM CHLORIDE, SODIUM PHOSPHATE, DIBASIC, AND POTASSIUM PHOSPHATE 1000 ML: .53; .5; .37; .037; .03; .012; .00082 INJECTION, SOLUTION INTRAVENOUS at 23:11

## 2025-07-10 RX ADMIN — METOCLOPRAMIDE 10 MG: 5 INJECTION, SOLUTION INTRAMUSCULAR; INTRAVENOUS at 23:17

## 2025-07-10 RX ADMIN — ACETAMINOPHEN 650 MG: 325 TABLET ORAL at 23:17
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
## 2025-07-11 ENCOUNTER — TELEPHONE (OUTPATIENT)
Age: 45
End: 2025-07-11

## 2025-07-11 DIAGNOSIS — D50.8 OTHER IRON DEFICIENCY ANEMIA: Primary | ICD-10-CM

## 2025-07-11 DIAGNOSIS — E87.1 HYPONATREMIA: ICD-10-CM

## 2025-07-11 DIAGNOSIS — E83.42 HYPOMAGNESEMIA: ICD-10-CM

## 2025-07-11 DIAGNOSIS — F55.8: ICD-10-CM

## 2025-07-11 LAB
ATRIAL RATE: 78 BPM
BILIRUB UR QL STRIP: NEGATIVE
CLARITY UR: CLEAR
COLOR UR: YELLOW
GLUCOSE SERPL-MCNC: 71 MG/DL (ref 65–140)
GLUCOSE UR STRIP-MCNC: NEGATIVE MG/DL
HGB UR QL STRIP.AUTO: NEGATIVE
KETONES UR STRIP-MCNC: NEGATIVE MG/DL
LEUKOCYTE ESTERASE UR QL STRIP: NEGATIVE
NITRITE UR QL STRIP: NEGATIVE
P AXIS: 79 DEGREES
PH UR STRIP.AUTO: 5.5 [PH] (ref 4.5–8)
PR INTERVAL: 146 MS
PROT UR STRIP-MCNC: NEGATIVE MG/DL
QRS AXIS: 89 DEGREES
QRSD INTERVAL: 88 MS
QT INTERVAL: 386 MS
QTC INTERVAL: 440 MS
SP GR UR STRIP.AUTO: 1.01 (ref 1–1.03)
T WAVE AXIS: 72 DEGREES
UROBILINOGEN UR QL STRIP.AUTO: 0.2 E.U./DL
VENTRICULAR RATE: 78 BPM

## 2025-07-11 PROCEDURE — 96375 TX/PRO/DX INJ NEW DRUG ADDON: CPT

## 2025-07-11 PROCEDURE — 81003 URINALYSIS AUTO W/O SCOPE: CPT

## 2025-07-11 PROCEDURE — 82948 REAGENT STRIP/BLOOD GLUCOSE: CPT

## 2025-07-11 PROCEDURE — 93010 ELECTROCARDIOGRAM REPORT: CPT | Performed by: STUDENT IN AN ORGANIZED HEALTH CARE EDUCATION/TRAINING PROGRAM

## 2025-07-11 RX ORDER — KETOROLAC TROMETHAMINE 30 MG/ML
15 INJECTION, SOLUTION INTRAMUSCULAR; INTRAVENOUS ONCE
Status: COMPLETED | OUTPATIENT
Start: 2025-07-11 | End: 2025-07-11

## 2025-07-11 RX ADMIN — KETOROLAC TROMETHAMINE 15 MG: 30 INJECTION, SOLUTION INTRAMUSCULAR; INTRAVENOUS at 01:03

## 2025-07-11 NOTE — TELEPHONE ENCOUNTER
Patient called in stating that she was in the EMERGENCY DEPARTMENT yesterday for headache and lower leg edema. Patient was discharged and has appointment in office 7/15. Patient states that the edema hurts and would like to know if anything can be advised to take.

## 2025-07-11 NOTE — ED ATTENDING ATTESTATION
7/10/2025  IFay, , saw and evaluated the patient. I have discussed the patient with the resident/non-physician practitioner and agree with the resident's/non-physician practitioner's findings, Plan of Care, and MDM as documented in the resident's/non-physician practitioner's note, except where noted. All available labs and Radiology studies were reviewed.  I was present for key portions of any procedure(s) performed by the resident/non-physician practitioner and I was immediately available to provide assistance.       At this point I agree with the current assessment done in the Emergency Department.  I have conducted an independent evaluation of this patient a history and physical is as follows:    44-year-old female presents with headaches, nausea, lower extremity swelling.  Patient states she just does not feel well.  No specific sick contacts.  States she has been drinking a lot of water.  Was seen in urgent care and referred to the hospital.  On exam-no acute distress, appears nontoxic, heart regular, no respiratory distress, cranial nerves II through XII intact without focal deficits, pitting edema bilateral lower extremities.  Plan-patient with multiple complaints, concern for electrolyte abnormalities given constellation of symptoms so we will check labs, give IV fluids.  Will treat headache and nausea with Reglan and Tylenol.  Do an EKG and reassess    ED Course         Critical Care Time  Procedures

## 2025-07-11 NOTE — DISCHARGE INSTRUCTIONS
You were evaluated in the emergency department for headache.  You can continue to take Motrin and Tylenol at home as needed.  Return to the emergency department if your headache worsens, you develop vomiting, weakness or numbness in arms or legs, or generally feel worse.  It is importantly follow-up with your primary care physician.

## 2025-07-11 NOTE — ED PROVIDER NOTES
Time reflects when diagnosis was documented in both MDM as applicable and the Disposition within this note       Time User Action Codes Description Comment    7/11/2025  1:33 AM Brian Harry [R51.9] Headache           ED Disposition       ED Disposition   Discharge    Condition   Stable    Date/Time   Fri Jul 11, 2025  1:33 AM    Comment   Fatimah Da Silva discharge to home/self care.                   Assessment & Plan       Medical Decision Making  Amount and/or Complexity of Data Reviewed  Labs: ordered. Decision-making details documented in ED Course.  ECG/medicine tests:  Decision-making details documented in ED Course.    Risk  OTC drugs.  Prescription drug management.      Patient is a 44 y.o. female with PMH of disordered eating, LATIA, presenting with headache, nausea, edema.    Vital signs: Vitals are unremarkable with blood pressure 118/70, temperature 90.5, heart rate of 89, respiratory of 18, SpO2 100% on room air.    Pertinent physical exam: Patient is no acute distress, appears nontoxic, positive pitting edema bilaterally in the lower extremities.  No abdominal pain, benign cardiopulmonary exam.    Differential diagnosis and plan:   Differential includes volume overload due to excessive electrolyte water intake, disordered eating with laxative and diuretic use, hypomagnesemia, anemia, and possible hypoglycemia. Labs reveal mild hyponatremia (Na 133), low magnesium (1.5), glucose 61, and new anemia (Hgb 10.3, previously 12). CMP is otherwise unremarkable with no current evidence of LATIA. ECG shows sinus rhythm without signs of arrhythmia or ACS. Plan includes monitoring glucose and consider repletion of magnesium as needed. Patient is advised to discontinue OTC diuretics and laxatives and is counseled on the risks of ongoing purging behaviors. If symptoms improve and recheck labs are stable, she may be discharged with close outpatient follow-up with PCP and referral to eating disorder and mental  health services for further management.    View ED course above for further discussion on patient workup.     Review of Previous Medical Records:   All labs reviewed and utilized in the medical decision making process  All radiology studies independently viewed by me and interpreted by the radiologist.  I reviewed all testing with the patient.     ED Course as of 07/11/25 1412   Thu Jul 10, 2025   2322 ECG 12 lead  Normal sinus rhythm with a heart rate of 78 bpm. Normal axis. No LVH. No significant ST-T wave changes. No acute changes when compared to prior ECG.      2324 CBC and differential(!)  CBC is nonconcerning for infection. Hemoglobin is 10.3 and hematocrit is 30.7, down from 12.0 and 37.3, respectively, four months ago. As the patient is not actively bleeding and has no recent trauma, and given her history of disordered eating, the anemia is most likely chronic. Will discuss the importance of follow-up with primary care for further evaluation.   2347 Magnesium is low at 1.5; phosphorus is within normal limits at 3.4. CMP notable for mild hyponatremia (Na 133), bicarbonate 20, and glucose 61. All other values within normal limits. Electrolyte abnormalities are unlikely to be causing arrhythmia, and there is no current concern for acute kidney injury.   2352 SO:44 year old Female presented for HA, edema, nausea. Waiting on recheck glucose, UA. Consider mag repletion.  Can DC is symptoms improve.       Disposition: 44 year old Female presented for HA, edema, nausea. Waiting on recheck glucose, UA. Consider mag repletion. Can DC is symptoms improve. Stable. Signed out to .        Medications   multi-electrolyte (Plasmalyte-A/Isolyte-S PH 7.4/Normosol-R) IV bolus 1,000 mL (0 mL Intravenous Stopped 7/11/25 0011)   metoclopramide (REGLAN) injection 10 mg (10 mg Intravenous Given 7/10/25 2317)   acetaminophen (TYLENOL) tablet 650 mg (650 mg Oral Given 7/10/25 2317)   ketorolac (TORADOL) injection 15 mg (15  mg Intravenous Given 7/11/25 0103)       ED Risk Strat Scores                    No data recorded                            History of Present Illness       Chief Complaint   Patient presents with    Headache     Pt states [place], came back on Saturday and started having ringing in the ears since.  She has been feeling off since, severe headaches, nauseous. She has been drinking a lot of electrolyte park and now feels like she is retaining water. Leg and face swelling. Was since at [facility] today and told to come here.        Past Medical History[1]   Past Surgical History[2]   Family History[3]   Social History[4]   E-Cigarette/Vaping    E-Cigarette Use Never User       E-Cigarette/Vaping Substances    Nicotine No     THC No     CBD No     Flavoring No     Other No     Unknown No       I have reviewed and agree with the history as documented.     HPI  The patient is a 44-year-old female with a past medical history of disordered eating who presents with headache, nausea, and swelling. Symptoms began over the weekend (last Saturday and Sunday) and have persisted since then. She reports drinking significantly more electrolyte water than usual, which she believes is contributing to swelling in her lower extremities and face. She also reports recent episodes of overeating followed by laxative use, as well as the use of over-the-counter diuretics. She denies chest pain, shortness of breath, abdominal pain, fever, or other associated symptoms.  Review of Systems        Objective       ED Triage Vitals   Temperature Pulse Blood Pressure Respirations SpO2 Patient Position - Orthostatic VS   07/10/25 2140 07/10/25 2140 07/10/25 2140 07/10/25 2140 07/10/25 2140 07/10/25 2140   98.5 °F (36.9 °C) 89 118/70 18 100 % Sitting      Temp Source Heart Rate Source BP Location FiO2 (%) Pain Score    07/10/25 2140 07/10/25 2140 07/10/25 2140 -- 07/10/25 2257    Temporal Monitor Left arm  5      Vitals       Date and Time Temp Pulse SpO2 Resp BP Pain Score FACES Pain Rating User   07/10/25 2317 -- -- -- -- -- 5 -- BL   07/10/25 2257 -- -- -- -- -- 5 --    07/10/25 2140 98.5 °F (36.9 °C) 89 100 % 18 118/70 -- -- [place]            Physical Exam    Results Reviewed       Procedure Component Value Units Date/Time    Fingerstick Glucose (POCT) [[id number]]  (Normal) Collected: 07/11/25 0146    Lab Status: Final result Specimen: Blood Updated: 07/11/25 0150     POC Glucose 71 mg/dl     Urine Macroscopic, POC [[id number]] Collected: 07/11/25 0057    Lab Status: Final result Specimen: Urine Updated: 07/11/25 0058     Color, UA Yellow     Clarity, UA Clear     pH, UA 5.5     Leukocytes, UA Negative     Nitrite, UA Negative     Protein, UA Negative mg/dl      Glucose, UA Negative mg/dl      Ketones, UA Negative mg/dl      Urobilinogen, UA 0.2 E.U./dl      Bilirubin, UA Negative     Occult Blood, UA Negative     Specific Reading, UA 1.015    Comprehensive metabolic panel [[id number]]  (Abnormal) Collected: 07/10/25 2311    Lab Status: Final result Specimen: Blood from Arm, Right Updated: 07/10/25 2347     Sodium 133 mmol/L      Potassium 3.8 mmol/L      Chloride 103 mmol/L      CO2 20 mmol/L      ANION GAP 10 mmol/L      BUN 9 mg/dL      Creatinine 1.07 mg/dL      Glucose 61 mg/dL      Calcium 8.6 mg/dL      AST 31 U/L      ALT 22 U/L      Alkaline Phosphatase 41 U/L      Total Protein 6.5 g/dL      Albumin 4.0 g/dL      Total Bilirubin 0.33 mg/dL      eGFR 63 ml/min/1.73sq m     Narrative:      National [employer] guidelines for Chronic Kidney Disease (CKD):     Stage 1 with normal or high GFR (GFR > 90 mL/min/1.73 square meters)    Stage 2 Mild CKD (GFR = 60-89 mL/min/1.73 square meters)    Stage 3A Moderate CKD (GFR = 45-59 mL/min/1.73 square meters)    Stage 3B Moderate CKD (GFR = 30-44 mL/min/1.73 square meters)    Stage 4 Severe CKD (GFR = 15-29 mL/min/1.73 square meters)    Stage 5 End Stage CKD (GFR <15  mL/min/1.73 square meters)  Note: GFR calculation is accurate only with a steady state creatinine    Phosphorus [[id number]]  (Normal) Collected: 07/10/25 2311    Lab Status: Final result Specimen: Blood from Arm, Right Updated: 07/10/25 2347     Phosphorus 3.4 mg/dL     Magnesium [[id number]]  (Abnormal) Collected: 07/10/25 2311    Lab Status: Final result Specimen: Blood from Arm, Right Updated: 07/10/25 2347     Magnesium 1.5 mg/dL     CBC and differential [[id number]]  (Abnormal) Collected: 07/10/25 2311    Lab Status: Final result Specimen: Blood from Arm, Right Updated: 07/10/25 2323     WBC 4.89 Thousand/uL      RBC 3.82 Million/uL      Hemoglobin 10.3 g/dL      Hematocrit 32.7 %      MCV 86 fL      MCH 27.0 pg      MCHC 31.5 g/dL      RDW 18.1 %      MPV 8.1 fL      Platelets 325 Thousands/uL      nRBC 0 /100 WBCs      Segmented % 38 %      Immature Grans % 0 %      Lymphocytes % 52 %      Monocytes % 8 %      Eosinophils Relative 1 %      Basophils Relative 1 %      Absolute Neutrophils 1.87 Thousands/µL      Absolute Immature Grans 0.01 Thousand/uL      Absolute Lymphocytes 2.54 Thousands/µL      Absolute Monocytes 0.37 Thousand/µL      Eosinophils Absolute 0.06 Thousand/µL      Basophils Absolute 0.04 Thousands/µL             No orders to display       Procedures    ED Medication and Procedure Management   Prior to Admission Medications   Prescriptions Last Dose Informant Patient Reported? Taking?   ARIPiprazole (ABILIFY) 2 mg tablet  Self Yes No   Patient not taking: Reported on 5/2/2024   Ascorbic Acid (VITAMIN C) 100 MG tablet  Self Yes No   Sig: Take 100 mg by mouth daily   Biotin w/ Vitamins C & E (HAIR/SKIN/NAILS PO)  Self Yes No   Sig: Take by mouth   Breo Ellipta 200-25 MCG/ACT inhaler  Self No No   Sig: INHALE 1 PUFF DAILY RINSE MOUTH AFTER USE.   CALCIUM-MAGNESIUM-ZINC PO  Self Yes No   Sig: Take by mouth   Ketotifen Fumarate (ZADITOR) 0.035 % ophthalmic solution  Self Yes No   Sig: instill 1 drop  into both eyes twice a day   Patient not taking: Reported on 3/1/2025   LORazepam (ATIVAN) 1 mg tablet  Self Yes No   Sig: Take 1 mg by mouth 6 (six) times a day   Multiple Vitamin (multivitamin) tablet  Self Yes No   Sig: Take 1 tablet by mouth daily   albuterol (PROVENTIL HFA,VENTOLIN HFA) 90 mcg/act inhaler  Self No No   Sig: TAKE 2 PUFFS BY MOUTH EVERY 6 HOURS AS NEEDED FOR WHEEZE OR FOR SHORTNESS OF BREATH   amoxicillin (AMOXIL) 875 mg tablet   Yes No   Sig: Take 1 tablet by mouth 2 (two) times a day   Patient not taking: Reported on 3/10/2025   benzonatate (TESSALON PERLES) 100 mg capsule   No No   Sig: Take 1 capsule (100 mg total) by mouth every 8 (eight) hours   busPIRone (BUSPAR) 15 mg tablet  Self Yes No   Sig: Take by mouth 3 (three) times a day     famotidine (PEPCID) 20 mg tablet   No No   Sig: TAKE 1 TABLET BY MOUTH TWICE A DAY   guaiFENesin (MUCINEX) 600 mg 12 hr tablet  Self No No   Sig: Take 2 tablets (1,200 mg total) by mouth 2 (two) times a day   Patient not taking: Reported on 3/1/2025   ibuprofen (MOTRIN) 600 mg tablet  Self Yes No   Sig: Take by mouth every 6 (six) hours as needed for mild pain   ketoconazole (NIZORAL) 2 % cream  Self Yes No   ketoconazole (NIZORAL) 2 % shampoo  Self Yes No   Sig: Apply topically   ketotifen (ZADITOR) 0.025 % ophthalmic solution  Self No No   Sig: Administer 1 drop to both eyes 2 (two) times a day   Patient not taking: Reported on 3/1/2025   loratadine (CLARITIN) 10 mg tablet   No No   Sig: TAKE 1 TABLET BY MOUTH EVERY DAY   naproxen (EC NAPROSYN) 500 MG EC tablet   No No   Sig: Take 1 tablet (500 mg total) by mouth 2 (two) times a day as needed for mild pain   naproxen (NAPROSYN) 500 mg tablet   No No   Sig: TAKE 1 TABLET BY MOUTH TWICE A DAY WITH FOOD   ofloxacin (OCUFLOX) 0.3 % ophthalmic solution  Self No No   Sig: Administer 1 drop to the right eye 4 (four) times a day   Patient not taking: Reported on 9/6/2024   omeprazole (PriLOSEC) 20 mg delayed  release capsule   No No   Sig: TAKE 1 CAPSULE (20 MG TOTAL) BY MOUTH TWICE DAILY   ondansetron (ZOFRAN) 4 mg tablet   No No   Sig: Take 1 tablet (4 mg total) by mouth every 8 (eight) hours as needed for nausea or vomiting   ondansetron (ZOFRAN) 4 mg tablet   No No   Sig: Take 1 tablet (4 mg total) by mouth every 6 (six) hours   ondansetron (ZOFRAN-ODT) 4 mg disintegrating tablet   No No   Sig: Take 1 tablet (4 mg total) by mouth every 8 (eight) hours as needed for nausea or vomiting   promethazine-dextromethorphan (PHENERGAN-DM) 6.25-15 mg/5 mL oral syrup   No No   Sig: Take 5 mL by mouth 4 (four) times a day as needed for cough   thiamine (VITAMIN B1) 100 mg tablet  Self Yes No   Sig: Take 100 mg by mouth daily   tiZANidine (ZANAFLEX) 2 mg tablet  Self No No   Sig: Take 1 tablet (2 mg total) by mouth every 8 (eight) hours as needed for muscle spasms      Facility-Administered Medications: None     Discharge Medication List as of 7/11/2025  1:36 AM        CONTINUE these medications which have NOT CHANGED    Details   albuterol (PROVENTIL HFA,VENTOLIN HFA) 90 mcg/act inhaler TAKE 2 PUFFS BY MOUTH EVERY 6 HOURS AS NEEDED FOR WHEEZE OR FOR SHORTNESS OF BREATH, Normal      amoxicillin (AMOXIL) 875 mg tablet Take 1 tablet by mouth 2 (two) times a day, Starting Thu 2/27/2025, Historical Med      ARIPiprazole (ABILIFY) 2 mg tablet Historical Med      Ascorbic Acid (VITAMIN C) 100 MG tablet Take 100 mg by mouth daily, Historical Med      benzonatate (TESSALON PERLES) 100 mg capsule Take 1 capsule (100 mg total) by mouth every 8 (eight) hours, Starting Sun 3/2/2025, Normal      Biotin w/ Vitamins C & E (HAIR/SKIN/NAILS PO) Take by mouth, Historical Med      Breo Ellipta 200-25 MCG/ACT inhaler INHALE 1 PUFF DAILY RINSE MOUTH AFTER USE., Normal      busPIRone (BUSPAR) 15 mg tablet Take by mouth 3 (three) times a day  , Historical Med      CALCIUM-MAGNESIUM-ZINC PO Take by mouth, Historical Med      famotidine (PEPCID) 20 mg  tablet TAKE 1 TABLET BY MOUTH TWICE A DAY, Starting Thu 4/24/2025, Normal      guaiFENesin (MUCINEX) 600 mg 12 hr tablet Take 2 tablets (1,200 mg total) by mouth 2 (two) times a day, Starting Thu 10/10/2024, Normal      ibuprofen (MOTRIN) 600 mg tablet Take by mouth every 6 (six) hours as needed for mild pain, Historical Med      ketoconazole (NIZORAL) 2 % cream Historical Med      ketoconazole (NIZORAL) 2 % shampoo Apply topically, Starting Mon 12/4/2023, Historical Med      ketotifen (ZADITOR) 0.025 % ophthalmic solution Administer 1 drop to both eyes 2 (two) times a day, Starting Wed 3/27/2024, Normal      Ketotifen Fumarate (ZADITOR) 0.035 % ophthalmic solution instill 1 drop into both eyes twice a day, Historical Med      loratadine (CLARITIN) 10 mg tablet TAKE 1 TABLET BY MOUTH EVERY DAY, Starting Wed 1/29/2025, Normal      LORazepam (ATIVAN) 1 mg tablet Take 1 mg by mouth 6 (six) times a day, Starting u 9/20/2018, Historical Med      Multiple Vitamin (multivitamin) tablet Take 1 tablet by mouth daily, Historical Med      naproxen (EC NAPROSYN) 500 MG EC tablet Take 1 tablet (500 mg total) by mouth 2 (two) times a day as needed for mild pain, Starting Mon 9/20/2021, Until Mon 3/10/2025 at 2359, Normal      naproxen (NAPROSYN) 500 mg tablet TAKE 1 TABLET BY MOUTH TWICE A DAY WITH FOOD, Starting Mon 4/28/2025, Normal      ofloxacin (OCUFLOX) 0.3 % ophthalmic solution Administer 1 drop to the right eye 4 (four) times a day, Starting Sun 4/28/2024, Normal      omeprazole (PriLOSEC) 20 mg delayed release capsule TAKE 1 CAPSULE (20 MG TOTAL) BY MOUTH TWICE DAILY, Starting Sun 5/25/2025, Normal      !! ondansetron (ZOFRAN) 4 mg tablet Take 1 tablet (4 mg total) by mouth every 8 (eight) hours as needed for nausea or vomiting, Starting Wed 1/8/2025, Normal      !! ondansetron (ZOFRAN) 4 mg tablet Take 1 tablet (4 mg total) by mouth every 6 (six) hours, Starting Sun 3/2/2025, Normal      ondansetron (ZOFRAN-ODT) 4 mg  disintegrating tablet Take 1 tablet (4 mg total) by mouth every 8 (eight) hours as needed for nausea or vomiting, Starting Wed 2025, Normal      promethazine-dextromethorphan (PHENERGAN-DM) 6.25-15 mg/5 mL oral syrup Take 5 mL by mouth 4 (four) times a day as needed for cough, Starting Thu 3/6/2025, Normal      thiamine (VITAMIN B1) 100 mg tablet Take 100 mg by mouth daily, Historical Med      tiZANidine (ZANAFLEX) 2 mg tablet Take 1 tablet (2 mg total) by mouth every 8 (eight) hours as needed for muscle spasms, Starting Wed 10/26/2022, Normal       !! - Potential duplicate medications found. Please discuss with provider.        No discharge procedures on file.  ED SEPSIS DOCUMENTATION   Time reflects when diagnosis was documented in both MDM as applicable and the Disposition within this note       Time User Action Codes Description Comment    2025  1:33 AM Brian Harry Add [R51.9] Headache                      [1]   Past Medical History:  Diagnosis Date    Allergies     Anxiety     Asthma     Chronic pain     Contusion of head, subsequent encounter 2022    Depression     Eating disorder     Fibrocystic breast changes of both breasts     Seizures (HCC)    [2]   Past Surgical History:  Procedure Laterality Date    INDUCED       MAMMO STEREOTACTIC BREAST BIOPSY LEFT (ALL INC)      Benign   [3]   Family History  Problem Relation Name Age of Onset    Diabetes Sister      Alcohol abuse Sister      Cancer Father          Diagnosed stage 4 - metastazied    Psychiatric Illness Mother      Hypertension Family      Diabetes Family     [4]   Social History  Tobacco Use    Smoking status: Every Day     Current packs/day: 0.50     Average packs/day: 0.5 packs/day for 28.0 years (14.0 ttl pk-yrs)     Types: Cigarettes    Smokeless tobacco: Never    Tobacco comments:     2023-10-15 cig/day   Vaping Use    Vaping status: Never Used   Substance Use Topics    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

## 2025-07-11 NOTE — ASSESSMENT & PLAN NOTE
Orders:    Transfer to other facility     [facility]             [address]                        Telephone [phone]             Fax [phone]       Burton Bess  :  2013  Age:  5 y.o. MRN:  [id number]  Date of visit:  2022     Assessment and Plan:    1. Influenza A  5-day history of cough, congestion-positive for influenza A in the urgent care today. Will hold off on Tamiflu at this time and treat supportively. Recommend Tylenol or ibuprofen as needed for fevers or sore throat. Can use over-the-counter cough and flu medicines as needed. Turn to clinic for any new or worsening symptoms. No fevers today. Recommend pushing oral fluids and having small frequent meals. 2. Body aches  - POCT COVID-19 & Influenza A/B      Subjective:    Burton Bess is a 5 y.o. male who presents to [facility] today (2022) for evaluation of:  Cough (Congestion,vomiting,headache,runny nose,chills and body aches. Sx began Monday and has not been back to school this week. )    Patient 5year-old male who presents to the office for 5-day history of cough, congestion, headache, vomiting. Mother and father state that patient had a bad day yesterday and has mild improvement today however they failed to bring him in as he has missed school all week. They state that he was able to eat dinner last night and has not had any vomiting yet today. No fever today. Has otherwise been doing okay today. Chief Complaint   Patient presents with    Cough     Congestion,vomiting,headache,runny nose,chills and body aches. Sx began Monday and has not been back to school this week. He has the following problem list:  There is no problem list on file for this patient. Review of Systems   Constitutional:  Negative for activity change and fever. HENT:  Positive for congestion, rhinorrhea and sore throat.  Negative for ear discharge, ear pain and sneezing. Eyes:  Negative for pain, discharge and redness. Respiratory:  Positive for cough. Negative for shortness of breath and wheezing. Cardiovascular:  Negative for chest pain. Gastrointestinal:  Positive for vomiting. Negative for abdominal pain, constipation, diarrhea and nausea. Endocrine: Negative for polydipsia and polyuria. Skin:  Negative for rash. Neurological:  Negative for headaches. Current medications are:  No current outpatient medications on file. No current facility-administered medications for this visit. He is allergic to zyrtec [cetirizine]. He  reports that he has never smoked. He has never used smokeless tobacco.      Objective:    Vitals:    12/09/22 0929   BP: 102/80   Pulse: 88   Temp: 98.4 °F (36.9 °C)   TempSrc: Tympanic   SpO2: 99%   Weight: 57 lb 3.2 oz (25.9 kg)   Height: 4' 5\" (1.346 m)     Body mass index is 14.32 kg/m². Physical Exam  Vitals and nursing note reviewed. Constitutional:       General: He is active. Appearance: He is well-developed. HENT:      Head: Atraumatic. Right Ear: Tympanic membrane normal.      Left Ear: Tympanic membrane normal.      Nose: Congestion and rhinorrhea present. Mouth/Throat:      Mouth: Mucous membranes are moist.      Pharynx: Posterior oropharyngeal erythema present. No oropharyngeal exudate. Eyes:      General:         Right eye: No discharge. Left eye: No discharge. Conjunctiva/sclera: Conjunctivae normal.   Cardiovascular:      Rate and Rhythm: Normal rate and regular rhythm. Heart sounds: No murmur heard. Pulmonary:      Effort: Pulmonary effort is normal. No respiratory distress or retractions. Breath sounds: Normal breath sounds and air entry. No wheezing. Abdominal:      General: Bowel sounds are normal.      Palpations: Abdomen is soft. Musculoskeletal:         General: No deformity or signs of injury. Normal range of motion. Cervical back: Normal range of motion. Skin:     General: Skin is warm and dry. Neurological:      Mental Status: He is alert.              (Please note that portions of this note were completed with a voice-recognition program. Efforts were made to edit the dictation but occasionally words are mis-transcribed.)

## 2025-07-14 ENCOUNTER — NURSE TRIAGE (OUTPATIENT)
Age: 45
End: 2025-07-14

## 2025-07-14 NOTE — TELEPHONE ENCOUNTER
"REASON FOR CONVERSATION: Leg Swelling    SYMPTOMS: edema up the knees in both legs, pain in legs, water retention     OTHER HEALTH INFORMATION: patient refused to go back to [facility] or Emergency Room today.     PROTOCOL DISPOSITION: See Today in Office    CARE ADVICE PROVIDED: yes     PRACTICE FOLLOW-UP: Patient requested \"something to be prescribed to reduce the edema\". Patient has an appointment for tomorrow 7/15. Refused to go back to Emergency Room or [facility] today.         "

## 2025-07-14 NOTE — TELEPHONE ENCOUNTER
"Reason for Disposition  • MODERATE swelling of both ankles (e.g., swelling extends up to the knees) AND new-onset or getting worse    Answer Assessment - Initial Assessment Questions  1. ONSET: \"When did the swelling start?\" (e.g., minutes, hours, days)      A week ago   2. LOCATION: \"What part of the leg is swollen?\"  \"Are both legs swollen or just one leg?\"      Both feet, ankles up to the knee.   3. SEVERITY: \"How bad is the swelling?\" (e.g., localized; mild, moderate, severe)      Severe   5. PAIN: \"Is the swelling painful to touch?\" If Yes, ask: \"How painful is it?\"   (Scale 1-10; mild, moderate or severe)      Moderate, Patient complains of her legs pain.   7. CAUSE: \"What do you think is causing the leg swelling?\"      Unknown     10. OTHER SYMPTOMS: \"Do you have any other symptoms?\" (e.g., chest pain, difficulty breathing)        Water retention, weight gain.   11. PREGNANCY: \"Is there any chance you are pregnant?\" \"When was your last menstrual period?\"        N/A    Protocols used: Leg Swelling and Edema-Adult-[place]    "

## 2025-07-14 NOTE — TELEPHONE ENCOUNTER
Called and spoke w pt -- discussed PCP note at length. Pt reports approx 3L of water intake daily, drinking a Pedialyte every other day as well as taking OTC magnesium to bring up electrolyte levels. Has not been eating recently given pain from edema. She has appt scheduled for tomorrow w/ PCP. She was advised of ED return precautions.

## 2025-07-14 NOTE — TELEPHONE ENCOUNTER
Please advise Fatimah supportive measures-elevate legs, she can consider compression stockings to manage the swelling.  She should also avoid prolonged exposure to the hot temperatures when legs are swollen.  Low-salt diet recommended as well.  I hesitate to recommend diuretics as I reviewed her emergency department visit.  She required electrolyte replacement for medication use prior to admission and fluid overload from water consumption.  Diuretics can worsen this situation.    I ordered recheck of labs specifically CBC-her red blood count dropped, recheck of her magnesium and sodium levels.  I would like this drawn in the next few weeks

## 2025-07-15 ENCOUNTER — APPOINTMENT (OUTPATIENT)
Dept: LAB | Facility: CLINIC | Age: 45
End: 2025-07-15
Payer: COMMERCIAL

## 2025-07-15 DIAGNOSIS — D50.8 OTHER IRON DEFICIENCY ANEMIA: ICD-10-CM

## 2025-07-15 DIAGNOSIS — F55.8: ICD-10-CM

## 2025-07-15 LAB
ALBUMIN SERPL BCG-MCNC: 3.9 G/DL (ref 3.5–5)
ALP SERPL-CCNC: 45 U/L (ref 34–104)
ALT SERPL W P-5'-P-CCNC: 20 U/L (ref 7–52)
ANION GAP SERPL CALCULATED.3IONS-SCNC: 11 MMOL/L (ref 4–13)
AST SERPL W P-5'-P-CCNC: 28 U/L (ref 13–39)
BASOPHILS # BLD AUTO: 0.03 THOUSANDS/ÂΜL (ref 0–0.1)
BASOPHILS NFR BLD AUTO: 1 % (ref 0–1)
BILIRUB SERPL-MCNC: 0.27 MG/DL (ref 0.2–1)
BUN SERPL-MCNC: 12 MG/DL (ref 5–25)
CALCIUM SERPL-MCNC: 8.6 MG/DL (ref 8.4–10.2)
CHLORIDE SERPL-SCNC: 99 MMOL/L (ref 96–108)
CO2 SERPL-SCNC: 22 MMOL/L (ref 21–32)
CREAT SERPL-MCNC: 1.08 MG/DL (ref 0.6–1.3)
EOSINOPHIL # BLD AUTO: 0.06 THOUSAND/ÂΜL (ref 0–0.61)
EOSINOPHIL NFR BLD AUTO: 1 % (ref 0–6)
ERYTHROCYTE [DISTWIDTH] IN BLOOD BY AUTOMATED COUNT: 17.6 % (ref 11.6–15.1)
GFR SERPL CREATININE-BSD FRML MDRD: 62 ML/MIN/1.73SQ M
GLUCOSE P FAST SERPL-MCNC: 66 MG/DL (ref 65–99)
HCT VFR BLD AUTO: 33.1 % (ref 34.8–46.1)
HGB BLD-MCNC: 10.9 G/DL (ref 11.5–15.4)
IMM GRANULOCYTES # BLD AUTO: 0.01 THOUSAND/UL (ref 0–0.2)
IMM GRANULOCYTES NFR BLD AUTO: 0 % (ref 0–2)
LYMPHOCYTES # BLD AUTO: 2.63 THOUSANDS/ÂΜL (ref 0.6–4.47)
LYMPHOCYTES NFR BLD AUTO: 41 % (ref 14–44)
MAGNESIUM SERPL-MCNC: 1.6 MG/DL (ref 1.9–2.7)
MCH RBC QN AUTO: 27.3 PG (ref 26.8–34.3)
MCHC RBC AUTO-ENTMCNC: 32.9 G/DL (ref 31.4–37.4)
MCV RBC AUTO: 83 FL (ref 82–98)
MONOCYTES # BLD AUTO: 0.62 THOUSAND/ÂΜL (ref 0.17–1.22)
MONOCYTES NFR BLD AUTO: 10 % (ref 4–12)
NEUTROPHILS # BLD AUTO: 3.03 THOUSANDS/ÂΜL (ref 1.85–7.62)
NEUTS SEG NFR BLD AUTO: 47 % (ref 43–75)
NRBC BLD AUTO-RTO: 0 /100 WBCS
PLATELET # BLD AUTO: 363 THOUSANDS/UL (ref 149–390)
PMV BLD AUTO: 8.1 FL (ref 8.9–12.7)
POTASSIUM SERPL-SCNC: 3.9 MMOL/L (ref 3.5–5.3)
PROT SERPL-MCNC: 6.3 G/DL (ref 6.4–8.4)
RBC # BLD AUTO: 4 MILLION/UL (ref 3.81–5.12)
SODIUM SERPL-SCNC: 132 MMOL/L (ref 135–147)
WBC # BLD AUTO: 6.38 THOUSAND/UL (ref 4.31–10.16)

## 2025-07-15 PROCEDURE — 85025 COMPLETE CBC W/AUTO DIFF WBC: CPT

## 2025-07-15 PROCEDURE — 36415 COLL VENOUS BLD VENIPUNCTURE: CPT

## 2025-07-15 PROCEDURE — 80053 COMPREHEN METABOLIC PANEL: CPT

## 2025-07-15 PROCEDURE — 83735 ASSAY OF MAGNESIUM: CPT

## 2025-07-16 ENCOUNTER — OFFICE VISIT (OUTPATIENT)
Dept: FAMILY MEDICINE CLINIC | Facility: CLINIC | Age: 45
End: 2025-07-16
Payer: COMMERCIAL

## 2025-07-16 ENCOUNTER — APPOINTMENT (OUTPATIENT)
Dept: LAB | Facility: CLINIC | Age: 45
End: 2025-07-16
Attending: NURSE PRACTITIONER
Payer: COMMERCIAL

## 2025-07-16 VITALS
WEIGHT: 98 LBS | BODY MASS INDEX: 18.5 KG/M2 | HEIGHT: 61 IN | RESPIRATION RATE: 17 BRPM | OXYGEN SATURATION: 98 % | DIASTOLIC BLOOD PRESSURE: 64 MMHG | HEART RATE: 96 BPM | SYSTOLIC BLOOD PRESSURE: 108 MMHG | TEMPERATURE: 98.1 F

## 2025-07-16 DIAGNOSIS — F33.0 MAJOR DEPRESSIVE DISORDER, RECURRENT, MILD (HCC): ICD-10-CM

## 2025-07-16 DIAGNOSIS — F55.8: ICD-10-CM

## 2025-07-16 DIAGNOSIS — E46 MALNUTRITION COMPROMISING BODILY FUNCTION (HCC): ICD-10-CM

## 2025-07-16 DIAGNOSIS — E55.9 VITAMIN D DEFICIENCY: ICD-10-CM

## 2025-07-16 DIAGNOSIS — E53.8 VITAMIN B12 DEFICIENCY: ICD-10-CM

## 2025-07-16 DIAGNOSIS — R60.9 SWELLING: ICD-10-CM

## 2025-07-16 DIAGNOSIS — F50.25 BULIMIA NERVOSA, IN PARTIAL REMISSION, MODERATE: ICD-10-CM

## 2025-07-16 DIAGNOSIS — F50.89 ATYPICAL EATING DISORDER: Primary | ICD-10-CM

## 2025-07-16 DIAGNOSIS — D50.8 IRON DEFICIENCY ANEMIA SECONDARY TO INADEQUATE DIETARY IRON INTAKE: ICD-10-CM

## 2025-07-16 DIAGNOSIS — F17.210 CIGARETTE SMOKER: ICD-10-CM

## 2025-07-16 LAB
25(OH)D3 SERPL-MCNC: 42.5 NG/ML (ref 30–100)
FERRITIN SERPL-MCNC: 3 NG/ML (ref 30–307)
IRON SATN MFR SERPL: 3 % (ref 15–50)
IRON SERPL-MCNC: 12 UG/DL (ref 50–212)
PREALB SERPL-MCNC: 22.8 MG/DL (ref 17–34)
TIBC SERPL-MCNC: 378 UG/DL (ref 250–450)
TRANSFERRIN SERPL-MCNC: 270 MG/DL (ref 203–362)
UIBC SERPL-MCNC: 366 UG/DL (ref 155–355)
VIT B12 SERPL-MCNC: 1020 PG/ML (ref 180–914)

## 2025-07-16 PROCEDURE — 36415 COLL VENOUS BLD VENIPUNCTURE: CPT

## 2025-07-16 PROCEDURE — 84134 ASSAY OF PREALBUMIN: CPT

## 2025-07-16 PROCEDURE — 83540 ASSAY OF IRON: CPT

## 2025-07-16 PROCEDURE — 83550 IRON BINDING TEST: CPT

## 2025-07-16 PROCEDURE — G2211 COMPLEX E/M VISIT ADD ON: HCPCS | Performed by: NURSE PRACTITIONER

## 2025-07-16 PROCEDURE — 82728 ASSAY OF FERRITIN: CPT

## 2025-07-16 PROCEDURE — 82306 VITAMIN D 25 HYDROXY: CPT

## 2025-07-16 PROCEDURE — 99214 OFFICE O/P EST MOD 30 MIN: CPT | Performed by: NURSE PRACTITIONER

## 2025-07-16 PROCEDURE — 82607 VITAMIN B-12: CPT

## 2025-07-16 NOTE — PROGRESS NOTES
Name: Fatimah Da Silva      : 1980      MRN: [id number]  Encounter Provider: SOLO Doherty  Encounter Date: 2025   [facility]  :  Assessment & Plan  Atypical eating disorder    Orders:    Ambulatory Referral to Social Work Care Management Program; Future    Malnutrition compromising bodily function (HCC)    Orders:    Ambulatory Referral to Social Work Care Management Program; Future    Prealbumin; Future    Abuse of diuretics         Iron deficiency anemia secondary to inadequate dietary iron intake        Orders:    Iron Panel (Includes Ferritin, Iron Sat%, Iron, and TIBC); Future    Vitamin B12 deficiency    Orders:    Vitamin B12; Future    Vitamin D deficiency    Orders:    Vitamin D 25 hydroxy; Future    Swelling    Orders:    Prealbumin; Future    Bulimia nervosa, in partial remission, moderate         Cigarette smoker         Major depressive disorder, recurrent, mild (HCC)         Fatimah and CARYL had a lengthy discussion  Need to break her behavior cycle before there are irreversible consequences, bodily damage.  At present her symptoms are manifestation of dietary malnutrition, abuse of diuretic, water intoxication  Need to correct iron stores, protein stores, electrolytes  Advised to lower water intake-no more than 75 ml per day   Guarded electrolyte replacement  I will not prescribe diuretics for treatment of edema at this time for concern of diuretic abuse of this rx  I place referral to case management for help to find local counseling for her eating d/o  Will follow her closely with 3 week follow up  Answered her questions to her satisfaction         History of Present Illness   Fatimah presents for ED follow up  She presented to [facility] ED [facility]/10 after family trip to [facility]  C/c general unwell felling, vertigo, several swelling-legs, abdomen    She has long standing history of eating disorder-binge eating followed by excessive laxative/diuretic  "use and prolonged anorexia  This was the case during the vacation  She was drinking excess water  She admits to only have eaten 2 salads since  as she reports \"excessive weight gain\" days prior    ED eval revealed electrolyte imbalance, protein deficit  New anemia --she has h/o same-received iron infusions    Today she reports persistent edema  She continues with her \"fasting' and excessive fluid intake  She is tearful  She knows this is detrimental but she is unable to find help as her insurance does not cover services    She has no new c/o since ED    We also discussed abn CT of chest from 2025 ED visit. I ordered f/u with pulm referral  She is still smoking. She asserts \"as least quit ETOH\"      Review of Systems   Constitutional:  Positive for fatigue and unexpected weight change.   Respiratory:  Negative for shortness of breath.    Cardiovascular:  Positive for leg swelling. Negative for chest pain.   Gastrointestinal:  Positive for abdominal pain and constipation. Negative for blood in stool.   Musculoskeletal:  Positive for arthralgias and myalgias.   Neurological:  Positive for dizziness and weakness.   Psychiatric/Behavioral:  The patient is nervous/anxious.        Objective   /64 (BP Location: Left arm, Patient Position: Sitting, Cuff Size: Standard)   Pulse 96   Temp 98.1 °F (36.7 °C) (Temporal)   Resp 17   Ht 5' 1\" (1.549 m)   Wt 44.5 kg (98 lb)   SpO2 98%   BMI 18.52 kg/m²      Physical Exam  Vitals and nursing note reviewed.   Constitutional:       Appearance: She is underweight.      Comments: Appears older than stated age         Cardiovascular:      Rate and Rhythm: Normal rate and regular rhythm.   Pulmonary:      Effort: Pulmonary effort is normal.      Breath sounds: Normal breath sounds.   Abdominal:      General: There is distension.      Tenderness: There is abdominal tenderness.     Musculoskeletal:      Right lower le+ Pitting Edema present.      Left lower le+ " Pitting Edema present.     Skin:     General: Skin is warm and dry.      Coloration: Skin is not jaundiced or pale.     Neurological:      General: No focal deficit present.      Mental Status: Mental status is at baseline.      Motor: No weakness.      Gait: Gait normal.     Psychiatric:         Mood and Affect: Mood is anxious. Affect is tearful.         Behavior: Behavior is cooperative.

## 2025-07-16 NOTE — ASSESSMENT & PLAN NOTE
{For anemia, please use [facility] Hematology Work-Up SmartSet to perform initial work-up. If there are still concerns regarding management, an E-Consult to Hematology should be ordered. Click here to go the therapy plan activity if you need to order iron infusions. Search for 'PCP Venofer' therapy plan.  :[id number]}      Orders:    Iron Panel (Includes Ferritin, Iron Sat%, Iron, and TIBC); Future

## 2025-07-17 ENCOUNTER — TELEPHONE (OUTPATIENT)
Age: 45
End: 2025-07-17

## 2025-07-17 NOTE — ASSESSMENT & PLAN NOTE
Fatimah and I had a lengthy discussion  Need to break her behavior cycle before there are irreversible consequences, bodily damage.  At present her symptoms are manifestation of dietary malnutrition, abuse of diuretic, water intoxication  Need to correct iron stores, protein stores, electrolytes  Advised to lower water intake-no more than 75 ml per day   Guarded electrolyte replacement  I will not prescribe diuretics for treatment of edema at this time for concern of diuretic abuse of this rx  I place referral to case management for help to find local counseling for her eating d/o  Will follow her closely with 3 week follow up  Answered her questions to her satisfaction

## 2025-07-17 NOTE — TELEPHONE ENCOUNTER
The patient called because she is confuse in regard the amount of water that she needs to take. In the advice from the provider she state no more than 75ml the patient state that is a tiny amount of water     Also she would like a call back to discuss her test results

## 2025-07-18 ENCOUNTER — PATIENT OUTREACH (OUTPATIENT)
Dept: CASE MANAGEMENT | Facility: OTHER | Age: 45
End: 2025-07-18

## 2025-07-18 NOTE — PROGRESS NOTES
Chart review indicates that an order was placed to follow up with patient regarding OP counseling for eating disorder. Per OV notes pt with long standing hx of eating disorder, binge eating followed by excessive laxative/diuretic use and prolonged anorexia.     LINDA outreached pt to discuss above. Per pt she has medicare and medicaid. Cards found in [employer], has a [employer]. [employer] secondary. Pt on disability for her mental health.     Pt has counseling and psychiatry with [facility] in [place]. Looking for services specific to eating disorder. Is open to ind, group or IOP. Has contacted many places and has had no luck with her insurance.     Pt not able to stay on the phone long as expecting someone in a few minutes at the house.     LINDA will research agencies for her.     [facility]ached [facility]  for IOP virtual services, they do not take Medicare. No way to bypass Medicare and bill Medicaid. She is going to send resources in [place] for IP or residential and Information on [facility].    [facility] did call back pt to confirm, at this time she cannot do IP as she needs to work part time to afford her bills.     Pt is not interested in nutrition or dietician, these services do not work for her.     Outreached:    [facility], p,[phone] ,  left  [facility], p, [phone], SANCHEZ left  [facility], [phone], can only offer IP in [place], [place] for medicare  [facility], SANCHEZ left  [facility],  left    [facility] provided pt with resource for:    [facility], she can apply on the website for support for treatment and insurance navigation.     National Association of Anorexia Nervosa and Associated Disorders (ANAD) Call helpline [phone]   https://anad.org/get-help/about-our-support-groups/  They have peer support groups, hotline, and recovery mentors available     https://www.nationaleatingdisorders.org/  National Eating Disorder Helpline  345.532.6284    SWCM spoke to [employer] and referral placed for agency to try and find IOP or ind counseling through Medicare for patient. Results will come to this SW. Pt can also on website and apply for mentor and support groups, virtual and all free-not through the insurance.     [facility] intake and referral line also provided to pt to call ask for sean/assistance,     All resources sent to patient, will make case complex to wait for results from [employer]. [facility] to follow.

## 2025-07-18 NOTE — TELEPHONE ENCOUNTER
I tried to contact patient.  Left message on voicemail.    Clarity on how much she can drink-75 ounces of water or other liquids daily.  She was drinking liquids excessively.    75 ounces equals approximately 4-1/2 water bottles or 6 cans of seltzer    As for blood work her B12 is normal    She will definitely need iron supplementation  I will call her Monday to discuss further  She does not feel she could do iron pills and will probably need iron infusions.  Her iron levels to qualify her for iron infusions

## 2025-07-20 DIAGNOSIS — M79.7 FIBROMYALGIA: ICD-10-CM

## 2025-07-20 DIAGNOSIS — J30.2 SEASONAL ALLERGIES: ICD-10-CM

## 2025-07-21 ENCOUNTER — PATIENT OUTREACH (OUTPATIENT)
Dept: CASE MANAGEMENT | Facility: OTHER | Age: 45
End: 2025-07-21

## 2025-07-21 ENCOUNTER — HOSPITAL ENCOUNTER (OUTPATIENT)
Facility: HOSPITAL | Age: 45
Discharge: HOME | End: 2025-07-21
Payer: COMMERCIAL

## 2025-07-21 VITALS — BODY MASS INDEX: 16.8 KG/M2 | HEIGHT: 61 IN | WEIGHT: 89 LBS

## 2025-07-21 DIAGNOSIS — Z13.820 OSTEOPOROSIS SCREENING: ICD-10-CM

## 2025-07-21 DIAGNOSIS — S22.000A COMPRESSION FRACTURE OF BODY OF THORACIC VERTEBRA (HCC): ICD-10-CM

## 2025-07-21 PROCEDURE — 77080 DXA BONE DENSITY AXIAL: CPT

## 2025-07-21 RX ORDER — NAPROXEN 500 MG/1
500 TABLET ORAL 2 TIMES DAILY WITH MEALS
Qty: 60 TABLET | Refills: 5 | Status: SHIPPED | OUTPATIENT
Start: 2025-07-21

## 2025-07-21 RX ORDER — LORATADINE 10 MG/1
10 TABLET ORAL DAILY
Qty: 90 TABLET | Refills: 1 | Status: SHIPPED | OUTPATIENT
Start: 2025-07-21

## 2025-07-21 NOTE — PROGRESS NOTES
Incoming VM from [facility], they do not take patient insurance.    VM from Haritha at [facility] requesting call back. SWCM returned call, VM left.    SWCM to follow.

## 2025-07-23 NOTE — TELEPHONE ENCOUNTER
Patient returned call and is requesting to speak with provider regarding the iron infusions. Patient stated she is at work to please leave a message.

## 2025-07-24 ENCOUNTER — RA CDI HCC (OUTPATIENT)
Dept: OTHER | Facility: HOSPITAL | Age: 45
End: 2025-07-24

## 2025-07-24 ENCOUNTER — PATIENT OUTREACH (OUTPATIENT)
Dept: CASE MANAGEMENT | Facility: OTHER | Age: 45
End: 2025-07-24

## 2025-07-24 NOTE — PROGRESS NOTES
Incoming call from [facility], they do not take pt insurance.    CM will await email from [employer] regarding finding to outreach patient. CM to follow.

## 2025-07-31 ENCOUNTER — PATIENT OUTREACH (OUTPATIENT)
Dept: CASE MANAGEMENT | Facility: OTHER | Age: 45
End: 2025-07-31

## 2025-08-05 ENCOUNTER — OFFICE VISIT (OUTPATIENT)
Dept: FAMILY MEDICINE CLINIC | Facility: CLINIC | Age: 45
End: 2025-08-05
Payer: COMMERCIAL

## 2025-08-05 VITALS
WEIGHT: 94.6 LBS | RESPIRATION RATE: 17 BRPM | OXYGEN SATURATION: 99 % | HEART RATE: 84 BPM | TEMPERATURE: 98.2 F | BODY MASS INDEX: 17.86 KG/M2 | DIASTOLIC BLOOD PRESSURE: 68 MMHG | SYSTOLIC BLOOD PRESSURE: 100 MMHG | HEIGHT: 61 IN

## 2025-08-05 DIAGNOSIS — J45.31 ASTHMA IN ADULT, MILD PERSISTENT, WITH ACUTE EXACERBATION: ICD-10-CM

## 2025-08-05 DIAGNOSIS — D50.8 IRON DEFICIENCY ANEMIA SECONDARY TO INADEQUATE DIETARY IRON INTAKE: Primary | ICD-10-CM

## 2025-08-05 DIAGNOSIS — Z71.2 ENCOUNTER TO DISCUSS TEST RESULTS: ICD-10-CM

## 2025-08-05 DIAGNOSIS — E43 SEVERE PROTEIN-CALORIE MALNUTRITION (HCC): ICD-10-CM

## 2025-08-05 DIAGNOSIS — E46 MALNUTRITION COMPROMISING BODILY FUNCTION (HCC): ICD-10-CM

## 2025-08-05 PROCEDURE — G2211 COMPLEX E/M VISIT ADD ON: HCPCS | Performed by: NURSE PRACTITIONER

## 2025-08-05 PROCEDURE — 99213 OFFICE O/P EST LOW 20 MIN: CPT | Performed by: NURSE PRACTITIONER

## 2025-08-05 RX ORDER — ALBUTEROL SULFATE 90 UG/1
2 INHALANT RESPIRATORY (INHALATION) EVERY 6 HOURS PRN
Qty: 8.5 G | Refills: 3 | Status: SHIPPED | OUTPATIENT
Start: 2025-08-05

## 2025-08-05 RX ORDER — SODIUM CHLORIDE 9 MG/ML
20 INJECTION, SOLUTION INTRAVENOUS ONCE
OUTPATIENT
Start: 2025-08-28

## 2025-08-11 ENCOUNTER — PATIENT OUTREACH (OUTPATIENT)
Dept: CASE MANAGEMENT | Facility: OTHER | Age: 45
End: 2025-08-11